# Patient Record
Sex: MALE | Race: WHITE | NOT HISPANIC OR LATINO | Employment: OTHER | URBAN - NONMETROPOLITAN AREA
[De-identification: names, ages, dates, MRNs, and addresses within clinical notes are randomized per-mention and may not be internally consistent; named-entity substitution may affect disease eponyms.]

---

## 2017-07-31 ENCOUNTER — OFFICE VISIT (OUTPATIENT)
Dept: UROLOGY | Facility: CLINIC | Age: 67
End: 2017-07-31

## 2017-07-31 VITALS — WEIGHT: 279 LBS | BODY MASS INDEX: 33.97 KG/M2 | TEMPERATURE: 98.5 F | HEIGHT: 76 IN

## 2017-07-31 DIAGNOSIS — R97.20 ELEVATED PROSTATE SPECIFIC ANTIGEN (PSA): Primary | ICD-10-CM

## 2017-07-31 LAB
BILIRUB BLD-MCNC: NEGATIVE MG/DL
CLARITY, POC: CLEAR
COLOR UR: YELLOW
GLUCOSE UR STRIP-MCNC: NEGATIVE MG/DL
KETONES UR QL: NEGATIVE
LEUKOCYTE EST, POC: NEGATIVE
NITRITE UR-MCNC: NEGATIVE MG/ML
PH UR: 5.5 [PH] (ref 5–8)
PROT UR STRIP-MCNC: NEGATIVE MG/DL
RBC # UR STRIP: NEGATIVE /UL
SP GR UR: 1.02 (ref 1–1.03)
UROBILINOGEN UR QL: NORMAL

## 2017-07-31 PROCEDURE — 99204 OFFICE O/P NEW MOD 45 MIN: CPT | Performed by: UROLOGY

## 2017-07-31 PROCEDURE — 81003 URINALYSIS AUTO W/O SCOPE: CPT | Performed by: UROLOGY

## 2017-07-31 RX ORDER — ALLOPURINOL 300 MG/1
300 TABLET ORAL DAILY
COMMUNITY
End: 2020-02-28 | Stop reason: SDUPTHER

## 2017-07-31 RX ORDER — ENALAPRIL MALEATE 1 MG/ML
1 SOLUTION ORAL DAILY
COMMUNITY
End: 2020-03-11 | Stop reason: SDUPTHER

## 2017-07-31 RX ORDER — HYDROCHLOROTHIAZIDE 25 MG/1
25 TABLET ORAL 2 TIMES DAILY
COMMUNITY
End: 2020-07-09

## 2017-07-31 RX ORDER — PROPRANOLOL HYDROCHLORIDE 20 MG/1
20 TABLET ORAL 2 TIMES DAILY
COMMUNITY
End: 2020-08-20 | Stop reason: SDUPTHER

## 2017-07-31 RX ORDER — COLCHICINE 0.6 MG/1
TABLET ORAL
COMMUNITY
End: 2020-01-31

## 2017-07-31 NOTE — PROGRESS NOTES
Mr. Monzon is 67 y.o. male    CHIEF COMPLAINT: I am here to follow up on my elevated psa. My PSA is 5.220  HPI  Elevated PSA  The patient presents with a presumed abnormality of the prostate gland, that is an elevated PSA.  This has been found in the context of PSA screening. Severity is best defined by the PSA level of 5.2 ng/ml. This test was done approximately 2weeks ago. Associated voiding symptom assessment reveals No evidence of voiding symptoms. He has never undergone prostate biopsy..       Previous PSA values include :   No results found for: PSA      No results found for: PSA      The following portions of the patient's history were reviewed and updated as appropriate: allergies, current medications, past family history, past medical history, past social history, past surgical history and problem list.    Review of Systems   Constitutional: Negative for appetite change and fever.   HENT: Negative for hearing loss and sore throat.    Eyes: Negative for pain and redness.   Respiratory: Negative for cough and shortness of breath.    Cardiovascular: Negative for chest pain and leg swelling.   Gastrointestinal: Negative for anal bleeding, nausea and vomiting.   Endocrine: Negative for cold intolerance and heat intolerance.   Genitourinary: Negative for dysuria, flank pain and hematuria.   Musculoskeletal: Negative for joint swelling and myalgias.   Skin: Negative for color change and rash.   Allergic/Immunologic: Negative for immunocompromised state.   Neurological: Negative for dizziness and speech difficulty.   Hematological: Negative for adenopathy. Does not bruise/bleed easily.   Psychiatric/Behavioral: Negative for dysphoric mood and suicidal ideas.         Current Outpatient Prescriptions:   •  allopurinol (ZYLOPRIM) 100 MG tablet, Take  by mouth., Disp: , Rfl:   •  colchicine 0.6 MG tablet, Take  by mouth., Disp: , Rfl:   •  Enalapril Maleate 1 MG/ML solution, Take  by mouth., Disp: , Rfl:   •   "hydrochlorothiazide (HYDRODIURIL) 25 MG tablet, Take  by mouth., Disp: , Rfl:   •  propranolol (INDERAL) 20 MG tablet, Take  by mouth., Disp: , Rfl:     Past Medical History:   Diagnosis Date   • Hypertension        Past Surgical History:   Procedure Laterality Date   • BACK SURGERY     • TOTAL HIP ARTHROPLASTY         Social History     Social History   • Marital status:      Spouse name: N/A   • Number of children: N/A   • Years of education: N/A     Social History Main Topics   • Smoking status: Never Smoker   • Smokeless tobacco: None   • Alcohol use None   • Drug use: None   • Sexual activity: Not Asked     Other Topics Concern   • None     Social History Narrative   • None       Family History   Problem Relation Age of Onset   • No Known Problems Father    • No Known Problems Mother        Temp 98.5 °F (36.9 °C)  Ht 76\" (193 cm)  Wt 279 lb (127 kg)  BMI 33.96 kg/m2    Physical Exam  Constitutional: He is oriented to person, place, and time. He appears well-developed and well-nourished. No distress.   HENT:   Nose: Nose normal.   Neck: Normal range of motion. Neck supple. No tracheal deviation present. No thyromegaly present.   Cardiovascular: Normal rate, regular rhythm and intact distal pulses.    No significant edema or tenderness    Pulmonary/Chest: Breath sounds normal. No accessory muscle usage. No respiratory distress.   Abdominal: Soft. Bowel sounds are normal. He exhibits no distension, no ascites and no mass. There is no hepatosplenomegaly. There is no tenderness. There is no rebound, no guarding and no CVA tenderness. No hernia.   Stool specimen is not indicated for my portion of the exam   Genitourinary: Testes normal and penis normal. Rectal exam shows no mass, no tenderness, anal tone normal and guaiac negative stool. Tender: no nodules. Right testis shows no mass, no swelling and no tenderness. Left testis shows no mass, no swelling and no tenderness. No penile tenderness (no lesion or " deformities).   Genitourinary Comments:  The urethral meatus normal in position without evidence of stricture. Epididymis without mass or tenderness. Vas Deferens is palpably normal.Anus and perineum without mass or tenderness. The seminal vesicle are without mass or enlargement. The prostate is approximately 30 ml. It is Symmetric, with a Soft consistency. There are no nodules present. . The seminal vesicles are Palpably normal in size and without mass.     Lymphadenopathy:     He has no cervical adenopathy. No inguinal adenopathy noted on the right or left side.        Right: No inguinal adenopathy present.        Left: No inguinal adenopathy present.   Neurological: He is alert and oriented to person, place, and time.   Skin: Skin is warm and dry. No rash noted. He is not diaphoretic. No pallor.   Psychiatric: He has a normal mood and affect. His behavior is normal.   Vitals reviewed.        Results for orders placed or performed in visit on 07/31/17   POC Urinalysis Dipstick, Automated   Result Value Ref Range    Color Yellow Yellow, Straw, Dark Yellow, Glendy    Clarity, UA Clear Clear    Glucose, UA Negative Negative, 1000 mg/dL (3+) mg/dL    Bilirubin Negative Negative    Ketones, UA Negative Negative    Specific Gravity  1.025 1.005 - 1.030    Blood, UA Negative Negative    pH, Urine 5.5 5.0 - 8.0    Protein, POC Negative Negative mg/dL    Urobilinogen, UA Normal Normal    Leukocytes Negative Negative    Nitrite, UA Negative Negative       Imaging Results (last 7 days)     ** No results found for the last 168 hours. **          Assessment and Plan  Diagnoses and all orders for this visit:    Elevated prostate specific antigen (PSA)  -     POC Urinalysis Dipstick, Automated  -     PSA, Total & Free; Future    This represents an undiagnosed problem with uncertain prognosis.  I discussed elevated PSA with the patient today.  We discussed that PSA is a protein measured in the bloodstream that comes exclusively  from the prostate gland.  I mentioned to him that all men with a prostate gland will have a certain PSA level.  We discussed this number can be compared to all men, or men of a certain age.  We can also follow trend of PSA which is called the PSA velocity.  We discussed the prostate cancer is a possible cause of PSA elevation, but benign etiologies such as infection, enlargement, aging, and inflammation should also be considered.  There is also convincing evidence that some patients will have a PSA that waxes and wanes completely unrelated to symptomatic disease of the prostate for cancer.  We discussed that some patients with a normal PSA may also have prostate cancer.  The necessity of digital rectal exam is also discussed.  Finally we discussed the role of free to total PSA ratio.  It is explained that this test is done in hopes of avoiding unnecessary biopsies, but that a few patients with prostate cancer will have false-negative results when measuring there free PSA.  We have elected to do a PSA with free to total ratio.  We did discuss the natural course of prostate cancer in most patients.  It is explained that waiting to see what the results of this test*are very unlikely to affect the clinical course of the disease.  However, there are exceptions in the biology of each cancer.  The risks and possible benefits of transrectal ultrasound with biopsy of the prostate gland is also discussed.  I did explain that biopsy is the standard of care for diagnosing prostate cancer. The risks, alternatives, and benefits of this treatment recommendation are discussed.  I did answer all questions of the patient.    Siddhartha Epps MD  07/31/17  4:07 PM      Cc: Bam Heath MD

## 2017-10-29 ENCOUNTER — RESULTS ENCOUNTER (OUTPATIENT)
Dept: UROLOGY | Facility: CLINIC | Age: 67
End: 2017-10-29

## 2017-10-29 DIAGNOSIS — R97.20 ELEVATED PROSTATE SPECIFIC ANTIGEN (PSA): ICD-10-CM

## 2017-11-03 LAB
PSA FREE MFR SERPL: 10.2 %
PSA FREE SERPL-MCNC: 0.54 NG/ML
PSA SERPL-MCNC: 5.3 NG/ML (ref 0–4)

## 2017-11-09 ENCOUNTER — OFFICE VISIT (OUTPATIENT)
Dept: UROLOGY | Facility: CLINIC | Age: 67
End: 2017-11-09

## 2017-11-09 VITALS — WEIGHT: 284 LBS | BODY MASS INDEX: 35.31 KG/M2 | HEIGHT: 75 IN | TEMPERATURE: 97 F

## 2017-11-09 DIAGNOSIS — R97.20 ELEVATED PROSTATE SPECIFIC ANTIGEN (PSA): Primary | ICD-10-CM

## 2017-11-09 LAB
BILIRUB BLD-MCNC: NEGATIVE MG/DL
CLARITY, POC: CLEAR
COLOR UR: YELLOW
GLUCOSE UR STRIP-MCNC: NEGATIVE MG/DL
KETONES UR QL: NEGATIVE
LEUKOCYTE EST, POC: NEGATIVE
NITRITE UR-MCNC: NEGATIVE MG/ML
PH UR: 6.5 [PH] (ref 5–8)
PROT UR STRIP-MCNC: ABNORMAL MG/DL
RBC # UR STRIP: NEGATIVE /UL
SP GR UR: 1.02 (ref 1–1.03)
UROBILINOGEN UR QL: NORMAL

## 2017-11-09 PROCEDURE — 81003 URINALYSIS AUTO W/O SCOPE: CPT | Performed by: UROLOGY

## 2017-11-09 PROCEDURE — 99213 OFFICE O/P EST LOW 20 MIN: CPT | Performed by: UROLOGY

## 2017-11-09 RX ORDER — CEPHALEXIN 500 MG/1
500 CAPSULE ORAL 2 TIMES DAILY
Qty: 2 CAPSULE | Refills: 0 | Status: SHIPPED | OUTPATIENT
Start: 2017-11-09 | End: 2017-11-10

## 2017-11-09 NOTE — PROGRESS NOTES
Mr. Monzon is 67 y.o. male    CHIEF COMPLAINT: I am here to follow up on my elevated psa    HPI  Patient with a presumed prostatic problem.  He has elevated PSA.  This was first identified about 4 months ago.  He has some mild obstructive voiding symptoms and occasional frequency.  Watchful waiting with repeat PSA was the initial management strategy which has not improved the PSA.  Severity is now PSA of 5.3 with a 10% free to total ratio.    Lab Results   Component Value Date    PSA 5.3 (H) 10% free to total ratio 11/02/2017         The following portions of the patient's history were reviewed and updated as appropriate: allergies, current medications, past family history, past medical history, past social history, past surgical history and problem list.    Review of Systems   Constitutional: Negative for chills and fever.   Gastrointestinal: Negative for abdominal pain, anal bleeding and blood in stool.   Genitourinary: Negative for flank pain and hematuria.         Current Outpatient Prescriptions:   •  allopurinol (ZYLOPRIM) 100 MG tablet, Take  by mouth., Disp: , Rfl:   •  cephalexin (KEFLEX) 500 MG capsule, Take 1 capsule by mouth 2 (Two) Times a Day for 1 day. Start on morning of biopsy, Disp: 2 capsule, Rfl: 0  •  colchicine 0.6 MG tablet, Take  by mouth., Disp: , Rfl:   •  Enalapril Maleate 1 MG/ML solution, Take  by mouth., Disp: , Rfl:   •  hydrochlorothiazide (HYDRODIURIL) 25 MG tablet, Take  by mouth., Disp: , Rfl:   •  propranolol (INDERAL) 20 MG tablet, Take  by mouth., Disp: , Rfl:     Past Medical History:   Diagnosis Date   • Hypertension        Past Surgical History:   Procedure Laterality Date   • BACK SURGERY     • TOTAL HIP ARTHROPLASTY         Social History     Social History   • Marital status:      Spouse name: N/A   • Number of children: N/A   • Years of education: N/A     Social History Main Topics   • Smoking status: Never Smoker   • Smokeless tobacco: None   • Alcohol use None  "  • Drug use: None   • Sexual activity: Not Asked     Other Topics Concern   • None     Social History Narrative       Family History   Problem Relation Age of Onset   • No Known Problems Father    • No Known Problems Mother        Temp 97 °F (36.1 °C)  Ht 75\" (190.5 cm)  Wt 284 lb (129 kg)  BMI 35.5 kg/m2    Physical Exam  Alert and oriented ×3  Not agitated or distressed  No obvious deformities  No respiratory distress  Skin without pallor or diaphoresis      Results for orders placed or performed in visit on 11/09/17   POC Urinalysis Dipstick, Automated   Result Value Ref Range    Color Yellow Yellow, Straw, Dark Yellow, Glendy    Clarity, UA Clear Clear    Glucose, UA Negative Negative, 1000 mg/dL (3+) mg/dL    Bilirubin Negative Negative    Ketones, UA Negative Negative    Specific Gravity  1.020 1.005 - 1.030    Blood, UA Negative Negative    pH, Urine 6.5 5.0 - 8.0    Protein, POC Trace (A) Negative mg/dL    Urobilinogen, UA Normal Normal    Leukocytes Negative Negative    Nitrite, UA Negative Negative       Imaging Results (last 7 days)     ** No results found for the last 168 hours. **          Assessment and Plan  Diagnoses and all orders for this visit:    Elevated prostate specific antigen (PSA)  -     POC Urinalysis Dipstick, Automated  -     cephalexin (KEFLEX) 500 MG capsule; Take 1 capsule by mouth 2 (Two) Times a Day for 1 day. Start on morning of biopsy  -     Biopsy Prostate    I discussed elevated PSA with him today. We discussed that PSA is a protein measured in the bloodstream that comes exclusively from the prostate gland.  I mentioned to him that all men with a prostate gland will have a certain PSA level. We discussed that this number can be compared to all men and age-specific PSA as well as PSA velocity. We discussed that Prostate Cancer is a possible cause of PSA elevaton, but benign etiologies such as infection, enlargement, aging, and inflammation should also be considered. We " discussed that some patients with a normal PSA may also have prostate cancer. The necessity of digital rectal examination is also discussed. The role of free to total PSA Ratio is explained.  We also discussed the relatively recent recommendations of the national prevented task force.  It is explained that the PSA has been downgraded as a standard screening tool.  Essentially this downgrading recommends the study not be used or prostate cancer screening done due to the high risk of a diagnosis of prostate cancer that is life-threatening which, if treated, can result in sexual or severe urinary side effects.  This is compared with comments by both the American U[employer]ic Association and the American Association Clinical Urologists. Discussions among the urology community has led most of us to feel strongly that a patient has a right to know if he has prostate cancer and that all options including a conservative approach to the management of prostate cancer should be discussed between A patient and a physician familiar with the treatment options for prostate cancer.  Additional emphasis is made regarding the possibility of the diagnosis of the nonlife threatening prostate cancer which could affect the patient psychologically or even result for treatment of disease were the risks of that treatment exceeds the risk of death.  The risks (infection, bleeding, pain, retention, sepsis) and possible benefits of transrectal ultrasound with biopsy of the prostate gland is also discussed. It is explained that some patients require a repeat biopsy based on diagnosis of prostate intraepithelial neoplasia or even a continued rising PSA after an initial biopsy. He voiced no additional questions. He has elected to proceed with TRUS and biopsy of the prostate       Siddhartha Epps MD  11/09/17  9:04 AM      Cc: Bam Heath MD

## 2017-12-01 DIAGNOSIS — R97.20 ELEVATED PROSTATE SPECIFIC ANTIGEN (PSA): Primary | ICD-10-CM

## 2017-12-01 RX ORDER — CEPHALEXIN 500 MG/1
500 CAPSULE ORAL 2 TIMES DAILY
Qty: 2 CAPSULE | Refills: 0 | Status: SHIPPED | OUTPATIENT
Start: 2017-12-01 | End: 2017-12-02

## 2017-12-04 ENCOUNTER — PROCEDURE VISIT (OUTPATIENT)
Dept: UROLOGY | Facility: CLINIC | Age: 67
End: 2017-12-04

## 2017-12-04 DIAGNOSIS — R97.20 ELEVATED PROSTATE SPECIFIC ANTIGEN (PSA): Primary | ICD-10-CM

## 2017-12-04 LAB
BILIRUB BLD-MCNC: NEGATIVE MG/DL
CLARITY, POC: CLEAR
COLOR UR: YELLOW
GLUCOSE UR STRIP-MCNC: NEGATIVE MG/DL
KETONES UR QL: NEGATIVE
LEUKOCYTE EST, POC: NEGATIVE
NITRITE UR-MCNC: NEGATIVE MG/ML
PH UR: 5.5 [PH] (ref 5–8)
PROT UR STRIP-MCNC: ABNORMAL MG/DL
RBC # UR STRIP: NEGATIVE /UL
SP GR UR: 1.02 (ref 1–1.03)
UROBILINOGEN UR QL: NORMAL

## 2017-12-04 PROCEDURE — 88342 IMHCHEM/IMCYTCHM 1ST ANTB: CPT | Performed by: UROLOGY

## 2017-12-04 PROCEDURE — 76872 US TRANSRECTAL: CPT | Performed by: UROLOGY

## 2017-12-04 PROCEDURE — 55700 PR PROSTATE NEEDLE BIOPSY ANY APPROACH: CPT | Performed by: UROLOGY

## 2017-12-04 PROCEDURE — G0416 PROSTATE BIOPSY, ANY MTHD: HCPCS | Performed by: UROLOGY

## 2017-12-04 PROCEDURE — 81003 URINALYSIS AUTO W/O SCOPE: CPT | Performed by: UROLOGY

## 2017-12-04 PROCEDURE — 76942 ECHO GUIDE FOR BIOPSY: CPT | Performed by: UROLOGY

## 2017-12-04 NOTE — PATIENT INSTRUCTIONS
What can I expect after a prostate biopsy?    After the biopsy, it is normal to experience the following sensation or symptoms:    Burning with urination-it is normal to feel burning with urination for the first 24 hours after the biopsy.  It may continue for up to 3 days.    Frequent urination-this will gradually improve over the first 24-36 hours.    Blood in the urine-is normal to have slightly red tinged urine or urine that resembles a navneet or red wine color.  This may last for 12 hours and may occur intermittently up to a couple weeks.    Blood in stool-you may notice red stains on the toilet tissue or see some bloody streaks in your stool.  This may last up to 5 days.    Blood in the semen-this may persist for up to 6 weeks after your biopsy.  It often starts bright red and then gradually gives way to more of a purplish discoloration.  Eventually it will be rust colored and then go away.    How should I take care of myself after the biopsy?    Drink plenty of fluids to prevent blood clots and infection and the bladder    Avoid strenuous exercise such as jogging, heavy lifting, golfing, and bike riding for 5 days.  He should also avoid riding motorcycles, lawn and tractor equipment, and ATVs during this period.    Be sure to take your final antibiotic tablet, if a prescription was given, the evening following the biopsy.    Do not drink alcoholic beverages until after completing your antibiotics.    Do not take aspirin and anti-inflammatory products such as Celebrex, ibuprofen or naproxen for 5 days following the biopsy unless specifically instructed to do so by the doctor.    Avoid sexual activity for 7 days.    If you are on warfarin, clopidogrel (Plavix), Elliquis, Effient, Trental, Pletal, or other drug to reduce clotting be sure you have discussed when to resume the medication. As a general rule, we like to keep you off this drug approximately 3-4 days following the biopsy.     When should I call my  doctor?    Call [facility] at [phone] if you have any of the following signs and symptoms:    Persistent urinary frequency or burning.    Fever greater than 101°.    Urine that is bright red or has clots large enough to make it difficult to urinate.    Rectal bleeding with clots or pure bloody stools.    Persistent bleeding that lasts longer than 1 week.

## 2017-12-04 NOTE — PROGRESS NOTES
CC: I am here for my prostate biopsy    TRUS PROSTATE WITH BIOPSY PROCEDURE NOTE  Indications:  Elevated PSA    Pre-operative prep:  fleets enema and oral antibiotic      Procedure:    After proper identification of patient and procedure, patient was placed in the left later decubitus position.  2% lidocaine jelly was instilled per rectum  for topical anesthesia.  The ultrasound probe was gently inserted per rectum. Prostate was scanned from the base of the bladder to the apex.  20 cc of 1% lidocaine plain was then used to perform a prostate nerve block injecting the junction of the seminal vesicle and bladder laterally.    PSA 5.3 (10% f/t ratio)    Prostate length: 4.4 cm    Prostate width: 6.2 cm    Prostate height: 3.1 cm    Prostate volume: 44.9 cc    PSA density: 0.12    Abnormal findings:  No lesions noted, Normal seminal vesicles, Ejaculatory ducts not visible.  No significant dilation and Transition zone enlargement    Median lobe:  no    A total of 12 biopsies were taken from the base, apex, and mid portion of the gland on both the right and the left sides.     Patient tolerated the procedure well    Complications: none    Estimated blood loss: minimal    Mr. Monzon was given instructions for follow up.  He will notify the office if he has excessive hematuria, hematochezia, fevers, perineal, or abdominal pain.    Follow up:   He will follow up in 1 week  for pathology results.

## 2017-12-06 LAB
CYTO UR: NORMAL
LAB AP CASE REPORT: NORMAL
LAB AP INTRADEPARTMENTAL CONSULT: NORMAL
LAB AP SYNOPTIC CHECKLIST: NORMAL
Lab: NORMAL
PATH REPORT.FINAL DX SPEC: NORMAL
PATH REPORT.GROSS SPEC: NORMAL

## 2017-12-08 ENCOUNTER — TELEPHONE (OUTPATIENT)
Dept: UROLOGY | Facility: CLINIC | Age: 67
End: 2017-12-08

## 2017-12-18 ENCOUNTER — OFFICE VISIT (OUTPATIENT)
Dept: UROLOGY | Facility: CLINIC | Age: 67
End: 2017-12-18

## 2017-12-18 VITALS — HEIGHT: 75 IN | BODY MASS INDEX: 35.31 KG/M2 | WEIGHT: 284 LBS

## 2017-12-18 DIAGNOSIS — C61 PROSTATE CANCER (HCC): Primary | ICD-10-CM

## 2017-12-18 PROCEDURE — 99215 OFFICE O/P EST HI 40 MIN: CPT | Performed by: UROLOGY

## 2017-12-18 NOTE — PROGRESS NOTES
Prostate Cancer consult  Mr. Monzon is 67 y.o. male    Chief complaint: I am here about my prostate biopsy.    He is here today to discuss his newly diagnosed prostate cancer.  His biopsy was done 1 week(s) ago. This was done in the context of Elevated PSA. Severity best described as adenocarcinoma in 4/12 cores with the maximum tamara grade of 3+3. The patient did not need imaging based on risk stratification. . Symptoms include none.      Current Outpatient Prescriptions:   •  allopurinol (ZYLOPRIM) 100 MG tablet, Take  by mouth., Disp: , Rfl:   •  colchicine 0.6 MG tablet, Take  by mouth., Disp: , Rfl:   •  Enalapril Maleate 1 MG/ML solution, Take  by mouth., Disp: , Rfl:   •  hydrochlorothiazide (HYDRODIURIL) 25 MG tablet, Take  by mouth., Disp: , Rfl:   •  propranolol (INDERAL) 20 MG tablet, Take  by mouth., Disp: , Rfl:     Past Medical History:   Diagnosis Date   • Hypertension        Past Surgical History:   Procedure Laterality Date   • BACK SURGERY     • TOTAL HIP ARTHROPLASTY           Assessment and Plan  There are no diagnoses linked to this encounter.    After finding out the patient has done well from the biopsy, we went over the pathology report including the assignment and application of the Tamara score, the volume of prostate cancer as predicted by the number of cores and percent involvement in each, We went over how this is used to determine whether or not the patient needs a bone scan and or CT scan of the abdomen and pelvis in addition to other preoperative workup. We talked about the use of a Azul nomogram to predict whether or not this prostate cancer is likely to still be confined to the gland.     We categorize him as: Intermediate risk Tamara 3+3 in 4/12 cores, all between 1-10% involvement.     Active Surveillance  Active Surveillance is explained as a technique that involves following the cancer because in most patients there is a significant lag time between diagnosis and spread  beyond the prostate gland. This type of surveillance is different than watchful waiting because it provides a way to closely follow the patient using RIA, PSA and repeat biopsies to assess an increase in volume or grade of tumor in hopes of still providing curative therapy while delaying the possible side effects that go with therapy as opposed to not treating the patient until there are symptoms of metastatic disease. The former treatment appears to be beneficial for those patients that have low risk disease but a greater than ten year life expectancy. The psychological manifestations of observing a clinically confined cancer are considered and discussed. The latter treatment is only appropriate in those patients whose life expectancy is less than ten years. I emphasized the risks of repeat biopsy. We talked about the experience in  countries in this technique and that a joint study with the [place] and [place] shows promise but will not be complete with definitive recommendations for a few years. It is also emphasized that some patients did not have a cancer free survival and that some patients  in the active surveillance groups but that there was no statistical significance when compared to the group treated immediately after diagnosis.     Additional information reviewed with the patient and provided to him on active surveillance as follows:   Which patients are the best candidates for no immediate treatment or active surveillance?   Prostate cancer is the most prevalent male cancer, but the majority of men with the disease do not die of prostate cancer. Studies suggest that 30-50 percent of men over the age of 60 years diagnosed with prostate cancer today by PSA screening undergo a treatment that will not extend their life or improve its quality. This does not mean that prostate cancer does not kill men, but rather that some men who are older and/or in poor health with a slowly progressive form of the  disease, may not need immediate treatment. The key is to identify the men who for now can safely forego treatment.   Eligible men should meet the following criteria for Very Low or Low Risk Disease:   Very Low Risk Prostate Cancer   · Life expectancy less than 20 years   · Cancer not felt on digital rectal examination (stage T1c)   · PSA density (PSA divided by prostate volume) is less than 0.15   · Hinckley score is 6 or less with no Paulo pattern 4 or 5   · No more than 2 cores with cancer, or cancer involving no more than 50% of any core on at least a 12 core biopsy   Low Risk Prostate Cancer   · Life expectancy less than 10-15 years   · Cancer not felt on digital rectal examination and/or small nodule (stage T1c or T2a)   · PSA below 10ng/ml   · Paulo score is 6 or less with no Hinckley pattern 4 or 5 on at least a 12 core biopsy   What does active surveillance mean?   In the past, the term watchful waiting meant no treatment until the development of metastatic disease, at which time androgen ablation (hormonal) therapy was initiated.     Today, men who have very low to low risk prostate cancer, and who choose no immediate treatment as an alternative to immediate treatment, are closely monitored with intervention -if necessary- at a time when cure is still possible. We now refer to this as active surveillance with selective delayed curative intervention. This means that men undergo periodic evaluations including PSA tests, digital rectal examinations, and prostate biopsies. If there is evidence that the cancer is progressing, treatment is recommended with the intention of curing the disease.     We discussed what rationale is used to define lower risk prostate cancer based on biopsy results.   Dr. Tyrone Steward, an expert in prostate pathology at [facility], has shown in two separate studies that cancers less than 0.5 cc (smaller than an eraser tip) can be identified correctly about 75-80  percent of the time using PSA and information from the prostate biopsy. Dr. Steward's criteria provide a method for helping men choose between immediate treatment and active surveillance.   If a man decides on active surveillance, what recommendations are made for follow-up of the cancer?   · 12-14 core prostate biopsy periodically until age 75 years   · PSA and Digital Rectal Examination every six months     Studies from [facility] active surveillance program have shown that PSA level changes are not sufficient to alert us as to whether or not the cancer is changing. This necessitates surveillance biopsies for careful monitoring. For those men that have a biopsy done about one year after the original biopsy that shows no cancer, we recommend that the interval between biopsies be lengthened to 18-24 months. This interval may increase with time e.g. every 2-3 years. Prostate biopsies may be discontinued at age 75 years, since prostate cancer is unlikely to cause harm beyond this age if a man has not yet had a change in the cancer with careful monitoring.   How does a man who qualifies for surveillance make a choice between active surveillance and treatment?     I also explained that I do not encourage healthy men in their 50's to strongly consider active surveillance because of their longer life expectancy and time horizon for cancer progression. For older men, especially over age 65 years who meet criteria, active surveillance is one of the options that should be seriously considered.     The men best suited for surveillance would appear to be those that:   · have the ability to live with cancer without worry reducing their quality of life   · are most concerned about the potential side effects of treatments   · value near term quality of life to a greater extent than any long term consequences that could occur   Each man should carefully weigh the potential loss of quality of life with treatment (radiation or  surgery), against the possibility that the window of opportunity for cure will disappear without treatment.   What does a man have to lose with active surveillance of a PSA-detected cancer?   The major concern is that while surveillance is taking place, the tumor will progress beyond the prostate to the point where cure is no longer possible. Experience to date with active surveillance allows a number of conclusions regarding this risk.   · About 30 percent of the men who have entered surveillance have undergone treatment within 5 years   · High grade cancers that are Paulo score 7 or more are found on surveillance biopsies at a rate of about 4% per year   · Most men who undergo treatment do not have high grade cancers   · Recognizing the probability of death from prostate cancer among men who are treated for high grade cancers, it is estimated that a man at age 65 years who enters surveillance with very low risk prostate cancer has   · a 20 year risk of death from prostate cancer of approximately 5%   · a 20 year risk of dying of another cause of approximately 60%     Healthy Living Tips   If a man chooses active surveillance, what dietary changes should he make?   Most men ask what they should be doing in terms of lifestyle changes to help prevent progression of their disease. This is an area of intense interest now but it's one that is clouded by the fact that dietary supplements are a billion dollar industry in the [place] This often makes it hard for consumers to distinguish between science and marketing.     The Prostate Cancer Foundation (PCF.org <http://www.pcf.org/>) has a free guide that can be ordered from their website titled Nutrition, Exercise, and Prostate Cancer. I would advise all men with prostate cancer to read this. Also, the American Cancer Society <http://www.cancer.org/Healthy/EatHealthyGetActive/ACSGuidelinesonNutritionPhysicalActivityforCancerPrevention/yecc-fgvbrqrwrx-uae>has up to date  information on lifestyle choices and cancer prevention.   The bottom line is that a diet that is low in animal and dairy fat, high in a wide variety of fruits and vegetables and healthy grains and nuts, is thought be a diet that can slow the progression of cancer. Maintaining a healthy weight with daily exercise is also considered important.     Recent studies have shown that a man's lifestyle-especially nutrition and exercise-has a significant influence in prostate cancer prevention and treatment.   · Follow these tips to help you live a healthier life   · Lose body fat by eating fewer calories per day than you burn   · Maintain muscle mass by increasing protein intake and exercise   · Cut carbohydrate intake to cut down on excess fat and weight, which can slow tumor growth   · Exercise every day, combining cardio fitness and weight lifting   · Eat nine servings a day of colorful fruits and vegetables   · Reduce stress by focusing on living a balanced life and taking care of yourself   · Plan ahead to eat healthfully and minimize stress   · Track your behaviors and help chart your progress   · Establish a support system by maintaining healthy relationships with people who understand what you are going through     What about patients with intermediate disease risk?   Intermediate disease risk is best defined as Willard Grade 7 disease (preferably 3+4), PSA >10, or palpable disease over 2cm on one side or both.   Studies show that the risk of death on active surveillance with intermediate disease is approximately 15% compared to approximately 5% with low risk disease.   Future studies will likely reveal reasonable candidates for intermediate risk disease.     Robot Assisted Laparoscopic Prostatectomy  We went over the rationale of the use of robot assisted laparoscopic prostatectomy. I explained my experience with the procedure. I explained to him that I feel confident with this technique because I have done nearly  300 cases in six years. I explained that I had not done that many open radical prostatectomies the previous ten years of practice combined which I believe plays a role in my comfort level. I explained that physicians that do that many open retropubic prostatectomies or perineal prostatectomies likely feel that they offer more benefit to patients with their respective technique because they are more proficient, familiar and comfortable. My experience with cancer control is discussed as well as the fact that cancer free survival from this technique are based on comparing early success and that long term comparisons aren't available yet. Clinically it is my impression that this procedure offers excellent local control with comparable intermediate cure rates. I also explained the technique of open retropubic prostatectomy. We discussed how previous abdominal surgery makes the operation more challenging in addition to body habitus and previous other anatomic variations that sometimes cause the procedure to last longer than expected or require conversion to the open procedure. A shorter hospital stay, more rapid progression during convalescence, and considerable decrease in blood loss due to pneumoperitoneum are explained. Complications of pneumoperitoneum and trochar placement are explained as a unique problem with robot prostatectomy. Preservation of continence and erections are discussed and compared with open surgery, radiation and other modalities of treatment. The role of early penile rehabilitation for more rapid resumption of erectile treatment is also discussed. Bladder neck contracture, Vesicourethral anastomotic leak, ureteral obstruction, and rectal injury are also discussed.     Radiation therapy options  We also talked about the types of radiation therapy are available. I explained to him the difference in traditional external beam radiotherapy versus three-dimensional conformal IMR T. I explained that the  latter seems to reduce the risks of damage to surrounding tissue with no decrease in the benefit of overall cancer treatment in studies. I did explain that typically this requires the placement of prostate side a shearing markers which will require an additional transrectal ultrasound with transrectal puncture to place these markers. He was explained that this is to accommodate the radiation oncologist in locating the prostate gland at each therapy session. I did explain that 1 disadvantages of this technique as the time consumption 8-9 consecutive weeks of treatment. We also discussed the used to prostate interstitial seed implantation. I explained to him the role of a volume study. It is also explained that radioactive seeds are implanted which increases the overall dose of radiation to the prostate while limiting the exposure to the surrounding tissues. I did also explain that our experience with this type of treatment in the limited number of these therapies that we did lead us to make a decision to no longer offer this to patient's locally. I did explain that I have a referral basis would be delighted to send this patient. We also briefly discussed proton therapy since that is not something available in our area. I explained the basics that normal tissue and a fixed distance from the target receives less scatter radiation from proton therapy than x-rays. This is this is the theoretical advantage to use of proton therapy versus IMRT.     Systemic Therapy  We talked about the role of chemotherapy and hormonal therapy in this setting. I explained that the former is used in patients that have failed primary curative therapy and have radiographic evidence of metastatic disease. It is generally preserved for patients that have failed hormonal therapy because of its high toxicity. Hormonal therapy and prostate cancer is explained. Both orchiectomy as well as systemic therapy using gonadotropin releasing hormones is  explained to the patient. The benefits of hormonal therapy is explained as palliative or adjunctive but not curative. The benefit as shown clinically by neoadjuvant hormonal therapy combined with radiotherapy in high risk patients is explained. I also went over the risks of hormonal therapy including the metabolic effects that may lead to metabolic syndrome. The latter is explained at increased risk in heart attack stroke and death. The metabolic effects on bone are explained including the increased possibility of fracture due to osteoporosis. It is explained at vitamin D and calcium supplementation is the recommendation upon initiation of the stalk treatments. Sexual side effects will also testosterone were also discussed.     Cryotherapy  Cryotherapy is explained as freezing of the prostate gland which is believed to kill prostate cancer cells. Clinical studies have shown a benefit for patients with organ confined prostate cancer. I told the patient that we don't offer this therapy, but also I really have never had a patient that has undergone cryotherapy, but I did offer to find a regional urologist for them to see that does it. I explained that it can also be used to treat patients that have failed initial radiation therapy.    Based on this discussion, the patient wants to ponder the option.     More than half of this 45 minute visit was spent on coordination of care and counseling the patient about the biology of prostate cancer, his biopsy report, risk stratification and management options .  The entire time allotted was spent as face to face time with the patient.         Siddhartha Epps MD  12/18/17  1:17 PM

## 2018-12-31 ENCOUNTER — HOSPITAL ENCOUNTER (OUTPATIENT)
Dept: ULTRASOUND IMAGING | Facility: HOSPITAL | Age: 68
Discharge: HOME | End: 2018-12-31
Attending: INTERNAL MEDICINE | Admitting: INTERNAL MEDICINE

## 2018-12-31 ENCOUNTER — TRANSCRIBE ORDERS (OUTPATIENT)
Dept: GENERAL RADIOLOGY | Facility: HOSPITAL | Age: 68
End: 2018-12-31

## 2018-12-31 DIAGNOSIS — M89.8X5 PAIN OF LEFT FEMUR: ICD-10-CM

## 2018-12-31 DIAGNOSIS — M79.605 LEG PAIN, LEFT: ICD-10-CM

## 2018-12-31 DIAGNOSIS — M79.605 LEG PAIN, LEFT: Primary | ICD-10-CM

## 2018-12-31 PROCEDURE — 93971 EXTREMITY STUDY: CPT

## 2020-01-31 RX ORDER — COLCHICINE 0.6 MG/1
0.6 TABLET ORAL 2 TIMES DAILY PRN
Qty: 20 TABLET | Refills: 5 | Status: SHIPPED | OUTPATIENT
Start: 2020-01-31

## 2020-01-31 RX ORDER — ALLOPURINOL 300 MG/1
300 TABLET ORAL DAILY
Qty: 90 TABLET | Refills: 3 | Status: SHIPPED | OUTPATIENT
Start: 2020-01-31 | End: 2020-12-08

## 2020-02-24 ENCOUNTER — TELEPHONE (OUTPATIENT)
Dept: INTERNAL MEDICINE | Facility: CLINIC | Age: 70
End: 2020-02-24

## 2020-02-24 DIAGNOSIS — Z12.5 SCREENING PSA (PROSTATE SPECIFIC ANTIGEN): Primary | ICD-10-CM

## 2020-02-24 NOTE — TELEPHONE ENCOUNTER
Pt called in requesting a order to have PSA levels checked. Pt has appt with Dr. Heath on Friday and appt in [place] on 3/4/20. Per pt, please put in order.

## 2020-02-27 LAB — PSA SERPL-MCNC: 1.55 NG/ML (ref 0–4)

## 2020-02-28 ENCOUNTER — OFFICE VISIT (OUTPATIENT)
Dept: INTERNAL MEDICINE | Facility: CLINIC | Age: 70
End: 2020-02-28

## 2020-02-28 VITALS
BODY MASS INDEX: 34.32 KG/M2 | WEIGHT: 276 LBS | HEIGHT: 75 IN | HEART RATE: 60 BPM | DIASTOLIC BLOOD PRESSURE: 72 MMHG | OXYGEN SATURATION: 99 % | SYSTOLIC BLOOD PRESSURE: 122 MMHG

## 2020-02-28 DIAGNOSIS — R73.01 IFG (IMPAIRED FASTING GLUCOSE): ICD-10-CM

## 2020-02-28 DIAGNOSIS — C61 PROSTATE CANCER (HCC): ICD-10-CM

## 2020-02-28 DIAGNOSIS — E78.00 HIGH CHOLESTEROL: ICD-10-CM

## 2020-02-28 DIAGNOSIS — I10 BENIGN ESSENTIAL HTN: Primary | ICD-10-CM

## 2020-02-28 PROCEDURE — 99214 OFFICE O/P EST MOD 30 MIN: CPT | Performed by: INTERNAL MEDICINE

## 2020-02-28 RX ORDER — FLUTICASONE PROPIONATE 50 MCG
1 SPRAY, SUSPENSION (ML) NASAL 2 TIMES DAILY PRN
COMMUNITY
Start: 2018-07-05 | End: 2020-09-16

## 2020-02-28 RX ORDER — TAMSULOSIN HYDROCHLORIDE 0.4 MG/1
1 CAPSULE ORAL DAILY PRN
COMMUNITY
End: 2020-09-16

## 2020-02-28 NOTE — PROGRESS NOTES
Subjective   Aleks Monzon is a 70 y.o. male.   Chief Complaint   Patient presents with   • Hypertension     6 month follow up       Patient is here for 6-month follow-up impaired fasting glucose hypertension prostate cancer.  He is not having any new problems he is feeling good no chest pain shortness of breath       The following portions of the patient's history were reviewed and updated as appropriate: allergies, current medications, past family history, past medical history, past social history, past surgical history and problem list.    Review of Systems   Constitutional: Negative for activity change, appetite change, fatigue, fever, unexpected weight gain and unexpected weight loss.   HENT: Negative for swollen glands, trouble swallowing and voice change.    Eyes: Negative for blurred vision and visual disturbance.   Respiratory: Negative for cough and shortness of breath.    Cardiovascular: Negative for chest pain, palpitations and leg swelling.   Gastrointestinal: Negative for abdominal pain, constipation, diarrhea, nausea, vomiting and indigestion.   Endocrine: Negative for cold intolerance, heat intolerance, polydipsia and polyphagia.   Genitourinary: Negative for dysuria and frequency.   Musculoskeletal: Positive for arthralgias, back pain and gait problem. Negative for joint swelling and neck pain.   Skin: Negative for color change, rash and skin lesions.   Neurological: Negative for dizziness, weakness, headache, memory problem and confusion.   Hematological: Does not bruise/bleed easily.   Psychiatric/Behavioral: Negative for agitation, hallucinations and suicidal ideas. The patient is not nervous/anxious.        Objective   Past Medical History:   Diagnosis Date   • Hypertension    • IFG (impaired fasting glucose)       Past Surgical History:   Procedure Laterality Date   • BACK SURGERY     • TOTAL HIP ARTHROPLASTY          Current Outpatient Medications:   •  allopurinol (ZYLOPRIM) 300 MG tablet,  Take 1 tablet by mouth Daily., Disp: 90 tablet, Rfl: 3  •  colchicine 0.6 MG tablet, Take 1 tablet by mouth 2 (Two) Times a Day As Needed for Muscle / Joint Pain., Disp: 20 tablet, Rfl: 5  •  Enalapril Maleate 1 MG/ML solution, Take 1 application by mouth Daily., Disp: , Rfl:   •  fluticasone (FLONASE) 50 MCG/ACT nasal spray, 1 spray into the nostril(s) as directed by provider 2 (Two) Times a Day As Needed., Disp: , Rfl:   •  hydrochlorothiazide (HYDRODIURIL) 25 MG tablet, Take 25 mg by mouth 2 (Two) Times a Day., Disp: , Rfl:   •  propranolol (INDERAL) 20 MG tablet, Take 20 mg by mouth 2 (Two) Times a Day., Disp: , Rfl:   •  tamsulosin (FLOMAX) 0.4 MG capsule 24 hr capsule, Take 1 capsule by mouth Daily As Needed., Disp: , Rfl:      Vitals:    02/28/20 1021   BP: 122/72   Pulse: 60   SpO2: 99%         02/28/20  1021   Weight: 125 kg (276 lb)     Patient's Body mass index is 34.5 kg/m². BMI is above normal parameters. Recommendations include: exercise counseling and nutrition counseling.      Physical Exam   Constitutional: He is oriented to person, place, and time. He appears well-developed and well-nourished.   HENT:   Head: Normocephalic and atraumatic.   Right Ear: External ear normal.   Left Ear: External ear normal.   Nose: Nose normal.   Mouth/Throat: Oropharynx is clear and moist.   Eyes: Pupils are equal, round, and reactive to light. Conjunctivae and EOM are normal.   Neck: Normal range of motion. Neck supple. No thyromegaly present.   Cardiovascular: Normal rate, regular rhythm, normal heart sounds and intact distal pulses.   Pulmonary/Chest: Effort normal and breath sounds normal.   Abdominal: Soft. Bowel sounds are normal.   Musculoskeletal:   He walks with a slight gait problem and a leaning forward gait   Lymphadenopathy:     He has no cervical adenopathy.   Neurological: He is alert and oriented to person, place, and time.   Skin: Skin is warm and dry.   Psychiatric: He has a normal mood and affect.  His behavior is normal. Thought content normal.   Nursing note and vitals reviewed.            Assessment/Plan   Diagnoses and all orders for this visit:    1. Benign essential HTN (Primary)    2. High cholesterol    3. IFG (impaired fasting glucose)    4. Prostate cancer (CMS/HCC)      This point patient's blood pressure is well controlled his cholesterol is being managed by diet his impaired fasting glucose is remaining in the pre-diabetes category his prostate cancer is followed by his [facility] oncologist who administered proton radiotherapy he is PSA is now down below 2 from over for the last time he had a checked once again his [place] oncologist is monitoring his prostate cancer he no new symptoms there

## 2020-02-29 ENCOUNTER — RESULTS ENCOUNTER (OUTPATIENT)
Dept: INTERNAL MEDICINE | Facility: CLINIC | Age: 70
End: 2020-02-29

## 2020-02-29 DIAGNOSIS — Z12.5 SCREENING PSA (PROSTATE SPECIFIC ANTIGEN): ICD-10-CM

## 2020-03-11 RX ORDER — ENALAPRIL MALEATE 20 MG/1
TABLET ORAL
Qty: 30 TABLET | Refills: 11 | Status: SHIPPED | OUTPATIENT
Start: 2020-03-11 | End: 2020-09-16

## 2020-07-09 ENCOUNTER — OFFICE VISIT (OUTPATIENT)
Dept: INTERNAL MEDICINE | Facility: CLINIC | Age: 70
End: 2020-07-09

## 2020-07-09 VITALS
WEIGHT: 284 LBS | BODY MASS INDEX: 35.31 KG/M2 | OXYGEN SATURATION: 99 % | TEMPERATURE: 97.7 F | HEART RATE: 71 BPM | HEIGHT: 75 IN | SYSTOLIC BLOOD PRESSURE: 104 MMHG | DIASTOLIC BLOOD PRESSURE: 68 MMHG

## 2020-07-09 DIAGNOSIS — I10 BENIGN ESSENTIAL HTN: Primary | ICD-10-CM

## 2020-07-09 DIAGNOSIS — E78.00 HIGH CHOLESTEROL: ICD-10-CM

## 2020-07-09 DIAGNOSIS — R73.01 IFG (IMPAIRED FASTING GLUCOSE): ICD-10-CM

## 2020-07-09 DIAGNOSIS — E66.9 OBESITY (BMI 30-39.9): ICD-10-CM

## 2020-07-09 PROBLEM — R97.20 ELEVATED PSA: Status: ACTIVE | Noted: 2020-07-09

## 2020-07-09 PROBLEM — R79.89 ELEVATED SERUM CREATININE: Status: ACTIVE | Noted: 2020-07-09

## 2020-07-09 PROBLEM — M17.0 PRIMARY OSTEOARTHRITIS OF BOTH KNEES: Status: ACTIVE | Noted: 2020-07-09

## 2020-07-09 PROBLEM — M10.9 GOUT, ARTHROPATHY: Status: ACTIVE | Noted: 2020-07-09

## 2020-07-09 PROCEDURE — 99213 OFFICE O/P EST LOW 20 MIN: CPT | Performed by: INTERNAL MEDICINE

## 2020-07-09 RX ORDER — HYDROCHLOROTHIAZIDE 25 MG/1
25 TABLET ORAL DAILY
Qty: 90 TABLET | Refills: 3
Start: 2020-07-09 | End: 2020-07-15

## 2020-07-10 DIAGNOSIS — N18.30 CKD (CHRONIC KIDNEY DISEASE) STAGE 3, GFR 30-59 ML/MIN (HCC): Primary | ICD-10-CM

## 2020-07-10 LAB
BUN SERPL-MCNC: 30 MG/DL (ref 8–23)
BUN/CREAT SERPL: 17.3 (ref 7–25)
CALCIUM SERPL-MCNC: 8 MG/DL (ref 8.6–10.5)
CHLORIDE SERPL-SCNC: 107 MMOL/L (ref 98–107)
CO2 SERPL-SCNC: 26.5 MMOL/L (ref 22–29)
CREAT SERPL-MCNC: 1.73 MG/DL (ref 0.76–1.27)
GLUCOSE SERPL-MCNC: 86 MG/DL (ref 65–99)
POTASSIUM SERPL-SCNC: 3.8 MMOL/L (ref 3.5–5.2)
SODIUM SERPL-SCNC: 141 MMOL/L (ref 136–145)

## 2020-07-15 ENCOUNTER — TELEPHONE (OUTPATIENT)
Dept: INTERNAL MEDICINE | Facility: CLINIC | Age: 70
End: 2020-07-15

## 2020-07-15 DIAGNOSIS — R60.9 EDEMA, UNSPECIFIED TYPE: Primary | ICD-10-CM

## 2020-07-15 RX ORDER — TORSEMIDE 10 MG/1
10 TABLET ORAL DAILY
Qty: 30 TABLET | Refills: 5 | Status: SHIPPED | OUTPATIENT
Start: 2020-07-15 | End: 2020-08-24 | Stop reason: SDUPTHER

## 2020-07-15 NOTE — TELEPHONE ENCOUNTER
Called pt to ask about his edema and he says it is essentially unchanged, maybe a little worse than before.  Dr. Heath reviewed BP readings and last office note and will stop HCTZ and change to Torsemide 10mg qd.  Needs to return in 2 weeks for BP check and BMP.  He voiced understanding.

## 2020-07-15 NOTE — TELEPHONE ENCOUNTER
PTS WIFE CALLED IN TO GIVE BLOOD PRESSURE RESULTS FOR PT  7/11/20 117/82  7/12/20 114/83  7/13/20  112/80  7/14/20 114/84  7/15/20 115/86

## 2020-07-17 DIAGNOSIS — N18.30 CKD (CHRONIC KIDNEY DISEASE) STAGE 3, GFR 30-59 ML/MIN (HCC): Primary | ICD-10-CM

## 2020-07-22 NOTE — PROGRESS NOTES
Mr. Monzon is 70 y.o. male    CHIEF COMPLAINT: Follow up for Hydronephrosis. Imaging done at [facility].    HPI  Aleks Monzon is a 71 y/o white male that I am asked to see for hydronephrosis of the left kidney.  This was identified about 2 weeks ago on a renal ultrasound.  This was done in the context evaluation for deterioration of renal function.  He denies any flank pain or hematuria.  The onset of this is unknown.  The patient was diagnosed with adenocarcinoma the prostate by me about 3 years ago.  He has undergone proton therapy by Dr. Hunter in [facility].    Lab Results   Component Value Date    PSA 1.550 02/26/2020    PSA 5.3 (H) 11/02/2017       The following portions of the patient's history were reviewed and updated as appropriate: allergies, current medications, past family history, past medical history, past social history, past surgical history and problem list.      Review of Systems   Constitutional: Negative for chills and fever.   Gastrointestinal: Negative for abdominal pain, anal bleeding and blood in stool.   Genitourinary: Negative for dysuria, frequency, hematuria and urgency.         Current Outpatient Medications:   •  allopurinol (ZYLOPRIM) 300 MG tablet, Take 1 tablet by mouth Daily., Disp: 90 tablet, Rfl: 3  •  colchicine 0.6 MG tablet, Take 1 tablet by mouth 2 (Two) Times a Day As Needed for Muscle / Joint Pain., Disp: 20 tablet, Rfl: 5  •  enalapril (VASOTEC) 20 MG tablet, TAKE 1 TABLET BY MOUTH ONCE DAILY., Disp: 30 tablet, Rfl: 11  •  fluticasone (FLONASE) 50 MCG/ACT nasal spray, 1 spray into the nostril(s) as directed by provider 2 (Two) Times a Day As Needed., Disp: , Rfl:   •  propranolol (INDERAL) 20 MG tablet, Take 20 mg by mouth 2 (Two) Times a Day., Disp: , Rfl:   •  tamsulosin (FLOMAX) 0.4 MG capsule 24 hr capsule, Take 1 capsule by mouth Daily As Needed., Disp: , Rfl:   •  torsemide (DEMADEX) 10 MG tablet, Take 1 tablet by mouth Daily., Disp: 30 tablet, Rfl: 5    Past  "Medical History:   Diagnosis Date   • Hypertension    • IFG (impaired fasting glucose)        Past Surgical History:   Procedure Laterality Date   • BACK SURGERY     • TOTAL HIP ARTHROPLASTY         Social History     Socioeconomic History   • Marital status:      Spouse name: Not on file   • Number of children: Not on file   • Years of education: Not on file   • Highest education level: Not on file   Tobacco Use   • Smoking status: Never Smoker   • Smokeless tobacco: Never Used   Substance and Sexual Activity   • Alcohol use: Never     Frequency: Never   • Drug use: Never   • Sexual activity: Defer       Family History   Problem Relation Age of Onset   • Heart disease Father    • Diabetes Maternal Grandmother          Temp 97.2 °F (36.2 °C)   Ht 190.5 cm (75\")   Wt 128 kg (282 lb)   BMI 35.25 kg/m²       Physical Exam  Constitutional:  They  appear well-developed and well-nourished. There are no obvious deformities. No distress. The vital signs are reviewed  Pulmonary/Chest: Effort normal.   GI: Soft. The patient exhibits no distension and no mass. There is no tenderness. There is no rebound and no guarding. No hernia.   Neurological: Patient is alert and oriented to person, place, and time.   Skin: Skin is warm and dry. Not diaphoretic.   Psychiatric:  normal mood and affect. Not agitated.         Data  Results for orders placed or performed in visit on 07/09/20   Basic Metabolic Panel   Result Value Ref Range    Glucose 86 65 - 99 mg/dL    BUN 30 (H) 8 - 23 mg/dL    Creatinine 1.73 (H) 0.76 - 1.27 mg/dL    eGFR Non African Am 39 (L) >60 mL/min/1.73    eGFR  Am 48 (L) >60 mL/min/1.73    BUN/Creatinine Ratio 17.3 7.0 - 25.0    Sodium 141 136 - 145 mmol/L    Potassium 3.8 3.5 - 5.2 mmol/L    Chloride 107 98 - 107 mmol/L    Total CO2 26.5 22.0 - 29.0 mmol/L    Calcium 8.0 (L) 8.6 - 10.5 mg/dL         Imaging Results (Last 7 Days)     ** No results found for the last 168 hours. **        International " Prostate Symptom Score  The following is posted based on patient questionnaire answers:  0 - not at all    1-7 mild symptoms  1- Less than one time in five  8-19 moderate symptoms  2 -Less than half the time  20-35 severe symptoms  3 - About half the time  4 - More than half the time  5 - Almost always     For following sections:  Incomplete Emptying: - How often have you had the sensation  of not emptying your bladder completely after you finished urinating?  0  Frequency: -How often have you had to urinate again less than   two hours after you finished urinating?      1  Intermittency: -How often have you found you stopped and started again  Several times when you urinate?       1  Urgency: -How often do you find it difficult to postpone urination?             1  Weak stream: - How often have you had a weak urinary stream?             0  Straining: - How often have you had to push or strain to begin  Urination?          0  Sleeping: -How many times did you most typically get up to urinate   From the time you went to bed at night until the time you got up in the   0  Morning          Total `  3    Quality of Life  How would you feel if you had to live with your urinary condition the way   0  It is now, no better, no worse for the rest of your life?    Where: 0=delighted; 1= pleased, 2= mostly satisfied, 3= mixed, 4 = mostly  Dissatisfied, 5= Unhappy, 6 = terrible    Independent renal ultrasound review  The renal ultrasound is available for me to review.  Treatment recommendations require an independent review.  This film has been reviewed by the radiologist to determine any non urologic abnormalities that are presents.  I inspected the kidneys for size, symmetry, contour, parenchymal thickness, perinephric reaction, presence of calcifications, and intrarenal dilation of the collecting system.  This US shows left hydronephrosis.  No stone or mass is identified.  There is definite cortical thinning in the left kidney  is about 2-1/2 cm smaller than the right kidney.      Assessment and Plan  Diagnoses and all orders for this visit:    Hydronephrosis of left kidney  -     Cancel: POC Urinalysis Dipstick, Multipro  -     CT Abdomen Pelvis Without Contrast; Future    Left renal atrophy    Prostate cancer (CMS/HCC)      -Patient has hydronephrosis and some atrophy of the left kidney.  This is a new finding.  He has had deterioration of his renal function with creatinine at 1.73.  He needs a CT scan done of the abdomen pelvis without contrast using his stone protocol.This represents an undiagnosed new problem with uncertain prognosis.  -Patient underwent proton therapy in [place] by Dr. Hunter.  He has had a good result with parent PSA of around 1.  He is continuing to follow-up with him.    (Please note that portions of this note were completed with a voice recognition program.)  Siddhartha Epps MD  07/24/20  07:46

## 2020-07-23 ENCOUNTER — OFFICE VISIT (OUTPATIENT)
Dept: UROLOGY | Facility: CLINIC | Age: 70
End: 2020-07-23

## 2020-07-23 VITALS — TEMPERATURE: 97.2 F | BODY MASS INDEX: 35.06 KG/M2 | HEIGHT: 75 IN | WEIGHT: 282 LBS

## 2020-07-23 DIAGNOSIS — N26.1 LEFT RENAL ATROPHY: ICD-10-CM

## 2020-07-23 DIAGNOSIS — N13.30 HYDRONEPHROSIS OF LEFT KIDNEY: Primary | ICD-10-CM

## 2020-07-23 DIAGNOSIS — C61 PROSTATE CANCER (HCC): ICD-10-CM

## 2020-07-23 PROCEDURE — 99214 OFFICE O/P EST MOD 30 MIN: CPT | Performed by: UROLOGY

## 2020-07-29 ENCOUNTER — RESULTS ENCOUNTER (OUTPATIENT)
Dept: INTERNAL MEDICINE | Facility: CLINIC | Age: 70
End: 2020-07-29

## 2020-07-29 DIAGNOSIS — R60.9 EDEMA, UNSPECIFIED TYPE: ICD-10-CM

## 2020-08-05 NOTE — PROGRESS NOTES
"  Mr. Monzon is 70 y.o. male    CHIEF COMPLAINT:\"Had my CT done on my kidney\"  HPI  Patient has left hydronephrosis.  It appears to be down to the level of the proximal ureter.  No flank pain or hematuria.  This was identified on evaluation by renal ultrasound which was done in the context of deteriorating renal function.  The patient was diagnosed with adenocarcinoma the prostate by me about 3 years ago.  Underwent proton therapy by Dr. Hunter in [facility].           Lab Results   Component Value Date     PSA 1.550 02/26/2020     PSA 5.3 (H) 11/02/2017       The following portions of the patient's history were reviewed and updated as appropriate: allergies, current medications, past family history, past medical history, past social history, past surgical history and problem list.      Review of Systems   Constitutional: Negative for chills and fever.   Gastrointestinal: Negative for abdominal pain, anal bleeding and blood in stool.   Genitourinary: Positive for urgency. Negative for dysuria, frequency and hematuria.         Current Outpatient Medications:   •  allopurinol (ZYLOPRIM) 300 MG tablet, Take 1 tablet by mouth Daily., Disp: 90 tablet, Rfl: 3  •  colchicine 0.6 MG tablet, Take 1 tablet by mouth 2 (Two) Times a Day As Needed for Muscle / Joint Pain., Disp: 20 tablet, Rfl: 5  •  enalapril (VASOTEC) 20 MG tablet, TAKE 1 TABLET BY MOUTH ONCE DAILY., Disp: 30 tablet, Rfl: 11  •  fluticasone (FLONASE) 50 MCG/ACT nasal spray, 1 spray into the nostril(s) as directed by provider 2 (Two) Times a Day As Needed., Disp: , Rfl:   •  propranolol (INDERAL) 20 MG tablet, Take 20 mg by mouth 2 (Two) Times a Day., Disp: , Rfl:   •  tamsulosin (FLOMAX) 0.4 MG capsule 24 hr capsule, Take 1 capsule by mouth Daily As Needed., Disp: , Rfl:   •  torsemide (DEMADEX) 10 MG tablet, Take 1 tablet by mouth Daily., Disp: 30 tablet, Rfl: 5    Past Medical History:   Diagnosis Date   • Hypertension    • IFG (impaired fasting " "glucose)        Past Surgical History:   Procedure Laterality Date   • BACK SURGERY     • TOTAL HIP ARTHROPLASTY         Social History     Socioeconomic History   • Marital status:      Spouse name: Not on file   • Number of children: Not on file   • Years of education: Not on file   • Highest education level: Not on file   Tobacco Use   • Smoking status: Never Smoker   • Smokeless tobacco: Never Used   Substance and Sexual Activity   • Alcohol use: Never     Frequency: Never   • Drug use: Never   • Sexual activity: Defer       Family History   Problem Relation Age of Onset   • Heart disease Father    • Diabetes Maternal Grandmother          Temp 97.2 °F (36.2 °C)   Ht 190.5 cm (75\")   Wt 128 kg (282 lb)   BMI 35.25 kg/m²       Physical Exam  Constitutional:  They  appear well-developed and well-nourished. There are no obvious deformities. No distress. The vital signs are reviewed  Pulmonary/Chest: Effort normal.   GI: Soft. The patient exhibits no distension and no mass. There is no tenderness. There is no rebound and no guarding. No hernia.   Neurological: Patient is alert and oriented to person, place, and time.   Skin: Skin is warm and dry. Not diaphoretic.   Psychiatric:  normal mood and affect. Not agitated.       Data  Results for orders placed or performed in visit on 08/10/20   POC Urinalysis Dipstick, Multipro   Result Value Ref Range    Color Yellow Yellow, Straw, Dark Yellow, Glendy    Clarity, UA Clear Clear    Glucose, UA Negative Negative, 1000 mg/dL (3+) mg/dL    Bilirubin Negative Negative    Ketones, UA Negative Negative    Specific Gravity  1.015 1.005 - 1.030    Blood, UA Negative Negative    pH, Urine 5.5 5.0 - 8.0    Protein, POC Negative Negative mg/dL    Urobilinogen, UA Normal Normal    Nitrite, UA Negative Negative    Leukocytes Negative Negative         Imaging Results (Last 7 Days)     ** No results found for the last 168 hours. **        CT ABDOMEN PELVIS WO CONTRAST- 8/6/2020 " 10:21 AM CDT     HISTORY: Left hydronephrosis on ultrasound with cortical thinning,  history of prostate cancer treated by proton therapy, and worsening  renal function; N13.30-Unspecified hydronephrosis      COMPARISON: None     DOSE LENGTH PRODUCT: 981 mGy cm. Automated exposure control was also  utilized to decrease patient radiation dose.     TECHNIQUE: Axial images and pelvis are obtained without IV contrast.     FINDINGS:  The nonenhanced liver, spleen, pancreas are unremarkable. The  gallbladder is contracted. 1.3 cm hypodense nonspecific right adrenal  nodule Hounsfield units measuring 22. The left adrenal gland is obscured  by the infiltrate 2 ill-defined opacities seen throughout the  retroperitoneum these begin at the T12-L1 level and extension to the  L3/L4 level. There is moderate left-sided hydronephrosis with thinning  of the left renal cortex. No obstructing urinary tract stones are  identified. Normal appearance to the nonenhanced right kidney and right  renal collecting system. Streak artifact in the pelvis from the  bilateral hip prosthesis. Multiple calcified phleboliths in the pelvis.     No free intraperitoneal air.     Redundant sigmoid colon. Normal appendix. No bowel obstruction.     Mild vascular calcifications.     There is infiltration of the posterior mediastinal fat within the lower  chest. Mild elevation left hemidiaphragm with left lower lobe  atelectasis and or scarring.     Postoperative changes of the lumbar spine with bilateral hip prosthesis.     IMPRESSION:  1. Ill-defined infiltrative process within the left retroperitoneum  beginning at the T12 level extending through L3/L4. This results in  moderate left-sided hydronephrosis with thinning of the left renal  cortex. There is stranding of the fat adjacent to the proximal left  ureter. A follow-up CT urography protocol might be considered for  further assessment. Difficult to discern if this represents  an  infectious/inflammatory or neoplastic process.  2. Nonspecific 1.3 cm right adrenal nodule.  This report was finalized on 08/06/2020 11:09 by Dr. Siri Whelan MD.    These images were made available to me to review independently.  I also reviewed the radiologist's report described above with regard to the urologic findings.   /Siddhartha Epps MD        Assessment and Plan  Diagnoses and all orders for this visit:    Hydronephrosis of left kidney  -     POC Urinalysis Dipstick, Multipro  -     CT Abdomen Pelvis With & Without Contrast; Future    Prostate cancer (CMS/HCC)    Lymphadenopathy, retroperitoneal  Comments:  New finding that requires further evaluation  Orders:  -     CT Abdomen Pelvis With & Without Contrast; Future    -Concerning finding on today's studies.  I think he has retroperitoneal adenopathy that may be causing extrinsic obstruction of the left kidney at the proximal aspect of the ureter.  I am also unable to assess the urinary tract for filling defect because this could also be concerning for urothelial mass within the renal pelvis or proximal ureter.  Clearly he needs a contrasted study.  His calculated EGFR has been 39 and 44 on the last 2 checks.  I did explain the possibility of contrast nephropathy I feel we could limit the risk of this by hydrating him both before and after the contrast.  I do think that the risks is less than the benefit of knowing the etiology of the findings as well as the potential obstruction of the left kidney.  I went over this with the family.    (Please note that portions of this note were completed with a voice recognition program.)  Siddhartha Epps MD  08/11/20  13:09

## 2020-08-06 ENCOUNTER — HOSPITAL ENCOUNTER (OUTPATIENT)
Dept: CT IMAGING | Facility: HOSPITAL | Age: 70
Discharge: HOME | End: 2020-08-06
Admitting: UROLOGY

## 2020-08-06 DIAGNOSIS — N13.30 HYDRONEPHROSIS OF LEFT KIDNEY: ICD-10-CM

## 2020-08-06 PROCEDURE — 74176 CT ABD & PELVIS W/O CONTRAST: CPT

## 2020-08-10 ENCOUNTER — OFFICE VISIT (OUTPATIENT)
Dept: UROLOGY | Facility: CLINIC | Age: 70
End: 2020-08-10

## 2020-08-10 VITALS — TEMPERATURE: 97.2 F | BODY MASS INDEX: 35.06 KG/M2 | WEIGHT: 282 LBS | HEIGHT: 75 IN

## 2020-08-10 DIAGNOSIS — C61 PROSTATE CANCER (HCC): ICD-10-CM

## 2020-08-10 DIAGNOSIS — R59.0 LYMPHADENOPATHY, RETROPERITONEAL: ICD-10-CM

## 2020-08-10 DIAGNOSIS — N13.30 HYDRONEPHROSIS OF LEFT KIDNEY: Primary | ICD-10-CM

## 2020-08-10 LAB
BILIRUB BLD-MCNC: NEGATIVE MG/DL
CLARITY, POC: CLEAR
COLOR UR: YELLOW
GLUCOSE UR STRIP-MCNC: NEGATIVE MG/DL
KETONES UR QL: NEGATIVE
LEUKOCYTE EST, POC: NEGATIVE
NITRITE UR-MCNC: NEGATIVE MG/ML
PH UR: 5.5 [PH] (ref 5–8)
PROT UR STRIP-MCNC: NEGATIVE MG/DL
RBC # UR STRIP: NEGATIVE /UL
SP GR UR: 1.01 (ref 1–1.03)
UROBILINOGEN UR QL: NORMAL

## 2020-08-10 PROCEDURE — 81003 URINALYSIS AUTO W/O SCOPE: CPT | Performed by: UROLOGY

## 2020-08-10 PROCEDURE — 99214 OFFICE O/P EST MOD 30 MIN: CPT | Performed by: UROLOGY

## 2020-08-11 DIAGNOSIS — N13.30 HYDRONEPHROSIS OF LEFT KIDNEY: Primary | ICD-10-CM

## 2020-08-11 RX ORDER — SODIUM CHLORIDE 9 MG/ML
500 INJECTION, SOLUTION INTRAVENOUS 2 TIMES DAILY
Status: DISCONTINUED | OUTPATIENT
Start: 2020-08-11 | End: 2020-09-16

## 2020-08-14 RX ORDER — PROPRANOLOL HYDROCHLORIDE 80 MG/1
TABLET ORAL
Qty: 180 TABLET | Refills: 0 | OUTPATIENT
Start: 2020-08-14

## 2020-08-14 RX ORDER — HYDROCHLOROTHIAZIDE 25 MG/1
TABLET ORAL
Qty: 180 TABLET | Refills: 0 | OUTPATIENT
Start: 2020-08-14

## 2020-08-14 NOTE — TELEPHONE ENCOUNTER
HCTZ was stopped by Dr. Heath in July.  Do not approve this.  In regards to swelling, any shortness of breath or chest pain? His blood pressure has also been running low.  I am not comfortable increasing his dose.  He will need to discuss with Dr. Heath.  Keep legs elevated as much as possible.

## 2020-08-14 NOTE — TELEPHONE ENCOUNTER
Will you look at his medicine for me. Patient is on Demadex 10 mg. Requesting a refill on HCTZ.    Patient states he was started on Toresemide 10 mg daily. Complains of increased swelling to ankles L> R. Wondering if he should increase medication. Reports having work up to check on kidney problems.

## 2020-08-17 NOTE — TELEPHONE ENCOUNTER
Patient is requesting a call back in regards to following up on the medications he was discussing with nurse on Friday. Patient can be reached at [phone] (F)

## 2020-08-17 NOTE — TELEPHONE ENCOUNTER
I have tried to contact patient at his home twice and it is office.  When he calls back let me talk with him

## 2020-08-17 NOTE — TELEPHONE ENCOUNTER
Dr. Heath has left multiple messages at patients cell and work, asking him to call back, and we have not received a call back yet.  Dr. Heath spoke with his son, Ahmet, this morning and asked him to have pt call the office.

## 2020-08-19 ENCOUNTER — HOSPITAL ENCOUNTER (OUTPATIENT)
Dept: CT IMAGING | Facility: HOSPITAL | Age: 70
Discharge: HOME | End: 2020-08-19
Admitting: UROLOGY

## 2020-08-19 DIAGNOSIS — N13.30 HYDRONEPHROSIS OF LEFT KIDNEY: ICD-10-CM

## 2020-08-19 DIAGNOSIS — R59.0 LYMPHADENOPATHY, RETROPERITONEAL: ICD-10-CM

## 2020-08-19 PROCEDURE — 82565 ASSAY OF CREATININE: CPT

## 2020-08-19 PROCEDURE — 25010000002 IOPAMIDOL 61 % SOLUTION: Performed by: UROLOGY

## 2020-08-19 PROCEDURE — 74178 CT ABD&PLV WO CNTR FLWD CNTR: CPT

## 2020-08-19 RX ADMIN — IOPAMIDOL 50 ML: 612 INJECTION, SOLUTION INTRAVENOUS at 08:28

## 2020-08-19 RX ADMIN — IOPAMIDOL 75 ML: 612 INJECTION, SOLUTION INTRAVENOUS at 08:25

## 2020-08-20 LAB — CREAT BLDA-MCNC: 1.8 MG/DL (ref 0.6–1.3)

## 2020-08-20 RX ORDER — PROPRANOLOL HYDROCHLORIDE 20 MG/1
80 TABLET ORAL 2 TIMES DAILY
Qty: 180 TABLET | Refills: 3 | Status: SHIPPED | OUTPATIENT
Start: 2020-08-20 | End: 2021-01-07 | Stop reason: SDUPTHER

## 2020-08-20 NOTE — TELEPHONE ENCOUNTER
Dr. Heath talked with pt by phone today, explaining that edema is r/t CT findings of tumor.  Discussion regarding findings and next steps discussed.

## 2020-08-20 NOTE — TELEPHONE ENCOUNTER
Inderal RF sent in today (see prior request).  In [employer], he was on 80mg bid, but listed incorrectly in Epic.

## 2020-08-24 ENCOUNTER — HOSPITAL ENCOUNTER (OUTPATIENT)
Dept: CT IMAGING | Facility: HOSPITAL | Age: 70
Discharge: HOME | End: 2020-08-24

## 2020-08-24 ENCOUNTER — OFFICE VISIT (OUTPATIENT)
Dept: INTERNAL MEDICINE | Facility: CLINIC | Age: 70
End: 2020-08-24

## 2020-08-24 ENCOUNTER — HOSPITAL ENCOUNTER (OUTPATIENT)
Dept: GENERAL RADIOLOGY | Facility: HOSPITAL | Age: 70
Discharge: HOME | End: 2020-08-24
Admitting: INTERNAL MEDICINE

## 2020-08-24 VITALS
DIASTOLIC BLOOD PRESSURE: 84 MMHG | HEIGHT: 75 IN | SYSTOLIC BLOOD PRESSURE: 116 MMHG | RESPIRATION RATE: 18 BRPM | TEMPERATURE: 97.7 F | HEART RATE: 96 BPM | BODY MASS INDEX: 35.93 KG/M2 | OXYGEN SATURATION: 96 % | WEIGHT: 289 LBS

## 2020-08-24 DIAGNOSIS — R60.9 EDEMA, UNSPECIFIED TYPE: ICD-10-CM

## 2020-08-24 DIAGNOSIS — R19.00 RETROPERITONEAL MASS: ICD-10-CM

## 2020-08-24 DIAGNOSIS — R73.01 IFG (IMPAIRED FASTING GLUCOSE): Primary | ICD-10-CM

## 2020-08-24 LAB — HBA1C MFR BLD: 5.3 %

## 2020-08-24 PROCEDURE — 83036 HEMOGLOBIN GLYCOSYLATED A1C: CPT | Performed by: INTERNAL MEDICINE

## 2020-08-24 PROCEDURE — 70490 CT SOFT TISSUE NECK W/O DYE: CPT

## 2020-08-24 PROCEDURE — 71250 CT THORAX DX C-: CPT

## 2020-08-24 PROCEDURE — 71046 X-RAY EXAM CHEST 2 VIEWS: CPT

## 2020-08-24 PROCEDURE — 99214 OFFICE O/P EST MOD 30 MIN: CPT | Performed by: INTERNAL MEDICINE

## 2020-08-24 RX ORDER — TORSEMIDE 10 MG/1
TABLET ORAL
Qty: 30 TABLET | Refills: 5 | Status: SHIPPED | OUTPATIENT
Start: 2020-08-24 | End: 2020-09-08

## 2020-08-24 NOTE — PROGRESS NOTES
Subjective   Aleks Monzon is a 70 y.o. male.   Chief Complaint   Patient presents with   • Hypertension     6 MONTH FOLLOW-UP   • IMPAIRED FASTING GLUCOSE     5.3       Patient is here for follow-up of his impaired fasting glucose his bilateral leg edema and a recent CT showing retroperitoneal mass.  I spent time going over the radiographic findings the potential diagnosis we also talked about his edema and the use of the torsemide.       The following portions of the patient's history were reviewed and updated as appropriate: allergies, current medications, past family history, past medical history, past social history, past surgical history and problem list.    Review of Systems   Constitutional: Negative for activity change, appetite change, fatigue, fever, unexpected weight gain and unexpected weight loss.   HENT: Negative for swollen glands, trouble swallowing and voice change.    Eyes: Negative for blurred vision and visual disturbance.   Respiratory: Negative for cough and shortness of breath.    Cardiovascular: Positive for leg swelling. Negative for chest pain and palpitations.   Gastrointestinal: Negative for abdominal pain, constipation, diarrhea, nausea, vomiting and indigestion.   Endocrine: Negative for cold intolerance, heat intolerance, polydipsia and polyphagia.   Genitourinary: Negative for dysuria and frequency.   Musculoskeletal: Negative for arthralgias, back pain, joint swelling and neck pain.   Skin: Negative for color change, rash and skin lesions.   Neurological: Negative for dizziness, weakness, headache, memory problem and confusion.   Hematological: Does not bruise/bleed easily.   Psychiatric/Behavioral: Negative for agitation, hallucinations and suicidal ideas. The patient is not nervous/anxious.        Objective   Past Medical History:   Diagnosis Date   • Hypertension    • IFG (impaired fasting glucose)       Past Surgical History:   Procedure Laterality Date   • BACK SURGERY     •  TOTAL HIP ARTHROPLASTY          Current Outpatient Medications:   •  allopurinol (ZYLOPRIM) 300 MG tablet, Take 1 tablet by mouth Daily., Disp: 90 tablet, Rfl: 3  •  colchicine 0.6 MG tablet, Take 1 tablet by mouth 2 (Two) Times a Day As Needed for Muscle / Joint Pain., Disp: 20 tablet, Rfl: 5  •  enalapril (VASOTEC) 20 MG tablet, TAKE 1 TABLET BY MOUTH ONCE DAILY., Disp: 30 tablet, Rfl: 11  •  fluticasone (FLONASE) 50 MCG/ACT nasal spray, 1 spray into the nostril(s) as directed by provider 2 (Two) Times a Day As Needed., Disp: , Rfl:   •  propranolol (INDERAL) 20 MG tablet, Take 4 tablets by mouth 2 (Two) Times a Day., Disp: 180 tablet, Rfl: 3  •  tamsulosin (FLOMAX) 0.4 MG capsule 24 hr capsule, Take 1 capsule by mouth Daily As Needed., Disp: , Rfl:   •  torsemide (DEMADEX) 10 MG tablet, 1/2 twice daily, Disp: 30 tablet, Rfl: 5    Current Facility-Administered Medications:   •  sodium chloride 0.9 % infusion, 500 mL/hr, Intravenous, BID, Siddhartha Epps MD     Vitals:    08/24/20 1453   BP: 116/84   Pulse: 96   Resp: 18   Temp: 97.7 °F (36.5 °C)   SpO2: 96%         08/24/20  1453   Weight: 131 kg (289 lb)     Patient's Body mass index is 36.12 kg/m². BMI is .      Physical Exam   Constitutional: He is oriented to person, place, and time. He appears well-developed and well-nourished.   HENT:   Head: Normocephalic and atraumatic.   Right Ear: External ear normal.   Left Ear: External ear normal.   Nose: Nose normal.   Mouth/Throat: Oropharynx is clear and moist.   Eyes: Pupils are equal, round, and reactive to light. Conjunctivae and EOM are normal.   Neck: Normal range of motion. Neck supple. No thyromegaly present.   Cardiovascular: Normal rate, regular rhythm, normal heart sounds and intact distal pulses.   Pulmonary/Chest: Effort normal and breath sounds normal.   Right axilla has a spongy lymph node measuring about 2 cm of questionable significance   Abdominal: Soft. Bowel sounds are normal.    Lymphadenopathy:     He has no cervical adenopathy.   Neurological: He is alert and oriented to person, place, and time.   Skin: Skin is warm and dry.   Psychiatric: He has a normal mood and affect. His behavior is normal. Thought content normal.   Nursing note and vitals reviewed.            Assessment/Plan   Diagnoses and all orders for this visit:    1. IFG (impaired fasting glucose) (Primary)  -     POC Glycosylated Hemoglobin (Hb A1C)  -     Cancel: Basic Metabolic Panel    2. Edema, unspecified type  -     torsemide (DEMADEX) 10 MG tablet; 1/2 twice daily  Dispense: 30 tablet; Refill: 5  -     XR Chest PA & Lateral; Future  -     CT Chest Without Contrast; Future  -     CT Soft Tissue Neck Without Contrast; Future  -     Comprehensive Metabolic Panel  -     Uric acid  -     Lactate Dehydrogenase    3. Retroperitoneal mass  -     XR Chest PA & Lateral; Future  -     CT Chest Without Contrast; Future  -     CT Soft Tissue Neck Without Contrast; Future  -     Comprehensive Metabolic Panel  -     Uric acid  -     Lactate Dehydrogenase  -     Ambulatory Referral to Oncology      Spent time reviewing patient's x-rays lab work and current work-up.  I called Dr. Toño Mendez who is very kind to discussed the patient's case we agreed on doing a CT uncontrasted of his neck and chest today as well as a chest x-ray I went ahead and added lab work CMP LDH uric acid levels.  I am making him an appointment to see Dr. Mendez from oncology next week hope will have some of the studies back at that point.

## 2020-08-25 LAB
ALBUMIN SERPL-MCNC: 2.9 G/DL (ref 3.5–5.2)
ALBUMIN/GLOB SERPL: 2.4 G/DL
ALP SERPL-CCNC: 61 U/L (ref 39–117)
ALT SERPL-CCNC: 15 U/L (ref 1–41)
AST SERPL-CCNC: 20 U/L (ref 1–40)
BILIRUB SERPL-MCNC: 0.4 MG/DL (ref 0–1.2)
BUN SERPL-MCNC: 23 MG/DL (ref 8–23)
BUN/CREAT SERPL: 14.6 (ref 7–25)
CALCIUM SERPL-MCNC: 7.7 MG/DL (ref 8.6–10.5)
CHLORIDE SERPL-SCNC: 106 MMOL/L (ref 98–107)
CO2 SERPL-SCNC: 28 MMOL/L (ref 22–29)
CREAT SERPL-MCNC: 1.58 MG/DL (ref 0.76–1.27)
GLOBULIN SER CALC-MCNC: 1.2 GM/DL
GLUCOSE SERPL-MCNC: 105 MG/DL (ref 65–99)
LDH SERPL-CCNC: 179 U/L (ref 135–225)
POTASSIUM SERPL-SCNC: 3.3 MMOL/L (ref 3.5–5.2)
PROT SERPL-MCNC: 4.1 G/DL (ref 6–8.5)
SODIUM SERPL-SCNC: 142 MMOL/L (ref 136–145)
URATE SERPL-MCNC: 6 MG/DL (ref 3.4–7)

## 2020-08-27 ENCOUNTER — TELEPHONE (OUTPATIENT)
Dept: INTERNAL MEDICINE | Facility: CLINIC | Age: 70
End: 2020-08-27

## 2020-08-27 LAB
Lab: NORMAL
PSA SERPL-MCNC: 0.91 NG/ML (ref 0–4)
WRITTEN AUTHORIZATION: NORMAL

## 2020-08-28 ENCOUNTER — TELEPHONE (OUTPATIENT)
Dept: INTERNAL MEDICINE | Facility: CLINIC | Age: 70
End: 2020-08-28

## 2020-08-28 ENCOUNTER — TRANSCRIBE ORDERS (OUTPATIENT)
Dept: ADMINISTRATIVE | Facility: HOSPITAL | Age: 70
End: 2020-08-28

## 2020-08-28 DIAGNOSIS — Z01.818 PRE-OP TESTING: Primary | ICD-10-CM

## 2020-08-28 NOTE — TELEPHONE ENCOUNTER
FYI:     Last three ofc notes, most recent labs, and PSA History faxed to Ally at [facility].       Fax  Number: [phone].    Contact number: [phone]

## 2020-08-29 ENCOUNTER — LAB (OUTPATIENT)
Dept: LAB | Facility: HOSPITAL | Age: 70
End: 2020-08-29

## 2020-08-29 PROCEDURE — U0003 INFECTIOUS AGENT DETECTION BY NUCLEIC ACID (DNA OR RNA); SEVERE ACUTE RESPIRATORY SYNDROME CORONAVIRUS 2 (SARS-COV-2) (CORONAVIRUS DISEASE [COVID-19]), AMPLIFIED PROBE TECHNIQUE, MAKING USE OF HIGH THROUGHPUT TECHNOLOGIES AS DESCRIBED BY CMS-2020-01-R: HCPCS | Performed by: INTERNAL MEDICINE

## 2020-08-29 PROCEDURE — C9803 HOPD COVID-19 SPEC COLLECT: HCPCS | Performed by: INTERNAL MEDICINE

## 2020-08-30 LAB
COVID LABCORP PRIORITY: NORMAL
SARS-COV-2 RNA RESP QL NAA+PROBE: NOT DETECTED

## 2020-09-01 ENCOUNTER — HOSPITAL ENCOUNTER (OUTPATIENT)
Dept: CT IMAGING | Facility: HOSPITAL | Age: 70
Discharge: HOME | End: 2020-09-01

## 2020-09-02 ENCOUNTER — OFFICE VISIT (OUTPATIENT)
Dept: HEMATOLOGY | Age: 70
End: 2020-09-02
Payer: MEDICARE

## 2020-09-02 ENCOUNTER — HOSPITAL ENCOUNTER (OUTPATIENT)
Dept: INFUSION THERAPY | Age: 70
Discharge: HOME | End: 2020-09-02
Payer: MEDICARE

## 2020-09-02 VITALS
HEIGHT: 75 IN | TEMPERATURE: 96.9 F | OXYGEN SATURATION: 96 % | DIASTOLIC BLOOD PRESSURE: 82 MMHG | BODY MASS INDEX: 35.99 KG/M2 | HEART RATE: 80 BPM | WEIGHT: 289.5 LBS | SYSTOLIC BLOOD PRESSURE: 122 MMHG

## 2020-09-02 DIAGNOSIS — R53.83 FATIGUE, UNSPECIFIED TYPE: ICD-10-CM

## 2020-09-02 DIAGNOSIS — R19.00 RETROPERITONEAL MASS: ICD-10-CM

## 2020-09-02 LAB
ALBUMIN SERPL-MCNC: 2.1 G/DL (ref 3.5–5.2)
ALP BLD-CCNC: 65 U/L (ref 40–130)
ALT SERPL-CCNC: 17 U/L (ref 21–72)
ANION GAP SERPL CALCULATED.3IONS-SCNC: 11 MMOL/L (ref 7–19)
AST SERPL-CCNC: 21 U/L (ref 17–59)
BASOPHILS ABSOLUTE: 0.02 K/UL (ref 0.01–0.08)
BASOPHILS RELATIVE PERCENT: 0.4 % (ref 0.1–1.2)
BILIRUB SERPL-MCNC: 0.4 MG/DL (ref 0.2–1.3)
BUN BLDV-MCNC: 27 MG/DL (ref 9–20)
CALCIUM SERPL-MCNC: 7.5 MG/DL (ref 8.4–10.2)
CHLORIDE BLD-SCNC: 102 MMOL/L (ref 98–111)
CO2: 27 MMOL/L (ref 22–29)
CREAT SERPL-MCNC: 1.8 MG/DL (ref 0.6–1.2)
EOSINOPHILS ABSOLUTE: 0.17 K/UL (ref 0.04–0.54)
EOSINOPHILS RELATIVE PERCENT: 3 % (ref 0.7–7)
FOLATE: 5.9 NG/ML (ref 2.7–20)
GFR NON-AFRICAN AMERICAN: 38
GLOBULIN: 1.9 G/DL
GLUCOSE BLD-MCNC: 108 MG/DL (ref 74–106)
HCT VFR BLD CALC: 41.5 % (ref 40.1–51)
HEMOGLOBIN: 13.6 G/DL (ref 13.7–17.5)
LYMPHOCYTES ABSOLUTE: 1 K/UL (ref 1.18–3.74)
LYMPHOCYTES RELATIVE PERCENT: 17.7 % (ref 19.3–53.1)
MCH RBC QN AUTO: 29.4 PG (ref 25.7–32.2)
MCHC RBC AUTO-ENTMCNC: 32.8 G/DL (ref 32.3–36.5)
MCV RBC AUTO: 89.6 FL (ref 79–92.2)
MONOCYTES ABSOLUTE: 0.48 K/UL (ref 0.24–0.82)
MONOCYTES RELATIVE PERCENT: 8.5 % (ref 4.7–12.5)
NEUTROPHILS ABSOLUTE: 3.98 K/UL (ref 1.56–6.13)
NEUTROPHILS RELATIVE PERCENT: 70.4 % (ref 34–71.1)
PDW BLD-RTO: 15.3 % (ref 11.6–14.4)
PLATELET # BLD: 231 K/UL (ref 163–337)
PMV BLD AUTO: 9.4 FL (ref 7.4–10.4)
POTASSIUM SERPL-SCNC: 3.6 MMOL/L (ref 3.5–5.1)
RBC # BLD: 4.63 M/UL (ref 4.63–6.08)
SODIUM BLD-SCNC: 140 MMOL/L (ref 137–145)
TOTAL PROTEIN: 4 G/DL (ref 6.3–8.2)
VITAMIN B-12: 264 PG/ML (ref 239–931)
WBC # BLD: 5.65 K/UL (ref 4.23–9.07)

## 2020-09-02 PROCEDURE — 1123F ACP DISCUSS/DSCN MKR DOCD: CPT | Performed by: INTERNAL MEDICINE

## 2020-09-02 PROCEDURE — 1036F TOBACCO NON-USER: CPT | Performed by: INTERNAL MEDICINE

## 2020-09-02 PROCEDURE — 80053 COMPREHEN METABOLIC PANEL: CPT

## 2020-09-02 PROCEDURE — 82607 VITAMIN B-12: CPT

## 2020-09-02 PROCEDURE — G8417 CALC BMI ABV UP PARAM F/U: HCPCS | Performed by: INTERNAL MEDICINE

## 2020-09-02 PROCEDURE — 3017F COLORECTAL CA SCREEN DOC REV: CPT | Performed by: INTERNAL MEDICINE

## 2020-09-02 PROCEDURE — 99202 OFFICE O/P NEW SF 15 MIN: CPT

## 2020-09-02 PROCEDURE — 85025 COMPLETE CBC W/AUTO DIFF WBC: CPT

## 2020-09-02 PROCEDURE — G8428 CUR MEDS NOT DOCUMENT: HCPCS | Performed by: INTERNAL MEDICINE

## 2020-09-02 PROCEDURE — 99205 OFFICE O/P NEW HI 60 MIN: CPT | Performed by: INTERNAL MEDICINE

## 2020-09-02 PROCEDURE — 82746 ASSAY OF FOLIC ACID SERUM: CPT

## 2020-09-02 PROCEDURE — 4040F PNEUMOC VAC/ADMIN/RCVD: CPT | Performed by: INTERNAL MEDICINE

## 2020-09-02 NOTE — PROGRESS NOTES
Patient:  Christopher Wells  YOB: 1950  Date of Service: 9/2/2020  MRN: [id number]   Primary Care Physician: Christine Worthy MD  Advance Directive:  No   Referring Provider: Maria Isabel Dean MD    Chief Complaint   Patient presents with   Lenetta Ni Mass     Retroperitoneal Mass       Patient Seen, Chart, Consults notes, Labs, Radiology studies reviewed. Subjective:   Christopher Wells is a very pleasant 78 y.o.  gentleman referred by Dr. Melina Christie for a large retroperitoneal mass. TARGET LYMPH NODE SITES:  · Retroperitoneal mass extending from approximately T12-L3 and encasing the mesenteric and renal arteries. The mass causes left obstructive uropathy and extends into the left renal pelvis and posteriorly around the left kidney  · 1.6 x 1.2 cm left axillary lymph node  · 0.9 cm rounded soft tissue nodule in the superficial lobe of the right parotid gland. · Nonspecific 1.3 cm right adrenal nodule    TUMOR HISTORY:   Che Samayoa was seen in initial oncology consultation on 9/2/2020 referred by Dr. Melina Christie for large retroperitoneal mass. He is accompanied by his wife Massiel Leon ([phone]). Che Samayoa has a history of prostate cancer presenting with an elevated PSA of 5.3 on 11/2/2017. 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 Stalin Gabriel. Proton therapy was delivered by Dr. Mata Ramirez with a total dose of 70 GyE in 20 fractions from 7/1/2019 through 8/13/2019. Che Samayoa presented to Dr. Melina Christie with newly developed lower extremity edema in July 2020. Laboratory parameters were abnormal.  A creatinine was 1.73 on 7/9/2020. Renal ultrasound on 7/16/2020 at [facility] radiology documented the following:   · Mild to moderate left hydronephrosis and a thin left renal cortex. Che Samayoa was referred to Dr. Stalin Gabriel for urology management anticipating a possible stent.   Dr. Nohelia Bates ordered a CT scan of the abdomen and pelvis as an initial imaging evaluation. Ct abdomen and pelvis without contrast on 8/6/2020 at [facility] documented:  ·  Ill-defined infiltrative process within the left retroperitoneum beginning at the T12 level extending through L3/L4. This results in moderate left-sided hydronephrosis with thinning of the left renal cortex. There is stranding of the fat adjacent to the proximal left ureter. A follow-up CT urography protocol might be considered for further assessment. Difficult to discern if this represents an infectious/inflammatory or neoplastic process. · Nonspecific 1.3 cm right adrenal nodule. CT abdomen and pelvis with and without contrast on 8/19/2020 was compared to 8/16/2020 and documented:  · Retroperitoneal mass is present. This extends from approximately T12-L3 this encases the mesenteric and renal arteries. This causes left obstructive uropathy and extends into the left renal pelvis and posteriorly around the left kidney. Most likely this is a neoplasm. Lymphoma should be excluded    CT neck without contrast on 8/24/2020 at [facility] documented:  · 0.9 cm rounded soft tissue nodule in the superficial lobe of the right parotid gland. CT appearance is nonspecific. Differential would include intraparotid lymph node or primary parotid neoplasia. · Otherwise, no soft tissue mass or adenopathy identified in the neck. · Advanced cervical spine degenerative change    CT chest without contrast on 8/24/2020 at [facility] documented:  · 1.6 x 1.2 cm left axillary lymph node, abnormal appearing. Consider ultrasound for further characterization. · Known retroperitoneal mass. · Otherwise, no acute intrathoracic findings    Dr. Willi Rosario arranged for a CT-guided biopsy of the retroperitoneal mass at [facility] on 9/1/2020. The biopsy was canceled by radiology due to proximity to blood vessels. Crow Luong is seen in initial medical oncology consultation on 9/2/2020.   I am able to palpate a left axillary lymph node approximately 1.5- 2 cm. Otherwise obvious palpable pathological peripheral lymphadenopathy is not appreciated. CBC today (9/2/2020) reveals a WBC of 5.65. Hgb is 13.6 with an MCV of 89.6 and platelet count of 933,281. ASSESSMENT RECOMMENDATION AND PLAN:  · I placed a call and spoke to Dr. Aleja Childs who reviewed the scans and will consider an EGD/EUS and biopsy tomorrow, 9/3/2020 to obtain tissue for definitive diagnosis. · If a diagnosis of lymphoma is made, he will then require a bone marrow biopsy and aspirate and PET scan  · 2D echo to evaluate cardiac status anticipating systemic chemotherapy with an anthracycline  · Serology, please see orders below  · Follow-up in 1 week           Allergies:  Patient has no known allergies. Medicines:  Current Outpatient Medications   Medication Sig Dispense Refill    colchicine (COLCRYS) 0.6 MG tablet Take 0.6 mg by mouth daily      hydrochlorothiazide (HYDRODIURIL) 25 MG tablet Take 25 mg by mouth daily      propranolol (INDERAL) 20 MG tablet Take 80 mg by mouth 2 times daily       ENALAPRIL MALEATE Take 20 mg by mouth daily       allopurinol (ZYLOPRIM) 100 MG tablet Take 100 mg by mouth daily. No current facility-administered medications for this visit.         Past Medical History:  Past Medical History:   Diagnosis Date    Allergic rhinitis     Colonic polyp     DDD (degenerative disc disease)     Hyperlipidemia     Hypertension        Past Surgical History:  Past Surgical History:   Procedure Laterality Date    BACK SURGERY      COLONOSCOPY  11/3/2004        JOINT REPLACEMENT      Bilateral total hips       Family History  Family History   Problem Relation Age of Onset    Stroke Mother     Pacemaker Father        Social History  Social History     Socioeconomic History    Marital status:      Spouse name: Not on file    Number of children: Not on file    Years of education: Not on file   Sriram Filter Highest education level: Not on file   Occupational History    Not on file   Social Needs    Financial resource strain: Not on file    Food insecurity     Worry: Not on file     Inability: Not on file    Transportation needs     Medical: Not on file     Non-medical: Not on file   Tobacco Use    Smoking status: Never Smoker    Smokeless tobacco: Never Used   Substance and Sexual Activity    Alcohol use: No    Drug use: No    Sexual activity: Not on file   Lifestyle    Physical activity     Days per week: Not on file     Minutes per session: Not on file    Stress: Not on file   Relationships    Social connections     Talks on phone: Not on file     Gets together: Not on file     Attends Bahai service: Not on file     Active member of club or organization: Not on file     Attends meetings of clubs or organizations: Not on file     Relationship status: Not on file    Intimate partner violence     Fear of current or ex partner: Not on file     Emotionally abused: Not on file     Physically abused: Not on file     Forced sexual activity: Not on file   Other Topics Concern    Not on file   Social History Narrative    Not on file         Review of Systems:  Constitutional: Negative for chills, fatigue, fever or significant weight loss. HENT: Negative for congestion, hearing loss, nosebleeds or sore throat. Eyes: Negative for photophobia, pain, discharge, redness and visual disturbance. Respiratory: Negative for cough, shortness of breath, or wheezing. Cardiovascular: Negative for chest pain, palpitations or leg swelling. Gastrointestinal: Negative for abdominal pain, blood in stool, constipation, diarrhea, nausea or vomiting. Genitourinary: Negative for dysuria, flank pain, frequency, hematuria or urgency. Musculoskeletal: Negative for back pain, joint swelling, myalgias or neck pain. Skin: Negative for rash or petechiae.    Neurological: Negative for tremors, seizures, syncope, weakness or headaches. Hematological: No active bruising or bleeding. Psychiatric/Behavioral: Negative for hallucinations. Objective:  Blood pressure 122/82, pulse 80, temperature 96.9 °F (36.1 °C), height 6' 3\" (1.905 m), weight 289 lb 8 oz (131.3 kg), SpO2 96 %. Physical Exam   Constitutional: Oriented to person, place, and time. No acute distress. Head: Normocephalic and atraumatic. Nose: Nose normal.   Mouth/Throat: Oropharynx is clear and moist. No oropharyngeal exudate. Eyes: Pupils are equal and round. Conjunctivae and EOM are normal. No scleral icterus. Neck: Normal range of motion. Neck supple. No JVD. No appreciable thyromegaly. Cardiovascular: Normal rate, regular rhythm, normal heart sounds and intact distal pulses. Exam reveals no gallop, murmurs or friction rub. Pulmonary/Chest: Effort normal and breath sounds normal. No respiratory distress. No wheezes. Abdominal: Soft. Bowel sounds are normal. No organomegally or masses. No tenderness. There is no rebound and no guarding. Musculoskeletal: Normal range of motion. No edema or tenderness. Lymphadenopathy: No cervical, axillary or inguinal lymphadenopathy. Neurological: Alert and oriented to person, place, and time. Cranial nerves are intact. Neurological exam is nonfocal  Skin: Skin is warm and dry. No rash noted. No erythema. No pallor. Psychiatric: Judgment normal.       Labs:  BMP:   Recent Labs     09/02/20  1442      K 3.6      CO2 27   BUN 27*   CREATININE 1.8*     CBC:   Recent Labs     09/02/20  1240   WBC 5.65   HGB 13.6*   HCT 41.5   MCV 89.6        PT/INR: No results for input(s): PROTIME, INR in the last 72 hours. APTT: No results for input(s): APTT in the last 72 hours. Magnesium:No results for input(s): MG in the last 72 hours. Phosphorus:No results for input(s): PHOS in the last 72 hours.   Hepatic:   Recent Labs     09/02/20  1442   ALKPHOS 65   ALT 17*   AST 21   PROT 4.0*   BILITOT 0.4 an EGD/EUS and biopsy tomorrow, 9/3/2020 to obtain tissue for definitive diagnosis. · If a diagnosis of lymphoma is made, he will then require a bone marrow biopsy and aspirate and PET scan  · 2D echo to evaluate cardiac status anticipating systemic chemotherapy with an anthracycline  · Serology, please see orders below  · Follow-up in 1 week              Alaina Dominguez was seen today for mass. Diagnoses and all orders for this visit:    Retroperitoneal mass  -     Miscellaneous Sendout 1; Future  -     Immunoglobulins, Quantitative; Future  -     Everman/Lambda Free Lt Chains, Serum Quant; Future  -     Dora Rivera MD, Gastroenterology, [place]    Examination prior to chemotherapy  -     Echo 2d w doppler w contrast limited; Future    Fatigue, unspecified type  -     Comprehensive Metabolic Panel; Future  -     Vitamin B12; Future  -     Folate; Future        Return in about 1 week (around 9/9/2020) for follow-up w/ Dr. Meryle Sat. Orders Placed This Encounter   Procedures    Comprehensive Metabolic Panel    Miscellaneous Sendout 1    Immunoglobulins, Quantitative    Loraine Gray, Serum Quant    Vitamin B12    Folate    Naty Subramanian MD, Gastroenterology, [place]    Echo 2d w doppler w contrast limited       No orders of the defined types were placed in this encounter. Jennifer May am scribing for Jono Richard MD. Electronically signed by [facility] on 7/4/2527 at 0:84 PM     I, Dr. Devan Hopper, personally performed the services described in this documentation as scribed by [facility] in my presence, and it is both accurate and complete. Thank you for the consult, we appreciate the opportunity to provide care to your patients. Feel free to contact me if I can be of any further assistance.

## 2020-09-03 ENCOUNTER — HOSPITAL ENCOUNTER (OUTPATIENT)
Age: 70
Setting detail: OUTPATIENT SURGERY
Discharge: HOME | End: 2020-09-03
Attending: INTERNAL MEDICINE | Admitting: INTERNAL MEDICINE
Payer: MEDICARE

## 2020-09-03 ENCOUNTER — ANESTHESIA (OUTPATIENT)
Dept: ENDOSCOPY | Age: 70
End: 2020-09-03
Payer: MEDICARE

## 2020-09-03 ENCOUNTER — ANESTHESIA EVENT (OUTPATIENT)
Dept: ENDOSCOPY | Age: 70
End: 2020-09-03
Payer: MEDICARE

## 2020-09-03 VITALS
HEART RATE: 70 BPM | HEIGHT: 75 IN | RESPIRATION RATE: 18 BRPM | OXYGEN SATURATION: 98 % | TEMPERATURE: 98.3 F | BODY MASS INDEX: 35.93 KG/M2 | SYSTOLIC BLOOD PRESSURE: 122 MMHG | DIASTOLIC BLOOD PRESSURE: 78 MMHG | WEIGHT: 289 LBS

## 2020-09-03 VITALS — DIASTOLIC BLOOD PRESSURE: 70 MMHG | SYSTOLIC BLOOD PRESSURE: 111 MMHG | OXYGEN SATURATION: 96 %

## 2020-09-03 PROCEDURE — 88305 TISSUE EXAM BY PATHOLOGIST: CPT

## 2020-09-03 PROCEDURE — 7100000010 HC PHASE II RECOVERY - FIRST 15 MIN: Performed by: INTERNAL MEDICINE

## 2020-09-03 PROCEDURE — 2720000010 HC SURG SUPPLY STERILE: Performed by: INTERNAL MEDICINE

## 2020-09-03 PROCEDURE — 3700000000 HC ANESTHESIA ATTENDED CARE: Performed by: INTERNAL MEDICINE

## 2020-09-03 PROCEDURE — 88173 CYTOPATH EVAL FNA REPORT: CPT

## 2020-09-03 PROCEDURE — 3700000001 HC ADD 15 MINUTES (ANESTHESIA): Performed by: INTERNAL MEDICINE

## 2020-09-03 PROCEDURE — 88177 CYTP FNA EVAL EA ADDL: CPT

## 2020-09-03 PROCEDURE — 6360000002 HC RX W HCPCS: Performed by: NURSE ANESTHETIST, CERTIFIED REGISTERED

## 2020-09-03 PROCEDURE — 88172 CYTP DX EVAL FNA 1ST EA SITE: CPT

## 2020-09-03 PROCEDURE — 43242 EGD US FINE NEEDLE BX/ASPIR: CPT | Performed by: INTERNAL MEDICINE

## 2020-09-03 PROCEDURE — 3609018500 HC EGD US SCOPE W/ADJACENT STRUCTURES: Performed by: INTERNAL MEDICINE

## 2020-09-03 PROCEDURE — 2580000003 HC RX 258: Performed by: INTERNAL MEDICINE

## 2020-09-03 PROCEDURE — 2709999900 HC NON-CHARGEABLE SUPPLY: Performed by: INTERNAL MEDICINE

## 2020-09-03 RX ORDER — ONDANSETRON 2 MG/ML
4 INJECTION INTRAMUSCULAR; INTRAVENOUS
Status: DISCONTINUED | OUTPATIENT
Start: 2020-09-03 | End: 2020-09-03 | Stop reason: HOSPADM

## 2020-09-03 RX ORDER — PROMETHAZINE HYDROCHLORIDE 25 MG/ML
6.25 INJECTION, SOLUTION INTRAMUSCULAR; INTRAVENOUS
Status: DISCONTINUED | OUTPATIENT
Start: 2020-09-03 | End: 2020-09-03 | Stop reason: HOSPADM

## 2020-09-03 RX ORDER — FENTANYL CITRATE 50 UG/ML
INJECTION, SOLUTION INTRAMUSCULAR; INTRAVENOUS PRN
Status: DISCONTINUED | OUTPATIENT
Start: 2020-09-03 | End: 2020-09-03 | Stop reason: SDUPTHER

## 2020-09-03 RX ORDER — TORSEMIDE 10 MG/1
15 TABLET ORAL DAILY
COMMUNITY
End: 2021-11-30

## 2020-09-03 RX ORDER — DIPHENHYDRAMINE HYDROCHLORIDE 50 MG/ML
12.5 INJECTION INTRAMUSCULAR; INTRAVENOUS
Status: DISCONTINUED | OUTPATIENT
Start: 2020-09-03 | End: 2020-09-03 | Stop reason: HOSPADM

## 2020-09-03 RX ORDER — PROPOFOL 10 MG/ML
INJECTION, EMULSION INTRAVENOUS CONTINUOUS PRN
Status: DISCONTINUED | OUTPATIENT
Start: 2020-09-03 | End: 2020-09-03 | Stop reason: SDUPTHER

## 2020-09-03 RX ORDER — SODIUM CHLORIDE, SODIUM LACTATE, POTASSIUM CHLORIDE, CALCIUM CHLORIDE 600; 310; 30; 20 MG/100ML; MG/100ML; MG/100ML; MG/100ML
INJECTION, SOLUTION INTRAVENOUS CONTINUOUS
Status: DISCONTINUED | OUTPATIENT
Start: 2020-09-03 | End: 2020-09-03 | Stop reason: HOSPADM

## 2020-09-03 RX ADMIN — FENTANYL CITRATE 25 MCG: 50 INJECTION INTRAMUSCULAR; INTRAVENOUS at 15:00

## 2020-09-03 RX ADMIN — PROPOFOL 120 MCG/KG/MIN: 10 INJECTION, EMULSION INTRAVENOUS at 14:43

## 2020-09-03 RX ADMIN — FENTANYL CITRATE 50 MCG: 50 INJECTION INTRAMUSCULAR; INTRAVENOUS at 14:42

## 2020-09-03 RX ADMIN — FENTANYL CITRATE 25 MCG: 50 INJECTION INTRAMUSCULAR; INTRAVENOUS at 14:50

## 2020-09-03 RX ADMIN — SODIUM CHLORIDE, POTASSIUM CHLORIDE, SODIUM LACTATE AND CALCIUM CHLORIDE: 600; 310; 30; 20 INJECTION, SOLUTION INTRAVENOUS at 14:10

## 2020-09-03 ASSESSMENT — PAIN SCALES - GENERAL
PAINLEVEL_OUTOF10: 0

## 2020-09-03 ASSESSMENT — PAIN - FUNCTIONAL ASSESSMENT: PAIN_FUNCTIONAL_ASSESSMENT: 0-10

## 2020-09-03 NOTE — ANESTHESIA PRE PROCEDURE
Department of Anesthesiology  Preprocedure Note       Name:  Jeana Espino   Age:  78 y.o.  :  1950                                          MRN:  [id number]         Date:  9/3/2020      Surgeon: Keanu Overton):  Jacinto Harris MD    Procedure: Procedure(s):  EGD ESOPHAGOGASTRODUODENOSCOPY ULTRASOUND    Medications prior to admission:   Prior to Admission medications    Medication Sig Start Date End Date Taking? Authorizing Provider   torsemide (DEMADEX) 10 MG tablet Take 15 mg by mouth daily   Yes Historical Provider, MD   colchicine (COLCRYS) 0.6 MG tablet Take 0.6 mg by mouth daily   Yes Historical Provider, MD   propranolol (INDERAL) 20 MG tablet Take 80 mg by mouth 2 times daily    Yes Historical Provider, MD   ENALAPRIL MALEATE Take 20 mg by mouth daily    Yes Historical Provider, MD   allopurinol (ZYLOPRIM) 100 MG tablet Take 100 mg by mouth daily. Yes Historical Provider, MD       Current medications:    Current Facility-Administered Medications   Medication Dose Route Frequency Provider Last Rate Last Dose    lactated ringers infusion   Intravenous Continuous Jacinto Harris  mL/hr at 20 1410         Allergies:  No Known Allergies    Problem List:    Patient Active Problem List   Diagnosis Code    Hypertension I10    Hyperlipidemia E78.5    Retroperitoneal mass R19.00       Past Medical History:        Diagnosis Date    Allergic rhinitis     Colonic polyp     DDD (degenerative disc disease)     Hyperlipidemia     Hypertension        Past Surgical History:        Procedure Laterality Date    BACK SURGERY      COLONOSCOPY  11/3/2004        JOINT REPLACEMENT      Bilateral total hips       Social History:    Social History     Tobacco Use    Smoking status: Never Smoker    Smokeless tobacco: Never Used   Substance Use Topics    Alcohol use:  No                                Counseling given: Not Answered      Vital Signs (Current):   Vitals:    20 1338   BP: 125/84 Airway: Mallampati: II        Dental:          Pulmonary:                              Cardiovascular:    (+) hypertension:, hyperlipidemia      ECG reviewed               Beta Blocker:  Dose within 24 Hrs         Neuro/Psych:               GI/Hepatic/Renal:   (+) renal disease: ARF,          ROS comment: Retroperitoneal mass. Endo/Other:    (+) blood dyscrasia: anemia:., electrolyte abnormalities, malignancy/cancer (large retroperitoneal mass). Abdominal:           Vascular:                                        Anesthesia Plan      MAC and TIVA     ASA 2       Induction: intravenous. Anesthetic plan and risks discussed with patient.                       Stephan Mahmood DO   9/3/2020

## 2020-09-03 NOTE — H&P
Patient Name: Jaz Dudley  : 1950  MRN: [id number]  DATE: 20    Allergies: No Known Allergies     ENDOSCOPY  History and Physical    Procedure:    [] Diagnostic Colonoscopy       [] Screening Colonoscopy  [x] EGD      [] ERCP      [x] EUS       [] Other    [x] Previous office notes/History and Physical reviewed from the patients chart. Please see EMR for further details of HPI. I have examined the patient's status immediately prior to the procedure and:      Indications/HPI:    [x]Abdominal Pain   []Cancer- GI/Lung     []Fhx of colon CA/polyps  []History of Polyps  []Barretts            []Melena  [x]Abnormal Imaging              []Dysphagia              []Persistent Pneumonia   []Anemia                            []Food Impaction        []History of Polyps  [] GI Bleed             []Pulmonary nodule/Mass   []Change in bowel habits []Heartburn/Reflux  []Rectal Bleed (BRBPR)  []Chest Pain - Non Cardiac []Heme (+) Stool []Ulcers  []Constipation  []Hemoptysis  []Varices  []Diarrhea  []Hypoxemia    []Nausea/Vomiting   []Screening   []Crohns/Colitis  []Other:     Anesthesia:   [x] MAC [] Moderate Sedation   [] General   [] None     ROS: 12 pt Review of Symptoms was negative unless mentioned above    Medications:   Prior to Admission medications    Medication Sig Start Date End Date Taking? Authorizing Provider   torsemide (DEMADEX) 10 MG tablet Take 15 mg by mouth daily   Yes Historical Provider, MD   colchicine (COLCRYS) 0.6 MG tablet Take 0.6 mg by mouth daily   Yes Historical Provider, MD   propranolol (INDERAL) 20 MG tablet Take 80 mg by mouth 2 times daily    Yes Historical Provider, MD   ENALAPRIL MALEATE Take 20 mg by mouth daily    Yes Historical Provider, MD   allopurinol (ZYLOPRIM) 100 MG tablet Take 100 mg by mouth daily.      Yes Historical Provider, MD       Past Medical History:  Past Medical History:   Diagnosis Date    Allergic rhinitis     Colonic polyp     DDD (degenerative disc disease)     Hyperlipidemia     Hypertension        Past Surgical History:  Past Surgical History:   Procedure Laterality Date    BACK SURGERY      COLONOSCOPY  11/3/2004        JOINT REPLACEMENT      Bilateral total hips       Social History:  Social History     Tobacco Use    Smoking status: Never Smoker    Smokeless tobacco: Never Used   Substance Use Topics    Alcohol use: No    Drug use: No       Vital Signs:   Vitals:    09/03/20 1338   BP: 125/84   Pulse: 72   Resp: 18   Temp: 98.3 °F (36.8 °C)   SpO2: 97%        Physical Exam:  Cardiac:  [x]WNL  []Comments:  Pulmonary:  [x]WNL   []Comments:  Neuro/Mental Status:  [x]WNL  []Comments:  Abdominal:  [x]WNL    []Comments:  Other:   []WNL  []Comments:    Informed Consent:  The risks and benefits of the procedure have been discussed with either the patient or if they cannot consent, their representative. Assessment:  Patient examined and appropriate for planned sedation and procedure. Plan:  Proceed with planned sedation and procedure as above.          Elicia Alexander MD

## 2020-09-08 ENCOUNTER — HOSPITAL ENCOUNTER (OUTPATIENT)
Dept: NON INVASIVE DIAGNOSTICS | Age: 70
Discharge: HOME | End: 2020-09-08
Payer: MEDICARE

## 2020-09-08 ENCOUNTER — OFFICE VISIT (OUTPATIENT)
Dept: INTERNAL MEDICINE | Facility: CLINIC | Age: 70
End: 2020-09-08

## 2020-09-08 ENCOUNTER — TELEPHONE (OUTPATIENT)
Dept: INTERNAL MEDICINE | Facility: CLINIC | Age: 70
End: 2020-09-08

## 2020-09-08 DIAGNOSIS — R19.09 ABDOMINAL MASS OF OTHER SITE: ICD-10-CM

## 2020-09-08 DIAGNOSIS — R60.9 EDEMA, UNSPECIFIED TYPE: ICD-10-CM

## 2020-09-08 DIAGNOSIS — E78.00 HIGH CHOLESTEROL: ICD-10-CM

## 2020-09-08 DIAGNOSIS — I10 BENIGN ESSENTIAL HTN: Primary | ICD-10-CM

## 2020-09-08 PROBLEM — R93.5 ABNORMAL CT OF THE ABDOMEN: Status: ACTIVE | Noted: 2020-09-08

## 2020-09-08 LAB
LV EF: 55 %
LVEF MODALITY: NORMAL

## 2020-09-08 PROCEDURE — C8929 TTE W OR WO FOL WCON,DOPPLER: HCPCS

## 2020-09-08 PROCEDURE — 99214 OFFICE O/P EST MOD 30 MIN: CPT | Performed by: INTERNAL MEDICINE

## 2020-09-08 PROCEDURE — 6360000004 HC RX CONTRAST MEDICATION: Performed by: NURSE PRACTITIONER

## 2020-09-08 RX ORDER — TORSEMIDE 20 MG/1
TABLET ORAL
Qty: 30 TABLET | Refills: 1 | Status: SHIPPED | OUTPATIENT
Start: 2020-09-08 | End: 2021-01-18

## 2020-09-08 RX ORDER — SODIUM CHLORIDE 0.9 % (FLUSH) 0.9 %
10 SYRINGE (ML) INJECTION PRN
Status: DISCONTINUED | OUTPATIENT
Start: 2020-09-08 | End: 2020-09-09 | Stop reason: HOSPADM

## 2020-09-08 RX ORDER — CEPHALEXIN 500 MG/1
500 CAPSULE ORAL 3 TIMES DAILY
Qty: 21 CAPSULE | Refills: 0 | Status: SHIPPED | OUTPATIENT
Start: 2020-09-08 | End: 2020-11-02

## 2020-09-08 RX ADMIN — PERFLUTREN 1.65 MG: 6.52 INJECTION, SUSPENSION INTRAVENOUS at 11:45

## 2020-09-08 NOTE — PROGRESS NOTES
Subjective   Aleks Monzon is a 70 y.o. male.   No chief complaint on file.      Patient presents with swelling both legs he is actually had beginning to have some oozing.  He has a known retroperitoneal mass which is placing some pressure on his ureters causing hydronephrosis.  We have previously given him a diuretic to try and help him with his leg edema it is a bit of minimal help.  Coincidently patient tells me that he had someone show up at work who was sick and left he was only exposed for short period time but that person later tested positive for COVID is been a week ago patient has not had symptoms since that time.       The following portions of the patient's history were reviewed and updated as appropriate: allergies, current medications, past family history, past medical history, past social history, past surgical history and problem list.    Review of Systems   Constitutional: Negative for activity change, appetite change, fatigue, fever, unexpected weight gain and unexpected weight loss.   HENT: Negative for swollen glands, trouble swallowing and voice change.    Eyes: Negative for blurred vision and visual disturbance.   Respiratory: Negative for cough and shortness of breath.    Cardiovascular: Negative for chest pain, palpitations and leg swelling.   Gastrointestinal: Negative for abdominal pain, constipation, diarrhea, nausea, vomiting and indigestion.   Endocrine: Negative for cold intolerance, heat intolerance, polydipsia and polyphagia.   Genitourinary: Negative for dysuria and frequency.   Musculoskeletal: Negative for arthralgias, back pain, joint swelling and neck pain.   Skin: Negative for color change, rash and skin lesions.   Neurological: Negative for dizziness, weakness, headache, memory problem and confusion.   Hematological: Does not bruise/bleed easily.   Psychiatric/Behavioral: Negative for agitation, hallucinations and suicidal ideas. The patient is not nervous/anxious.         Objective   Past Medical History:   Diagnosis Date   • IFG (impaired fasting glucose)       Past Surgical History:   Procedure Laterality Date   • BACK SURGERY     • TOTAL HIP ARTHROPLASTY          Current Outpatient Medications:   •  allopurinol (ZYLOPRIM) 300 MG tablet, Take 1 tablet by mouth Daily., Disp: 90 tablet, Rfl: 3  •  cephalexin (KEFLEX) 500 MG capsule, Take 1 capsule by mouth 3 (Three) Times a Day., Disp: 21 capsule, Rfl: 0  •  colchicine 0.6 MG tablet, Take 1 tablet by mouth 2 (Two) Times a Day As Needed for Muscle / Joint Pain., Disp: 20 tablet, Rfl: 5  •  enalapril (VASOTEC) 20 MG tablet, TAKE 1 TABLET BY MOUTH ONCE DAILY., Disp: 30 tablet, Rfl: 11  •  fluticasone (FLONASE) 50 MCG/ACT nasal spray, 1 spray into the nostril(s) as directed by provider 2 (Two) Times a Day As Needed., Disp: , Rfl:   •  propranolol (INDERAL) 20 MG tablet, Take 4 tablets by mouth 2 (Two) Times a Day., Disp: 180 tablet, Rfl: 3  •  tamsulosin (FLOMAX) 0.4 MG capsule 24 hr capsule, Take 1 capsule by mouth Daily As Needed., Disp: , Rfl:   •  torsemide (DEMADEX) 20 MG tablet, 1   daily, Disp: 30 tablet, Rfl: 1    Current Facility-Administered Medications:   •  sodium chloride 0.9 % infusion, 500 mL/hr, Intravenous, BID, Siddhartha Epps MD     There were no vitals filed for this visit.  There were no vitals filed for this visit.  Patient's There is no height or weight on file to calculate BMI. BMI is .      Physical Exam   Constitutional: He is oriented to person, place, and time. He appears well-developed and well-nourished.   HENT:   Head: Normocephalic and atraumatic.   Right Ear: External ear normal.   Left Ear: External ear normal.   Nose: Nose normal.   Mouth/Throat: Oropharynx is clear and moist.   Eyes: Pupils are equal, round, and reactive to light. Conjunctivae and EOM are normal.   Neck: Normal range of motion. Neck supple. No thyromegaly present.   Cardiovascular: Normal rate, regular rhythm, normal heart  sounds and intact distal pulses.   Pulmonary/Chest: Effort normal and breath sounds normal.   Abdominal: Soft. Bowel sounds are normal.   Musculoskeletal: He exhibits edema ( Both lower extremities are 2+ edematous he is getting some breakdown of the tissues of his lower extremities some of this may be lymphedema.).   Lymphadenopathy:     He has no cervical adenopathy.   I was able to palpate a lymph node beneath his left axilla today which was not palpable his last visit   Neurological: He is alert and oriented to person, place, and time.   Skin: Skin is warm and dry.   Psychiatric: He has a normal mood and affect. His behavior is normal. Thought content normal.   Nursing note and vitals reviewed.            Assessment/Plan   Diagnoses and all orders for this visit:    1. Benign essential HTN (Primary)    2. Edema, unspecified type  -     torsemide (DEMADEX) 20 MG tablet; 1   daily  Dispense: 30 tablet; Refill: 1    3. High cholesterol    4. Abdominal mass of other site    Other orders  -     cephalexin (KEFLEX) 500 MG capsule; Take 1 capsule by mouth 3 (Three) Times a Day.  Dispense: 21 capsule; Refill: 0      Patient has a retroperitoneal mass which is causing bilateral leg edema.  They have attempted twice to obtain tissue for diagnosis he has a presumed lymphoma currently being worked up by Dr. Toño Mendez.  He does have a new lymph node beneath his left axilla which was not palpable on prior evaluation.  I am going to try patient on a round of antibiotics and increase his Demadex to 20 mg daily will see him back in short-term in about 2 weeks to make sure that he does not get dehydrated or going to renal failure.  Hopefully Dr. Mendez can get him biopsied and diagnosed in the interim.

## 2020-09-08 NOTE — TELEPHONE ENCOUNTER
Pt called in requesting to speak with nurse. Pt stated he has some leakage coming from his feet.Pt stated Dr. Heath and Dr. Mendez is aware of what's going on. Pt is wanting to know what he can do to help the area.    Please call [phone].

## 2020-09-09 ENCOUNTER — VIRTUAL VISIT (OUTPATIENT)
Dept: HEMATOLOGY | Age: 70
End: 2020-09-09
Payer: MEDICARE

## 2020-09-09 PROCEDURE — 1036F TOBACCO NON-USER: CPT | Performed by: INTERNAL MEDICINE

## 2020-09-09 PROCEDURE — 1123F ACP DISCUSS/DSCN MKR DOCD: CPT | Performed by: INTERNAL MEDICINE

## 2020-09-09 PROCEDURE — G8417 CALC BMI ABV UP PARAM F/U: HCPCS | Performed by: INTERNAL MEDICINE

## 2020-09-09 PROCEDURE — 4040F PNEUMOC VAC/ADMIN/RCVD: CPT | Performed by: INTERNAL MEDICINE

## 2020-09-09 PROCEDURE — G8427 DOCREV CUR MEDS BY ELIG CLIN: HCPCS | Performed by: INTERNAL MEDICINE

## 2020-09-09 PROCEDURE — 3017F COLORECTAL CA SCREEN DOC REV: CPT | Performed by: INTERNAL MEDICINE

## 2020-09-09 PROCEDURE — 99214 OFFICE O/P EST MOD 30 MIN: CPT | Performed by: INTERNAL MEDICINE

## 2020-09-09 ASSESSMENT — ENCOUNTER SYMPTOMS
STRIDOR: 0
SINUS PAIN: 0
ABDOMINAL PAIN: 0
CHEST TIGHTNESS: 0
DIARRHEA: 0
EYE DISCHARGE: 0
VOMITING: 0
BACK PAIN: 0
SHORTNESS OF BREATH: 0
FACIAL SWELLING: 0
COLOR CHANGE: 0
RECTAL PAIN: 0
NAUSEA: 0
VOICE CHANGE: 0
TROUBLE SWALLOWING: 0
WHEEZING: 0
CONSTIPATION: 0
EYE ITCHING: 0
EYE PAIN: 0

## 2020-09-09 NOTE — PROGRESS NOTES
2020    TELEHEALTH EVALUATION -- Audio/Visual (During IYZRY-43 public health emergency)    HPI:    Rk Varma (:  1950) has requested an audio/video evaluation for the following concern(s):    Rk Varma is a very pleasant 78 y.o.  gentleman managed with primary and secondary diagnoses as outlined:  · Retroperitoneal mass extending from approximately T12-L3 and encasing the mesenteric and renal arteries, left axillary lymphadenopathy and right parotid gland small nodule suspicious for lymphoma    Arrangements were made during his initial consultation on 2020 for EGD/EUS and biopsy of the retroperitoneal mass. EGD with EUS and FNA performed on 9/3/2020 by Dr. Leti Alfaro was nondiagnostic. The site of the primary mass was too low to be accessed from the stomach despite the description suggesting involvement in the celiac plexus area. Mr. Linda Velazquez is evaluated using a virtual synchronous two way real time Audio substitute for in-person clinic visit using the telephone system platform during the [address] [facility] emergency pandemic crisis participating from home and I from the [facility] clinic. His wife was present with him during this interview. The patient was offered telemedicine as an option for care delivery and consented to this option rather than coming to the clinic due to the fact that he was in contact with someone with COVID-19 and responsibly requested medication. TARGET LYMPH NODE SITES:  · Retroperitoneal mass extending from approximately T12-L3 and encasing the mesenteric and renal arteries. The mass causes left obstructive uropathy and extends into the left renal pelvis and posteriorly around the left kidney  · 1.6 x 1.2 cm left axillary lymph node  · 0.9 cm rounded soft tissue nodule in the superficial lobe of the right parotid gland.   · Nonspecific 1.3 cm right adrenal nodule     TUMOR HISTORY:   Tony Headley was seen in initial oncology consultation on 9/2/2020 referred by Dr. Ketty Polo for large retroperitoneal mass. He is accompanied by his wife [facility] ([phone]).    Sri Lay has a history of prostate cancer presenting with an elevated PSA of 5.3 on 11/2/2017. 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 Rosalia Sahu. Proton therapy was delivered by Dr. Sly Conte with a total dose of 70 GyE in 20 fractions from 7/1/2019 through 8/13/2019.     Sri Lay presented to Dr. Ketty Polo with newly developed lower extremity edema in July 2020. Laboratory parameters were abnormal.  A creatinine was 1.73 on 7/9/2020.     Renal ultrasound on 7/16/2020 at [facility] documented the following:   · Mild to moderate left hydronephrosis and a thin left renal cortex.     Sri Lay was referred to Dr. Rosalia Sahu for urology management anticipating a possible stent. Dr. Helen Wang ordered a CT scan of the abdomen and pelvis as an initial imaging evaluation.     Ct abdomen and pelvis without contrast on 8/6/2020 at [facility] documented:  ·  Ill-defined infiltrative process within the left retroperitoneum beginning at the T12 level extending through L3/L4. This results in moderate left-sided hydronephrosis with thinning of the left renal cortex. There is stranding of the fat adjacent to the proximal left ureter. A follow-up CT urography protocol might be considered for further assessment. Difficult to discern if this represents an infectious/inflammatory or neoplastic process. · Nonspecific 1.3 cm right adrenal nodule.     CT abdomen and pelvis with and without contrast on 8/19/2020 was compared to 8/16/2020 and documented:  · Retroperitoneal mass is present. This extends from approximately T12-L3 this encases the mesenteric and renal arteries. This causes left obstructive uropathy and extends into the left renal pelvis and posteriorly around the left kidney. Most likely this is a neoplasm. Lymphoma should be excluded     CT neck without contrast on 8/24/2020 at [facility] documented:  · 0.9 cm rounded soft tissue nodule in the superficial lobe of the right parotid gland. CT appearance is nonspecific. Differential would include intraparotid lymph node or primary parotid neoplasia. · Otherwise, no soft tissue mass or adenopathy identified in the neck. · Advanced cervical spine degenerative change     CT chest without contrast on 8/24/2020 at [facility] documented:  · 1.6 x 1.2 cm left axillary lymph node, abnormal appearing. Consider ultrasound for further characterization. · Known retroperitoneal mass. · Otherwise, no acute intrathoracic findings     Dr. Dangelo Abad arranged for a CT-guided biopsy of the retroperitoneal mass at [facility] on 9/1/2020. The biopsy was canceled by radiology due to proximity to blood vessels.     Kayleen Wright is seen in initial medical oncology consultation on 9/2/2020. I am able to palpate a left axillary lymph node approximately 1.5- 2 cm. Otherwise obvious palpable pathological peripheral lymphadenopathy is not appreciated.     CBC 9/2/2020 revealed a WBC of 5.65. Hgb is 13.6 with an MCV of 89.6 and platelet count of 516,482.      EGD with EUS and FNA was performed on 9/3/2020 by Dr. Isadora Villegas. Cytology revealed:  · Hyalinized fibrosis with scattered small round lymphocytes and fragments of benign hepatic parenchyma   · Negative for cytologic evidence of malignancy     The case was discussed with Dr. Isadora Villegas. He was unable to directly and adequately visualize the mass to obtain a positive tissue for diagnosis. All of this is discussed with . Jose Cheek and his wife with all other questions answered to their understanding satisfaction.     ASSESSMENT RECOMMENDATION AND PLAN:  · Ultrasound-guided biopsy of the left axillary lymph node has been scheduled for 9/15/2020 at 10:30 AM at [address] [place] [address] radiology department  · If a diagnosis of lymphoma is made, he will then require a bone marrow biopsy and aspirate and PET scan  · 2D echo to evaluate cardiac status anticipating systemic chemotherapy with an anthracycline  · Follow-up 9/17/2020          Review of Systems   Constitutional: Negative for appetite change, fatigue, fever and unexpected weight change. HENT: Negative for ear pain, facial swelling, hearing loss, sinus pain, trouble swallowing and voice change. Eyes: Negative for pain, discharge, itching and visual disturbance. Respiratory: Negative for chest tightness, shortness of breath, wheezing and stridor. Cardiovascular: Negative for chest pain, palpitations and leg swelling. Gastrointestinal: Negative for abdominal pain, constipation, diarrhea, nausea, rectal pain and vomiting. Endocrine: Negative for polydipsia, polyphagia and polyuria. Genitourinary: Negative for dysuria, flank pain and hematuria. Musculoskeletal: Negative for back pain, gait problem, neck pain and neck stiffness. Skin: Negative for color change, pallor, rash and wound. Allergic/Immunologic: Negative for immunocompromised state. Neurological: Negative for dizziness, speech difficulty, weakness, light-headedness and headaches. Hematological: Does not bruise/bleed easily. Psychiatric/Behavioral: Negative for confusion. The patient is not nervous/anxious. Prior to Visit Medications    Medication Sig Taking? Authorizing Provider   torsemide (DEMADEX) 10 MG tablet Take 15 mg by mouth daily Yes Historical Provider, MD   colchicine (COLCRYS) 0.6 MG tablet Take 0.6 mg by mouth daily Yes Historical Provider, MD   propranolol (INDERAL) 20 MG tablet Take 80 mg by mouth 2 times daily  Yes Historical Provider, MD   ENALAPRIL MALEATE Take 20 mg by mouth daily  Yes Historical Provider, MD   allopurinol (ZYLOPRIM) 100 MG tablet Take 100 mg by mouth daily.    Yes Historical Provider, MD       Social History     Tobacco Use    Smoking status: Never Smoker    Smokeless tobacco: Never Used   Substance Use Topics    Alcohol use: No    Drug use: No       ASSESSMENT/PLAN:  Jeana Espino is a very pleasant 78 y.o.  gentleman managed with primary and secondary diagnoses as outlined:  · Retroperitoneal mass extending from approximately T12-L3 and encasing the mesenteric and renal arteries, left axillary lymphadenopathy and right parotid gland small nodule suspicious for lymphoma    Arrangements were made during his initial consultation on 9/2/2020 for EGD/EUS and biopsy of the retroperitoneal mass. EGD with EUS and FNA performed on 9/3/2020 by Dr. Maria C Grant was nondiagnostic. The site of the primary mass was too low to be accessed from the stomach despite the description suggesting involvement in the celiac plexus area. Mr. Lisandra Woo is evaluated using a virtual synchronous two way real time Audio substitute for in-person clinic visit using the telephone system platform during the [address] [facility] emergency pandemic crisis participating from home and I from the [facility] [place] clinic. His wife was present with him during this interview. The patient was offered telemedicine as an option for care delivery and consented to this option rather than coming to the clinic due to the fact that he was in contact with someone with COVID-19 and responsibly requested medication. TARGET LYMPH NODE SITES:  · Retroperitoneal mass extending from approximately T12-L3 and encasing the mesenteric and renal arteries. The mass causes left obstructive uropathy and extends into the left renal pelvis and posteriorly around the left kidney  · 1.6 x 1.2 cm left axillary lymph node  · 0.9 cm rounded soft tissue nodule in the superficial lobe of the right parotid gland. · Nonspecific 1.3 cm right adrenal nodule     Boogie Centeno has a history of prostate cancer presenting with an elevated PSA of 5.3 on 11/2/2017.   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 Didier Clemons. Proton therapy was delivered by Dr. Wolfgang Puri with a total dose of 70 GyE in 20 fractions from 7/1/2019 through 8/13/2019.     Paulette Tavarez presented to Dr. Alethea May with newly developed lower extremity edema in July 2020. Laboratory parameters were abnormal.  A creatinine was 1.73 on 7/9/2020.     Renal ultrasound on 7/16/2020 at [facility] radiology documented the following:   · Mild to moderate left hydronephrosis and a thin left renal cortex.     Paulette Tavarez was referred to Dr. Didier Clemons for urology management anticipating a possible stent. Dr. iNng Saucedo ordered a CT scan of the abdomen and pelvis as an initial imaging evaluation.     Ct abdomen and pelvis without contrast on 8/6/2020 at [facility] documented:  ·  Ill-defined infiltrative process within the left retroperitoneum beginning at the T12 level extending through L3/L4. This results in moderate left-sided hydronephrosis with thinning of the left renal cortex. There is stranding of the fat adjacent to the proximal left ureter. A follow-up CT urography protocol might be considered for further assessment. Difficult to discern if this represents an infectious/inflammatory or neoplastic process. · Nonspecific 1.3 cm right adrenal nodule.     CT abdomen and pelvis with and without contrast on 8/19/2020 was compared to 8/16/2020 and documented:  · Retroperitoneal mass is present. This extends from approximately T12-L3 this encases the mesenteric and renal arteries. This causes left obstructive uropathy and extends into the left renal pelvis and posteriorly around the left kidney. Most likely this is a neoplasm. Lymphoma should be excluded     CT neck without contrast on 8/24/2020 at [facility] documented:  · 0.9 cm rounded soft tissue nodule in the superficial lobe of the right parotid gland. CT appearance is nonspecific.  Differential would include intraparotid lymph node or primary parotid neoplasia. · Otherwise, no soft tissue mass or adenopathy identified in the neck. · Advanced cervical spine degenerative change     CT chest without contrast on 8/24/2020 at [facility] documented:  · 1.6 x 1.2 cm left axillary lymph node, abnormal appearing. Consider ultrasound for further characterization. · Known retroperitoneal mass. · Otherwise, no acute intrathoracic findings     Dr. Bailey Guzman arranged for a CT-guided biopsy of the retroperitoneal mass at [facility] on 9/1/2020. The biopsy was canceled by radiology due to proximity to blood vessels.     Lexi Orona is seen in initial medical oncology consultation on 9/2/2020. I am able to palpate a left axillary lymph node measuring approximately 1.5- 2 cm, and mobile. Otherwise obvious palpable pathological peripheral lymphadenopathy is not appreciated.     CBC 9/2/2020 revealed a WBC of 5.65. Hgb is 13.6 with an MCV of 89.6 and platelet count of 116,325. EGD with EUS and FNA was performed on 9/3/2020 by Dr. Damion Calvillo. Cytology revealed:  · Hyalinized fibrosis with scattered small round lymphocytes and fragments of benign hepatic parenchyma   · Negative for cytologic evidence of malignancy     The case was discussed with Dr. Damion Calvillo. He was unable to directly and adequately visualize the mass to obtain a positive tissue for diagnosis. All of this is discussed with . Ramesh Magallon and his wife with all other questions answered to their understanding satisfaction. ASSESSMENT RECOMMENDATION AND PLAN:  · Ultrasound-guided biopsy of the left axillary lymph node has been scheduled for 9/15/2020 at 10:30 AM at [address] [place] [address] radiology department  · If a diagnosis of Miguel Salt is made, he will then require a bone marrow biopsy and aspirate and PET scan  · 2D echo to evaluate cardiac status anticipating systemic chemotherapy with an anthracycline  · Follow-up 9/17/2020      1. Axillary mass, left    - US BIOPSY LYMPH NODE; Future      No follow-ups on file.     Jaz Dudley is a 78 y.o. male being evaluated by a Virtual Visit (video visit) encounter to address concerns as mentioned above. A caregiver was present when appropriate. Due to this being a TeleHealth encounter (During [place] [address] public health emergency), evaluation of the following organ systems was limited: Vitals/Constitutional/EENT/Resp/CV/GI//MS/Neuro/Skin/Heme-Lymph-Imm. Pursuant to the emergency declaration under the [address] [place] [address], [address] [place] [address] authority and the [employer] Act, this Virtual Visit was conducted with patient's (and/or legal guardian's) consent, to reduce the patient's risk of exposure to COVID-19 and provide necessary medical care. The patient (and/or legal guardian) has also been advised to contact this office for worsening conditions or problems, and seek emergency medical treatment and/or call 911 if deemed necessary. Patient identification was verified at the start of the visit: Yes    Total time spent on this encounter: Not billed by time    Services were provided through a video synchronous discussion virtually to substitute for in-person clinic visit. Patient and provider were located at their individual homes. --Dmitry Patel LPN on 8/8/2615 at 7:57 PM    An electronic signature was used to authenticate this note. Ambrosio Guillen am scribing for Meme Medrano MD. Electronically signed by Giuseppe Pradhan LPN on 3/0/0018 at 4:94 PM       Prakash Simmonds was seen today for telephone appointment visit.     Diagnoses and all orders for this visit:    Axillary mass, left  -     US BIOPSY LYMPH NODE; Future        Orders Placed This Encounter   Procedures    US BIOPSY LYMPH NODE     Standing Status:   Future     Standing Expiration Date:   9/9/2021     Order Specific Question:   Reason for exam:     Answer:   palpable left axillary mass     Order Specific Question:   Laterality     Answer:   Left No orders of the defined types were placed in this encounter. No follow-ups on file.

## 2020-09-15 ENCOUNTER — HOSPITAL ENCOUNTER (OUTPATIENT)
Dept: GENERAL RADIOLOGY | Age: 70
Discharge: HOME | End: 2020-09-15
Payer: MEDICARE

## 2020-09-15 ENCOUNTER — HOSPITAL ENCOUNTER (OUTPATIENT)
Dept: ULTRASOUND IMAGING | Age: 70
Discharge: HOME | End: 2020-09-15
Payer: MEDICARE

## 2020-09-15 PROCEDURE — 76882 US LMTD JT/FCL EVL NVASC XTR: CPT

## 2020-09-15 NOTE — PROGRESS NOTES
Patient:  Zachary Contreras  YOB: 1950  Date of Service: 9/16/2020  MRN: [id number]    Primary Care Physician: Jody Sharp MD    Chief Complaint   Patient presents with    Follow-up     Retroperitoneal mass        Patient Seen, Chart, Consults notes, Labs, Radiology studies reviewed. Subjective:  Lilliana Neighbors a very pleasant 70 y.o.  gentleman managed with primary and secondary diagnoses as outlined:  · Retroperitoneal mass extending from approximately T12-L3 and encasing the mesenteric and renal arteries, left axillary lymphadenopathy and right parotid gland small nodule suspicious for lymphoma     Arrangements were made during his initial consultation on 9/2/2020 for EGD/EUS and biopsy of the retroperitoneal mass. EGD with EUS and FNA performed on 9/3/2020 by Dr. Sloan Regalado was nondiagnostic. The site of the primary mass was too low to be accessed from the stomach despite the description suggesting involvement in the celiac plexus area. Referral was then made for an ultrasound-guided biopsy of the left axillary lymph node. This was attempted on 9/15/2020 by the radiologist, Dr. Ernesto Prieto after they were unable to visualize the lymph node and therefore this procedure was not performed either. Leatha Blank comes today accompanied by his wife for further decision-making anticipating a definitive tissue diagnosis to proceed with therapy.        TARGET LYMPH NODE SITES:  · Retroperitoneal mass extending from approximately T12-L3 and encasing the mesenteric and renal arteries.  The mass causes left obstructive uropathy and extends into the left renal pelvis and posteriorly around the left kidney  · 1.6 x 1.2 cm left axillary lymph node  · 0.9 cm rounded soft tissue nodule in the superficial lobe of the right parotid gland.   · Nonspecific 1.3 cm right adrenal nodule     TUMOR HISTORY:   Leatha Blank was seen in initial oncology consultation on 9/2/2020 referred by Dr. Thiago Mejia for large retroperitoneal mass.  He is accompanied by his wife Shonda Aguilera ([phone]).    Mark Noonan has a history of prostate cancer presenting with an elevated PSA of 5.3 on 11/2/2017. A diagnosis of stage I (T1 cN0 M0) low risk adenocarcinoma of the prostate (Isaac score of 6 (3+3) was made on transrectal ultrasound biopsy on 12/4/2017 by Dr. Dharmesh Crawley. Proton therapy was delivered by Dr. He Glynn with a total dose of 70 GyE in 20 fractions from 7/1/2019 through 8/13/2019.     Mark Noonan presented to Dr. Michelle Humphrey with newly developed lower extremity edema in July 2020. Laboratory parameters were abnormal.  A creatinine was 1.73 on 7/9/2020.     Renal ultrasound on 7/16/2020 at [facility] radiology documented the following:   · Mild to moderate left hydronephrosis and a thin left renal cortex.     Mark Noonan was referred to Dr. Dharmesh Crawley for urology management anticipating a possible stent. Dr. Ravin Vinson ordered a CT scan of the abdomen and pelvis as an initial imaging evaluation.     Ct abdomen and pelvis without contrast on 8/6/2020 [facility] documented:  ·  Ill-defined infiltrative process within the left retroperitoneum beginning at the T12 level extending through L3/L4. This results in moderate left-sided hydronephrosis with thinning of the left renal cortex. There is stranding of the fat adjacent to the proximal left ureter. A follow-up CT urography protocol might be considered for further assessment. Difficult to discern if this represents an infectious/inflammatory or neoplastic process. · Nonspecific 1.3 cm right adrenal nodule.     CT abdomen and pelvis with and without contrast on 8/19/2020 was compared to 8/16/2020 and documented:  · Retroperitoneal mass is present. This extends from approximately T12-L3 this encases the mesenteric and renal arteries. This causes left obstructive uropathy and extends into the left renal pelvis and posteriorly around the left kidney. Most likely this is a neoplasm.  Lymphoma should be excluded     CT neck without contrast on 8/24/2020 at [facility] documented:  · 0.9 cm rounded soft tissue nodule in the superficial lobe of the right parotid gland. CT appearance is nonspecific. Differential would include intraparotid lymph node or primary parotid neoplasia. · Otherwise, no soft tissue mass or adenopathy identified in the neck. · Advanced cervical spine degenerative change     CT chest without contrast on 8/24/2020 at [facility] documented:  · 1.6 x 1.2 cm left axillary lymph node, abnormal appearing. Consider ultrasound for further characterization. · Known retroperitoneal mass. · Otherwise, no acute intrathoracic findings     Dr. Lamar Flores arranged for a CT-guided biopsy of the retroperitoneal mass at [facility] on 9/1/2020. The biopsy was canceled by radiology due to proximity to blood vessels.     Javier Burnham is seen in initial medical oncology consultation on 9/2/2020.  I am able to palpate a left axillary lymph node approximately 1.5- 2 cm.  Otherwise obvious palpable pathological peripheral lymphadenopathy is not appreciated.     CBC 9/2/2020 revealed a WBC of 5.65. Hgb is 13.6 with an MCV of 89.6 and platelet count of 175,695.      EGD with EUS and FNA was performed on 9/3/2020 by Dr. Viviane Goel. Cytology revealed:  · Hyalinized fibrosis with scattered small round lymphocytes and fragments of benign hepatic parenchyma   · Negative for cytologic evidence of malignancy     The case was discussed with Dr. Viviane Goel. He was unable to directly and adequately visualize the mass to obtain a positive tissue for diagnosis.     2D ECHO performed on 9/8/2020 showed an EF of 55%. Referral was then made for an ultrasound-guided biopsy of the left axillary lymph node. This was attempted on 9/15/2020 by the radiologist, Dr. Freddie Hanna after they were unable to visualize the lymph node and therefore this procedure was not performed either.       Allergies:  Patient has no known allergies.     Medicines:  Current Outpatient Medications   Medication Sig Dispense Refill    torsemide (DEMADEX) 10 MG tablet Take 15 mg by mouth daily      colchicine (COLCRYS) 0.6 MG tablet Take 0.6 mg by mouth daily      propranolol (INDERAL) 20 MG tablet Take 80 mg by mouth 2 times daily       ENALAPRIL MALEATE Take 20 mg by mouth daily       allopurinol (ZYLOPRIM) 100 MG tablet Take 100 mg by mouth daily. No current facility-administered medications for this visit. Past Medical History:      Diagnosis Date    Allergic rhinitis     Colonic polyp     DDD (degenerative disc disease)     Hyperlipidemia     Hypertension         Past Surgical History:      Procedure Laterality Date    BACK SURGERY      COLONOSCOPY  11/3/2004        TOTAL HIP ARTHROPLASTY Bilateral     UPPER GASTROINTESTINAL ENDOSCOPY N/A 9/3/2020    Dr RAMSES Olivarez-w/EUS and fna-Benign        Family History:      Problem Relation Age of Onset    Stroke Mother     Deep Vein Thrombosis Mother     Pacemaker Father     Heart Disease Father         Social History  Social History     Tobacco Use    Smoking status: Never Smoker    Smokeless tobacco: Never Used   Substance Use Topics    Alcohol use: No    Drug use: No          Review of Systems:  Constitutional: Negative for chills, fatigue, fever or significant weight loss. HENT: Negative for congestion, hearing loss, nosebleeds or sore throat. Eyes: Negative for photophobia, pain, discharge, redness and visual disturbance. Respiratory: Negative for cough, shortness of breath, or wheezing. Cardiovascular: Negative for chest pain, palpitations or leg swelling. Gastrointestinal: Negative for abdominal pain, blood in stool, constipation, diarrhea, nausea or vomiting. Genitourinary: Negative for dysuria, flank pain, frequency, hematuria or urgency. Musculoskeletal: Negative for back pain, joint swelling, myalgias or neck pain. Skin: Negative for rash or petechiae.    Neurological: Negative for tremors, seizures, syncope, weakness or headaches. Hematological: No active bruising or bleeding. Psychiatric/Behavioral: Negative for hallucinations. Objective:  Vital Signs: Blood pressure 124/82, pulse 73, temperature 96.6 °F (35.9 °C), temperature source Temporal, height 6' 3\" (1.905 m), weight 270 lb (122.5 kg), SpO2 97 %. Physical Exam   Constitutional: Oriented to person, place, and time. No acute distress. Head: Normocephalic and atraumatic. Nose: Nose normal.   Mouth/Throat: Oropharynx is clear and moist. No oropharyngeal exudate. Eyes: Pupils are equal and round. Conjunctivae and EOM are normal. No scleral icterus. Neck: Normal range of motion. Neck supple. No JVD. No appreciable thyromegaly. Cardiovascular: Normal rate, regular rhythm, normal heart sounds and intact distal pulses. Exam reveals no gallop, murmurs or friction rub. Pulmonary/Chest: Effort normal and breath sounds normal. No respiratory distress. No wheezes. Abdominal: Soft. Bowel sounds are normal. No organomegally or masses. No tenderness. There is no rebound and no guarding. Musculoskeletal: Normal range of motion. No edema or tenderness. Lymphadenopathy: No cervical, axillary or inguinal lymphadenopathy. Neurological: Alert and oriented to person, place, and time. Cranial nerves are intact. Neurological exam is nonfocal  Skin: Skin is warm and dry. No rash noted. No erythema. No pallor. Psychiatric: Judgment normal.          Labs:  BMP: No results for input(s): NA, K, CL, CO2, PHOS, BUN, CREATININE, CALCIUM in the last 72 hours. CBC:   Recent Labs     09/16/20  0815   WBC 7.32   HGB 13.7   HCT 42.1   MCV 90.1        PT/INR: No results for input(s): PROTIME, INR in the last 72 hours. APTT: No results for input(s): APTT in the last 72 hours. Magnesium:No results for input(s): MG in the last 72 hours. Phosphorus:No results for input(s): PHOS in the last 72 hours.   Hepatic: No results for input(s): ALKPHOS, ALT, AST, PROT, BILITOT, BILIDIR, LABALBU in the last 72 hours. Cultures:   No results for input(s): CULTURE in the last 72 hours. Radiology reports as per the Radiologist  Radiology: No results found. ASSESSMENT AND PLAN:  Delmis Aguirre a very pleasant 70 y.o.  gentleman managed with primary and secondary diagnoses as outlined:  · Retroperitoneal mass extending from approximately T12-L3 and encasing the mesenteric and renal arteries, left axillary lymphadenopathy and right parotid gland small nodule suspicious for lymphoma     Arrangements were made during his initial consultation on 9/2/2020 for EGD/EUS and biopsy of the retroperitoneal mass. EGD with EUS and FNA performed on 9/3/2020 by Dr. Maira Cruz was nondiagnostic. The site of the primary mass was too low to be accessed from the stomach despite the description suggesting involvement in the celiac plexus area. Referral was then made for an ultrasound-guided biopsy of the left axillary lymph node. This was attempted on 9/15/2020 by the radiologist, Dr. Vane Alonzo after they were unable to visualize the lymph node and therefore this procedure was not performed either. Kimber Steele comes today accompanied by his wife for further decision-making anticipating a definitive tissue diagnosis to proceed with therapy.     CBC 9/2/2020 revealed a WBC of 5.65. Hgb is 13.6 with an MCV of 89.6 and platelet count of 897,993. CBC today (9/16/2020) reveals a WBC of 7.32. Hgb is 13.7 with an MCV of 90.1 and platelet count of 814,550. I placed a phone call to the surgeon, Dr. Rut Hamilton to expedite tissue diagnosis. She returned my call and we discussed Mr. Wyatt Handley in detail. Dr. Alcides ePtit graciously agreed to see Mr. Wyatt Handley this afternoon at 1 PM anticipating getting him on the OR schedule for tomorrow 9/17/2020 ordered 9/18/2020.     I will get him back into see me next week to try to proceed with completion of work-up and proceeding with a treatment plan. [place] Apgar am scribing for Lilly Gerard MD. Electronically signed by Jose M Perez LPN on  at 1:63 AM       Leatha Blank was seen today for follow-up. Diagnoses and all orders for this visit:    Axillary mass, left    Retroperitoneal mass    Fatigue, unspecified type        No orders of the defined types were placed in this encounter. No orders of the defined types were placed in this encounter. Return in about 1 week (around 2020) for follow-up w/ Dr. Reyna Chand. I, Dr. Sharifa Brito, personally performed the services described in this documentation as scribed by Jose M Perez LPN in my presence, and it is both accurate and complete.

## 2020-09-16 ENCOUNTER — HOSPITAL ENCOUNTER (OUTPATIENT)
Dept: INFUSION THERAPY | Age: 70
Discharge: HOME | End: 2020-09-16
Payer: MEDICARE

## 2020-09-16 ENCOUNTER — TRANSCRIBE ORDERS (OUTPATIENT)
Dept: ADMINISTRATIVE | Facility: HOSPITAL | Age: 70
End: 2020-09-16

## 2020-09-16 ENCOUNTER — OFFICE VISIT (OUTPATIENT)
Dept: HEMATOLOGY | Age: 70
End: 2020-09-16
Payer: MEDICARE

## 2020-09-16 ENCOUNTER — APPOINTMENT (OUTPATIENT)
Dept: PREADMISSION TESTING | Facility: HOSPITAL | Age: 70
End: 2020-09-16

## 2020-09-16 VITALS
HEIGHT: 75 IN | BODY MASS INDEX: 33.57 KG/M2 | DIASTOLIC BLOOD PRESSURE: 82 MMHG | WEIGHT: 270 LBS | HEART RATE: 73 BPM | SYSTOLIC BLOOD PRESSURE: 124 MMHG | TEMPERATURE: 96.6 F | OXYGEN SATURATION: 97 %

## 2020-09-16 VITALS
SYSTOLIC BLOOD PRESSURE: 131 MMHG | DIASTOLIC BLOOD PRESSURE: 75 MMHG | OXYGEN SATURATION: 96 % | WEIGHT: 271.17 LBS | BODY MASS INDEX: 35.94 KG/M2 | HEIGHT: 73 IN | RESPIRATION RATE: 18 BRPM | HEART RATE: 84 BPM

## 2020-09-16 DIAGNOSIS — Z11.59 SCREENING FOR VIRAL DISEASE: Primary | ICD-10-CM

## 2020-09-16 DIAGNOSIS — R19.00 RETROPERITONEAL MASS: ICD-10-CM

## 2020-09-16 PROBLEM — R53.83 FATIGUE: Status: ACTIVE | Noted: 2020-09-16

## 2020-09-16 PROBLEM — R22.32 AXILLARY MASS, LEFT: Status: ACTIVE | Noted: 2020-09-16

## 2020-09-16 LAB
ALBUMIN SERPL-MCNC: 2.5 G/DL (ref 3.5–5.2)
ALBUMIN/GLOB SERPL: 1.1 G/DL
ALP SERPL-CCNC: 92 U/L (ref 39–117)
ALT SERPL W P-5'-P-CCNC: 19 U/L (ref 1–41)
ANION GAP SERPL CALCULATED.3IONS-SCNC: 7 MMOL/L (ref 5–15)
AST SERPL-CCNC: 23 U/L (ref 1–40)
BASOPHILS # BLD AUTO: 0.02 10*3/MM3 (ref 0–0.2)
BASOPHILS ABSOLUTE: 0.02 K/UL (ref 0.01–0.08)
BASOPHILS NFR BLD AUTO: 0.3 % (ref 0–1.5)
BASOPHILS RELATIVE PERCENT: 0.3 % (ref 0.1–1.2)
BILIRUB SERPL-MCNC: 0.2 MG/DL (ref 0–1.2)
BUN SERPL-MCNC: 31 MG/DL (ref 8–23)
BUN/CREAT SERPL: 17.1 (ref 7–25)
CALCIUM SPEC-SCNC: 8.2 MG/DL (ref 8.6–10.5)
CHLORIDE SERPL-SCNC: 109 MMOL/L (ref 98–107)
CO2 SERPL-SCNC: 30 MMOL/L (ref 22–29)
CREAT SERPL-MCNC: 1.81 MG/DL (ref 0.76–1.27)
DEPRECATED RDW RBC AUTO: 45.1 FL (ref 37–54)
EOSINOPHIL # BLD AUTO: 0.07 10*3/MM3 (ref 0–0.4)
EOSINOPHIL NFR BLD AUTO: 1.2 % (ref 0.3–6.2)
EOSINOPHILS ABSOLUTE: 0.09 K/UL (ref 0.04–0.54)
EOSINOPHILS RELATIVE PERCENT: 1.2 % (ref 0.7–7)
ERYTHROCYTE [DISTWIDTH] IN BLOOD BY AUTOMATED COUNT: 15.2 % (ref 12.3–15.4)
GFR SERPL CREATININE-BSD FRML MDRD: 37 ML/MIN/1.73
GLOBULIN UR ELPH-MCNC: 2.2 GM/DL
GLUCOSE SERPL-MCNC: 115 MG/DL (ref 65–99)
HCT VFR BLD AUTO: 38.5 % (ref 37.5–51)
HCT VFR BLD CALC: 42.1 % (ref 40.1–51)
HEMOGLOBIN: 13.7 G/DL (ref 13.7–17.5)
HGB BLD-MCNC: 13.1 G/DL (ref 13–17.7)
IMM GRANULOCYTES # BLD AUTO: 0.04 10*3/MM3 (ref 0–0.05)
IMM GRANULOCYTES NFR BLD AUTO: 0.7 % (ref 0–0.5)
LYMPHOCYTES # BLD AUTO: 1.28 10*3/MM3 (ref 0.7–3.1)
LYMPHOCYTES ABSOLUTE: 1.6 K/UL (ref 1.18–3.74)
LYMPHOCYTES NFR BLD AUTO: 22.3 % (ref 19.6–45.3)
LYMPHOCYTES RELATIVE PERCENT: 21.9 % (ref 19.3–53.1)
MCH RBC QN AUTO: 28.3 PG (ref 26.6–33)
MCH RBC QN AUTO: 29.3 PG (ref 25.7–32.2)
MCHC RBC AUTO-ENTMCNC: 32.5 G/DL (ref 32.3–36.5)
MCHC RBC AUTO-ENTMCNC: 34 G/DL (ref 31.5–35.7)
MCV RBC AUTO: 83.2 FL (ref 79–97)
MCV RBC AUTO: 90.1 FL (ref 79–92.2)
MONOCYTES # BLD AUTO: 0.56 10*3/MM3 (ref 0.1–0.9)
MONOCYTES ABSOLUTE: 0.9 K/UL (ref 0.24–0.82)
MONOCYTES NFR BLD AUTO: 9.7 % (ref 5–12)
MONOCYTES RELATIVE PERCENT: 12.3 % (ref 4.7–12.5)
NEUTROPHILS ABSOLUTE: 4.71 K/UL (ref 1.56–6.13)
NEUTROPHILS NFR BLD AUTO: 3.78 10*3/MM3 (ref 1.7–7)
NEUTROPHILS NFR BLD AUTO: 65.8 % (ref 42.7–76)
NEUTROPHILS RELATIVE PERCENT: 64.3 % (ref 34–71.1)
NRBC BLD AUTO-RTO: 0 /100 WBC (ref 0–0.2)
PDW BLD-RTO: 14.9 % (ref 11.6–14.4)
PLATELET # BLD AUTO: 283 10*3/MM3 (ref 140–450)
PLATELET # BLD: 227 K/UL (ref 163–337)
PMV BLD AUTO: 9.2 FL (ref 6–12)
PMV BLD AUTO: 9.3 FL (ref 7.4–10.4)
POTASSIUM SERPL-SCNC: 3.5 MMOL/L (ref 3.5–5.2)
PROT SERPL-MCNC: 4.7 G/DL (ref 6–8.5)
RBC # BLD AUTO: 4.63 10*6/MM3 (ref 4.14–5.8)
RBC # BLD: 4.67 M/UL (ref 4.63–6.08)
SODIUM SERPL-SCNC: 146 MMOL/L (ref 136–145)
WBC # BLD AUTO: 5.75 10*3/MM3 (ref 3.4–10.8)
WBC # BLD: 7.32 K/UL (ref 4.23–9.07)

## 2020-09-16 PROCEDURE — 80053 COMPREHEN METABOLIC PANEL: CPT | Performed by: SPECIALIST

## 2020-09-16 PROCEDURE — 85025 COMPLETE CBC W/AUTO DIFF WBC: CPT

## 2020-09-16 PROCEDURE — G8417 CALC BMI ABV UP PARAM F/U: HCPCS | Performed by: INTERNAL MEDICINE

## 2020-09-16 PROCEDURE — 99211 OFF/OP EST MAY X REQ PHY/QHP: CPT

## 2020-09-16 PROCEDURE — 93005 ELECTROCARDIOGRAM TRACING: CPT

## 2020-09-16 PROCEDURE — 36415 COLL VENOUS BLD VENIPUNCTURE: CPT

## 2020-09-16 PROCEDURE — 3017F COLORECTAL CA SCREEN DOC REV: CPT | Performed by: INTERNAL MEDICINE

## 2020-09-16 PROCEDURE — 4040F PNEUMOC VAC/ADMIN/RCVD: CPT | Performed by: INTERNAL MEDICINE

## 2020-09-16 PROCEDURE — C9803 HOPD COVID-19 SPEC COLLECT: HCPCS | Performed by: SPECIALIST

## 2020-09-16 PROCEDURE — 93010 ELECTROCARDIOGRAM REPORT: CPT | Performed by: INTERNAL MEDICINE

## 2020-09-16 PROCEDURE — 99214 OFFICE O/P EST MOD 30 MIN: CPT | Performed by: INTERNAL MEDICINE

## 2020-09-16 PROCEDURE — G8427 DOCREV CUR MEDS BY ELIG CLIN: HCPCS | Performed by: INTERNAL MEDICINE

## 2020-09-16 PROCEDURE — 1036F TOBACCO NON-USER: CPT | Performed by: INTERNAL MEDICINE

## 2020-09-16 PROCEDURE — 87635 SARS-COV-2 COVID-19 AMP PRB: CPT | Performed by: SPECIALIST

## 2020-09-16 PROCEDURE — 1123F ACP DISCUSS/DSCN MKR DOCD: CPT | Performed by: INTERNAL MEDICINE

## 2020-09-16 PROCEDURE — 85025 COMPLETE CBC W/AUTO DIFF WBC: CPT | Performed by: SPECIALIST

## 2020-09-16 NOTE — DISCHARGE INSTRUCTIONS
DAY OF SURGERY INSTRUCTIONS        YOUR SURGEON: ***April DAILY    PROCEDURE: ***AXILLIARY LYMPH NODE BIOPSY    DATE OF SURGERY: ***SEPTEMBER17,2020    ARRIVAL TIME: AS DIRECTED BY OFFICE    YOU MAY TAKE THE FOLLOWING MEDICATION(S) THE MORNING OF SURGERY WITH A SIP OF WATER: ***PROPRANOLOL      ALL OTHER HOME MEDICATION CHECK WITH YOUR PHYSICIAN      DO NOT TAKE ANY ERECTILE DYSFUNCTION MEDICATIONS (EX: CIALIS, VIAGRA) 24 HOURS PRIOR TO SURGERY                      MANAGING PAIN AFTER SURGERY    We know you are probably wondering what your pain will be like after surgery.  Following surgery it is unrealistic to expect you will not have pain.   Pain is how our bodies let us know that something is wrong or cautions us to be careful.  That said, our goal is to make your pain tolerable.    Methods we may use to treat your pain include (oral or IV medications, PCAs, epidurals, nerve blocks, etc.)   While some procedures require IV pain medications for a short time after surgery, transitioning to pain medications by mouth allows for better management of pain.   Your nurse will encourage you to take oral pain medications whenever possible.  IV medications work almost immediately, but only last a short while.  Taking medications by mouth allows for a more constant level of medication in your blood stream for a longer period of time.      Once your pain is out of control it is harder to get back under control.  It is important you are aware when your next dose of pain medication is due.  If you are admitted, your nurse may write the time of your next dose on the white board in your room to help you remember.      We are interested in your pain and encourage you to inform us about aggravating factors during your visit.   Many times a simple repositioning every few hours can make a big difference.    If your physician says it is okay, do not let your pain prevent you from getting out of bed. Be sure to call your nurse for  assistance prior to getting up so you do not fall.      Before surgery, please decide your tolerable pain goal.  These faces help describe the pain ratings we use on a 0-10 scale.   Be prepared to tell us your goal and whether or not you take pain or anxiety medications at home.          BEFORE YOU COME TO THE HOSPITAL  (Pre-op instructions)  • Do not eat, drink, smoke or chew gum after midnight the night before surgery.  This also includes no mints.  • Morning of surgery take only the medicines you have been instructed with a sip of water unless otherwise instructed  by your physician.  • Do not shave, wear makeup or dark nail polish.  • Remove all jewelry including rings.  • Leave anything you consider valuable at home.  • Leave your suitcase in the car until after your surgery.  • Bring the following with you if applicable:  o Picture ID and insurance, Medicare or Medicaid cards  o Co-pay/deductible required by insurance (cash, check, credit card)  o Copy of advance directive, living will or power-of- documents if not brought to PAT  o CPAP or BIPAP mask and tubing  o Relaxation aids ( book, magazine), etc.  o Hearing aids                        ON THE DAY OF SURGERY  · On the day of surgery check in at registration located at the main entrance of the hospital.   ? You will be registered and given a beeper with instructions where to wait in the main lobby.  ? When your beeper lights up and vibrates a member of the Outpatient Surgery staff will meet you at the double doors under the stair steps and escort you to your preoperative room.   · You may have cloth compression devices placed on your legs. These help to prevent blood clots and reduce swelling in your legs.  · An IV may be inserted into one of your veins.  · In the operating room, you may be given one or more of the following:  ? A medicine to help you relax (sedative).  ? A medicine to numb the area (local anesthetic).  ? A medicine to make you  "fall asleep (general anesthetic).  ? A medicine that is injected into an area of your body to numb everything below the injection site (regional anesthetic).  · Your surgical site will be marked or identified.  · You may be given an antibiotic through your IV to help prevent infection.  Contact a health care provider if you:  · Develop a fever of more than 100.4°F (38°C) or other feelings of illness during the 48 hours before your surgery.  · Have symptoms that get worse.  Have questions or concerns about your surgery    General Anesthesia/Surgery, Adult  General anesthesia is the use of medicines to make a person \"go to sleep\" (unconscious) for a medical procedure. General anesthesia must be used for certain procedures, and is often recommended for procedures that:  · Last a long time.  · Require you to be still or in an unusual position.  · Are major and can cause blood loss.  The medicines used for general anesthesia are called general anesthetics. As well as making you unconscious for a certain amount of time, these medicines:  · Prevent pain.  · Control your blood pressure.  · Relax your muscles.  Tell a health care provider about:  · Any allergies you have.  · All medicines you are taking, including vitamins, herbs, eye drops, creams, and over-the-counter medicines.  · Any problems you or family members have had with anesthetic medicines.  · Types of anesthetics you have had in the past.  · Any blood disorders you have.  · Any surgeries you have had.  · Any medical conditions you have.  · Any recent upper respiratory, chest, or ear infections.  · Any history of:  ? Heart or lung conditions, such as heart failure, sleep apnea, asthma, or chronic obstructive pulmonary disease (COPD).  ?  service.  ? Depression or anxiety.  · Any tobacco or drug use, including marijuana or alcohol use.  · Whether you are pregnant or may be pregnant.  What are the risks?  Generally, this is a safe procedure. However, " problems may occur, including:  · Allergic reaction.  · Lung and heart problems.  · Inhaling food or liquid from the stomach into the lungs (aspiration).  · Nerve injury.  · Air in the bloodstream, which can lead to stroke.  · Extreme agitation or confusion (delirium) when you wake up from the anesthetic.  · Waking up during your procedure and being unable to move. This is rare.  These problems are more likely to develop if you are having a major surgery or if you have an advanced or serious medical condition. You can prevent some of these complications by answering all of your health care provider's questions thoroughly and by following all instructions before your procedure.  General anesthesia can cause side effects, including:  · Nausea or vomiting.  · A sore throat from the breathing tube.  · Hoarseness.  · Wheezing or coughing.  · Shaking chills.  · Tiredness.  · Body aches.  · Anxiety.  · Sleepiness or drowsiness.  · Confusion or agitation.  RISKS AND COMPLICATIONS OF SURGERY  Your health care provider will discuss possible risks and complications with you before surgery. Common risks and complications include:    · Problems due to the use of anesthetics.  · Blood loss and replacement (does not apply to minor surgical procedures).  · Temporary increase in pain due to surgery.  · Uncorrected pain or problems that the surgery was meant to correct.  · Infection.  · New damage.    What happens before the procedure?    Medicines  Ask your health care provider about:  · Changing or stopping your regular medicines. This is especially important if you are taking diabetes medicines or blood thinners.  · Taking medicines such as aspirin and ibuprofen. These medicines can thin your blood. Do not take these medicines unless your health care provider tells you to take them.  · Taking over-the-counter medicines, vitamins, herbs, and supplements. Do not take these during the week before your procedure unless your health  care provider approves them.  General instructions  · Starting 3-6 weeks before the procedure, do not use any products that contain nicotine or tobacco, such as cigarettes and e-cigarettes. If you need help quitting, ask your health care provider.  · If you brush your teeth on the morning of the procedure, make sure to spit out all of the toothpaste.  · Tell your health care provider if you become ill or develop a cold, cough, or fever.  · If instructed by your health care provider, bring your sleep apnea device with you on the day of your surgery (if applicable).  · Ask your health care provider if you will be going home the same day, the following day, or after a longer hospital stay.  ? Plan to have someone take you home from the hospital or clinic.  ? Plan to have a responsible adult care for you for at least 24 hours after you leave the hospital or clinic. This is important.  What happens during the procedure?  · You will be given anesthetics through both of the following:  ? A mask placed over your nose and mouth.  ? An IV in one of your veins.  · You may receive a medicine to help you relax (sedative).  · After you are unconscious, a breathing tube may be inserted down your throat to help you breathe. This will be removed before you wake up.  · An anesthesia specialist will stay with you throughout your procedure. He or she will:  ? Keep you comfortable and safe by continuing to give you medicines and adjusting the amount of medicine that you get.  ? Monitor your blood pressure, pulse, and oxygen levels to make sure that the anesthetics do not cause any problems.  The procedure may vary among health care providers and hospitals.  What happens after the procedure?  · Your blood pressure, temperature, heart rate, breathing rate, and blood oxygen level will be monitored until the medicines you were given have worn off.  · You will wake up in a recovery area. You may wake up slowly.  · If you feel anxious or  agitated, you may be given medicine to help you calm down.  · If you will be going home the same day, your health care provider may check to make sure you can walk, drink, and urinate.  · Your health care provider will treat any pain or side effects you have before you go home.  · Do not drive for 24 hours if you were given a sedative.  Summary  · General anesthesia is used to keep you still and prevent pain during a procedure.  · It is important to tell your healthcare provider about your medical history and any surgeries you have had, and previous experience with anesthesia.  · Follow your healthcare provider’s instructions about when to stop eating, drinking, or taking certain medicines before your procedure.  · Plan to have someone take you home from the hospital or clinic.  This information is not intended to replace advice given to you by your health care provider. Make sure you discuss any questions you have with your health care provider.  Document Released: 03/26/2009 Document Revised: 08/03/2018 Document Reviewed: 08/03/2018  [employer] Interactive Patient Education © 2019 [employer]       Fall Prevention in Hospitals, Adult  As a hospital patient, your condition and the treatments you receive can increase your risk for falls. Some additional risk factors for falls in a hospital include:  · Being in an unfamiliar environment.  · Being on bed rest.  · Your surgery.  · Taking certain medicines.  · Your tubing requirements, such as intravenous (IV) therapy or catheters.  It is important that you learn how to decrease fall risks while at the hospital. Below are important tips that can help prevent falls.  SAFETY TIPS FOR PREVENTING FALLS  Talk about your risk of falling.  · Ask your health care provider why you are at risk for falling. Is it your medicine, illness, tubing placement, or something else?  · Make a plan with your health care provider to keep you safe from falls.  · Ask your health care provider  or pharmacist about side effects of your medicines. Some medicines can make you dizzy or affect your coordination.  Ask for help.  · Ask for help before getting out of bed. You may need to press your call button.  · Ask for assistance in getting safely to the toilet.  · Ask for a walker or cane to be put at your bedside. Ask that most of the side rails on your bed be placed up before your health care provider leaves the room.  · Ask family or friends to sit with you.  · Ask for things that are out of your reach, such as your glasses, hearing aids, telephone, bedside table, or call button.  Follow these tips to avoid falling:  · Stay lying or seated, rather than standing, while waiting for help.  · Wear rubber-soled slippers or shoes whenever you walk in the hospital.  · Avoid quick, sudden movements.  ¨ Change positions slowly.  ¨ Sit on the side of your bed before standing.  ¨ Stand up slowly and wait before you start to walk.  · Let your health care provider know if there is a spill on the floor.  · Pay careful attention to the medical equipment, electrical cords, and tubes around you.  · When you need help, use your call button by your bed or in the bathroom. Wait for one of your health care providers to help you.  · If you feel dizzy or unsure of your footing, return to bed and wait for assistance.  · Avoid being distracted by the TV, telephone, or another person in your room.  · Do not lean or support yourself on rolling objects, such as IV poles or bedside tables.     This information is not intended to replace advice given to you by your health care provider. Make sure you discuss any questions you have with your health care provider.     Document Released: 12/15/2001 Document Revised: 01/08/2016 Document Reviewed: 08/25/2013  [employer] Interactive Patient Education ©2016 [employer]       [facility]  CHG 4% Patient Instruction Sheet    Chlorhexidine Before Surgery  Chlorhexidine gluconate (CHG)  is a germ-killing (antiseptic) solution that is used to clean the skin. It gets rid of the bacteria that normally live on the skin. Cleaning your skin with CHG before surgery helps lower the risk for infection after surgery.    How to use CHG solution  · You will take 2 showers, one shower the night before surgery, the second shower the morning of surgery before coming to the hospital.  · Use CHG only as told by your health care provider, and follow the instructions on the label.  · Use CHG solution while taking a shower. Follow these steps when using CHG solution (unless your health care provider gives you different instructions):  1. Start the shower.  2. Use your normal soap and shampoo to wash your face and hair.  3. Turn off the shower or move out of the shower stream.  4. Pour the CHG onto a clean washcloth. Do not use any type of brush or rough-edged sponge.  5. Starting at your neck, lather your body down to your toes. Make sure you:  6. Pay special attention to the part of your body where you will be having surgery. Scrub this area for at least 1 minute.  7. Use the full amount of CHG as directed. Usually, this is one half bottle for each shower.  8. Do not use CHG on your head or face. If the solution gets into your ears or eyes, rinse them well with water.  9. Avoid your genital area.  10. Avoid any areas of skin that have broken skin, cuts, or scrapes.  11. Scrub your back and under your arms. Make sure to wash skin folds.  12. Let the lather sit on your skin for 1-2 minutes or as long as told by your health care  provider.  13. Thoroughly rinse your entire body in the shower. Make sure that all body creases and crevices are rinsed well.  14. Dry off with a clean towel. Do not put any substances on your body afterward, such as powder, lotion, or perfume.  15. Put on clean clothes or pajamas.  16. If it is the night before your surgery, sleep in clean sheets.    What are the risks?  Risks of using CHG  include:  · A skin reaction.  · Hearing loss, if CHG gets in your ears.  · Eye injury, if CHG gets in your eyes and is not rinsed out.  · The CHG product catching fire.  Make sure that you avoid smoking and flames after applying CHG to your skin.  Do not use CHG:  · If you have a chlorhexidine allergy or have previously reacted to chlorhexidine.  · On babies younger than 2 months of age.      On the day of surgery, when you are taken to your room in Outpatient Surgery you will be given a CHG prepackaged cloth to wipe the site for your surgery.  How to use CHG prepackaged cloths  · Follow the instructions on the label.  · Use the CHG cloth on clean, dry skin. Follow these steps when using a CHG cloth (unless your health care provider gives you different instructions):  1. Using the CHG cloth, vigorously scrub the part of your body where you will be having surgery. Scrub using a back-and-forth motion for 3 minutes. The area on your body should be completely wet with CHG when you are finished scrubbing.  2. Do not rinse. Discard the cloth and let the area air-dry for 1 minute. Do not put any substances on your body afterward, such as powder, lotion, or perfume.  Contact a health care provider if:  · Your skin gets irritated after scrubbing.  · You have questions about using your solution or cloth.  Get help right away if:  · Your eyes become very red or swollen.  · Your eyes itch badly.  · Your skin itches badly and is red or swollen.  · Your hearing changes.  · You have trouble seeing.  · You have swelling or tingling in your mouth or throat.  · You have trouble breathing.  · You swallow any chlorhexidine.  Summary  · Chlorhexidine gluconate (CHG) is a germ-killing (antiseptic) solution that is used to clean the skin. Cleaning your skin with CHG before surgery helps lower the risk for infection after surgery.  · You may be given CHG to use at home. It may be in a bottle or in a prepackaged cloth to use on your skin.  Carefully follow your health care provider's instructions and the instructions on the product label.  · Do not use CHG if you have a chlorhexidine allergy.  · Contact your health care provider if your skin gets irritated after scrubbing.  This information is not intended to replace advice given to you by your health care provider. Make sure you discuss any questions you have with your health care provider.  Document Released: 09/11/2013 Document Revised: 11/15/2018 Document Reviewed: 11/15/2018  [employer] Interactive Patient Education © 2019 [employer]          PATIENT/FAMILY/RESPONSIBLE PARTY VERBALIZES UNDERSTANDING OF ABOVE EDUCATION.  COPY OF PAIN SCALE GIVEN AND REVIEWED WITH VERBALIZED UNDERSTANDING.

## 2020-09-17 ENCOUNTER — TELEPHONE (OUTPATIENT)
Dept: INTERNAL MEDICINE | Facility: CLINIC | Age: 70
End: 2020-09-17

## 2020-09-17 LAB — SARS-COV-2 N GENE RESP QL NAA+PROBE: DETECTED

## 2020-09-17 NOTE — TELEPHONE ENCOUNTER
Called pt, at Dr. Heath's request, to see how he was doing as he tested positive for Covid.  He says he is doing fine, is asymptomatic, and is isolating at home.  Advised if he were to become symptomatic and having problems, will need to go to the ER and he voiced understanding.

## 2020-09-21 ENCOUNTER — HOSPITAL ENCOUNTER (OUTPATIENT)
Dept: INFUSION THERAPY | Age: 70
End: 2020-09-21

## 2020-09-22 NOTE — OP NOTE
Referring/Primary Care Provider: Ranjana Jim MD,   Duyen Crowder MD    Date of Procedure: 09/03/20    Procedure:   1. EGD with Endoscopic Ultrasound and FNA    Indications:   1. Abnormal imaging of the abdomen with retroperitoneal mass noted. There is obstructive uropathy. Patient had symptoms of pain and decreased appetite. Anesthesia:  Sedation was administered by anesthesia who monitored the patient during the procedure. Procedure:   After reviewing the patient's chart, H&P, medications, obtaining informed consent, and discussing risks benefits and alternatives to the procedure the patient was placed in the left lateral decubitus position. A oblique viewing Olympus 140 Linear EUS scope was lubricated and inserted through the mouth into the oropharynx. Under indirect visualization, the upper esophagus was intubated. The scope was advanced to the level of the third portion of duodenum with limited views of the esophageal mucosa. Findings and maneuvers are listed in impression below. The patient tolerated the procedure well. There were no immediate complications. Findings:   Endoscopic Finding:   - Endoscopic evaluation of the upper GI tract was largely unremarkable. Endosonographic Findings:  - The celiac axis and associated vascular structures was identified and examined. No well defined lymph nodes were appreciated. Limited views of the left lobe of the liver revealed an enlarged left lobe of the liver. Posterior to the stomach there did appear to be a an abnormal and well defined area measuring 4.1cm x 1.5cm. This appeared to be well defined. I questions if this was actually an enlarged conglomerate of lymph nodes. FNA was pursued with a 22G FNA needle using a stylette and slow pull capillary suction technique. Doppler imaging was used to confirm a lack of vascular structures within the needle path. Rapid cytology at bedside was suspicious for hepatocytes.   Further evaluation did not show a clear connection to the left lobe of liver, but the possibility of an \"appendage\" type area to the left lobe was possible. No further FNA was pursed of this area. - Pancreas: the main pancreatic body and tail appeared normal. There was no pancreatic ductal dilation noted. - From the duodenum, the CBD was noted to be normal. Again despite the CT report, I could not appreciate a focal mass for FNA.     - Careful examination of the uncinate area of the pancreas did show a questionably more defined area of 1.3 x 1.38cm in size. It was hypoechoic with ill defined borders. The tissue around this area (in the posterior and inferior portions) of the pancreatic head were very ill defined and poor marginated. FNA was pursued x 2 passes in similar fashion to above. Bedside cytology was reported as blood only - no abnormal cells. Due to the unclear etiology of the mass and difficulty in identifying the mass in question, the procedure was aborted. Estimated Blood Loss: minimal    IMPRESSION:    1. S/p FNA as above. Very difficult to identify the mass in question on CT scan. RECOMMENDATIONS:   - Follow up as planned with Dr Grady Cooks office to further discuss evaluation/biopsy of this mass. Plan/Results d/w Dr Sara Piedra post procedure. The results were discussed with the patient and family. A copy of the images obtained were given to the patient.      Darcie Dietz MD  8:01 AM

## 2020-09-22 NOTE — BRIEF OP NOTE
Brief Postoperative Note      Patient: Jesus Mason  YOB: 1950  MRN: [id number]    Date of Procedure: 9/3/2020    Pre-Op Diagnosis: ABD MASS    Post-Op Diagnosis: Same       Procedure(s):  EGD ESOPHAGOGASTRODUODENOSCOPY ULTRASOUND with FNA    Surgeon(s):  Bharath Huerta MD    Anesthesia: Monitor Anesthesia Care    Estimated Blood Loss (mL): Minimal    Complications: None    Specimens:   * No specimens in log *    Implants:  * No implants in log *      Drains: * No LDAs found *      Electronically signed by Bharath Huerta MD on 9/22/2020 at 7:51 AM

## 2020-09-24 ENCOUNTER — TELEPHONE (OUTPATIENT)
Dept: INTERNAL MEDICINE | Facility: CLINIC | Age: 70
End: 2020-09-24

## 2020-09-24 NOTE — TELEPHONE ENCOUNTER
Jackeline with Dr. Jackson called, and they will r/s him for 10/1 with a 5:30 arrival at the hospital.  She says [employer] will be calling him to arrange for repeat Covid testing, will not need repeat pre-op lab.  He voiced understanding.

## 2020-09-24 NOTE — TELEPHONE ENCOUNTER
Called pt, at Dr. Heath's request, to see if anyone had contacted him about repeat Covid testing, rescheduling his biopsy with Dr. To, etc and he says no.  He did say that the health dept had contacted him to let him know he would be released on the 27th.  Called Dr. To's office and left a message on Dr. Yousuf Viveros's MA, , informing of his release date and the need to get him rescheduled for his biopsy, etc.  Asked her to call us back to confirm that this was being worked on, then we could relay that to the patient as well.

## 2020-09-25 ENCOUNTER — TRANSCRIBE ORDERS (OUTPATIENT)
Dept: ADMINISTRATIVE | Facility: HOSPITAL | Age: 70
End: 2020-09-25

## 2020-09-25 DIAGNOSIS — Z11.59 SCREENING FOR VIRAL DISEASE: Primary | ICD-10-CM

## 2020-09-28 ENCOUNTER — LAB (OUTPATIENT)
Dept: LAB | Facility: HOSPITAL | Age: 70
End: 2020-09-28

## 2020-09-28 PROCEDURE — U0003 INFECTIOUS AGENT DETECTION BY NUCLEIC ACID (DNA OR RNA); SEVERE ACUTE RESPIRATORY SYNDROME CORONAVIRUS 2 (SARS-COV-2) (CORONAVIRUS DISEASE [COVID-19]), AMPLIFIED PROBE TECHNIQUE, MAKING USE OF HIGH THROUGHPUT TECHNOLOGIES AS DESCRIBED BY CMS-2020-01-R: HCPCS | Performed by: SPECIALIST

## 2020-09-28 PROCEDURE — C9803 HOPD COVID-19 SPEC COLLECT: HCPCS | Performed by: SPECIALIST

## 2020-09-29 LAB
COVID LABCORP PRIORITY: NORMAL
SARS-COV-2 RNA RESP QL NAA+PROBE: NOT DETECTED

## 2020-10-01 ENCOUNTER — ANESTHESIA (OUTPATIENT)
Dept: PERIOP | Facility: HOSPITAL | Age: 70
End: 2020-10-01

## 2020-10-01 ENCOUNTER — HOSPITAL ENCOUNTER (OUTPATIENT)
Facility: HOSPITAL | Age: 70
Setting detail: HOSPITAL OUTPATIENT SURGERY
Discharge: HOME | End: 2020-10-01
Attending: SPECIALIST | Admitting: SPECIALIST

## 2020-10-01 ENCOUNTER — ANESTHESIA EVENT (OUTPATIENT)
Dept: PERIOP | Facility: HOSPITAL | Age: 70
End: 2020-10-01

## 2020-10-01 VITALS
SYSTOLIC BLOOD PRESSURE: 116 MMHG | TEMPERATURE: 97.8 F | HEART RATE: 63 BPM | DIASTOLIC BLOOD PRESSURE: 84 MMHG | OXYGEN SATURATION: 95 % | RESPIRATION RATE: 18 BRPM

## 2020-10-01 DIAGNOSIS — C85.90 LYMPHOMA (HCC): ICD-10-CM

## 2020-10-01 DIAGNOSIS — R93.5 ABNORMAL CT OF THE ABDOMEN: Primary | ICD-10-CM

## 2020-10-01 PROCEDURE — 25010000002 ONDANSETRON PER 1 MG: Performed by: ANESTHESIOLOGY

## 2020-10-01 PROCEDURE — 88313 SPECIAL STAINS GROUP 2: CPT

## 2020-10-01 PROCEDURE — 88185 FLOWCYTOMETRY/TC ADD-ON: CPT | Performed by: SPECIALIST

## 2020-10-01 PROCEDURE — 88323 CONSLTJ&REPRT MATRL PREP SLD: CPT

## 2020-10-01 PROCEDURE — 25010000002 SUCCINYLCHOLINE PER 20 MG: Performed by: NURSE ANESTHETIST, CERTIFIED REGISTERED

## 2020-10-01 PROCEDURE — 88333 PATH CONSLTJ SURG CYTO XM 1: CPT | Performed by: SPECIALIST

## 2020-10-01 PROCEDURE — 25010000002 PROPOFOL 10 MG/ML EMULSION: Performed by: NURSE ANESTHETIST, CERTIFIED REGISTERED

## 2020-10-01 PROCEDURE — 88307 TISSUE EXAM BY PATHOLOGIST: CPT | Performed by: SPECIALIST

## 2020-10-01 PROCEDURE — 25010000002 FENTANYL CITRATE (PF) 100 MCG/2ML SOLUTION: Performed by: NURSE ANESTHETIST, CERTIFIED REGISTERED

## 2020-10-01 PROCEDURE — 25010000002 ONDANSETRON PER 1 MG: Performed by: NURSE ANESTHETIST, CERTIFIED REGISTERED

## 2020-10-01 PROCEDURE — 88341 IMHCHEM/IMCYTCHM EA ADD ANTB: CPT

## 2020-10-01 PROCEDURE — 88184 FLOWCYTOMETRY/ TC 1 MARKER: CPT | Performed by: SPECIALIST

## 2020-10-01 PROCEDURE — 88342 IMHCHEM/IMCYTCHM 1ST ANTB: CPT

## 2020-10-01 RX ORDER — LIDOCAINE HYDROCHLORIDE 40 MG/ML
SOLUTION TOPICAL AS NEEDED
Status: DISCONTINUED | OUTPATIENT
Start: 2020-10-01 | End: 2020-10-01 | Stop reason: SURG

## 2020-10-01 RX ORDER — SODIUM CHLORIDE 0.9 % (FLUSH) 0.9 %
3-10 SYRINGE (ML) INJECTION AS NEEDED
Status: DISCONTINUED | OUTPATIENT
Start: 2020-10-01 | End: 2020-10-01 | Stop reason: HOSPADM

## 2020-10-01 RX ORDER — ONDANSETRON 2 MG/ML
4 INJECTION INTRAMUSCULAR; INTRAVENOUS ONCE AS NEEDED
Status: COMPLETED | OUTPATIENT
Start: 2020-10-01 | End: 2020-10-01

## 2020-10-01 RX ORDER — NALOXONE HCL 0.4 MG/ML
0.4 VIAL (ML) INJECTION AS NEEDED
Status: DISCONTINUED | OUTPATIENT
Start: 2020-10-01 | End: 2020-10-01 | Stop reason: HOSPADM

## 2020-10-01 RX ORDER — OXYCODONE HYDROCHLORIDE AND ACETAMINOPHEN 5; 325 MG/1; MG/1
1-2 TABLET ORAL EVERY 4 HOURS PRN
Qty: 20 TABLET | Refills: 0 | Status: SHIPPED | OUTPATIENT
Start: 2020-10-01 | End: 2020-11-16

## 2020-10-01 RX ORDER — SODIUM CHLORIDE 0.9 % (FLUSH) 0.9 %
3 SYRINGE (ML) INJECTION EVERY 12 HOURS SCHEDULED
Status: DISCONTINUED | OUTPATIENT
Start: 2020-10-01 | End: 2020-10-01 | Stop reason: HOSPADM

## 2020-10-01 RX ORDER — MAGNESIUM HYDROXIDE 1200 MG/15ML
LIQUID ORAL AS NEEDED
Status: DISCONTINUED | OUTPATIENT
Start: 2020-10-01 | End: 2020-10-01 | Stop reason: HOSPADM

## 2020-10-01 RX ORDER — ACETAMINOPHEN 500 MG
1000 TABLET ORAL ONCE
Status: COMPLETED | OUTPATIENT
Start: 2020-10-01 | End: 2020-10-01

## 2020-10-01 RX ORDER — FENTANYL CITRATE 50 UG/ML
25 INJECTION, SOLUTION INTRAMUSCULAR; INTRAVENOUS
Status: DISCONTINUED | OUTPATIENT
Start: 2020-10-01 | End: 2020-10-01 | Stop reason: HOSPADM

## 2020-10-01 RX ORDER — ONDANSETRON 2 MG/ML
INJECTION INTRAMUSCULAR; INTRAVENOUS AS NEEDED
Status: DISCONTINUED | OUTPATIENT
Start: 2020-10-01 | End: 2020-10-01 | Stop reason: SURG

## 2020-10-01 RX ORDER — SODIUM CHLORIDE, SODIUM LACTATE, POTASSIUM CHLORIDE, CALCIUM CHLORIDE 600; 310; 30; 20 MG/100ML; MG/100ML; MG/100ML; MG/100ML
1000 INJECTION, SOLUTION INTRAVENOUS CONTINUOUS
Status: DISCONTINUED | OUTPATIENT
Start: 2020-10-01 | End: 2020-10-01 | Stop reason: HOSPADM

## 2020-10-01 RX ORDER — ROCURONIUM BROMIDE 10 MG/ML
INJECTION, SOLUTION INTRAVENOUS AS NEEDED
Status: DISCONTINUED | OUTPATIENT
Start: 2020-10-01 | End: 2020-10-01 | Stop reason: SURG

## 2020-10-01 RX ORDER — FENTANYL CITRATE 50 UG/ML
INJECTION, SOLUTION INTRAMUSCULAR; INTRAVENOUS AS NEEDED
Status: DISCONTINUED | OUTPATIENT
Start: 2020-10-01 | End: 2020-10-01 | Stop reason: SURG

## 2020-10-01 RX ORDER — FAMOTIDINE 10 MG/ML
20 INJECTION, SOLUTION INTRAVENOUS
Status: COMPLETED | OUTPATIENT
Start: 2020-10-01 | End: 2020-10-01

## 2020-10-01 RX ORDER — LABETALOL HYDROCHLORIDE 5 MG/ML
5 INJECTION, SOLUTION INTRAVENOUS
Status: DISCONTINUED | OUTPATIENT
Start: 2020-10-01 | End: 2020-10-01 | Stop reason: HOSPADM

## 2020-10-01 RX ORDER — OXYCODONE AND ACETAMINOPHEN 7.5; 325 MG/1; MG/1
2 TABLET ORAL EVERY 4 HOURS PRN
Status: DISCONTINUED | OUTPATIENT
Start: 2020-10-01 | End: 2020-10-01 | Stop reason: HOSPADM

## 2020-10-01 RX ORDER — LIDOCAINE HYDROCHLORIDE 10 MG/ML
0.5 INJECTION, SOLUTION EPIDURAL; INFILTRATION; INTRACAUDAL; PERINEURAL ONCE AS NEEDED
Status: DISCONTINUED | OUTPATIENT
Start: 2020-10-01 | End: 2020-10-01 | Stop reason: HOSPADM

## 2020-10-01 RX ORDER — EPHEDRINE SULFATE 50 MG/ML
INJECTION, SOLUTION INTRAVENOUS AS NEEDED
Status: DISCONTINUED | OUTPATIENT
Start: 2020-10-01 | End: 2020-10-01 | Stop reason: SURG

## 2020-10-01 RX ORDER — OXYCODONE HYDROCHLORIDE AND ACETAMINOPHEN 5; 325 MG/1; MG/1
1 TABLET ORAL ONCE AS NEEDED
Status: DISCONTINUED | OUTPATIENT
Start: 2020-10-01 | End: 2020-10-01 | Stop reason: HOSPADM

## 2020-10-01 RX ORDER — DIPHENHYDRAMINE HCL 25 MG
25 TABLET ORAL EVERY 6 HOURS PRN
COMMUNITY

## 2020-10-01 RX ORDER — OXYCODONE AND ACETAMINOPHEN 10; 325 MG/1; MG/1
1 TABLET ORAL ONCE AS NEEDED
Status: DISCONTINUED | OUTPATIENT
Start: 2020-10-01 | End: 2020-10-01 | Stop reason: HOSPADM

## 2020-10-01 RX ORDER — SODIUM CHLORIDE, SODIUM LACTATE, POTASSIUM CHLORIDE, CALCIUM CHLORIDE 600; 310; 30; 20 MG/100ML; MG/100ML; MG/100ML; MG/100ML
100 INJECTION, SOLUTION INTRAVENOUS CONTINUOUS
Status: DISCONTINUED | OUTPATIENT
Start: 2020-10-01 | End: 2020-10-01 | Stop reason: HOSPADM

## 2020-10-01 RX ORDER — SUCCINYLCHOLINE CHLORIDE 20 MG/ML
INJECTION INTRAMUSCULAR; INTRAVENOUS AS NEEDED
Status: DISCONTINUED | OUTPATIENT
Start: 2020-10-01 | End: 2020-10-01 | Stop reason: SURG

## 2020-10-01 RX ORDER — PROPOFOL 10 MG/ML
VIAL (ML) INTRAVENOUS AS NEEDED
Status: DISCONTINUED | OUTPATIENT
Start: 2020-10-01 | End: 2020-10-01 | Stop reason: SURG

## 2020-10-01 RX ORDER — BUPIVACAINE HYDROCHLORIDE AND EPINEPHRINE 2.5; 5 MG/ML; UG/ML
INJECTION, SOLUTION INFILTRATION; PERINEURAL AS NEEDED
Status: DISCONTINUED | OUTPATIENT
Start: 2020-10-01 | End: 2020-10-01 | Stop reason: HOSPADM

## 2020-10-01 RX ORDER — NEOSTIGMINE METHYLSULFATE 5 MG/5 ML
SYRINGE (ML) INTRAVENOUS AS NEEDED
Status: DISCONTINUED | OUTPATIENT
Start: 2020-10-01 | End: 2020-10-01 | Stop reason: SURG

## 2020-10-01 RX ORDER — FLUMAZENIL 0.1 MG/ML
0.2 INJECTION INTRAVENOUS AS NEEDED
Status: DISCONTINUED | OUTPATIENT
Start: 2020-10-01 | End: 2020-10-01 | Stop reason: HOSPADM

## 2020-10-01 RX ORDER — SODIUM CHLORIDE 0.9 % (FLUSH) 0.9 %
3 SYRINGE (ML) INJECTION AS NEEDED
Status: DISCONTINUED | OUTPATIENT
Start: 2020-10-01 | End: 2020-10-01 | Stop reason: HOSPADM

## 2020-10-01 RX ADMIN — ACETAMINOPHEN 1000 MG: 500 TABLET, FILM COATED ORAL at 07:46

## 2020-10-01 RX ADMIN — GLYCOPYRROLATE 0.4 MG: 0.2 INJECTION, SOLUTION INTRAMUSCULAR; INTRAVENOUS at 09:03

## 2020-10-01 RX ADMIN — ROCURONIUM BROMIDE 25 MG: 10 INJECTION INTRAVENOUS at 08:43

## 2020-10-01 RX ADMIN — PROPOFOL 180 MG: 10 INJECTION, EMULSION INTRAVENOUS at 08:26

## 2020-10-01 RX ADMIN — FAMOTIDINE 20 MG: 10 INJECTION INTRAVENOUS at 07:46

## 2020-10-01 RX ADMIN — SUCCINYLCHOLINE CHLORIDE 160 MG: 20 INJECTION, SOLUTION INTRAMUSCULAR; INTRAVENOUS at 08:26

## 2020-10-01 RX ADMIN — FENTANYL CITRATE 100 MCG: 50 INJECTION, SOLUTION INTRAMUSCULAR; INTRAVENOUS at 08:26

## 2020-10-01 RX ADMIN — ONDANSETRON HYDROCHLORIDE 4 MG: 2 SOLUTION INTRAMUSCULAR; INTRAVENOUS at 09:44

## 2020-10-01 RX ADMIN — ROCURONIUM BROMIDE 5 MG: 10 INJECTION INTRAVENOUS at 08:26

## 2020-10-01 RX ADMIN — CEFAZOLIN 1 G: 1 INJECTION, POWDER, FOR SOLUTION INTRAMUSCULAR; INTRAVENOUS; PARENTERAL at 08:41

## 2020-10-01 RX ADMIN — SODIUM CHLORIDE, POTASSIUM CHLORIDE, SODIUM LACTATE AND CALCIUM CHLORIDE: 600; 310; 30; 20 INJECTION, SOLUTION INTRAVENOUS at 09:00

## 2020-10-01 RX ADMIN — CEFAZOLIN 1 G: 1 INJECTION, POWDER, FOR SOLUTION INTRAMUSCULAR; INTRAVENOUS; PARENTERAL at 08:35

## 2020-10-01 RX ADMIN — Medication 3 MG: at 09:03

## 2020-10-01 RX ADMIN — OXYCODONE HYDROCHLORIDE AND ACETAMINOPHEN 1 TABLET: 5; 325 TABLET ORAL at 10:32

## 2020-10-01 RX ADMIN — ONDANSETRON HYDROCHLORIDE 4 MG: 2 SOLUTION INTRAMUSCULAR; INTRAVENOUS at 09:01

## 2020-10-01 RX ADMIN — EPHEDRINE SULFATE 20 MG: 50 INJECTION INTRAVENOUS at 08:39

## 2020-10-01 RX ADMIN — LIDOCAINE HYDROCHLORIDE 1 EACH: 40 SOLUTION TOPICAL at 08:26

## 2020-10-01 RX ADMIN — LIDOCAINE HYDROCHLORIDE 100 MG: 20 INJECTION, SOLUTION INTRAVENOUS at 08:26

## 2020-10-01 RX ADMIN — SODIUM CHLORIDE, POTASSIUM CHLORIDE, SODIUM LACTATE AND CALCIUM CHLORIDE 1000 ML: 600; 310; 30; 20 INJECTION, SOLUTION INTRAVENOUS at 07:03

## 2020-10-01 NOTE — ANESTHESIA POSTPROCEDURE EVALUATION
Patient: Aleks Monzon    Procedure Summary     Date: 10/01/20 Room / Location:  [facility] OR 06 /  [facility] OR    Anesthesia Start: 0821 Anesthesia Stop: 0913    Procedure: LEFT AXILLARY LYMPH NODE BIOPSY (Left ) Diagnosis: (LYMPHOMA)    Surgeon: Dina To MD Provider: Angel Castellanos CRNA    Anesthesia Type: general ASA Status: 3          Anesthesia Type: general    Vitals  Vitals Value Taken Time   /84 10/01/20 0952   Temp 97.8 °F (36.6 °C) 10/01/20 0945   Pulse 61 10/01/20 0954   Resp 16 10/01/20 0945   SpO2 94 % 10/01/20 0954   Vitals shown include unvalidated device data.        Post Anesthesia Care and Evaluation    PONV Status: none  Comments: Patient d/c from PACU prior to anes eval based on Yaneth score.  Please see RN notes for details of d/c criteria.    Blood pressure 112/79, pulse 60, temperature 97.8 °F (36.6 °C), temperature source Temporal, resp. rate 18, SpO2 95 %.

## 2020-10-01 NOTE — ANESTHESIA PROCEDURE NOTES
Airway  Urgency: elective    Date/Time: 10/1/2020 8:26 AM  Airway not difficult    General Information and Staff    Patient location during procedure: OR  CRNA: Angel Castellanos CRNA    Indications and Patient Condition  Indications for airway management: airway protection    Preoxygenated: yes  Mask difficulty assessment: 1 - vent by mask    Final Airway Details  Final airway type: endotracheal airway      Successful airway: ETT  Cuffed: yes   Successful intubation technique: direct laryngoscopy  Facilitating devices/methods: intubating stylet  Endotracheal tube insertion site: oral  Blade: Spring  Blade size: 2  ETT size (mm): 7.5  Cormack-Lehane Classification: grade I - full view of glottis  Placement verified by: chest auscultation and capnometry   Cuff volume (mL): 8  Measured from: teeth  ETT/EBT  to teeth (cm): 21  Number of attempts at approach: 1  Assessment: lips, teeth, and gum same as pre-op and atraumatic intubation

## 2020-10-01 NOTE — DISCHARGE INSTRUCTIONS

## 2020-10-01 NOTE — ANESTHESIA PREPROCEDURE EVALUATION
Anesthesia Evaluation     Patient summary reviewed and Nursing notes reviewed   no history of anesthetic complications:  NPO Solid Status: > 8 hours  NPO Liquid Status: > 8 hours           Airway   Mallampati: I  TM distance: >3 FB  Neck ROM: full  No difficulty expected  Dental      Pulmonary    (-) not a smoker  Cardiovascular   Exercise tolerance: good (4-7 METS)    Patient on routine beta blocker and Beta blocker given within 24 hours of surgery    (+) hypertension, hyperlipidemia,       Neuro/Psych  (-) seizures, TIA, CVA  GI/Hepatic/Renal/Endo    (+) obesity,   renal disease CRI,     ROS Comment: Worsening renal function and lower extremity swelling, CT shows   IMPRESSION:  1. Ill-defined infiltrative process within the left retroperitoneum  beginning at the T12 level extending through L3/L4. This results in  moderate left-sided hydronephrosis with thinning of the left renal  cortex. There is stranding of the fat adjacent to the proximal left  ureter. A follow-up CT urography protocol might be considered for  further assessment. Difficult to discern if this represents an  infectious/inflammatory or neoplastic process.  2. Nonspecific 1.3 cm right adrenal nodule.  This report was finalized on 08/06/2020 11:09 by Dr. Siri Whelan MD.       Now presents for axillary node excision for diagnosis  Pt was incidentally found to be covid + on 9/16/20. He was asymptomatic throughout.     Musculoskeletal     Abdominal    Substance History      OB/GYN          Other   arthritis,    history of cancer      Other Comment: Prostate cancer in remission  Retroperitoneal hematoma as described above                Anesthesia Plan    ASA 3     general     intravenous induction     Anesthetic plan, all risks, benefits, and alternatives have been provided, discussed and informed consent has been obtained with: patient.

## 2020-10-01 NOTE — OP NOTE
Preoperative diagnosis:  Retroperitoneal mass with left axillary adenopathy  Postoperative diagnosis:  Same  Procedure:  Left axillary lymph node biopsy  Surgeon:  Dina To MD  Anesthesia:  Get loc  Ebl:  Minimal  Ivf: see anesthesia  Indications: the patient is a 70-year-old male who has been found to have a retroperitoneal mass.  He presents for left axillary lymph node biopsy for hopefull diagnosis.  The risks, benefits, complications, and possible alternatives were discussed with the patient who agreed to proceed.   Description of procedure; the patient was laid supine.  The left axilla was prepped and draped.  An incision was created.  There were multiple enlarged but not matted lymph nodes which were excised with bovie.  The wound was copiously irrigated with sterile water.  A #10 AMANDEEP was placed into the wound bed, anchored to the skin with 2-0 silk, and placed to bulb suction. The skin was closed with 3-0 and 4-0 vicyrl. The sponge, needle, and instrument counts were correct.  Complications :none  Disposition good to pacu  Findings;  Multiple nonmatted lymph nodes

## 2020-10-05 DIAGNOSIS — R19.00 ABDOMINAL MASS, UNSPECIFIED ABDOMINAL LOCATION: Primary | ICD-10-CM

## 2020-10-05 DIAGNOSIS — C85.90 LYMPHOMA, UNSPECIFIED BODY REGION, UNSPECIFIED LYMPHOMA TYPE (HCC): ICD-10-CM

## 2020-11-02 ENCOUNTER — TRANSCRIBE ORDERS (OUTPATIENT)
Dept: ADMINISTRATIVE | Facility: HOSPITAL | Age: 70
End: 2020-11-02

## 2020-11-02 ENCOUNTER — OFFICE VISIT (OUTPATIENT)
Dept: INTERNAL MEDICINE | Facility: CLINIC | Age: 70
End: 2020-11-02

## 2020-11-02 ENCOUNTER — LAB (OUTPATIENT)
Dept: LAB | Facility: HOSPITAL | Age: 70
End: 2020-11-02

## 2020-11-02 VITALS
OXYGEN SATURATION: 97 % | TEMPERATURE: 97 F | SYSTOLIC BLOOD PRESSURE: 118 MMHG | DIASTOLIC BLOOD PRESSURE: 80 MMHG | BODY MASS INDEX: 36.71 KG/M2 | HEIGHT: 73 IN | WEIGHT: 277 LBS | HEART RATE: 73 BPM

## 2020-11-02 DIAGNOSIS — R19.00 RETROPERITONEAL MASS: Primary | ICD-10-CM

## 2020-11-02 DIAGNOSIS — I10 BENIGN ESSENTIAL HTN: ICD-10-CM

## 2020-11-02 DIAGNOSIS — Z01.818 PREOPERATIVE TESTING: Primary | ICD-10-CM

## 2020-11-02 DIAGNOSIS — Z23 NEED FOR INFLUENZA VACCINATION: ICD-10-CM

## 2020-11-02 DIAGNOSIS — Z01.818 PREOPERATIVE TESTING: ICD-10-CM

## 2020-11-02 DIAGNOSIS — C61 PROSTATE CANCER (HCC): ICD-10-CM

## 2020-11-02 PROBLEM — R22.32 AXILLARY MASS, LEFT: Status: ACTIVE | Noted: 2020-09-16

## 2020-11-02 PROCEDURE — 99214 OFFICE O/P EST MOD 30 MIN: CPT | Performed by: INTERNAL MEDICINE

## 2020-11-02 PROCEDURE — C9803 HOPD COVID-19 SPEC COLLECT: HCPCS

## 2020-11-02 PROCEDURE — U0003 INFECTIOUS AGENT DETECTION BY NUCLEIC ACID (DNA OR RNA); SEVERE ACUTE RESPIRATORY SYNDROME CORONAVIRUS 2 (SARS-COV-2) (CORONAVIRUS DISEASE [COVID-19]), AMPLIFIED PROBE TECHNIQUE, MAKING USE OF HIGH THROUGHPUT TECHNOLOGIES AS DESCRIBED BY CMS-2020-01-R: HCPCS | Performed by: UROLOGY

## 2020-11-02 PROCEDURE — 90694 VACC AIIV4 NO PRSRV 0.5ML IM: CPT | Performed by: INTERNAL MEDICINE

## 2020-11-02 PROCEDURE — G0008 ADMIN INFLUENZA VIRUS VAC: HCPCS | Performed by: INTERNAL MEDICINE

## 2020-11-02 NOTE — PROGRESS NOTES
Subjective   Aleks Monzon is a 70 y.o. male.   Chief Complaint   Patient presents with   • Follow-up     abd mass and htn        This is a follow-up visit with Mr. Monzon in regard to his retroperitoneal mass which is causing his left ureter to be significantly obstructed.  He has been worked up recently at [facility] they tried to do a endoscopic ultrasound-guided biopsy and were unable to do that.  At this point he is scheduled to have a needle biopsy with one of the interventionalists at [facility].  I am only able to see certain reports in care everywhere and unfortunately it does not fill in all the gaps.  Mr. Monzon seems to be doing well he feels well is in good spirits his swelling in his legs are stable he is being very diligent about wearing his support stockings.       The following portions of the patient's history were reviewed and updated as appropriate: allergies, current medications, past family history, past medical history, past social history, past surgical history and problem list.    Review of Systems   Constitutional: Negative for activity change, appetite change, fatigue, fever, unexpected weight gain and unexpected weight loss.   HENT: Negative for swollen glands, trouble swallowing and voice change.    Eyes: Negative for blurred vision and visual disturbance.   Respiratory: Negative for cough and shortness of breath.    Cardiovascular: Negative for chest pain, palpitations and leg swelling.   Gastrointestinal: Negative for abdominal pain, constipation, diarrhea, nausea, vomiting and indigestion.   Endocrine: Negative for cold intolerance, heat intolerance, polydipsia and polyphagia.   Genitourinary: Negative for dysuria and frequency.   Musculoskeletal: Negative for arthralgias, back pain, joint swelling and neck pain.   Skin: Negative for color change, rash and skin lesions.   Neurological: Negative for dizziness, weakness, headache, memory problem and confusion.   Hematological: Does  not bruise/bleed easily.   Psychiatric/Behavioral: Negative for agitation, hallucinations and suicidal ideas. The patient is not nervous/anxious.        Objective   Past Medical History:   Diagnosis Date   • Arthritis    • Cancer (CMS/HCC)     PROSTATE   • Cellulitis    • Gout    • Hypertension    • IFG (impaired fasting glucose)       Past Surgical History:   Procedure Laterality Date   • AXILLARY LYMPH NODE BIOPSY/EXCISION Left 10/1/2020    Procedure: LEFT AXILLARY LYMPH NODE BIOPSY;  Surgeon: Dina To MD;  Location: [facility];  Service: General;  Laterality: Left;   • BACK SURGERY     • JOINT REPLACEMENT      BILATERAL HIP    • TOTAL HIP ARTHROPLASTY          Current Outpatient Medications:   •  allopurinol (ZYLOPRIM) 300 MG tablet, Take 1 tablet by mouth Daily., Disp: 90 tablet, Rfl: 3  •  colchicine 0.6 MG tablet, Take 1 tablet by mouth 2 (Two) Times a Day As Needed for Muscle / Joint Pain., Disp: 20 tablet, Rfl: 5  •  diphenhydrAMINE (BENADRYL) 25 MG tablet, Take 25 mg by mouth Every 6 (Six) Hours As Needed for Allergies., Disp: , Rfl:   •  oxyCODONE-acetaminophen (PERCOCET) 5-325 MG per tablet, Take 1-2 tablets by mouth Every 4 (Four) Hours As Needed (Pain)., Disp: 20 tablet, Rfl: 0  •  propranolol (INDERAL) 20 MG tablet, Take 4 tablets by mouth 2 (Two) Times a Day., Disp: 180 tablet, Rfl: 3  •  torsemide (DEMADEX) 20 MG tablet, 1   daily (Patient taking differently: Take 10 mg by mouth 2 (two) times a day. 1   daily), Disp: 30 tablet, Rfl: 1     Vitals:    11/02/20 1536   BP: 118/80   Pulse: 73   Temp: 97 °F (36.1 °C)   SpO2: 97%         11/02/20  1536   Weight: 126 kg (277 lb)     Patient's Body mass index is 36.52 kg/m². BMI is .      Physical Exam  Vitals signs and nursing note reviewed.   Constitutional:       General: He is not in acute distress.     Appearance: Normal appearance. He is well-developed.   HENT:      Head: Normocephalic and atraumatic.      Right Ear: External ear normal.       Left Ear: External ear normal.      Nose: Nose normal.   Eyes:      Extraocular Movements: Extraocular movements intact.      Conjunctiva/sclera: Conjunctivae normal.      Pupils: Pupils are equal, round, and reactive to light.   Neck:      Musculoskeletal: Normal range of motion and neck supple. No neck rigidity or muscular tenderness.   Cardiovascular:      Rate and Rhythm: Normal rate and regular rhythm.      Pulses: Normal pulses.      Heart sounds: Normal heart sounds.   Pulmonary:      Effort: Pulmonary effort is normal.      Breath sounds: Normal breath sounds.   Abdominal:      General: Bowel sounds are normal.      Palpations: Abdomen is soft.   Musculoskeletal: Normal range of motion.   Skin:     General: Skin is warm and dry.   Neurological:      General: No focal deficit present.      Mental Status: He is alert and oriented to person, place, and time.   Psychiatric:         Mood and Affect: Mood normal.         Behavior: Behavior normal.               Assessment/Plan   Diagnoses and all orders for this visit:    1. Retroperitoneal mass (Primary)    2. Prostate cancer (CMS/HCC)    3. Benign essential HTN    Patient is scheduled Thursday for a biopsy of his retroperitoneal mass once we have tissue we can get him treated.  There is been quite a hold up in not being able to get adequate tissue including of the unfortunate negativity of the left axillary lymph node.

## 2020-11-03 LAB
COVID LABCORP PRIORITY: NORMAL
SARS-COV-2 RNA RESP QL NAA+PROBE: NOT DETECTED

## 2020-11-16 ENCOUNTER — OFFICE VISIT (OUTPATIENT)
Dept: INTERNAL MEDICINE | Facility: CLINIC | Age: 70
End: 2020-11-16

## 2020-11-16 VITALS
DIASTOLIC BLOOD PRESSURE: 72 MMHG | HEART RATE: 96 BPM | WEIGHT: 281 LBS | TEMPERATURE: 96.9 F | SYSTOLIC BLOOD PRESSURE: 118 MMHG | HEIGHT: 73 IN | OXYGEN SATURATION: 99 % | BODY MASS INDEX: 37.24 KG/M2

## 2020-11-16 DIAGNOSIS — E66.9 OBESITY (BMI 30-39.9): ICD-10-CM

## 2020-11-16 DIAGNOSIS — C61 PROSTATE CANCER (HCC): ICD-10-CM

## 2020-11-16 DIAGNOSIS — I10 BENIGN ESSENTIAL HTN: Primary | ICD-10-CM

## 2020-11-16 DIAGNOSIS — R19.00 RETROPERITONEAL MASS: ICD-10-CM

## 2020-11-16 PROCEDURE — 99214 OFFICE O/P EST MOD 30 MIN: CPT | Performed by: INTERNAL MEDICINE

## 2020-11-16 RX ORDER — TAMSULOSIN HYDROCHLORIDE 0.4 MG/1
0.4 CAPSULE ORAL DAILY
COMMUNITY
Start: 2020-11-05 | End: 2021-08-02

## 2020-11-16 NOTE — PROGRESS NOTES
Subjective   Aleks Monzon is a 70 y.o. male.   Chief Complaint   Patient presents with   • Edema     bilateral legs x 3 months, progressing into waist, water weight, patient denies SOB        I spent considerable time reviewing patient's of [facility] records.  Recently patient had a biopsy of a retroperitoneal mass at [facility].  The tissues came back fibrosis benign hepatocytes no cancers were identified.  He did have placement of a ureteral stent by Dr. Davis at [facility].  My review of his note and recent other notes indicate that [facility] was trying to get in touch with him to possibly schedule him to see a rheumatologist there.  There remains no clear reason for the retroperitoneal mass.  Patient is primarily here today concerned about the edema that he has in both his lower extremities.  He has support hose which he said were in his sock drawer at home.       The following portions of the patient's history were reviewed and updated as appropriate: allergies, current medications, past family history, past medical history, past social history, past surgical history and problem list.    Review of Systems   Constitutional: Negative for activity change, appetite change, fatigue, fever, unexpected weight gain and unexpected weight loss.   HENT: Negative for swollen glands, trouble swallowing and voice change.    Eyes: Negative for blurred vision and visual disturbance.   Respiratory: Negative for cough and shortness of breath.    Cardiovascular: Negative for chest pain, palpitations and leg swelling.   Gastrointestinal: Negative for abdominal pain, constipation, diarrhea, nausea, vomiting and indigestion.   Endocrine: Negative for cold intolerance, heat intolerance, polydipsia and polyphagia.   Genitourinary: Negative for dysuria and frequency.   Musculoskeletal: Negative for arthralgias, back pain, joint swelling and neck pain.   Skin: Negative for color change, rash and skin lesions.   Neurological:  Negative for dizziness, weakness, headache, memory problem and confusion.   Hematological: Does not bruise/bleed easily.   Psychiatric/Behavioral: Negative for agitation, hallucinations and suicidal ideas. The patient is not nervous/anxious.        Objective   Past Medical History:   Diagnosis Date   • Arthritis    • Cancer (CMS/HCC)     PROSTATE   • Cellulitis    • Gout    • Hypertension    • IFG (impaired fasting glucose)       Past Surgical History:   Procedure Laterality Date   • AXILLARY LYMPH NODE BIOPSY/EXCISION Left 10/1/2020    Procedure: LEFT AXILLARY LYMPH NODE BIOPSY;  Surgeon: Dina To MD;  Location: [facility];  Service: General;  Laterality: Left;   • BACK SURGERY     • JOINT REPLACEMENT      BILATERAL HIP    • TOTAL HIP ARTHROPLASTY          Current Outpatient Medications:   •  allopurinol (ZYLOPRIM) 300 MG tablet, Take 1 tablet by mouth Daily., Disp: 90 tablet, Rfl: 3  •  diphenhydrAMINE (BENADRYL) 25 MG tablet, Take 25 mg by mouth Every 6 (Six) Hours As Needed for Allergies., Disp: , Rfl:   •  propranolol (INDERAL) 20 MG tablet, Take 4 tablets by mouth 2 (Two) Times a Day., Disp: 180 tablet, Rfl: 3  •  tamsulosin (FLOMAX) 0.4 MG capsule 24 hr capsule, Take 0.4 mg by mouth Daily., Disp: , Rfl:   •  torsemide (DEMADEX) 20 MG tablet, 1   daily (Patient taking differently: Take 10 mg by mouth 2 (two) times a day. 1   daily), Disp: 30 tablet, Rfl: 1  •  colchicine 0.6 MG tablet, Take 1 tablet by mouth 2 (Two) Times a Day As Needed for Muscle / Joint Pain., Disp: 20 tablet, Rfl: 5     Vitals:    11/16/20 1533   BP: 118/72   Pulse: 96   Temp: 96.9 °F (36.1 °C)   SpO2: 99%         11/16/20  1533   Weight: 127 kg (281 lb)     Patient's Body mass index is 37.07 kg/m². BMI is .      Physical Exam  Vitals signs and nursing note reviewed.   Constitutional:       General: He is not in acute distress.     Appearance: Normal appearance. He is well-developed.   HENT:      Head: Normocephalic and atraumatic.       Right Ear: External ear normal.      Left Ear: External ear normal.      Nose: Nose normal.   Eyes:      Extraocular Movements: Extraocular movements intact.      Conjunctiva/sclera: Conjunctivae normal.      Pupils: Pupils are equal, round, and reactive to light.   Neck:      Musculoskeletal: Normal range of motion and neck supple. No neck rigidity or muscular tenderness.   Cardiovascular:      Rate and Rhythm: Normal rate and regular rhythm.      Pulses: Normal pulses.      Heart sounds: Normal heart sounds.   Pulmonary:      Effort: Pulmonary effort is normal.      Breath sounds: Normal breath sounds.   Abdominal:      General: Bowel sounds are normal.      Palpations: Abdomen is soft.   Musculoskeletal: Normal range of motion.   Skin:     General: Skin is warm and dry.   Neurological:      General: No focal deficit present.      Mental Status: He is alert and oriented to person, place, and time.   Psychiatric:         Mood and Affect: Mood normal.         Behavior: Behavior normal.               Assessment/Plan   Diagnoses and all orders for this visit:    1. Benign essential HTN (Primary)  -     Basic Metabolic Panel    2. Obesity (BMI 30-39.9)    3. Prostate cancer (CMS/HCC)    4. Retroperitoneal mass    Patient has a retroperitoneal mass which is causing a significant amount of edema in both lower extremities.  The etiology of that mass has still not been identified clearly.  The biopsy would appear to be showing some hepatocytes some fibrosis tissue but no cancer was identified.  Apparently there are some additional studies which are being done and according to my reading of [facility] they may be setting him up with a rheumatologist.  I am a little concerned that he is not seeing a oncologist.  I have asked him to change his use of torsemide from 10 mg twice a day to 20 mg once a day to try and get that diuretic effect.  I am also getting a BMP on him today we will follow him up in a  couple weeks

## 2020-11-17 ENCOUNTER — TELEPHONE (OUTPATIENT)
Dept: INTERNAL MEDICINE | Facility: CLINIC | Age: 70
End: 2020-11-17

## 2020-11-17 LAB
BUN SERPL-MCNC: 29 MG/DL (ref 8–23)
BUN/CREAT SERPL: 18.2 (ref 7–25)
CALCIUM SERPL-MCNC: 8.3 MG/DL (ref 8.6–10.5)
CHLORIDE SERPL-SCNC: 105 MMOL/L (ref 98–107)
CO2 SERPL-SCNC: 28 MMOL/L (ref 22–29)
CREAT SERPL-MCNC: 1.59 MG/DL (ref 0.76–1.27)
GLUCOSE SERPL-MCNC: 101 MG/DL (ref 65–99)
POTASSIUM SERPL-SCNC: 3.8 MMOL/L (ref 3.5–5.2)
SODIUM SERPL-SCNC: 140 MMOL/L (ref 136–145)

## 2020-11-17 NOTE — TELEPHONE ENCOUNTER
HE STATED HE HAS GOT IN TOUCH WITH   [facility] AND HAS AN APPT WITH DR LUKE ON 02- FOR DEPT OF RHEUMATOLOGY   JUST WANTED TO GIVE ESTEE       GOOD CONTACT   [phone] (H)

## 2020-11-18 ENCOUNTER — TELEPHONE (OUTPATIENT)
Dept: INTERNAL MEDICINE | Facility: CLINIC | Age: 70
End: 2020-11-18

## 2020-11-18 NOTE — TELEPHONE ENCOUNTER
PATIENT HAD QUESTIONS ABOUT FLUID BUILD UP.   CAN FLUID BUILD UP DAMAGE ORGANS?  WHAT TO DO UNTIL NEXT APPOINTMENT WITH. RHEUMATOLOGY    IF SURGERY IS A OPTION TO REMOVE THE MASS ON KIDNEY?   CALL BACK:[phone] (H)

## 2020-11-19 ENCOUNTER — TELEPHONE (OUTPATIENT)
Dept: INTERNAL MEDICINE | Facility: CLINIC | Age: 70
End: 2020-11-19

## 2020-11-19 DIAGNOSIS — N13.5 RETROPERITONEAL FIBROSIS: Primary | ICD-10-CM

## 2020-11-19 LAB
LAB AP CASE REPORT: NORMAL
LAB AP CLINICAL INFORMATION: NORMAL
LAB AP DIAGNOSIS COMMENT: NORMAL
PATH REPORT.ADDENDUM SPEC: NORMAL
PATH REPORT.FINAL DX SPEC: NORMAL
PATH REPORT.GROSS SPEC: NORMAL

## 2020-11-19 NOTE — TELEPHONE ENCOUNTER
Dr. Heath spoke to Dr. Burrows by phone today, in regards to retroperitoneal fibrosis.  Patient has a rheum appt at [facility] in February but wanting him seen by someone sooner, to start medication, etc.  Dr. Burrows says his professor at [employer] was an expert on this condition, so he is very familiar with it and plans to work him in over the next 2 weeks.  Records faxed to Dr. Burrows' office today.  Pt needs a renal u/s in about 2 weeks, per Dr. Heath.  Have LM for pt to call back to inform him of this information.  Dr. Heath says not to cancel his Feb [facility] rheum appt yet.

## 2020-11-23 ENCOUNTER — TELEPHONE (OUTPATIENT)
Dept: INTERNAL MEDICINE | Facility: CLINIC | Age: 70
End: 2020-11-23

## 2020-11-25 ENCOUNTER — TELEPHONE (OUTPATIENT)
Dept: INTERNAL MEDICINE | Facility: CLINIC | Age: 70
End: 2020-11-25

## 2020-11-25 NOTE — TELEPHONE ENCOUNTER
Dr. Burrows called and talked with Dr. Heath - he is going to order a CT of A&P, so we will cancel his renal ultrasound order.

## 2020-11-30 ENCOUNTER — HOSPITAL ENCOUNTER (OUTPATIENT)
Dept: ULTRASOUND IMAGING | Facility: HOSPITAL | Age: 70
Discharge: HOME | End: 2020-11-30
Admitting: INTERNAL MEDICINE

## 2020-11-30 ENCOUNTER — TRANSCRIBE ORDERS (OUTPATIENT)
Dept: ADMINISTRATIVE | Facility: HOSPITAL | Age: 70
End: 2020-11-30

## 2020-11-30 DIAGNOSIS — N13.5 RETROPERITONEAL FIBROSIS: Primary | ICD-10-CM

## 2020-11-30 DIAGNOSIS — N13.5 RETROPERITONEAL FIBROSIS: ICD-10-CM

## 2020-11-30 PROCEDURE — 76775 US EXAM ABDO BACK WALL LIM: CPT

## 2020-12-03 ENCOUNTER — HOSPITAL ENCOUNTER (OUTPATIENT)
Dept: CT IMAGING | Facility: HOSPITAL | Age: 70
Discharge: HOME | End: 2020-12-03
Admitting: INTERNAL MEDICINE

## 2020-12-03 DIAGNOSIS — N13.5 RETROPERITONEAL FIBROSIS: ICD-10-CM

## 2020-12-03 LAB — CREAT BLDA-MCNC: 1.8 MG/DL (ref 0.6–1.3)

## 2020-12-03 PROCEDURE — 82565 ASSAY OF CREATININE: CPT

## 2020-12-03 PROCEDURE — 74170 CT ABD WO CNTRST FLWD CNTRST: CPT

## 2020-12-03 PROCEDURE — 25010000002 IOPAMIDOL 61 % SOLUTION: Performed by: INTERNAL MEDICINE

## 2020-12-03 RX ADMIN — IOPAMIDOL 100 ML: 612 INJECTION, SOLUTION INTRAVENOUS at 08:56

## 2020-12-07 RX ORDER — PROPRANOLOL HYDROCHLORIDE 80 MG/1
TABLET ORAL
Qty: 180 TABLET | Refills: 3 | Status: ON HOLD | OUTPATIENT
Start: 2020-12-07 | End: 2022-05-23

## 2020-12-08 RX ORDER — ALLOPURINOL 300 MG/1
TABLET ORAL
Qty: 90 TABLET | Refills: 3 | Status: SHIPPED | OUTPATIENT
Start: 2020-12-08 | End: 2021-12-27

## 2020-12-16 ENCOUNTER — TELEPHONE (OUTPATIENT)
Dept: INTERNAL MEDICINE | Facility: CLINIC | Age: 70
End: 2020-12-16

## 2020-12-16 NOTE — TELEPHONE ENCOUNTER
PATIENT WAS INTERESTED IN SHINGLES VACCINATION AND WANTED TO LEAVE A MESSAGE WITH PCP ABOUT GETTING ONE.    PATIENT CALL BACK: [phone]

## 2020-12-16 NOTE — TELEPHONE ENCOUNTER
With his recent medical issues, is it ok for him to get the shingles vaccine now?  If so, will instruct to get thru pharmacy.

## 2020-12-17 NOTE — TELEPHONE ENCOUNTER
Pt informed he should get it and instructed him to get thru the pharmacy, which is [facility], for billing purposes, which he understands.

## 2021-01-07 ENCOUNTER — OFFICE VISIT (OUTPATIENT)
Dept: INTERNAL MEDICINE | Facility: CLINIC | Age: 71
End: 2021-01-07

## 2021-01-07 VITALS
HEIGHT: 73 IN | WEIGHT: 287 LBS | SYSTOLIC BLOOD PRESSURE: 122 MMHG | TEMPERATURE: 97.1 F | HEART RATE: 60 BPM | OXYGEN SATURATION: 98 % | BODY MASS INDEX: 38.04 KG/M2 | DIASTOLIC BLOOD PRESSURE: 60 MMHG

## 2021-01-07 DIAGNOSIS — R79.89 ELEVATED SERUM CREATININE: ICD-10-CM

## 2021-01-07 DIAGNOSIS — R73.01 IFG (IMPAIRED FASTING GLUCOSE): Primary | ICD-10-CM

## 2021-01-07 DIAGNOSIS — C61 PROSTATE CANCER (HCC): ICD-10-CM

## 2021-01-07 DIAGNOSIS — N13.5 RETROPERITONEAL FIBROSIS: ICD-10-CM

## 2021-01-07 PROBLEM — M10.9 GOUT: Status: ACTIVE | Noted: 2021-01-06

## 2021-01-07 PROBLEM — K68.2 RETROPERITONEAL FIBROSIS: Status: ACTIVE | Noted: 2021-01-06

## 2021-01-07 LAB — HBA1C MFR BLD: 5.5 %

## 2021-01-07 PROCEDURE — 83036 HEMOGLOBIN GLYCOSYLATED A1C: CPT | Performed by: INTERNAL MEDICINE

## 2021-01-07 PROCEDURE — 99214 OFFICE O/P EST MOD 30 MIN: CPT | Performed by: INTERNAL MEDICINE

## 2021-01-07 NOTE — PROGRESS NOTES
Subjective   Aleks Monzon is a 70 y.o. male.   Chief Complaint   Patient presents with   • Retroperitoneal Mass   • IFG     A1C:5.5 %       History of Present Illness at this point patient is following up for his retroperitoneal fibrosis.  We have referred him to see Dr. Burrows our local rheumatologist who is working with a team at [facility] to get him under some form of treatment.  My understanding is that [facility] and Dr. Burrows are going to be discussing this and coming up with a formal plan tomorrow.  At this point patient is not yet on steroids though he is having quite a bit of abdominal distention leg edema.  In addition to following up his retroperitoneal fibrosis I am also seeing him for impaired fasting glucose.    The following portions of the patient's history were reviewed and updated as appropriate: allergies, current medications, past family history, past medical history, past social history, past surgical history and problem list.    Review of Systems   Constitutional: Negative for activity change, appetite change, fatigue, fever, unexpected weight gain and unexpected weight loss.   HENT: Negative for swollen glands, trouble swallowing and voice change.    Eyes: Negative for blurred vision and visual disturbance.   Respiratory: Negative for cough and shortness of breath.    Cardiovascular: Positive for leg swelling. Negative for chest pain and palpitations.   Gastrointestinal: Positive for abdominal distention. Negative for abdominal pain, constipation, diarrhea, nausea, vomiting and indigestion.   Endocrine: Negative for cold intolerance, heat intolerance, polydipsia and polyphagia.   Genitourinary: Negative for dysuria and frequency.   Musculoskeletal: Negative for arthralgias, back pain, joint swelling and neck pain.   Skin: Negative for color change, rash and skin lesions.   Neurological: Negative for dizziness, weakness, headache, memory problem and confusion.   Hematological: Does not  bruise/bleed easily.   Psychiatric/Behavioral: Negative for agitation, hallucinations and suicidal ideas. The patient is not nervous/anxious.        Objective   Past Medical History:   Diagnosis Date   • Arthritis    • Cancer (CMS/HCC)     PROSTATE   • Cellulitis    • Gout    • Hypertension    • IFG (impaired fasting glucose)       Past Surgical History:   Procedure Laterality Date   • AXILLARY LYMPH NODE BIOPSY/EXCISION Left 10/1/2020    Procedure: LEFT AXILLARY LYMPH NODE BIOPSY;  Surgeon: Dina To MD;  Location: [facility];  Service: General;  Laterality: Left;   • BACK SURGERY     • JOINT REPLACEMENT      BILATERAL HIP    • TOTAL HIP ARTHROPLASTY          Current Outpatient Medications:   •  allopurinol (ZYLOPRIM) 300 MG tablet, TAKE 1 TABLET BY MOUTH ONCE DAILY, Disp: 90 tablet, Rfl: 3  •  colchicine 0.6 MG tablet, Take 1 tablet by mouth 2 (Two) Times a Day As Needed for Muscle / Joint Pain., Disp: 20 tablet, Rfl: 5  •  diphenhydrAMINE (BENADRYL) 25 MG tablet, Take 25 mg by mouth Every 6 (Six) Hours As Needed for Allergies., Disp: , Rfl:   •  propranolol (INDERAL) 80 MG tablet, TAKE 2 TABLETS BY MOUTH AT BEDTIME, Disp: 180 tablet, Rfl: 3  •  tamsulosin (FLOMAX) 0.4 MG capsule 24 hr capsule, Take 0.4 mg by mouth Daily., Disp: , Rfl:   •  torsemide (DEMADEX) 20 MG tablet, 1   daily (Patient taking differently: Take 10 mg by mouth 2 (two) times a day. 1   daily), Disp: 30 tablet, Rfl: 1     Vitals:    01/07/21 1123   BP: 122/60   Pulse: 60   Temp: 97.1 °F (36.2 °C)   SpO2: 98%         01/07/21  1123   Weight: 130 kg (287 lb)     Patient's Body mass index is 37.87 kg/m². BMI is .      Physical Exam  Vitals signs and nursing note reviewed.   Constitutional:       General: He is not in acute distress.     Appearance: Normal appearance. He is well-developed.   HENT:      Head: Normocephalic and atraumatic.      Right Ear: External ear normal.      Left Ear: External ear normal.      Nose: Nose normal.   Eyes:       Extraocular Movements: Extraocular movements intact.      Conjunctiva/sclera: Conjunctivae normal.      Pupils: Pupils are equal, round, and reactive to light.   Neck:      Musculoskeletal: Normal range of motion and neck supple. No neck rigidity or muscular tenderness.   Cardiovascular:      Rate and Rhythm: Normal rate and regular rhythm.      Pulses: Normal pulses.      Heart sounds: Normal heart sounds.   Pulmonary:      Effort: Pulmonary effort is normal.      Breath sounds: Normal breath sounds.   Abdominal:      General: Bowel sounds are normal.      Palpations: Abdomen is soft.   Musculoskeletal: Normal range of motion.   Skin:     General: Skin is warm and dry.   Neurological:      General: No focal deficit present.      Mental Status: He is alert and oriented to person, place, and time.   Psychiatric:         Mood and Affect: Mood normal.         Behavior: Behavior normal.               Assessment/Plan   Diagnoses and all orders for this visit:    1. IFG (impaired fasting glucose) (Primary)  -     POC Glycosylated Hemoglobin (Hb A1C)  -     Basic Metabolic Panel    2. Prostate cancer (CMS/HCC)  -     PSA DIAGNOSTIC    3. Retroperitoneal fibrosis    4. Elevated serum creatinine        At this point patient has adequate impaired fasting glucose his prostate cancer is under control we are going to recheck his PSA.  His retroperitoneal fibrosis is under review but both are local rheumatologist Dr. Burrows as well as [facility] a plan of treatment is intense dissipated to begin tomorrow.  Because of a previously elevated creatinine I want to go ahead and repeat his kidney function and electrolytes today.

## 2021-01-08 LAB
BUN SERPL-MCNC: 31 MG/DL (ref 8–23)
BUN/CREAT SERPL: 20.5 (ref 7–25)
CALCIUM SERPL-MCNC: 7.9 MG/DL (ref 8.6–10.5)
CHLORIDE SERPL-SCNC: 107 MMOL/L (ref 98–107)
CO2 SERPL-SCNC: 29.7 MMOL/L (ref 22–29)
CREAT SERPL-MCNC: 1.51 MG/DL (ref 0.76–1.27)
GLUCOSE SERPL-MCNC: 99 MG/DL (ref 65–99)
POTASSIUM SERPL-SCNC: 3.9 MMOL/L (ref 3.5–5.2)
PSA SERPL-MCNC: 0.51 NG/ML (ref 0–4)
SODIUM SERPL-SCNC: 142 MMOL/L (ref 136–145)

## 2021-01-18 ENCOUNTER — TRANSCRIBE ORDERS (OUTPATIENT)
Dept: ADMINISTRATIVE | Facility: HOSPITAL | Age: 71
End: 2021-01-18

## 2021-01-18 DIAGNOSIS — R60.9 EDEMA, UNSPECIFIED TYPE: ICD-10-CM

## 2021-01-18 DIAGNOSIS — N13.5 RETROPERITONEAL FIBROSIS: Primary | ICD-10-CM

## 2021-01-18 RX ORDER — TORSEMIDE 20 MG/1
10 TABLET ORAL 2 TIMES DAILY
Qty: 60 TABLET | Refills: 11 | Status: SHIPPED | OUTPATIENT
Start: 2021-01-18 | End: 2021-03-25 | Stop reason: SDUPTHER

## 2021-01-20 ENCOUNTER — HOSPITAL ENCOUNTER (OUTPATIENT)
Dept: CT IMAGING | Facility: HOSPITAL | Age: 71
Discharge: HOME | End: 2021-01-20
Admitting: INTERNAL MEDICINE

## 2021-01-20 DIAGNOSIS — N13.5 RETROPERITONEAL FIBROSIS: ICD-10-CM

## 2021-01-20 PROCEDURE — 25010000002 IOPAMIDOL 61 % SOLUTION: Performed by: INTERNAL MEDICINE

## 2021-01-20 PROCEDURE — 71260 CT THORAX DX C+: CPT

## 2021-01-20 RX ADMIN — IOPAMIDOL 100 ML: 612 INJECTION, SOLUTION INTRAVENOUS at 12:45

## 2021-03-15 ENCOUNTER — TELEPHONE (OUTPATIENT)
Dept: INTERNAL MEDICINE | Facility: CLINIC | Age: 71
End: 2021-03-15

## 2021-03-15 ENCOUNTER — TRANSCRIBE ORDERS (OUTPATIENT)
Dept: ADMINISTRATIVE | Facility: HOSPITAL | Age: 71
End: 2021-03-15

## 2021-03-15 DIAGNOSIS — N13.5 RETROPERITONEAL FIBROSIS: Primary | ICD-10-CM

## 2021-03-16 ENCOUNTER — TELEPHONE (OUTPATIENT)
Dept: INTERNAL MEDICINE | Facility: CLINIC | Age: 71
End: 2021-03-16

## 2021-03-16 NOTE — TELEPHONE ENCOUNTER
PAULA WITH [facility] IN [place] CALLING ON BEHALF OF PATIENT STATING THAT THEY ARE NEEDING AN EKG TRACING ASAP FOR PATIENT SINCE HE IS HAVING SURGERY TOMORROW.    PAULA CALLBACK # [phone]

## 2021-03-16 NOTE — TELEPHONE ENCOUNTER
Spoke with cortney they needed the reading not just report, faxed to [phone] she will call back if not received by [phone]

## 2021-03-24 ENCOUNTER — HOSPITAL ENCOUNTER (OUTPATIENT)
Dept: CT IMAGING | Facility: HOSPITAL | Age: 71
Discharge: HOME | End: 2021-03-24
Admitting: INTERNAL MEDICINE

## 2021-03-24 DIAGNOSIS — N13.5 RETROPERITONEAL FIBROSIS: ICD-10-CM

## 2021-03-24 PROCEDURE — 74178 CT ABD&PLV WO CNTR FLWD CNTR: CPT

## 2021-03-24 PROCEDURE — 25010000002 IOPAMIDOL 61 % SOLUTION: Performed by: INTERNAL MEDICINE

## 2021-03-24 PROCEDURE — 82565 ASSAY OF CREATININE: CPT

## 2021-03-24 RX ADMIN — IOPAMIDOL 100 ML: 612 INJECTION, SOLUTION INTRAVENOUS at 09:04

## 2021-03-25 DIAGNOSIS — R60.9 EDEMA, UNSPECIFIED TYPE: ICD-10-CM

## 2021-03-25 LAB — CREAT BLDA-MCNC: 1.6 MG/DL (ref 0.6–1.3)

## 2021-03-25 RX ORDER — TORSEMIDE 20 MG/1
10 TABLET ORAL 2 TIMES DAILY
Qty: 60 TABLET | Refills: 11 | Status: SHIPPED | OUTPATIENT
Start: 2021-03-25 | End: 2021-09-30

## 2021-03-25 NOTE — TELEPHONE ENCOUNTER
Caller: Aleks Monzon    Relationship: Self    Best call back number:=[phone]  Medication needed:   Requested Prescriptions     Pending Prescriptions Disp Refills   • torsemide (DEMADEX) 20 MG tablet 60 tablet 11     Sig: Take 0.5 tablets by mouth 2 (two) times a day. 1   daily       When do you need the refill by: ASAP    What additional details did the patient provide when requesting the medication: TAKING HALF PILL NOW WANTING TO INCREASE THAT     Does the patient have less than a 3 day supply:  [x] Yes  [] No    What is the patient's preferred pharmacy: [facility], [address] - [phone] [facility] [phone] FX

## 2021-04-02 ENCOUNTER — LAB (OUTPATIENT)
Dept: INTERNAL MEDICINE | Facility: CLINIC | Age: 71
End: 2021-04-02

## 2021-04-02 DIAGNOSIS — Z79.899 ENCOUNTER FOR LONG-TERM (CURRENT) USE OF MEDICATIONS: ICD-10-CM

## 2021-04-02 DIAGNOSIS — I10 BENIGN ESSENTIAL HTN: Primary | ICD-10-CM

## 2021-04-02 DIAGNOSIS — E11.9 ENCOUNTER FOR DIABETIC FOOT EXAM (HCC): ICD-10-CM

## 2021-04-02 DIAGNOSIS — Z12.5 SCREENING PSA (PROSTATE SPECIFIC ANTIGEN): ICD-10-CM

## 2021-04-02 DIAGNOSIS — Z11.59 NEED FOR HEPATITIS C SCREENING TEST: ICD-10-CM

## 2021-04-02 DIAGNOSIS — E78.00 HIGH CHOLESTEROL: ICD-10-CM

## 2021-04-03 LAB
ALBUMIN SERPL-MCNC: 2.6 G/DL (ref 3.5–5.2)
ALBUMIN/GLOB SERPL: 1.6 G/DL
ALP SERPL-CCNC: 57 U/L (ref 39–117)
ALT SERPL-CCNC: 27 U/L (ref 1–41)
APPEARANCE UR: CLEAR
AST SERPL-CCNC: 17 U/L (ref 1–40)
BACTERIA #/AREA URNS HPF: ABNORMAL /HPF
BASOPHILS # BLD AUTO: ABNORMAL 10*3/UL
BILIRUB SERPL-MCNC: 0.5 MG/DL (ref 0–1.2)
BILIRUB UR QL STRIP: NEGATIVE
BUN SERPL-MCNC: 36 MG/DL (ref 8–23)
BUN/CREAT SERPL: 21.2 (ref 7–25)
CALCIUM SERPL-MCNC: 6.7 MG/DL (ref 8.6–10.5)
CASTS URNS MICRO: ABNORMAL
CHLORIDE SERPL-SCNC: 101 MMOL/L (ref 98–107)
CHOLEST SERPL-MCNC: 143 MG/DL (ref 0–200)
CO2 SERPL-SCNC: 32.2 MMOL/L (ref 22–29)
COLOR UR: YELLOW
CREAT SERPL-MCNC: 1.7 MG/DL (ref 0.76–1.27)
DIFFERENTIAL COMMENT: ABNORMAL
EOSINOPHIL # BLD AUTO: ABNORMAL 10*3/UL
EOSINOPHIL NFR BLD AUTO: ABNORMAL %
EPI CELLS #/AREA URNS HPF: ABNORMAL /HPF
ERYTHROCYTE [DISTWIDTH] IN BLOOD BY AUTOMATED COUNT: 18 % (ref 12.3–15.4)
GLOBULIN SER CALC-MCNC: 1.6 GM/DL
GLUCOSE SERPL-MCNC: 107 MG/DL (ref 65–99)
GLUCOSE UR QL: NEGATIVE
HBA1C MFR BLD: 5.6 % (ref 4.8–5.6)
HCT VFR BLD AUTO: 38.8 % (ref 37.5–51)
HCV AB S/CO SERPL IA: <0.1 S/CO RATIO (ref 0–0.9)
HDLC SERPL-MCNC: 52 MG/DL (ref 40–60)
HGB BLD-MCNC: 13.1 G/DL (ref 13–17.7)
HGB UR QL STRIP: ABNORMAL
KETONES UR QL STRIP: NEGATIVE
LDLC SERPL CALC-MCNC: 76 MG/DL (ref 0–100)
LEUKOCYTE ESTERASE UR QL STRIP: ABNORMAL
LYMPHOCYTES # BLD AUTO: ABNORMAL 10*3/UL
LYMPHOCYTES # BLD MANUAL: 0.8 10*3/MM3 (ref 0.7–3.1)
LYMPHOCYTES NFR BLD AUTO: ABNORMAL %
LYMPHOCYTES NFR BLD MANUAL: 7.5 % (ref 19.6–45.3)
MCH RBC QN AUTO: 29.8 PG (ref 26.6–33)
MCHC RBC AUTO-ENTMCNC: 33.8 G/DL (ref 31.5–35.7)
MCV RBC AUTO: 88.2 FL (ref 79–97)
MONOCYTES # BLD MANUAL: 0.8 10*3/MM3 (ref 0.1–0.9)
MONOCYTES NFR BLD AUTO: ABNORMAL %
MONOCYTES NFR BLD MANUAL: 7.5 % (ref 5–12)
NEUTROPHILS # BLD MANUAL: 8.8 10*3/MM3 (ref 1.7–7)
NEUTROPHILS NFR BLD AUTO: ABNORMAL %
NEUTROPHILS NFR BLD MANUAL: 82.8 % (ref 42.7–76)
NITRITE UR QL STRIP: NEGATIVE
PH UR STRIP: 6.5 [PH] (ref 5–8)
PLATELET # BLD AUTO: 181 10*3/MM3 (ref 140–450)
PLATELET BLD QL SMEAR: ABNORMAL
POTASSIUM SERPL-SCNC: 3 MMOL/L (ref 3.5–5.2)
PROT SERPL-MCNC: 4.2 G/DL (ref 6–8.5)
PROT UR QL STRIP: NEGATIVE
PSA SERPL-MCNC: 0.49 NG/ML (ref 0–4)
RBC # BLD AUTO: 4.4 10*6/MM3 (ref 4.14–5.8)
RBC #/AREA URNS HPF: ABNORMAL /HPF
RBC MORPH BLD: ABNORMAL
SODIUM SERPL-SCNC: 142 MMOL/L (ref 136–145)
SP GR UR: 1.01 (ref 1–1.03)
TRIGL SERPL-MCNC: 76 MG/DL (ref 0–150)
TSH SERPL DL<=0.005 MIU/L-ACNC: 3.94 UIU/ML (ref 0.27–4.2)
URATE SERPL-MCNC: 7.5 MG/DL (ref 3.4–7)
UROBILINOGEN UR STRIP-MCNC: ABNORMAL MG/DL
VLDLC SERPL CALC-MCNC: 15 MG/DL (ref 5–40)
WBC # BLD AUTO: 10.63 10*3/MM3 (ref 3.4–10.8)
WBC #/AREA URNS HPF: ABNORMAL /HPF

## 2021-04-08 ENCOUNTER — OFFICE VISIT (OUTPATIENT)
Dept: INTERNAL MEDICINE | Facility: CLINIC | Age: 71
End: 2021-04-08

## 2021-04-08 VITALS
OXYGEN SATURATION: 97 % | DIASTOLIC BLOOD PRESSURE: 88 MMHG | HEIGHT: 73 IN | TEMPERATURE: 97.9 F | BODY MASS INDEX: 35.78 KG/M2 | WEIGHT: 270 LBS | SYSTOLIC BLOOD PRESSURE: 112 MMHG | HEART RATE: 93 BPM

## 2021-04-08 DIAGNOSIS — C44.222 SQUAMOUS CELL CARCINOMA OF SKIN OF RIGHT EAR AND EXTERNAL AUDITORY CANAL: Primary | ICD-10-CM

## 2021-04-08 DIAGNOSIS — I10 BENIGN ESSENTIAL HTN: ICD-10-CM

## 2021-04-08 DIAGNOSIS — R73.01 IFG (IMPAIRED FASTING GLUCOSE): ICD-10-CM

## 2021-04-08 DIAGNOSIS — N13.5 RETROPERITONEAL FIBROSIS: ICD-10-CM

## 2021-04-08 PROCEDURE — G0438 PPPS, INITIAL VISIT: HCPCS | Performed by: INTERNAL MEDICINE

## 2021-04-08 RX ORDER — POTASSIUM CHLORIDE 750 MG/1
10 CAPSULE, EXTENDED RELEASE ORAL DAILY
Qty: 90 CAPSULE | Refills: 3 | Status: SHIPPED | OUTPATIENT
Start: 2021-04-08 | End: 2021-12-01

## 2021-04-08 NOTE — PROGRESS NOTES
The ABCs of the Annual Wellness Visit  Initial Medicare Wellness Visit    Chief Complaint   Patient presents with   • Medicare Wellness-Initial Visit   • Leg Swelling     swelling, red, and weeping; has been getting worse ; weakness as well  shelbi time getting up steps    • Arm Pain     bilateral arm pain; muscle, worse in left arm than right; even lifting a coffee cup        Subjective   History of Present Illness:  Aleks Monzon is a 71 y.o. male who presents for an Initial Medicare Wellness Visit.  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  His renal function has improved with each stent placement.  Currently he is having edema and breaking out on both lower extremities.    HEALTH RISK ASSESSMENT    Recent Hospitalizations:  No hospitalization(s) within the last year.    Current Medical Providers:  Patient Care Team:  Bam Heath MD as PCP - General (Internal Medicine)  Siddhartha Epps MD as Consulting Physician (Urology)    Smoking Status:  Social History     Tobacco Use   Smoking Status Never Smoker   Smokeless Tobacco Never Used       Alcohol Consumption:  Social History     Substance and Sexual Activity   Alcohol Use Never       Depression Screen:   PHQ-2/PHQ-9 Depression Screening 4/8/2021   Little interest or pleasure in doing things 0   Feeling down, depressed, or hopeless 0   Total Score 0       Fall Risk Screen:  STEADI Fall Risk Assessment has not been completed.    Health Habits and Functional and Cognitive Screening:  Functional & Cognitive Status 4/8/2021   Do you have difficulty preparing food and eating? No   Do you have difficulty bathing yourself, getting dressed or grooming yourself? No   Do you have difficulty using the toilet? No   Do you have difficulty moving around from place to place? No   Do you have trouble with steps  or getting out of a bed or a chair? No   Current Diet Well Balanced Diet   Dental Exam Up to date   Eye Exam Up to date   Exercise (times per week) 0 times per week   Current Exercise Activities Include No Regular Exercise   Do you need help using the phone?  No   Are you deaf or do you have serious difficulty hearing?  No   Do you need help with transportation? No   Do you need help shopping? No   Do you need help preparing meals?  No   Do you need help with housework?  No   Do you need help with laundry? No   Do you need help taking your medications? No   Do you need help managing money? No   Do you ever drive or ride in a car without wearing a seat belt? No   Have you felt unusual stress, anger or loneliness in the last month? No   Who do you live with? Spouse   If you need help, do you have trouble finding someone available to you? No   Have you been bothered in the last four weeks by sexual problems? No   Do you have difficulty concentrating, remembering or making decisions? No         Does the patient have evidence of cognitive impairment? No    Asprin use counseling:Does not need ASA (and currently is not on it)    Age-appropriate Screening Schedule:  Refer to the list below for future screening recommendations based on patient's age, sex and/or medical conditions. Orders for these recommended tests are listed in the plan section. The patient has been provided with a written plan.    Health Maintenance   Topic Date Due   • URINE MICROALBUMIN  Never done   • COLONOSCOPY  Never done   • DIABETIC EYE EXAM  03/15/2019   • ZOSTER VACCINE (2 of 2) 02/24/2021   • INFLUENZA VACCINE  08/01/2021   • HEMOGLOBIN A1C  10/02/2021   • DIABETIC FOOT EXAM  04/02/2022   • LIPID PANEL  04/02/2022   • TDAP/TD VACCINES (2 - Td) 07/31/2028          The following portions of the patient's history were reviewed and updated as appropriate: allergies, current medications, past family history, past medical history, past social history,  past surgical history and problem list.    Outpatient Medications Prior to Visit   Medication Sig Dispense Refill   • allopurinol (ZYLOPRIM) 300 MG tablet TAKE 1 TABLET BY MOUTH ONCE DAILY 90 tablet 3   • colchicine 0.6 MG tablet Take 1 tablet by mouth 2 (Two) Times a Day As Needed for Muscle / Joint Pain. 20 tablet 5   • diphenhydrAMINE (BENADRYL) 25 MG tablet Take 25 mg by mouth Every 6 (Six) Hours As Needed for Allergies.     • propranolol (INDERAL) 80 MG tablet TAKE 2 TABLETS BY MOUTH AT BEDTIME 180 tablet 3   • tamsulosin (FLOMAX) 0.4 MG capsule 24 hr capsule Take 0.4 mg by mouth Daily.     • torsemide (DEMADEX) 20 MG tablet Take 0.5 tablets by mouth 2 (two) times a day. 1   daily 60 tablet 11     No facility-administered medications prior to visit.       Patient Active Problem List   Diagnosis   • IFG (impaired fasting glucose)   • Benign essential HTN   • High cholesterol   • Prostate cancer (CMS/HCC)   • Elevated PSA   • Elevated serum creatinine   • Gout, arthropathy   • Obesity (BMI 30-39.9)   • Primary osteoarthritis of both knees   • Retroperitoneal mass   • Edema   • Abnormal CT of the abdomen   • Axillary mass, left   • Retroperitoneal fibrosis   • Gout   • Ureteral obstruction, left   • Hypertension       Advanced Care Planning:  ACP discussion was held with the patient during this visit. Patient does not have an advance directive, information provided.    Review of Systems   Constitutional: Negative for activity change, appetite change and chills.   HENT: Negative for congestion, ear pain and facial swelling.    Eyes: Negative for pain, discharge and itching.   Respiratory: Positive for shortness of breath. Negative for apnea and cough.    Cardiovascular: Positive for leg swelling. Negative for chest pain and palpitations.   Gastrointestinal: Positive for abdominal distention. Negative for abdominal pain and anal bleeding.   Endocrine: Negative for cold intolerance and heat intolerance.  "  Genitourinary: Negative for difficulty urinating, dysuria and flank pain.   Musculoskeletal: Negative for arthralgias, back pain and joint swelling.   Skin: Negative for color change, pallor and rash.   Allergic/Immunologic: Negative for environmental allergies and food allergies.   Neurological: Negative for dizziness and facial asymmetry.   Hematological: Negative for adenopathy. Does not bruise/bleed easily.   Psychiatric/Behavioral: Negative for agitation, behavioral problems and confusion.       Compared to one year ago, the patient feels his physical health is the same.  Compared to one year ago, the patient feels his mental health is the same.    Reviewed chart for potential of high risk medication in the elderly: yes  Reviewed chart for potential of harmful drug interactions in the elderly:yes    Objective         Vitals:    04/08/21 1058   BP: 112/88   BP Location: Left arm   Patient Position: Sitting   Cuff Size: Adult   Pulse: 93   Temp: 97.9 °F (36.6 °C)   TempSrc: Temporal   SpO2: 97%   Weight: 122 kg (270 lb)   Height: 185.4 cm (73\")   PainSc:   3   PainLoc: Arm  Comment: muscle pain       Body mass index is 35.62 kg/m².  Discussed the patient's BMI with him. The BMI is above average; BMI management plan is completed.    Physical Exam  Vitals and nursing note reviewed.   Constitutional:       General: He is not in acute distress.     Appearance: Normal appearance. He is well-developed.   HENT:      Head: Normocephalic and atraumatic.      Right Ear: External ear normal.      Left Ear: External ear normal.      Nose: Nose normal.   Eyes:      Extraocular Movements: Extraocular movements intact.      Conjunctiva/sclera: Conjunctivae normal.      Pupils: Pupils are equal, round, and reactive to light.   Cardiovascular:      Rate and Rhythm: Normal rate and regular rhythm.      Pulses: Normal pulses.      Heart sounds: Normal heart sounds.   Pulmonary:      Effort: Pulmonary effort is normal.      Breath " sounds: Normal breath sounds.   Abdominal:      General: Bowel sounds are normal.      Palpations: Abdomen is soft.   Musculoskeletal:         General: Normal range of motion.      Cervical back: Normal range of motion and neck supple. No rigidity. No muscular tenderness.      Right lower leg: Edema present.      Left lower leg: Edema present.   Skin:     General: Skin is warm and dry.      Comments: Mild breakdown both lower extremities.  Patient has been dressing these with over-the-counter dressings.  He does have a few pustules in the right shin area.  No evidence of cellulitis or overt infection   Neurological:      General: No focal deficit present.      Mental Status: He is alert and oriented to person, place, and time.   Psychiatric:         Mood and Affect: Mood normal.         Behavior: Behavior normal.         Lab Results   Component Value Date     (H) 04/02/2021    CHLPL 143 04/02/2021    TRIG 76 04/02/2021    HDL 52 04/02/2021    LDL 76 04/02/2021    VLDL 15 04/02/2021    HGBA1C 5.60 04/02/2021        Assessment/Plan   Medicare Risks and Personalized Health Plan  CMS Preventative Services Quick Reference  Advance Directive Discussion  Cardiovascular risk  Colon Cancer Screening  Immunizations Discussed/Encouraged (specific immunizations; adacel Tdap, Pneumococcal 23, Prevnar and Shingrix )  Obesity/Overweight     The above risks/problems have been discussed with the patient.  Pertinent information has been shared with the patient in the After Visit Summary.  Follow up plans and orders are seen below in the Assessment/Plan Section.    Diagnoses and all orders for this visit:    1. 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 Benign essential HTN  -     Basic Metabolic Panel; Future    3. Retroperitoneal fibrosis    4. IFG (impaired fasting glucose)    Other orders  -     potassium chloride (MICRO-K) 10 MEQ CR  capsule; Take 1 capsule by mouth Daily.  Dispense: 90 capsule; Refill: 3      Follow Up:  Return in about 6 months (around 10/8/2021).  At this point patient has multiple skin cancers ears top of his head needs to be seen by dermatology.  Also his blood pressure is under good control his retroperitoneal fibrosis being managed by Dr. Burrows locally he has been seen in consultation at [place].  I did spend time explaining retroperitoneal fibrosis and the nature of the disease to him.  On his lab work we did find hypokalemia so he is being given potassium replacement today with a follow-up potassium in 2 weeks.    An After Visit Summary and PPPS were given to the patient.

## 2021-04-13 ENCOUNTER — TELEPHONE (OUTPATIENT)
Dept: INTERNAL MEDICINE | Facility: CLINIC | Age: 71
End: 2021-04-13

## 2021-04-15 NOTE — TELEPHONE ENCOUNTER
Appt made with Dr. Stratton for 6/15 @ 8:15, which was their soonest date available.  LM for wife to call back to inform.  Will also give her contact info for Dr. Summers's office, in case they want to call and see if he has a sooner appt.

## 2021-05-10 ENCOUNTER — TELEPHONE (OUTPATIENT)
Dept: INTERNAL MEDICINE | Facility: CLINIC | Age: 71
End: 2021-05-10

## 2021-05-10 NOTE — TELEPHONE ENCOUNTER
Pt stated he missed a call. Marleni CALDWELL Called pt to remind him to do repeat labs at his convenience.

## 2021-06-09 RX ORDER — ENALAPRIL MALEATE 20 MG/1
TABLET ORAL
Qty: 30 TABLET | Refills: 0 | OUTPATIENT
Start: 2021-06-09

## 2021-06-16 ENCOUNTER — TELEPHONE (OUTPATIENT)
Dept: INTERNAL MEDICINE | Facility: CLINIC | Age: 71
End: 2021-06-16

## 2021-06-16 ENCOUNTER — OFFICE VISIT (OUTPATIENT)
Dept: INTERNAL MEDICINE | Facility: CLINIC | Age: 71
End: 2021-06-16

## 2021-06-16 VITALS
OXYGEN SATURATION: 98 % | DIASTOLIC BLOOD PRESSURE: 80 MMHG | HEIGHT: 73 IN | SYSTOLIC BLOOD PRESSURE: 120 MMHG | HEART RATE: 81 BPM | WEIGHT: 261 LBS | BODY MASS INDEX: 34.59 KG/M2 | TEMPERATURE: 97.6 F

## 2021-06-16 DIAGNOSIS — C61 PROSTATE CANCER (HCC): ICD-10-CM

## 2021-06-16 DIAGNOSIS — N13.5 RETROPERITONEAL FIBROSIS: ICD-10-CM

## 2021-06-16 DIAGNOSIS — C67.9 MALIGNANT NEOPLASM OF URINARY BLADDER, UNSPECIFIED SITE (HCC): ICD-10-CM

## 2021-06-16 DIAGNOSIS — R39.9 UTI SYMPTOMS: Primary | ICD-10-CM

## 2021-06-16 PROBLEM — C67.0 MALIGNANT NEOPLASM OF TRIGONE OF URINARY BLADDER (HCC): Status: ACTIVE | Noted: 2021-04-16

## 2021-06-16 LAB
BILIRUB BLD-MCNC: NEGATIVE MG/DL
CLARITY, POC: CLEAR
COLOR UR: YELLOW
GLUCOSE UR STRIP-MCNC: NEGATIVE MG/DL
KETONES UR QL: NEGATIVE
LEUKOCYTE EST, POC: ABNORMAL
NITRITE UR-MCNC: POSITIVE MG/ML
PH UR: 6 [PH] (ref 5–8)
PROT UR STRIP-MCNC: NEGATIVE MG/DL
RBC # UR STRIP: NEGATIVE /UL
SP GR UR: 1.02 (ref 1–1.03)
UROBILINOGEN UR QL: NORMAL

## 2021-06-16 PROCEDURE — 99214 OFFICE O/P EST MOD 30 MIN: CPT | Performed by: INTERNAL MEDICINE

## 2021-06-16 PROCEDURE — 81003 URINALYSIS AUTO W/O SCOPE: CPT | Performed by: INTERNAL MEDICINE

## 2021-06-16 RX ORDER — SULFAMETHOXAZOLE AND TRIMETHOPRIM 800; 160 MG/1; MG/1
1 TABLET ORAL 2 TIMES DAILY
Qty: 6 TABLET | Refills: 0 | Status: SHIPPED | OUTPATIENT
Start: 2021-06-16 | End: 2021-07-01 | Stop reason: SDUPTHER

## 2021-06-16 RX ORDER — PREDNISONE 1 MG/1
5 TABLET ORAL DAILY
COMMUNITY

## 2021-06-16 NOTE — TELEPHONE ENCOUNTER
Caller: Aleks Monzon    Relationship: Self    Best call back number: [phone] (W)    What orders are you requesting (i.e. lab or imaging): REQUESTING LAB ORDER TO TEST FOR UTI HE STATES HE CAN NOT HAVE AN INFUSION IF HE STILL HAS AN INFECTION     In what timeframe would the patient need to come in:  SOON     Where will you receive your lab/imaging services: [facility]     Additional notes: REQUESTING CALL BACK FROM THE CLINICAL TEAM AS SOON AS THEY ARE AVAILABLE

## 2021-06-16 NOTE — PROGRESS NOTES
Subjective   Aleks Monzon is a 71 y.o. male.   Chief Complaint   Patient presents with   • OTHER     PT NEEDS TO MAKE SURE HE HAS NO INFECTION TO BE CLEARD FOR TREATMENT AT [facility]    • PENDING LAB     PENDING BMP IN CHART        History of Present Illness this is a follow-up for patient who has retroperitoneal fibrosis he is also recently been diagnosed as cancer of the bladder he has been treated at [facility] by Dr. Davis.  He is getting bladder infusions at this point he also has had prostate cancer in the past and is stable from that standpoint.  Recently he has been losing weight feeling weaker than usual I think a lot of this may be attributed to the weaning of prednisone down to 10 mg daily.    The following portions of the patient's history were reviewed and updated as appropriate: allergies, current medications, past family history, past medical history, past social history, past surgical history and problem list.    Review of Systems   Constitutional: Negative for activity change, appetite change, fatigue, fever, unexpected weight gain and unexpected weight loss.   HENT: Negative for swollen glands, trouble swallowing and voice change.    Eyes: Negative for blurred vision and visual disturbance.   Respiratory: Negative for cough and shortness of breath.    Cardiovascular: Negative for chest pain, palpitations and leg swelling.   Gastrointestinal: Negative for abdominal pain, constipation, diarrhea, nausea, vomiting and indigestion.   Endocrine: Negative for cold intolerance, heat intolerance, polydipsia and polyphagia.   Genitourinary: Negative for dysuria and frequency.   Musculoskeletal: Negative for arthralgias, back pain, joint swelling and neck pain.   Skin: Negative for color change, rash and skin lesions.   Neurological: Negative for dizziness, weakness, headache, memory problem and confusion.   Hematological: Does not bruise/bleed easily.   Psychiatric/Behavioral: Negative for agitation,  hallucinations and suicidal ideas. The patient is not nervous/anxious.        Objective   Past Medical History:   Diagnosis Date   • Arthritis    • Cancer (CMS/HCC)     PROSTATE   • Cellulitis    • Gout    • Hypertension    • IFG (impaired fasting glucose)       Past Surgical History:   Procedure Laterality Date   • AXILLARY LYMPH NODE BIOPSY/EXCISION Left 10/1/2020    Procedure: LEFT AXILLARY LYMPH NODE BIOPSY;  Surgeon: Dina To MD;  Location: [facility];  Service: General;  Laterality: Left;   • BACK SURGERY     • JOINT REPLACEMENT      BILATERAL HIP    • TOTAL HIP ARTHROPLASTY          Current Outpatient Medications:   •  allopurinol (ZYLOPRIM) 300 MG tablet, TAKE 1 TABLET BY MOUTH ONCE DAILY, Disp: 90 tablet, Rfl: 3  •  colchicine 0.6 MG tablet, Take 1 tablet by mouth 2 (Two) Times a Day As Needed for Muscle / Joint Pain., Disp: 20 tablet, Rfl: 5  •  diphenhydrAMINE (BENADRYL) 25 MG tablet, Take 25 mg by mouth Every 6 (Six) Hours As Needed for Allergies., Disp: , Rfl:   •  potassium chloride (MICRO-K) 10 MEQ CR capsule, Take 1 capsule by mouth Daily., Disp: 90 capsule, Rfl: 3  •  propranolol (INDERAL) 80 MG tablet, TAKE 2 TABLETS BY MOUTH AT BEDTIME, Disp: 180 tablet, Rfl: 3  •  tamsulosin (FLOMAX) 0.4 MG capsule 24 hr capsule, Take 0.4 mg by mouth Daily., Disp: , Rfl:   •  torsemide (DEMADEX) 20 MG tablet, Take 0.5 tablets by mouth 2 (two) times a day. 1   daily, Disp: 60 tablet, Rfl: 11     Vitals:    06/16/21 1404   BP: 120/80   Pulse: 81   Temp: 97.6 °F (36.4 °C)   SpO2: 98%         06/16/21  1404   Weight: 118 kg (261 lb)         Physical Exam  Vitals and nursing note reviewed.   Constitutional:       General: He is not in acute distress.     Appearance: Normal appearance. He is well-developed.   HENT:      Head: Normocephalic and atraumatic.      Right Ear: External ear normal.      Left Ear: External ear normal.      Nose: Nose normal.   Eyes:      Extraocular Movements: Extraocular movements  intact.      Conjunctiva/sclera: Conjunctivae normal.      Pupils: Pupils are equal, round, and reactive to light.   Cardiovascular:      Rate and Rhythm: Normal rate and regular rhythm.      Pulses: Normal pulses.      Heart sounds: Normal heart sounds.   Pulmonary:      Effort: Pulmonary effort is normal.      Breath sounds: Normal breath sounds.   Abdominal:      General: Bowel sounds are normal.      Palpations: Abdomen is soft.   Musculoskeletal:         General: Normal range of motion.      Cervical back: Normal range of motion and neck supple. No rigidity. No muscular tenderness.   Skin:     General: Skin is warm and dry.   Neurological:      General: No focal deficit present.      Mental Status: He is alert and oriented to person, place, and time.   Psychiatric:         Mood and Affect: Mood normal.         Behavior: Behavior normal.               Assessment/Plan   Diagnoses and all orders for this visit:    1. UTI symptoms (Primary)  -     POC Urinalysis Dipstick, Automated  -     Urine Culture - Urine, Urine, Clean Catch    2. Malignant neoplasm of urinary bladder, unspecified site (CMS/HCC)    3. Prostate cancer (CMS/HCC)    4. Retroperitoneal fibrosis    Other orders  -     sulfamethoxazole-trimethoprim (Bactrim DS) 800-160 MG per tablet; Take 1 tablet by mouth 2 (Two) Times a Day.  Dispense: 6 tablet; Refill: 0    Patient has an abnormal urine analysis today and I am going to go ahead and extend his antibiotic therapy until I can get a culture back.  I spent significant time reviewing his [facility] notes as well as rheumatology evaluations and treatment.  I really believe most of patient's weakness and weight loss is because he is been weaned down quickly on his prednisone I cannot rule out another etiology he is being scanned and watched closely for the retroperitoneal fibrosis.

## 2021-06-17 ENCOUNTER — TELEPHONE (OUTPATIENT)
Dept: INTERNAL MEDICINE | Facility: CLINIC | Age: 71
End: 2021-06-17

## 2021-06-17 NOTE — TELEPHONE ENCOUNTER
Caller: Violet Monzon    Relationship to patient: Emergency Contact    Best call back number: [phone]    Patient is needing:     Patient is requesting his UA results. He is leaving for [place] in the morning. Please advise.

## 2021-06-17 NOTE — TELEPHONE ENCOUNTER
Called wife and explained we have the dipstick UA results, but culture takes 48hrs minimum to get a report, sometimes longer.  Faxed results with office note to [facility] this afternoon.  She is going to call there and ask  whether or not they should make the trip tomorrow.

## 2021-06-18 ENCOUNTER — TELEPHONE (OUTPATIENT)
Dept: INTERNAL MEDICINE | Facility: CLINIC | Age: 71
End: 2021-06-18

## 2021-06-18 LAB
BACTERIA UR CULT: NO GROWTH
BACTERIA UR CULT: NORMAL

## 2021-06-18 NOTE — TELEPHONE ENCOUNTER
Patient returned call and was informed of results. He will call back with any other questions or concerns.

## 2021-06-29 ENCOUNTER — CLINICAL SUPPORT (OUTPATIENT)
Dept: INTERNAL MEDICINE | Facility: CLINIC | Age: 71
End: 2021-06-29

## 2021-06-29 ENCOUNTER — TELEPHONE (OUTPATIENT)
Dept: INTERNAL MEDICINE | Facility: CLINIC | Age: 71
End: 2021-06-29

## 2021-06-29 DIAGNOSIS — N39.0 URINARY TRACT INFECTION WITH HEMATURIA, SITE UNSPECIFIED: ICD-10-CM

## 2021-06-29 DIAGNOSIS — C67.9 MALIGNANT NEOPLASM OF URINARY BLADDER, UNSPECIFIED SITE (HCC): Primary | ICD-10-CM

## 2021-06-29 DIAGNOSIS — R31.9 URINARY TRACT INFECTION WITH HEMATURIA, SITE UNSPECIFIED: ICD-10-CM

## 2021-06-29 LAB
BILIRUB BLD-MCNC: NEGATIVE MG/DL
CLARITY, POC: CLEAR
COLOR UR: YELLOW
GLUCOSE UR STRIP-MCNC: NEGATIVE MG/DL
KETONES UR QL: NEGATIVE
LEUKOCYTE EST, POC: ABNORMAL
NITRITE UR-MCNC: NEGATIVE MG/ML
PH UR: 5.5 [PH] (ref 5–8)
PROT UR STRIP-MCNC: ABNORMAL MG/DL
RBC # UR STRIP: ABNORMAL /UL
SP GR UR: 1.01 (ref 1–1.03)
UROBILINOGEN UR QL: NORMAL

## 2021-06-29 PROCEDURE — 81003 URINALYSIS AUTO W/O SCOPE: CPT | Performed by: INTERNAL MEDICINE

## 2021-06-29 NOTE — PROGRESS NOTES
Pt asymptomatic, having UA for upcoming treatment in [place].  Reviewed with Dr. Heath - will send for culture today and [facility].  Patient's last UA was abnormal, but culture came back negative.

## 2021-06-29 NOTE — TELEPHONE ENCOUNTER
Caller: Aleks Monzon    Relationship: Self    Best call back number: [phone]     What orders are you requesting (i.e. lab or imaging): URINALYSIS    In what timeframe would the patient need to come in: ASAP, PATIENT REQUESTING SOMETIME TODAY    Where will you receive your lab/imaging services: LAB    Additional notes: PATIENT STATED HE HAS A PROCEDURE IN [place] AT [place] ON Friday 07/02/2021 AND THEY WILL NOT DO PROCEDURE UNLESS HE HAS HAD THIS URINALYSIS DONE. PLEASE ADVISE. PATIENT REQUESTING CALLBACK ONCE ORDER IS IN.

## 2021-07-01 NOTE — TELEPHONE ENCOUNTER
RX PENDED/ATTACHED (PHONED IN)    Called pt to inform that urine C&S does have bacterial growth this time, but report is still preliminary.  Dr. Heath is recommending 10 days of Ab's, which was called in to pharmacy today.  Faxed UA and C&S to [facility] @ [phone].  He will check with them in the morning to see if he can have his scheduled treatment tomorrow or not.

## 2021-07-02 RX ORDER — SULFAMETHOXAZOLE AND TRIMETHOPRIM 800; 160 MG/1; MG/1
1 TABLET ORAL 2 TIMES DAILY
Qty: 20 TABLET | Refills: 0 | OUTPATIENT
Start: 2021-07-02 | End: 2021-12-01

## 2021-07-04 LAB
BACTERIA UR CULT: ABNORMAL
BACTERIA UR CULT: ABNORMAL
OTHER ANTIBIOTIC SUSC ISLT: ABNORMAL

## 2021-08-02 RX ORDER — TAMSULOSIN HYDROCHLORIDE 0.4 MG/1
CAPSULE ORAL
Qty: 90 CAPSULE | Refills: 3 | Status: ON HOLD | OUTPATIENT
Start: 2021-08-02 | End: 2022-05-23

## 2021-08-13 DIAGNOSIS — N39.0 URINARY TRACT INFECTION WITHOUT HEMATURIA, SITE UNSPECIFIED: Primary | ICD-10-CM

## 2021-08-26 LAB
BACTERIA UR CULT: ABNORMAL
BACTERIA UR CULT: ABNORMAL
OTHER ANTIBIOTIC SUSC ISLT: ABNORMAL

## 2021-08-27 ENCOUNTER — TELEPHONE (OUTPATIENT)
Dept: INTERNAL MEDICINE | Facility: CLINIC | Age: 71
End: 2021-08-27

## 2021-08-27 NOTE — TELEPHONE ENCOUNTER
I called and spoke with [facility] . She had to put a note back to Dr. Davis's nurse. The  stated that they have the report and the nurse had scanned it into his chart. They have not called back as of 4:25PM Friday 8/27.    Contacted patient as well to make sure he has been taken care of through Urology. He stated that they have called him and given him Cipro.

## 2021-09-30 DIAGNOSIS — R60.9 EDEMA, UNSPECIFIED TYPE: ICD-10-CM

## 2021-09-30 RX ORDER — TORSEMIDE 20 MG/1
20 TABLET ORAL DAILY
Qty: 90 TABLET | Refills: 1 | Status: SHIPPED | OUTPATIENT
Start: 2021-09-30 | End: 2021-12-01

## 2021-10-19 ENCOUNTER — TRANSCRIBE ORDERS (OUTPATIENT)
Dept: ADMINISTRATIVE | Facility: HOSPITAL | Age: 71
End: 2021-10-19

## 2021-10-19 ENCOUNTER — HOSPITAL ENCOUNTER (OUTPATIENT)
Dept: GENERAL RADIOLOGY | Facility: HOSPITAL | Age: 71
Discharge: HOME | End: 2021-10-19
Admitting: INTERNAL MEDICINE

## 2021-10-19 DIAGNOSIS — M62.81 MUSCLE WEAKNESS: Primary | ICD-10-CM

## 2021-10-19 DIAGNOSIS — M62.81 MUSCLE WEAKNESS: ICD-10-CM

## 2021-10-19 PROCEDURE — 73030 X-RAY EXAM OF SHOULDER: CPT

## 2021-11-30 ENCOUNTER — HOSPITAL ENCOUNTER (OUTPATIENT)
Dept: INFUSION THERAPY | Age: 71
Setting detail: INFUSION SERIES
Discharge: HOME | End: 2021-11-30
Payer: MEDICARE

## 2021-11-30 VITALS
SYSTOLIC BLOOD PRESSURE: 90 MMHG | TEMPERATURE: 97.2 F | DIASTOLIC BLOOD PRESSURE: 60 MMHG | HEART RATE: 65 BPM | OXYGEN SATURATION: 96 %

## 2021-11-30 DIAGNOSIS — U07.1 COVID-19: ICD-10-CM

## 2021-11-30 DIAGNOSIS — U07.1 COVID-19: Primary | ICD-10-CM

## 2021-11-30 PROBLEM — M10.9 GOUT: Status: ACTIVE | Noted: 2021-01-06

## 2021-11-30 PROBLEM — E66.9 OBESITY (BMI 30-39.9): Status: ACTIVE | Noted: 2020-07-09

## 2021-11-30 PROBLEM — C61 PROSTATE CANCER (HCC): Status: ACTIVE | Noted: 2020-02-28

## 2021-11-30 PROBLEM — R73.01 IFG (IMPAIRED FASTING GLUCOSE): Status: ACTIVE | Noted: 2020-02-28

## 2021-11-30 PROBLEM — M10.9 GOUT, ARTHROPATHY: Status: ACTIVE | Noted: 2020-07-09

## 2021-11-30 PROBLEM — C67.0 MALIGNANT NEOPLASM OF TRIGONE OF URINARY BLADDER (HCC): Status: ACTIVE | Noted: 2021-04-16

## 2021-11-30 PROBLEM — M17.0 PRIMARY OSTEOARTHRITIS OF BOTH KNEES: Status: ACTIVE | Noted: 2020-07-09

## 2021-11-30 PROBLEM — R60.9 EDEMA: Status: ACTIVE | Noted: 2020-08-24

## 2021-11-30 PROBLEM — N13.5 URETERAL OBSTRUCTION, LEFT: Status: ACTIVE | Noted: 2021-03-12

## 2021-11-30 PROBLEM — N13.5 RETROPERITONEAL FIBROSIS: Status: ACTIVE | Noted: 2021-01-06

## 2021-11-30 PROBLEM — I10 BENIGN ESSENTIAL HTN: Status: ACTIVE | Noted: 2020-02-28

## 2021-11-30 PROBLEM — R97.20 ELEVATED PSA: Status: ACTIVE | Noted: 2020-07-09

## 2021-11-30 PROCEDURE — 2580000003 HC RX 258: Performed by: INTERNAL MEDICINE

## 2021-11-30 PROCEDURE — 6370000000 HC RX 637 (ALT 250 FOR IP): Performed by: INTERNAL MEDICINE

## 2021-11-30 PROCEDURE — 2500000003 HC RX 250 WO HCPCS: Performed by: INTERNAL MEDICINE

## 2021-11-30 PROCEDURE — M0245 HC IV INFUSION BAMLANIVIMAB & ETESEVIMAB W/MONITORING: HCPCS

## 2021-11-30 PROCEDURE — 6360000002 HC RX W HCPCS: Performed by: INTERNAL MEDICINE

## 2021-11-30 PROCEDURE — 96374 THER/PROPH/DIAG INJ IV PUSH: CPT

## 2021-11-30 RX ORDER — SODIUM CHLORIDE 0.9 % (FLUSH) 0.9 %
5-40 SYRINGE (ML) INJECTION PRN
Status: CANCELLED | OUTPATIENT
Start: 2021-11-30

## 2021-11-30 RX ORDER — SODIUM CHLORIDE 0.9 % (FLUSH) 0.9 %
5-40 SYRINGE (ML) INJECTION PRN
Status: DISCONTINUED | OUTPATIENT
Start: 2021-11-30 | End: 2021-12-01 | Stop reason: HOSPADM

## 2021-11-30 RX ORDER — ACETAMINOPHEN 325 MG/1
650 TABLET ORAL ONCE
Status: COMPLETED | OUTPATIENT
Start: 2021-11-30 | End: 2021-11-30

## 2021-11-30 RX ORDER — SODIUM CHLORIDE 9 MG/ML
INJECTION, SOLUTION INTRAVENOUS CONTINUOUS
Status: CANCELLED | OUTPATIENT
Start: 2021-11-30

## 2021-11-30 RX ORDER — DIPHENHYDRAMINE HYDROCHLORIDE 50 MG/ML
50 INJECTION INTRAMUSCULAR; INTRAVENOUS
Status: CANCELLED | OUTPATIENT
Start: 2021-11-30

## 2021-11-30 RX ORDER — EPINEPHRINE 1 MG/ML
0.3 INJECTION, SOLUTION, CONCENTRATE INTRAVENOUS PRN
Status: CANCELLED | OUTPATIENT
Start: 2021-11-30

## 2021-11-30 RX ORDER — DIPHENHYDRAMINE HYDROCHLORIDE 50 MG/ML
25 INJECTION INTRAMUSCULAR; INTRAVENOUS ONCE
Status: CANCELLED
Start: 2021-11-30 | End: 2021-11-30

## 2021-11-30 RX ORDER — PREDNISONE 1 MG/1
5 TABLET ORAL DAILY
COMMUNITY
Start: 2021-09-30

## 2021-11-30 RX ORDER — ACETAMINOPHEN 325 MG/1
650 TABLET ORAL ONCE
Status: CANCELLED
Start: 2021-11-30 | End: 2021-11-30

## 2021-11-30 RX ORDER — ACETAMINOPHEN 325 MG/1
650 TABLET ORAL
Status: CANCELLED | OUTPATIENT
Start: 2021-11-30

## 2021-11-30 RX ORDER — PROPRANOLOL HYDROCHLORIDE 80 MG/1
TABLET ORAL
COMMUNITY
Start: 2021-09-10 | End: 2022-05-25

## 2021-11-30 RX ORDER — CIPROFLOXACIN 500 MG/1
TABLET, FILM COATED ORAL
COMMUNITY
Start: 2021-08-23 | End: 2022-05-25

## 2021-11-30 RX ORDER — ONDANSETRON 2 MG/ML
8 INJECTION INTRAMUSCULAR; INTRAVENOUS
Status: CANCELLED | OUTPATIENT
Start: 2021-11-30

## 2021-11-30 RX ORDER — ALLOPURINOL 300 MG/1
300 TABLET ORAL DAILY
COMMUNITY
Start: 2021-09-30

## 2021-11-30 RX ORDER — POTASSIUM CHLORIDE 750 MG/1
CAPSULE, EXTENDED RELEASE ORAL
COMMUNITY
Start: 2021-04-08 | End: 2022-05-25

## 2021-11-30 RX ORDER — SODIUM CHLORIDE 9 MG/ML
INJECTION, SOLUTION INTRAVENOUS CONTINUOUS
Status: ACTIVE | OUTPATIENT
Start: 2021-11-30 | End: 2021-11-30

## 2021-11-30 RX ORDER — TORSEMIDE 20 MG/1
10 TABLET ORAL DAILY
COMMUNITY
Start: 2021-11-02

## 2021-11-30 RX ORDER — ALBUTEROL SULFATE 90 UG/1
4 AEROSOL, METERED RESPIRATORY (INHALATION) PRN
Status: CANCELLED | OUTPATIENT
Start: 2021-11-30

## 2021-11-30 RX ORDER — DIPHENHYDRAMINE HYDROCHLORIDE 50 MG/ML
25 INJECTION INTRAMUSCULAR; INTRAVENOUS ONCE
Status: DISCONTINUED | OUTPATIENT
Start: 2021-11-30 | End: 2021-12-02 | Stop reason: HOSPADM

## 2021-11-30 RX ADMIN — SODIUM CHLORIDE: 9 INJECTION, SOLUTION INTRAVENOUS at 15:00

## 2021-11-30 RX ADMIN — ACETAMINOPHEN 650 MG: 325 TABLET ORAL at 15:05

## 2021-11-30 RX ADMIN — SODIUM CHLORIDE: 9 INJECTION, SOLUTION INTRAVENOUS at 16:26

## 2021-11-30 ASSESSMENT — PAIN SCALES - GENERAL: PAINLEVEL_OUTOF10: 0

## 2021-12-01 ENCOUNTER — OFFICE VISIT (OUTPATIENT)
Dept: INTERNAL MEDICINE | Facility: CLINIC | Age: 71
End: 2021-12-01

## 2021-12-01 ENCOUNTER — TELEPHONE (OUTPATIENT)
Dept: INTERNAL MEDICINE | Facility: CLINIC | Age: 71
End: 2021-12-01

## 2021-12-01 VITALS
HEART RATE: 50 BPM | OXYGEN SATURATION: 98 % | HEIGHT: 73 IN | SYSTOLIC BLOOD PRESSURE: 98 MMHG | WEIGHT: 240 LBS | DIASTOLIC BLOOD PRESSURE: 72 MMHG | TEMPERATURE: 97.9 F | BODY MASS INDEX: 31.81 KG/M2

## 2021-12-01 DIAGNOSIS — E86.0 DEHYDRATION: ICD-10-CM

## 2021-12-01 DIAGNOSIS — U07.1 COVID-19: Primary | ICD-10-CM

## 2021-12-01 DIAGNOSIS — R19.7 DIARRHEA OF PRESUMED INFECTIOUS ORIGIN: ICD-10-CM

## 2021-12-01 DIAGNOSIS — I95.89 HYPOTENSION DUE TO HYPOVOLEMIA: ICD-10-CM

## 2021-12-01 DIAGNOSIS — E86.1 HYPOTENSION DUE TO HYPOVOLEMIA: ICD-10-CM

## 2021-12-01 PROCEDURE — 99214 OFFICE O/P EST MOD 30 MIN: CPT | Performed by: INTERNAL MEDICINE

## 2021-12-01 NOTE — TELEPHONE ENCOUNTER
Caller: Violet Monsivais    Relationship: Emergency Contact    Best call back number: [phone]     What is the best time to reach you: ANYTIME     Who are you requesting to speak with (clinical staff, provider,  specific staff member): CLINICAL STAFF     Do you know the name of the person who called: VIOLET MONSIVAIS     What was the call regarding: VIOLET STATED THAT PATIENT HAS BEEN HAVING ISSUES WITH LOW BLOOD PRESSURE AND WOULD LIKE TO KNOW IF HE SHOULD CONTINUE TAKING propranolol (INDERAL) 80 MG tablet, torsemide (DEMADEX) 20 MG tablet BEFORE UPCOMING APPOINTMENT ON 12/08/2021 OR IF HE SHOULD STOP TAKING NOW. VIOLET STATED THAT PATIENT WENT TO TRY AND GET INFUSION YESTERDAY BUT THEY WOULD NOT LET HIM DUE TO HIS BP BEING 79/58. PLEASE ADVISE.     Do you require a callback: YES

## 2021-12-01 NOTE — PROGRESS NOTES
Subjective   Aleks Monzon is a 71 y.o. male.   Chief Complaint   Patient presents with   • low BP     pt states blood pressure has been running lower.  yesterday it was 83/57; pt has not had any blood pressure medication since Monday        History of Present Illness patient is here stating that his blood pressures been low he blood pressure of 83/57 yesterday he has not taken any of his medications since Monday.  What he did not tell us over the phone is that he tested positive for Covid a week ago Monday his rheumatologist ordered an antibody infusion which she received yesterday at [facility].  Patient states he was in a truck with 3 other people going to a conference the all tested positive for Covid.    The following portions of the patient's history were reviewed and updated as appropriate: allergies, current medications, past family history, past medical history, past social history, past surgical history and problem list.    Review of Systems   Constitutional: Positive for fatigue and unexpected weight loss. Negative for activity change, appetite change, fever and unexpected weight gain.   HENT: Positive for postnasal drip. Negative for swollen glands, trouble swallowing and voice change.    Eyes: Negative for blurred vision and visual disturbance.   Respiratory: Negative for cough and shortness of breath.    Cardiovascular: Positive for leg swelling. Negative for chest pain and palpitations.   Gastrointestinal: Positive for diarrhea. Negative for abdominal pain, constipation, nausea, vomiting and indigestion.   Endocrine: Negative for cold intolerance, heat intolerance, polydipsia and polyphagia.   Genitourinary: Negative for dysuria and frequency.   Musculoskeletal: Negative for arthralgias, back pain, joint swelling and neck pain.   Skin: Negative for color change, rash and skin lesions.   Neurological: Negative for dizziness, weakness, headache, memory problem and confusion.   Hematological:  Does not bruise/bleed easily.   Psychiatric/Behavioral: Negative for agitation, hallucinations and suicidal ideas. The patient is not nervous/anxious.        Objective   Past Medical History:   Diagnosis Date   • Arthritis    • Cancer (HCC)     PROSTATE   • Cellulitis    • Gout    • Hypertension    • IFG (impaired fasting glucose)       Past Surgical History:   Procedure Laterality Date   • AXILLARY LYMPH NODE BIOPSY/EXCISION Left 10/1/2020    Procedure: LEFT AXILLARY LYMPH NODE BIOPSY;  Surgeon: Dina To MD;  Location: [facility];  Service: General;  Laterality: Left;   • BACK SURGERY     • JOINT REPLACEMENT      BILATERAL HIP    • TOTAL HIP ARTHROPLASTY          Current Outpatient Medications:   •  allopurinol (ZYLOPRIM) 300 MG tablet, TAKE 1 TABLET BY MOUTH ONCE DAILY, Disp: 90 tablet, Rfl: 3  •  colchicine 0.6 MG tablet, Take 1 tablet by mouth 2 (Two) Times a Day As Needed for Muscle / Joint Pain., Disp: 20 tablet, Rfl: 5  •  diphenhydrAMINE (BENADRYL) 25 MG tablet, Take 25 mg by mouth Every 6 (Six) Hours As Needed for Allergies., Disp: , Rfl:   •  predniSONE (DELTASONE) 10 MG tablet, Take 10 mg by mouth Daily., Disp: , Rfl:   •  propranolol (INDERAL) 80 MG tablet, TAKE 2 TABLETS BY MOUTH AT BEDTIME, Disp: 180 tablet, Rfl: 3  •  tamsulosin (FLOMAX) 0.4 MG capsule 24 hr capsule, TAKE 1 CAPSULE BY MOUTH EVERY EVENING AS DIRECTED. MAY TAKE TWICE DAILY IF SYMPTOMS DO NOT IMPROVE WITH EVENING DOSE., Disp: 90 capsule, Rfl: 3      Vitals:    12/01/21 1426   BP: 98/72   Pulse: 50   Temp: 97.9 °F (36.6 °C)   SpO2: 98%         12/01/21  1426   Weight: 109 kg (240 lb)       Body mass index is 31.66 kg/m².    Physical Exam  Vitals and nursing note reviewed.   Constitutional:       General: He is not in acute distress.     Appearance: He is well-developed. He is ill-appearing.   HENT:      Head: Normocephalic and atraumatic.      Right Ear: External ear normal.      Left Ear: External ear normal.      Nose: Nose  normal.   Eyes:      Extraocular Movements: Extraocular movements intact.      Conjunctiva/sclera: Conjunctivae normal.      Pupils: Pupils are equal, round, and reactive to light.   Cardiovascular:      Rate and Rhythm: Normal rate and regular rhythm.      Pulses: Normal pulses.      Heart sounds: Normal heart sounds.   Pulmonary:      Effort: Pulmonary effort is normal.      Breath sounds: Normal breath sounds.   Abdominal:      General: Bowel sounds are normal.      Palpations: Abdomen is soft.   Musculoskeletal:         General: Normal range of motion.      Cervical back: Normal range of motion and neck supple. No rigidity. No muscular tenderness.      Right lower leg: Edema present.      Left lower leg: Edema present.   Skin:     General: Skin is warm and dry.   Neurological:      General: No focal deficit present.      Mental Status: He is alert and oriented to person, place, and time.   Psychiatric:         Mood and Affect: Mood normal.         Behavior: Behavior normal.               Assessment/Plan   Diagnoses and all orders for this visit:    1. COVID-19 (Primary)    2. Diarrhea of presumed infectious origin    3. Hypotension due to hypovolemia    4. Dehydration      Unfortunately patient came through the entire screening process and was not picked up that he had Covid.  Patient tells me he was tested positive for Covid a week ago Monday after traveling with some friends to a meeting.  I have 1 in the truck had Covid.  In addition to that he was sent for antibody infusion by his rheumatologist yesterday at [facility].  Basically his blood pressures been running low he is having diarrhea I am sure he is getting dehydrated at this point I have encouraged him to  some Gatorade and try to replete his volume stay off of his diuretic and potassium therapy until his blood pressure comes up and can only resume if he has significant swelling.

## 2021-12-02 ENCOUNTER — TELEPHONE (OUTPATIENT)
Dept: INTERNAL MEDICINE | Facility: CLINIC | Age: 71
End: 2021-12-02

## 2021-12-02 NOTE — TELEPHONE ENCOUNTER
Called pt this a.m. to check on him, at Dr. Heath's request, and he says he feels some better this morning, has been trying to hydrate, /80 this morning.  Advised that if he begins to feel any worse at all, wife needs to take him to the ER as there is a concern for him being dehydrated.  He voiced understanding.

## 2021-12-10 ENCOUNTER — TELEPHONE (OUTPATIENT)
Dept: INTERNAL MEDICINE | Facility: CLINIC | Age: 71
End: 2021-12-10

## 2021-12-10 NOTE — TELEPHONE ENCOUNTER
Caller: Aleks Monzon    Relationship: Self    Best call back number: [phone]    What is the best time to reach you: ANYTIME    Who are you requesting to speak with (clinical staff, provider,  specific staff member): CLINICAL STAFF    Do you know the name of the person who called: PATIENT    What was the call regarding: PATIENT WANTED TO KNOW IF HE NEEDS LABS BEFORE OR AT THE TIME OF HIS APPOINTMENT?    Do you require a callback: YES

## 2021-12-13 ENCOUNTER — LAB (OUTPATIENT)
Dept: INTERNAL MEDICINE | Facility: CLINIC | Age: 71
End: 2021-12-13

## 2021-12-13 DIAGNOSIS — I10 BENIGN ESSENTIAL HTN: ICD-10-CM

## 2021-12-13 DIAGNOSIS — C61 PROSTATE CANCER (HCC): Primary | ICD-10-CM

## 2021-12-13 DIAGNOSIS — R73.01 IFG (IMPAIRED FASTING GLUCOSE): ICD-10-CM

## 2021-12-14 LAB
BUN SERPL-MCNC: 20 MG/DL (ref 8–23)
BUN/CREAT SERPL: 16.4 (ref 7–25)
CALCIUM SERPL-MCNC: 7.5 MG/DL (ref 8.6–10.5)
CHLORIDE SERPL-SCNC: 104 MMOL/L (ref 98–107)
CO2 SERPL-SCNC: 29.8 MMOL/L (ref 22–29)
CREAT SERPL-MCNC: 1.22 MG/DL (ref 0.76–1.27)
GLUCOSE SERPL-MCNC: 87 MG/DL (ref 65–99)
HBA1C MFR BLD: 5.04 % (ref 4.8–5.6)
POTASSIUM SERPL-SCNC: 3.8 MMOL/L (ref 3.5–5.2)
SODIUM SERPL-SCNC: 141 MMOL/L (ref 136–145)

## 2021-12-16 ENCOUNTER — OFFICE VISIT (OUTPATIENT)
Dept: INTERNAL MEDICINE | Facility: CLINIC | Age: 71
End: 2021-12-16

## 2021-12-16 VITALS
HEART RATE: 116 BPM | BODY MASS INDEX: 32.6 KG/M2 | OXYGEN SATURATION: 98 % | DIASTOLIC BLOOD PRESSURE: 70 MMHG | HEIGHT: 73 IN | SYSTOLIC BLOOD PRESSURE: 120 MMHG | WEIGHT: 246 LBS | TEMPERATURE: 96.8 F

## 2021-12-16 DIAGNOSIS — I10 BENIGN ESSENTIAL HTN: ICD-10-CM

## 2021-12-16 DIAGNOSIS — I10 PRIMARY HYPERTENSION: ICD-10-CM

## 2021-12-16 DIAGNOSIS — C67.0 MALIGNANT NEOPLASM OF TRIGONE OF URINARY BLADDER (HCC): ICD-10-CM

## 2021-12-16 DIAGNOSIS — C61 PROSTATE CANCER (HCC): ICD-10-CM

## 2021-12-16 DIAGNOSIS — N13.5 RETROPERITONEAL FIBROSIS: ICD-10-CM

## 2021-12-16 DIAGNOSIS — R73.01 IFG (IMPAIRED FASTING GLUCOSE): Primary | ICD-10-CM

## 2021-12-16 LAB — HBA1C MFR BLD: 5 %

## 2021-12-16 PROCEDURE — 83036 HEMOGLOBIN GLYCOSYLATED A1C: CPT | Performed by: INTERNAL MEDICINE

## 2021-12-16 PROCEDURE — 99214 OFFICE O/P EST MOD 30 MIN: CPT | Performed by: INTERNAL MEDICINE

## 2021-12-16 PROCEDURE — 3044F HG A1C LEVEL LT 7.0%: CPT | Performed by: INTERNAL MEDICINE

## 2021-12-16 NOTE — PROGRESS NOTES
Subjective   Aleks Monzon is a 71 y.o. male.   Chief Complaint   Patient presents with   • Hypertension   • Prediabetes     a1c: 5.0       History of Present Illness this is a follow-up for hypertension and prediabetes. I spent time reviewing his [facility] records regarding his recent cystoscopy and evaluation for bladder cancer. He is currently getting intravesicular treatments for bladder cancer. In addition to that he has retroperitoneal fibrosis which she is followed by Dr. Burrows as well as [facility]. The etiology of the retroperitoneal fibrosis escapes us at this time he has been on diuretic therapy but recently we pulled him off due to dehydration hypotension secondary to Covid.    The following portions of the patient's history were reviewed and updated as appropriate: allergies, current medications, past family history, past medical history, past social history, past surgical history and problem list.    Review of Systems   Constitutional: Negative for activity change, appetite change, fatigue, fever, unexpected weight gain and unexpected weight loss.   HENT: Negative for swollen glands, trouble swallowing and voice change.    Eyes: Negative for blurred vision and visual disturbance.   Respiratory: Negative for cough and shortness of breath.    Cardiovascular: Positive for leg swelling. Negative for chest pain and palpitations.   Gastrointestinal: Negative for abdominal pain, constipation, diarrhea, nausea, vomiting and indigestion.   Endocrine: Negative for cold intolerance, heat intolerance, polydipsia and polyphagia.   Genitourinary: Negative for dysuria and frequency.   Musculoskeletal: Negative for arthralgias, back pain, joint swelling and neck pain.   Skin: Negative for color change, rash and skin lesions.   Neurological: Negative for dizziness, weakness, headache, memory problem and confusion.   Hematological: Does not bruise/bleed easily.   Psychiatric/Behavioral: Negative for agitation,  hallucinations and suicidal ideas. The patient is not nervous/anxious.        Objective   Past Medical History:   Diagnosis Date   • Arthritis    • Cancer (HCC)     PROSTATE   • Cellulitis    • Gout    • Hypertension    • IFG (impaired fasting glucose)       Past Surgical History:   Procedure Laterality Date   • AXILLARY LYMPH NODE BIOPSY/EXCISION Left 10/1/2020    Procedure: LEFT AXILLARY LYMPH NODE BIOPSY;  Surgeon: Dina To MD;  Location: [facility];  Service: General;  Laterality: Left;   • BACK SURGERY     • JOINT REPLACEMENT      BILATERAL HIP    • TOTAL HIP ARTHROPLASTY          Current Outpatient Medications:   •  allopurinol (ZYLOPRIM) 300 MG tablet, TAKE 1 TABLET BY MOUTH ONCE DAILY, Disp: 90 tablet, Rfl: 3  •  colchicine 0.6 MG tablet, Take 1 tablet by mouth 2 (Two) Times a Day As Needed for Muscle / Joint Pain., Disp: 20 tablet, Rfl: 5  •  diphenhydrAMINE (BENADRYL) 25 MG tablet, Take 25 mg by mouth Every 6 (Six) Hours As Needed for Allergies., Disp: , Rfl:   •  predniSONE (DELTASONE) 5 MG tablet, Take 5 mg by mouth Daily., Disp: , Rfl:   •  propranolol (INDERAL) 80 MG tablet, TAKE 2 TABLETS BY MOUTH AT BEDTIME, Disp: 180 tablet, Rfl: 3  •  tamsulosin (FLOMAX) 0.4 MG capsule 24 hr capsule, TAKE 1 CAPSULE BY MOUTH EVERY EVENING AS DIRECTED. MAY TAKE TWICE DAILY IF SYMPTOMS DO NOT IMPROVE WITH EVENING DOSE., Disp: 90 capsule, Rfl: 3      Vitals:    12/16/21 1432   BP: 120/70   Pulse: 116   Temp: 96.8 °F (36 °C)   SpO2: 98%         12/16/21  1432   Weight: 112 kg (246 lb)       Body mass index is 32.46 kg/m².    Physical Exam  Vitals and nursing note reviewed.   Constitutional:       General: He is not in acute distress.     Appearance: Normal appearance. He is well-developed.   HENT:      Head: Normocephalic and atraumatic.      Right Ear: External ear normal.      Left Ear: External ear normal.      Nose: Nose normal.   Eyes:      Extraocular Movements: Extraocular movements intact.       Conjunctiva/sclera: Conjunctivae normal.      Pupils: Pupils are equal, round, and reactive to light.   Cardiovascular:      Rate and Rhythm: Normal rate and regular rhythm.      Pulses: Normal pulses.      Heart sounds: Normal heart sounds.   Pulmonary:      Effort: Pulmonary effort is normal.      Breath sounds: Normal breath sounds.   Abdominal:      General: Bowel sounds are normal.      Palpations: Abdomen is soft.   Musculoskeletal:         General: Normal range of motion.      Cervical back: Normal range of motion and neck supple. No rigidity. No muscular tenderness.      Right lower leg: Edema ( 3-4+ bilateral) present.      Left lower leg: Edema present.   Skin:     General: Skin is warm and dry.   Neurological:      General: No focal deficit present.      Mental Status: He is alert and oriented to person, place, and time.   Psychiatric:         Mood and Affect: Mood normal.         Behavior: Behavior normal.               Assessment/Plan   Diagnoses and all orders for this visit:    1. IFG (impaired fasting glucose) (Primary)  -     POC Glycated Hemoglobin, Total    2. Benign essential HTN    3. Prostate cancer (HCC)    4. Retroperitoneal fibrosis    5. Primary hypertension    6. Malignant neoplasm of trigone of urinary bladder (HCC)        At today's visit I am reinstituting patient's torsemide he can continue to use that on a as needed basis now that he is no longer dehydrated and his blood pressure is better. I did review [facility] records including recent cystoscopy and treatment plans for his bladder cancer. Spent time reviewing the CAT scan that shows significant atrophy of the left kidney. He had a recent BMP done shows good renal function which I suspect represents normally functioning right kidney. I have spent some time discussing the potential for the retroperitoneal fibrosis affecting the right kidney things that he should look for including decreased urination. I have gone ahead and added a  PSA to his previous lab work. We will continue to follow him on a 3-6 months basis.

## 2021-12-17 LAB
Lab: NORMAL
PSA SERPL-MCNC: 0.38 NG/ML (ref 0–4)
WRITTEN AUTHORIZATION: NORMAL

## 2021-12-27 RX ORDER — ALLOPURINOL 300 MG/1
TABLET ORAL
Qty: 90 TABLET | Refills: 0 | Status: ON HOLD | OUTPATIENT
Start: 2021-12-27 | End: 2022-05-23

## 2022-02-03 NOTE — PROGRESS NOTES
Subjective   Aleks Monzon is a 70 y.o. male.   Chief Complaint   Patient presents with   • Joint Swelling     SWELLING IN BOTH ANKLES STARTED THIS PAST FRIDAY        Patient's present for edema both lower extremities.  He is also noticing a lower blood pressure at home he is not complaining of shortness of breath or chest pain       The following portions of the patient's history were reviewed and updated as appropriate: allergies, current medications, past family history, past medical history, past social history, past surgical history and problem list.    Review of Systems   Constitutional: Negative for activity change, appetite change, fatigue, fever, unexpected weight gain and unexpected weight loss.   HENT: Negative for swollen glands, trouble swallowing and voice change.    Eyes: Negative for blurred vision and visual disturbance.   Respiratory: Negative for cough and shortness of breath.    Cardiovascular: Positive for leg swelling. Negative for chest pain and palpitations.   Gastrointestinal: Negative for abdominal pain, constipation, diarrhea, nausea, vomiting and indigestion.   Endocrine: Negative for cold intolerance, heat intolerance, polydipsia and polyphagia.   Genitourinary: Negative for dysuria and frequency.   Musculoskeletal: Negative for arthralgias, back pain, joint swelling and neck pain.   Skin: Negative for color change, rash and skin lesions.   Neurological: Negative for dizziness, weakness, headache, memory problem and confusion.   Hematological: Does not bruise/bleed easily.   Psychiatric/Behavioral: Negative for agitation, hallucinations and suicidal ideas. The patient is not nervous/anxious.        Objective   Past Medical History:   Diagnosis Date   • Hypertension    • IFG (impaired fasting glucose)       Past Surgical History:   Procedure Laterality Date   • BACK SURGERY     • TOTAL HIP ARTHROPLASTY          Current Outpatient Medications:   •  allopurinol (ZYLOPRIM) 300 MG tablet,  Take 1 tablet by mouth Daily., Disp: 90 tablet, Rfl: 3  •  colchicine 0.6 MG tablet, Take 1 tablet by mouth 2 (Two) Times a Day As Needed for Muscle / Joint Pain., Disp: 20 tablet, Rfl: 5  •  enalapril (VASOTEC) 20 MG tablet, TAKE 1 TABLET BY MOUTH ONCE DAILY., Disp: 30 tablet, Rfl: 11  •  fluticasone (FLONASE) 50 MCG/ACT nasal spray, 1 spray into the nostril(s) as directed by provider 2 (Two) Times a Day As Needed., Disp: , Rfl:   •  propranolol (INDERAL) 20 MG tablet, Take 20 mg by mouth 2 (Two) Times a Day., Disp: , Rfl:   •  tamsulosin (FLOMAX) 0.4 MG capsule 24 hr capsule, Take 1 capsule by mouth Daily As Needed., Disp: , Rfl:      Vitals:    07/09/20 1522   BP: 104/68   Pulse: 71   Temp: 97.7 °F (36.5 °C)   SpO2: 99%         07/09/20  1522   Weight: 129 kg (284 lb)     Patient's Body mass index is 35.5 kg/m². BMI is above normal parameters. Recommendations include: nutrition counseling.      Physical Exam   Constitutional: He is oriented to person, place, and time. He appears well-developed and well-nourished.   HENT:   Head: Normocephalic and atraumatic.   Right Ear: External ear normal.   Left Ear: External ear normal.   Mouth/Throat: Oropharynx is clear and moist.   Eyes: Pupils are equal, round, and reactive to light. Conjunctivae and EOM are normal.   Neck: Normal range of motion. Neck supple. No thyromegaly present.   Cardiovascular: Normal rate, regular rhythm, normal heart sounds and intact distal pulses.   Pulmonary/Chest: Effort normal and breath sounds normal.   Abdominal: Soft. Bowel sounds are normal.   Musculoskeletal: He exhibits edema ( Trace to 1+ both lower extremities).   Lymphadenopathy:     He has no cervical adenopathy.   Neurological: He is alert and oriented to person, place, and time.   Skin: Skin is warm and dry.   Psychiatric: He has a normal mood and affect. His behavior is normal. Thought content normal.   Nursing note and vitals reviewed.            Assessment/Plan   Diagnoses and  all orders for this visit:    1. Benign essential HTN (Primary)    2. High cholesterol    3. Obesity (BMI 30-39.9)    4. IFG (impaired fasting glucose)        Blood pressure is very low and is experiencing some edema there was a change at the pharmacy regarding his blood pressure medications were going to go ahead and stop his enalapril today and then let him continue his hydrochlorothiazide however if his blood pressure remains low or if his swelling does not improve I will change his hydrochlorothiazide to torsemide 10 mg daily          No

## 2022-03-01 ENCOUNTER — TRANSCRIBE ORDERS (OUTPATIENT)
Dept: ADMINISTRATIVE | Facility: HOSPITAL | Age: 72
End: 2022-03-01

## 2022-03-01 DIAGNOSIS — R60.1 GENERALIZED EDEMA: Primary | ICD-10-CM

## 2022-03-02 ENCOUNTER — APPOINTMENT (OUTPATIENT)
Dept: CARDIOLOGY | Facility: HOSPITAL | Age: 72
End: 2022-03-02

## 2022-03-30 RX ORDER — ALLOPURINOL 300 MG/1
TABLET ORAL
Qty: 90 TABLET | Refills: 0 | OUTPATIENT
Start: 2022-03-30

## 2022-05-02 ENCOUNTER — HOSPITAL ENCOUNTER (OUTPATIENT)
Dept: CARDIOLOGY | Facility: HOSPITAL | Age: 72
Discharge: HOME | End: 2022-05-02
Admitting: STUDENT IN AN ORGANIZED HEALTH CARE EDUCATION/TRAINING PROGRAM

## 2022-05-02 VITALS
HEIGHT: 73 IN | DIASTOLIC BLOOD PRESSURE: 70 MMHG | BODY MASS INDEX: 32.6 KG/M2 | SYSTOLIC BLOOD PRESSURE: 120 MMHG | WEIGHT: 246 LBS

## 2022-05-02 DIAGNOSIS — R60.1 GENERALIZED EDEMA: ICD-10-CM

## 2022-05-02 PROCEDURE — 25010000002 PERFLUTREN 6.52 MG/ML SUSPENSION: Performed by: INTERNAL MEDICINE

## 2022-05-02 PROCEDURE — 93306 TTE W/DOPPLER COMPLETE: CPT

## 2022-05-02 PROCEDURE — 93306 TTE W/DOPPLER COMPLETE: CPT | Performed by: INTERNAL MEDICINE

## 2022-05-02 RX ADMIN — PERFLUTREN 8.48 MG: 6.52 INJECTION, SUSPENSION INTRAVENOUS at 14:27

## 2022-05-03 LAB
BH CV ECHO MEAS - AO MAX PG: 7.4 MMHG
BH CV ECHO MEAS - AO MEAN PG: 5 MMHG
BH CV ECHO MEAS - AO ROOT DIAM: 4.1 CM
BH CV ECHO MEAS - AO V2 MAX: 136 CM/SEC
BH CV ECHO MEAS - AO V2 VTI: 28.2 CM
BH CV ECHO MEAS - AVA(I,D): 3.2 CM2
BH CV ECHO MEAS - EDV(CUBED): 51.5 ML
BH CV ECHO MEAS - EDV(MOD-SP4): 172 ML
BH CV ECHO MEAS - EF(MOD-SP4): 65.9 %
BH CV ECHO MEAS - ESV(CUBED): 18 ML
BH CV ECHO MEAS - ESV(MOD-SP4): 58.6 ML
BH CV ECHO MEAS - FS: 29.6 %
BH CV ECHO MEAS - IVS/LVPW: 1 CM
BH CV ECHO MEAS - IVSD: 1.29 CM
BH CV ECHO MEAS - LA DIMENSION: 3.9 CM
BH CV ECHO MEAS - LAT PEAK E' VEL: 11.4 CM/SEC
BH CV ECHO MEAS - LV DIASTOLIC VOL/BSA (35-75): 73.9 CM2
BH CV ECHO MEAS - LV MASS(C)D: 165.8 GRAMS
BH CV ECHO MEAS - LV MAX PG: 4.2 MMHG
BH CV ECHO MEAS - LV MEAN PG: 3 MMHG
BH CV ECHO MEAS - LV SYSTOLIC VOL/BSA (12-30): 25.2 CM2
BH CV ECHO MEAS - LV V1 MAX: 103 CM/SEC
BH CV ECHO MEAS - LV V1 VTI: 23.4 CM
BH CV ECHO MEAS - LVIDD: 3.7 CM
BH CV ECHO MEAS - LVIDS: 2.6 CM
BH CV ECHO MEAS - LVOT AREA: 3.8 CM2
BH CV ECHO MEAS - LVOT DIAM: 2.2 CM
BH CV ECHO MEAS - LVPWD: 1.29 CM
BH CV ECHO MEAS - MED PEAK E' VEL: 8.7 CM/SEC
BH CV ECHO MEAS - MV A MAX VEL: 91.1 CM/SEC
BH CV ECHO MEAS - MV DEC SLOPE: 335 CM/SEC2
BH CV ECHO MEAS - MV DEC TIME: 0.21 MSEC
BH CV ECHO MEAS - MV E MAX VEL: 70.8 CM/SEC
BH CV ECHO MEAS - MV E/A: 0.78
BH CV ECHO MEAS - SI(MOD-SP4): 48.7 ML/M2
BH CV ECHO MEAS - SV(LVOT): 89 ML
BH CV ECHO MEAS - SV(MOD-SP4): 113.4 ML
BH CV ECHO MEASUREMENTS AVERAGE E/E' RATIO: 7.04
LEFT ATRIUM VOLUME INDEX: 20.4 ML/M2
LEFT ATRIUM VOLUME: 47.6 CM3
MAXIMAL PREDICTED HEART RATE: 150 BPM
STRESS TARGET HR: 128 BPM

## 2022-05-21 ENCOUNTER — APPOINTMENT (OUTPATIENT)
Dept: GENERAL RADIOLOGY | Facility: HOSPITAL | Age: 72
End: 2022-05-21

## 2022-05-21 ENCOUNTER — HOSPITAL ENCOUNTER (EMERGENCY)
Facility: HOSPITAL | Age: 72
Discharge: HOME | End: 2022-05-21
Attending: EMERGENCY MEDICINE | Admitting: EMERGENCY MEDICINE

## 2022-05-21 VITALS
SYSTOLIC BLOOD PRESSURE: 112 MMHG | OXYGEN SATURATION: 99 % | RESPIRATION RATE: 16 BRPM | WEIGHT: 237.9 LBS | DIASTOLIC BLOOD PRESSURE: 85 MMHG | TEMPERATURE: 97.7 F | BODY MASS INDEX: 30.53 KG/M2 | HEART RATE: 78 BPM | HEIGHT: 74 IN
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
## 2022-05-21 DIAGNOSIS — Z96.649 PERIPROSTHETIC FRACTURE OF PROXIMAL END OF FEMUR: ICD-10-CM

## 2022-05-21 DIAGNOSIS — M25.551 RIGHT HIP PAIN: ICD-10-CM

## 2022-05-21 DIAGNOSIS — M97.8XXA PERIPROSTHETIC FRACTURE OF PROXIMAL END OF FEMUR: ICD-10-CM

## 2022-05-21 LAB
ALBUMIN SERPL-MCNC: 2.4 G/DL (ref 3.5–5.2)
ALBUMIN/GLOB SERPL: 1.4 G/DL
ALP SERPL-CCNC: 137 U/L (ref 39–117)
ALT SERPL W P-5'-P-CCNC: 21 U/L (ref 1–41)
ANION GAP SERPL CALCULATED.3IONS-SCNC: 8 MMOL/L (ref 5–15)
APTT PPP: 27.9 SECONDS (ref 24.1–35)
AST SERPL-CCNC: 27 U/L (ref 1–40)
BASOPHILS # BLD AUTO: 0.02 10*3/MM3 (ref 0–0.2)
BASOPHILS NFR BLD AUTO: 0.2 % (ref 0–1.5)
BILIRUB SERPL-MCNC: 0.2 MG/DL (ref 0–1.2)
BUN SERPL-MCNC: 27 MG/DL (ref 8–23)
BUN/CREAT SERPL: 22 (ref 7–25)
CALCIUM SPEC-SCNC: 7.5 MG/DL (ref 8.6–10.5)
CHLORIDE SERPL-SCNC: 106 MMOL/L (ref 98–107)
CO2 SERPL-SCNC: 27 MMOL/L (ref 22–29)
CREAT SERPL-MCNC: 1.23 MG/DL (ref 0.76–1.27)
DEPRECATED RDW RBC AUTO: 54.6 FL (ref 37–54)
EGFRCR SERPLBLD CKD-EPI 2021: 63.2 ML/MIN/1.73
EOSINOPHIL # BLD AUTO: 0.05 10*3/MM3 (ref 0–0.4)
EOSINOPHIL NFR BLD AUTO: 0.5 % (ref 0.3–6.2)
ERYTHROCYTE [DISTWIDTH] IN BLOOD BY AUTOMATED COUNT: 17.5 % (ref 12.3–15.4)
GLOBULIN UR ELPH-MCNC: 1.7 GM/DL
GLUCOSE SERPL-MCNC: 136 MG/DL (ref 65–99)
HCT VFR BLD AUTO: 39.4 % (ref 37.5–51)
HGB BLD-MCNC: 12.6 G/DL (ref 13–17.7)
IMM GRANULOCYTES # BLD AUTO: 0.05 10*3/MM3 (ref 0–0.05)
IMM GRANULOCYTES NFR BLD AUTO: 0.5 % (ref 0–0.5)
INR PPP: 1.16 (ref 0.91–1.09)
LYMPHOCYTES # BLD AUTO: 0.71 10*3/MM3 (ref 0.7–3.1)
LYMPHOCYTES NFR BLD AUTO: 7.3 % (ref 19.6–45.3)
MCH RBC QN AUTO: 27.5 PG (ref 26.6–33)
MCHC RBC AUTO-ENTMCNC: 32 G/DL (ref 31.5–35.7)
MCV RBC AUTO: 86 FL (ref 79–97)
MONOCYTES # BLD AUTO: 0.51 10*3/MM3 (ref 0.1–0.9)
MONOCYTES NFR BLD AUTO: 5.2 % (ref 5–12)
NEUTROPHILS NFR BLD AUTO: 8.41 10*3/MM3 (ref 1.7–7)
NEUTROPHILS NFR BLD AUTO: 86.3 % (ref 42.7–76)
NRBC BLD AUTO-RTO: 0 /100 WBC (ref 0–0.2)
PLATELET # BLD AUTO: 251 10*3/MM3 (ref 140–450)
PMV BLD AUTO: 9.2 FL (ref 6–12)
POTASSIUM SERPL-SCNC: 3.3 MMOL/L (ref 3.5–5.2)
PROT SERPL-MCNC: 4.1 G/DL (ref 6–8.5)
PROTHROMBIN TIME: 14.4 SECONDS (ref 11.9–14.6)
RBC # BLD AUTO: 4.58 10*6/MM3 (ref 4.14–5.8)
SARS-COV-2 RNA PNL SPEC NAA+PROBE: NOT DETECTED
SODIUM SERPL-SCNC: 141 MMOL/L (ref 136–145)
WBC NRBC COR # BLD: 9.75 10*3/MM3 (ref 3.4–10.8)

## 2022-05-21 PROCEDURE — 80053 COMPREHEN METABOLIC PANEL: CPT | Performed by: EMERGENCY MEDICINE

## 2022-05-21 PROCEDURE — 87635 SARS-COV-2 COVID-19 AMP PRB: CPT | Performed by: EMERGENCY MEDICINE

## 2022-05-21 PROCEDURE — 99283 EMERGENCY DEPT VISIT LOW MDM: CPT

## 2022-05-21 PROCEDURE — 96374 THER/PROPH/DIAG INJ IV PUSH: CPT

## 2022-05-21 PROCEDURE — 73502 X-RAY EXAM HIP UNI 2-3 VIEWS: CPT

## 2022-05-21 PROCEDURE — 85025 COMPLETE CBC W/AUTO DIFF WBC: CPT | Performed by: EMERGENCY MEDICINE

## 2022-05-21 PROCEDURE — 85730 THROMBOPLASTIN TIME PARTIAL: CPT | Performed by: EMERGENCY MEDICINE

## 2022-05-21 PROCEDURE — 85610 PROTHROMBIN TIME: CPT | Performed by: EMERGENCY MEDICINE

## 2022-05-21 PROCEDURE — 73552 X-RAY EXAM OF FEMUR 2/>: CPT

## 2022-05-21 PROCEDURE — 25010000002 FENTANYL CITRATE (PF) 50 MCG/ML SOLUTION: Performed by: EMERGENCY MEDICINE

## 2022-05-21 PROCEDURE — 71045 X-RAY EXAM CHEST 1 VIEW: CPT

## 2022-05-21 RX ORDER — FENTANYL CITRATE 50 UG/ML
50 INJECTION, SOLUTION INTRAMUSCULAR; INTRAVENOUS ONCE
Status: COMPLETED | OUTPATIENT
Start: 2022-05-21 | End: 2022-05-21

## 2022-05-21 RX ORDER — HYDROCODONE BITARTRATE AND ACETAMINOPHEN 5; 325 MG/1; MG/1
1 TABLET ORAL EVERY 6 HOURS PRN
Qty: 8 TABLET | Refills: 0 | Status: ON HOLD | OUTPATIENT
Start: 2022-05-21 | End: 2022-05-25

## 2022-05-21 RX ORDER — SODIUM CHLORIDE 0.9 % (FLUSH) 0.9 %
10 SYRINGE (ML) INJECTION AS NEEDED
Status: DISCONTINUED | OUTPATIENT
Start: 2022-05-21 | End: 2022-05-21 | Stop reason: HOSPADM

## 2022-05-21 RX ADMIN — FENTANYL CITRATE 50 MCG: 50 INJECTION INTRAMUSCULAR; INTRAVENOUS at 09:16

## 2022-05-22 ENCOUNTER — APPOINTMENT (OUTPATIENT)
Dept: CT IMAGING | Facility: HOSPITAL | Age: 72
End: 2022-05-22

## 2022-05-22 ENCOUNTER — HOSPITAL ENCOUNTER (INPATIENT)
Facility: HOSPITAL | Age: 72
LOS: 3 days | Discharge: NURSING, REHAB OR LONG-TERM CARE | End: 2022-05-25
Attending: EMERGENCY MEDICINE | Admitting: ORTHOPAEDIC SURGERY

## 2022-05-22 ENCOUNTER — APPOINTMENT (OUTPATIENT)
Dept: GENERAL RADIOLOGY | Facility: HOSPITAL | Age: 72
End: 2022-05-22

## 2022-05-22 DIAGNOSIS — M97.8XXA PERIPROSTHETIC FRACTURE OF PROXIMAL END OF FEMUR: ICD-10-CM

## 2022-05-22 DIAGNOSIS — Z74.09 IMPAIRED MOBILITY: ICD-10-CM

## 2022-05-22 DIAGNOSIS — S72.001A CLOSED FRACTURE OF RIGHT HIP, INITIAL ENCOUNTER: Primary | ICD-10-CM

## 2022-05-22 DIAGNOSIS — Z96.649 PERIPROSTHETIC FRACTURE OF PROXIMAL END OF FEMUR: ICD-10-CM

## 2022-05-22 DIAGNOSIS — Z78.9 DECREASED ACTIVITIES OF DAILY LIVING (ADL): ICD-10-CM

## 2022-05-22 DIAGNOSIS — M17.0 PRIMARY OSTEOARTHRITIS OF BOTH KNEES: ICD-10-CM

## 2022-05-22 LAB
ALBUMIN SERPL-MCNC: 1.9 G/DL (ref 3.5–5.2)
ALBUMIN/GLOB SERPL: 0.9 G/DL
ALP SERPL-CCNC: 118 U/L (ref 39–117)
ALT SERPL W P-5'-P-CCNC: 14 U/L (ref 1–41)
ANION GAP SERPL CALCULATED.3IONS-SCNC: 4 MMOL/L (ref 5–15)
AST SERPL-CCNC: 16 U/L (ref 1–40)
BASOPHILS # BLD AUTO: 0.03 10*3/MM3 (ref 0–0.2)
BASOPHILS NFR BLD AUTO: 0.3 % (ref 0–1.5)
BILIRUB SERPL-MCNC: 0.2 MG/DL (ref 0–1.2)
BUN SERPL-MCNC: 25 MG/DL (ref 8–23)
BUN/CREAT SERPL: 19.1 (ref 7–25)
CALCIUM SPEC-SCNC: 7.4 MG/DL (ref 8.6–10.5)
CHLORIDE SERPL-SCNC: 105 MMOL/L (ref 98–107)
CO2 SERPL-SCNC: 29 MMOL/L (ref 22–29)
CREAT SERPL-MCNC: 1.31 MG/DL (ref 0.76–1.27)
DEPRECATED RDW RBC AUTO: 54.8 FL (ref 37–54)
EGFRCR SERPLBLD CKD-EPI 2021: 58.6 ML/MIN/1.73
EOSINOPHIL # BLD AUTO: 0.01 10*3/MM3 (ref 0–0.4)
EOSINOPHIL NFR BLD AUTO: 0.1 % (ref 0.3–6.2)
ERYTHROCYTE [DISTWIDTH] IN BLOOD BY AUTOMATED COUNT: 17.5 % (ref 12.3–15.4)
GLOBULIN UR ELPH-MCNC: 2.1 GM/DL
GLUCOSE SERPL-MCNC: 122 MG/DL (ref 65–99)
HCT VFR BLD AUTO: 36.1 % (ref 37.5–51)
HGB BLD-MCNC: 11.6 G/DL (ref 13–17.7)
IMM GRANULOCYTES # BLD AUTO: 0.05 10*3/MM3 (ref 0–0.05)
IMM GRANULOCYTES NFR BLD AUTO: 0.5 % (ref 0–0.5)
LYMPHOCYTES # BLD AUTO: 0.56 10*3/MM3 (ref 0.7–3.1)
LYMPHOCYTES NFR BLD AUTO: 5.4 % (ref 19.6–45.3)
MCH RBC QN AUTO: 27.6 PG (ref 26.6–33)
MCHC RBC AUTO-ENTMCNC: 32.1 G/DL (ref 31.5–35.7)
MCV RBC AUTO: 85.7 FL (ref 79–97)
MONOCYTES # BLD AUTO: 0.82 10*3/MM3 (ref 0.1–0.9)
MONOCYTES NFR BLD AUTO: 7.9 % (ref 5–12)
NEUTROPHILS NFR BLD AUTO: 8.89 10*3/MM3 (ref 1.7–7)
NEUTROPHILS NFR BLD AUTO: 85.8 % (ref 42.7–76)
NRBC BLD AUTO-RTO: 0 /100 WBC (ref 0–0.2)
PLATELET # BLD AUTO: 245 10*3/MM3 (ref 140–450)
PMV BLD AUTO: 9 FL (ref 6–12)
POTASSIUM SERPL-SCNC: 3.6 MMOL/L (ref 3.5–5.2)
PROT SERPL-MCNC: 4 G/DL (ref 6–8.5)
RBC # BLD AUTO: 4.21 10*6/MM3 (ref 4.14–5.8)
SODIUM SERPL-SCNC: 138 MMOL/L (ref 136–145)
URATE SERPL-MCNC: 3.2 MG/DL (ref 3.4–7)
WBC NRBC COR # BLD: 10.36 10*3/MM3 (ref 3.4–10.8)

## 2022-05-22 PROCEDURE — 99283 EMERGENCY DEPT VISIT LOW MDM: CPT

## 2022-05-22 PROCEDURE — 80053 COMPREHEN METABOLIC PANEL: CPT | Performed by: EMERGENCY MEDICINE

## 2022-05-22 PROCEDURE — 73564 X-RAY EXAM KNEE 4 OR MORE: CPT

## 2022-05-22 PROCEDURE — 72192 CT PELVIS W/O DYE: CPT

## 2022-05-22 PROCEDURE — 85025 COMPLETE CBC W/AUTO DIFF WBC: CPT | Performed by: EMERGENCY MEDICINE

## 2022-05-22 PROCEDURE — 84550 ASSAY OF BLOOD/URIC ACID: CPT | Performed by: EMERGENCY MEDICINE

## 2022-05-22 RX ORDER — NALOXONE HCL 0.4 MG/ML
0.4 VIAL (ML) INJECTION
Status: DISCONTINUED | OUTPATIENT
Start: 2022-05-22 | End: 2022-05-25 | Stop reason: HOSPADM

## 2022-05-22 RX ORDER — SODIUM CHLORIDE 0.9 % (FLUSH) 0.9 %
10 SYRINGE (ML) INJECTION AS NEEDED
Status: DISCONTINUED | OUTPATIENT
Start: 2022-05-22 | End: 2022-05-25 | Stop reason: HOSPADM

## 2022-05-22 RX ORDER — HYDROCODONE BITARTRATE AND ACETAMINOPHEN 7.5; 325 MG/1; MG/1
1 TABLET ORAL EVERY 4 HOURS PRN
Status: DISCONTINUED | OUTPATIENT
Start: 2022-05-22 | End: 2022-05-23 | Stop reason: SDUPTHER

## 2022-05-23 PROBLEM — N18.31 STAGE 3A CHRONIC KIDNEY DISEASE (HCC): Chronic | Status: ACTIVE | Noted: 2022-05-23

## 2022-05-23 PROBLEM — S72.001A CLOSED FRACTURE OF RIGHT HIP, INITIAL ENCOUNTER: Status: ACTIVE | Noted: 2022-05-23

## 2022-05-23 LAB — FOLATE SERPL-MCNC: 7.68 NG/ML (ref 4.78–24.2)

## 2022-05-23 PROCEDURE — 97166 OT EVAL MOD COMPLEX 45 MIN: CPT

## 2022-05-23 PROCEDURE — 97163 PT EVAL HIGH COMPLEX 45 MIN: CPT

## 2022-05-23 PROCEDURE — 63710000001 PREDNISONE PER 5 MG: Performed by: FAMILY MEDICINE

## 2022-05-23 PROCEDURE — 25010000002 ENOXAPARIN PER 10 MG: Performed by: FAMILY MEDICINE

## 2022-05-23 PROCEDURE — 82746 ASSAY OF FOLIC ACID SERUM: CPT | Performed by: FAMILY MEDICINE

## 2022-05-23 RX ORDER — TAMSULOSIN HYDROCHLORIDE 0.4 MG/1
0.4 CAPSULE ORAL DAILY
Status: DISCONTINUED | OUTPATIENT
Start: 2022-05-23 | End: 2022-05-24

## 2022-05-23 RX ORDER — ALLOPURINOL 300 MG/1
300 TABLET ORAL DAILY
Status: DISCONTINUED | OUTPATIENT
Start: 2022-05-23 | End: 2022-05-24

## 2022-05-23 RX ORDER — ENALAPRIL MALEATE 20 MG/1
20 TABLET ORAL DAILY
Status: ON HOLD | COMMUNITY
End: 2022-05-23

## 2022-05-23 RX ORDER — TORSEMIDE 20 MG/1
20 TABLET ORAL DAILY
COMMUNITY

## 2022-05-23 RX ORDER — ALLOPURINOL 300 MG/1
300 TABLET ORAL DAILY
COMMUNITY

## 2022-05-23 RX ORDER — TAMSULOSIN HYDROCHLORIDE 0.4 MG/1
1 CAPSULE ORAL DAILY
COMMUNITY

## 2022-05-23 RX ORDER — FAMOTIDINE 20 MG/1
20 TABLET, FILM COATED ORAL 2 TIMES DAILY PRN
Status: DISCONTINUED | OUTPATIENT
Start: 2022-05-23 | End: 2022-05-25 | Stop reason: HOSPADM

## 2022-05-23 RX ORDER — COLCHICINE 0.6 MG/1
0.6 TABLET ORAL 2 TIMES DAILY PRN
Status: DISCONTINUED | OUTPATIENT
Start: 2022-05-23 | End: 2022-05-25 | Stop reason: HOSPADM

## 2022-05-23 RX ORDER — PREDNISONE 1 MG/1
5 TABLET ORAL DAILY
Status: DISCONTINUED | OUTPATIENT
Start: 2022-05-23 | End: 2022-05-25 | Stop reason: HOSPADM

## 2022-05-23 RX ORDER — ENOXAPARIN SODIUM 100 MG/ML
40 INJECTION SUBCUTANEOUS EVERY 24 HOURS
Status: DISCONTINUED | OUTPATIENT
Start: 2022-05-23 | End: 2022-05-25

## 2022-05-23 RX ORDER — ONDANSETRON 2 MG/ML
4 INJECTION INTRAMUSCULAR; INTRAVENOUS EVERY 6 HOURS PRN
Status: DISCONTINUED | OUTPATIENT
Start: 2022-05-23 | End: 2022-05-25 | Stop reason: HOSPADM

## 2022-05-23 RX ORDER — PROPRANOLOL HYDROCHLORIDE 40 MG/1
80 TABLET ORAL EVERY 12 HOURS SCHEDULED
Status: DISCONTINUED | OUTPATIENT
Start: 2022-05-23 | End: 2022-05-25 | Stop reason: HOSPADM

## 2022-05-23 RX ORDER — LANOLIN ALCOHOL/MO/W.PET/CERES
6 CREAM (GRAM) TOPICAL NIGHTLY PRN
Status: DISCONTINUED | OUTPATIENT
Start: 2022-05-23 | End: 2022-05-25 | Stop reason: HOSPADM

## 2022-05-23 RX ORDER — HYDROCODONE BITARTRATE AND ACETAMINOPHEN 5; 325 MG/1; MG/1
1 TABLET ORAL EVERY 6 HOURS PRN
Status: DISCONTINUED | OUTPATIENT
Start: 2022-05-23 | End: 2022-05-25 | Stop reason: HOSPADM

## 2022-05-23 RX ADMIN — COLCHICINE 0.6 MG: 0.6 TABLET ORAL at 12:08

## 2022-05-23 RX ADMIN — HYDROCODONE BITARTRATE AND ACETAMINOPHEN 1 TABLET: 5; 325 TABLET ORAL at 18:24

## 2022-05-23 RX ADMIN — ALLOPURINOL 300 MG: 300 TABLET ORAL at 09:24

## 2022-05-23 RX ADMIN — TAMSULOSIN HYDROCHLORIDE 0.4 MG: 0.4 CAPSULE ORAL at 09:24

## 2022-05-23 RX ADMIN — PREDNISONE 5 MG: 5 TABLET ORAL at 09:24

## 2022-05-23 RX ADMIN — ENOXAPARIN SODIUM 40 MG: 100 INJECTION SUBCUTANEOUS at 09:24

## 2022-05-23 RX ADMIN — HYDROCODONE BITARTRATE AND ACETAMINOPHEN 1 TABLET: 5; 325 TABLET ORAL at 12:07

## 2022-05-24 LAB
ANION GAP SERPL CALCULATED.3IONS-SCNC: 5 MMOL/L (ref 5–15)
BASOPHILS # BLD AUTO: 0.03 10*3/MM3 (ref 0–0.2)
BASOPHILS NFR BLD AUTO: 0.5 % (ref 0–1.5)
BUN SERPL-MCNC: 28 MG/DL (ref 8–23)
BUN/CREAT SERPL: 25.7 (ref 7–25)
CALCIUM SPEC-SCNC: 7.7 MG/DL (ref 8.6–10.5)
CHLORIDE SERPL-SCNC: 106 MMOL/L (ref 98–107)
CO2 SERPL-SCNC: 29 MMOL/L (ref 22–29)
CREAT SERPL-MCNC: 1.09 MG/DL (ref 0.76–1.27)
DEPRECATED RDW RBC AUTO: 55.8 FL (ref 37–54)
EGFRCR SERPLBLD CKD-EPI 2021: 73 ML/MIN/1.73
EOSINOPHIL # BLD AUTO: 0.05 10*3/MM3 (ref 0–0.4)
EOSINOPHIL NFR BLD AUTO: 0.8 % (ref 0.3–6.2)
ERYTHROCYTE [DISTWIDTH] IN BLOOD BY AUTOMATED COUNT: 17.3 % (ref 12.3–15.4)
GLUCOSE SERPL-MCNC: 106 MG/DL (ref 65–99)
HCT VFR BLD AUTO: 33.4 % (ref 37.5–51)
HGB BLD-MCNC: 10.3 G/DL (ref 13–17.7)
IMM GRANULOCYTES # BLD AUTO: 0.04 10*3/MM3 (ref 0–0.05)
IMM GRANULOCYTES NFR BLD AUTO: 0.6 % (ref 0–0.5)
IRON 24H UR-MRATE: 23 MCG/DL (ref 59–158)
IRON SATN MFR SERPL: 10 % (ref 20–50)
LYMPHOCYTES # BLD AUTO: 0.88 10*3/MM3 (ref 0.7–3.1)
LYMPHOCYTES NFR BLD AUTO: 14.3 % (ref 19.6–45.3)
MCH RBC QN AUTO: 27.3 PG (ref 26.6–33)
MCHC RBC AUTO-ENTMCNC: 30.8 G/DL (ref 31.5–35.7)
MCV RBC AUTO: 88.6 FL (ref 79–97)
MONOCYTES # BLD AUTO: 0.61 10*3/MM3 (ref 0.1–0.9)
MONOCYTES NFR BLD AUTO: 9.9 % (ref 5–12)
NEUTROPHILS NFR BLD AUTO: 4.56 10*3/MM3 (ref 1.7–7)
NEUTROPHILS NFR BLD AUTO: 73.9 % (ref 42.7–76)
NRBC BLD AUTO-RTO: 0 /100 WBC (ref 0–0.2)
PLATELET # BLD AUTO: 242 10*3/MM3 (ref 140–450)
PMV BLD AUTO: 9.7 FL (ref 6–12)
POTASSIUM SERPL-SCNC: 3.8 MMOL/L (ref 3.5–5.2)
RBC # BLD AUTO: 3.77 10*6/MM3 (ref 4.14–5.8)
SODIUM SERPL-SCNC: 140 MMOL/L (ref 136–145)
TIBC SERPL-MCNC: 221 MCG/DL (ref 298–536)
TRANSFERRIN SERPL-MCNC: 148 MG/DL (ref 200–360)
VIT B12 BLD-MCNC: 212 PG/ML (ref 211–946)
WBC NRBC COR # BLD: 6.17 10*3/MM3 (ref 3.4–10.8)

## 2022-05-24 PROCEDURE — 97535 SELF CARE MNGMENT TRAINING: CPT

## 2022-05-24 PROCEDURE — 85025 COMPLETE CBC W/AUTO DIFF WBC: CPT | Performed by: FAMILY MEDICINE

## 2022-05-24 PROCEDURE — 80048 BASIC METABOLIC PNL TOTAL CA: CPT | Performed by: FAMILY MEDICINE

## 2022-05-24 PROCEDURE — 25010000002 ENOXAPARIN PER 10 MG: Performed by: FAMILY MEDICINE

## 2022-05-24 PROCEDURE — 83540 ASSAY OF IRON: CPT | Performed by: FAMILY MEDICINE

## 2022-05-24 PROCEDURE — 84466 ASSAY OF TRANSFERRIN: CPT | Performed by: FAMILY MEDICINE

## 2022-05-24 PROCEDURE — 63710000001 PREDNISONE PER 5 MG: Performed by: FAMILY MEDICINE

## 2022-05-24 PROCEDURE — 82607 VITAMIN B-12: CPT | Performed by: FAMILY MEDICINE

## 2022-05-24 PROCEDURE — 97530 THERAPEUTIC ACTIVITIES: CPT

## 2022-05-24 RX ORDER — TORSEMIDE 20 MG/1
20 TABLET ORAL DAILY
Status: DISCONTINUED | OUTPATIENT
Start: 2022-05-24 | End: 2022-05-25 | Stop reason: HOSPADM

## 2022-05-24 RX ORDER — ALLOPURINOL 300 MG/1
300 TABLET ORAL DAILY
Status: DISCONTINUED | OUTPATIENT
Start: 2022-05-24 | End: 2022-05-25 | Stop reason: HOSPADM

## 2022-05-24 RX ORDER — TAMSULOSIN HYDROCHLORIDE 0.4 MG/1
0.4 CAPSULE ORAL DAILY
Status: DISCONTINUED | OUTPATIENT
Start: 2022-05-24 | End: 2022-05-25 | Stop reason: HOSPADM

## 2022-05-24 RX ADMIN — Medication 10 ML: at 08:46

## 2022-05-24 RX ADMIN — ENOXAPARIN SODIUM 40 MG: 100 INJECTION SUBCUTANEOUS at 08:46

## 2022-05-24 RX ADMIN — HYDROCODONE BITARTRATE AND ACETAMINOPHEN 1 TABLET: 5; 325 TABLET ORAL at 14:53

## 2022-05-24 RX ADMIN — ALLOPURINOL 300 MG: 300 TABLET ORAL at 08:45

## 2022-05-24 RX ADMIN — HYDROCODONE BITARTRATE AND ACETAMINOPHEN 1 TABLET: 5; 325 TABLET ORAL at 08:46

## 2022-05-24 RX ADMIN — PREDNISONE 5 MG: 5 TABLET ORAL at 08:45

## 2022-05-25 ENCOUNTER — HOSPITAL ENCOUNTER (INPATIENT)
Age: 72
LOS: 14 days | Discharge: HOME HEALTH | DRG: 560 | End: 2022-06-08
Attending: PSYCHIATRY & NEUROLOGY | Admitting: PSYCHIATRY & NEUROLOGY
Payer: MEDICARE

## 2022-05-25 VITALS
RESPIRATION RATE: 16 BRPM | DIASTOLIC BLOOD PRESSURE: 76 MMHG | WEIGHT: 232.37 LBS | OXYGEN SATURATION: 95 % | SYSTOLIC BLOOD PRESSURE: 110 MMHG | TEMPERATURE: 98.3 F | BODY MASS INDEX: 29.82 KG/M2 | HEART RATE: 77 BPM | HEIGHT: 74 IN

## 2022-05-25 DIAGNOSIS — Z87.81 S/P RIGHT HIP FRACTURE: Primary | ICD-10-CM

## 2022-05-25 PROBLEM — M25.559 HIP PAIN: Status: ACTIVE | Noted: 2022-05-25

## 2022-05-25 PROBLEM — R26.9 GAIT ABNORMALITY: Status: ACTIVE | Noted: 2022-05-25

## 2022-05-25 LAB
APPEARANCE FLD: ABNORMAL
BACTERIA: NEGATIVE /HPF
BILIRUBIN URINE: NEGATIVE
BLOOD, URINE: NEGATIVE
CLARITY: CLEAR
COLOR FLD: ABNORMAL
COLOR: YELLOW
CRYSTALS, UA: ABNORMAL /HPF
EPITHELIAL CELLS, UA: 1 /HPF (ref 0–5)
GLUCOSE URINE: NEGATIVE MG/DL
HYALINE CASTS: 1 /HPF (ref 0–8)
INR BLD: 1.12 (ref 0.88–1.18)
KETONES, URINE: NEGATIVE MG/DL
LEUKOCYTE ESTERASE, URINE: ABNORMAL
LYMPHOCYTES NFR FLD MANUAL: 10 %
NEUTROPHILS NFR FLD MANUAL: 90 %
NITRITE, URINE: NEGATIVE
PH UA: 7 (ref 5–8)
PREALBUMIN: 11 MG/DL (ref 20–40)
PROTEIN UA: NEGATIVE MG/DL
PROTHROMBIN TIME: 14.4 SEC (ref 12–14.6)
RBC # FLD AUTO: <1000 /MM3
RBC UA: 1 /HPF (ref 0–4)
SPECIFIC GRAVITY UA: 1.01 (ref 1–1.03)
TSH REFLEX FT4: 1.17 UIU/ML (ref 0.35–5.5)
UROBILINOGEN, URINE: 0.2 E.U./DL
VITAMIN B-12: 319 PG/ML (ref 211–946)
WBC # FLD AUTO: 1490 /MM3
WBC UA: 2 /HPF (ref 0–5)

## 2022-05-25 PROCEDURE — 89060 EXAM SYNOVIAL FLUID CRYSTALS: CPT | Performed by: STUDENT IN AN ORGANIZED HEALTH CARE EDUCATION/TRAINING PROGRAM

## 2022-05-25 PROCEDURE — 63710000001 PREDNISONE PER 5 MG: Performed by: FAMILY MEDICINE

## 2022-05-25 PROCEDURE — 36415 COLL VENOUS BLD VENIPUNCTURE: CPT

## 2022-05-25 PROCEDURE — 82607 VITAMIN B-12: CPT

## 2022-05-25 PROCEDURE — 97110 THERAPEUTIC EXERCISES: CPT

## 2022-05-25 PROCEDURE — 1180000000 HC REHAB R&B

## 2022-05-25 PROCEDURE — 85610 PROTHROMBIN TIME: CPT

## 2022-05-25 PROCEDURE — 97530 THERAPEUTIC ACTIVITIES: CPT

## 2022-05-25 PROCEDURE — 87205 SMEAR GRAM STAIN: CPT | Performed by: STUDENT IN AN ORGANIZED HEALTH CARE EDUCATION/TRAINING PROGRAM

## 2022-05-25 PROCEDURE — 25010000002 ENOXAPARIN PER 10 MG: Performed by: FAMILY MEDICINE

## 2022-05-25 PROCEDURE — 81001 URINALYSIS AUTO W/SCOPE: CPT

## 2022-05-25 PROCEDURE — 87070 CULTURE OTHR SPECIMN AEROBIC: CPT | Performed by: STUDENT IN AN ORGANIZED HEALTH CARE EDUCATION/TRAINING PROGRAM

## 2022-05-25 PROCEDURE — 87015 SPECIMEN INFECT AGNT CONCNTJ: CPT | Performed by: STUDENT IN AN ORGANIZED HEALTH CARE EDUCATION/TRAINING PROGRAM

## 2022-05-25 PROCEDURE — 0S9D3ZX DRAINAGE OF LEFT KNEE JOINT, PERCUTANEOUS APPROACH, DIAGNOSTIC: ICD-10-PCS | Performed by: ORTHOPAEDIC SURGERY

## 2022-05-25 PROCEDURE — 6360000002 HC RX W HCPCS: Performed by: PSYCHIATRY & NEUROLOGY

## 2022-05-25 PROCEDURE — 84134 ASSAY OF PREALBUMIN: CPT

## 2022-05-25 PROCEDURE — 84443 ASSAY THYROID STIM HORMONE: CPT

## 2022-05-25 PROCEDURE — 89051 BODY FLUID CELL COUNT: CPT | Performed by: PHYSICIAN ASSISTANT

## 2022-05-25 RX ORDER — TAMSULOSIN HYDROCHLORIDE 0.4 MG/1
0.4 CAPSULE ORAL DAILY
Status: DISCONTINUED | OUTPATIENT
Start: 2022-05-25 | End: 2022-06-08 | Stop reason: HOSPADM

## 2022-05-25 RX ORDER — HYDROCODONE BITARTRATE AND ACETAMINOPHEN 5; 325 MG/1; MG/1
1 TABLET ORAL EVERY 6 HOURS PRN
Status: ON HOLD | COMMUNITY
End: 2022-06-08 | Stop reason: HOSPADM

## 2022-05-25 RX ORDER — HYDROCODONE BITARTRATE AND ACETAMINOPHEN 5; 325 MG/1; MG/1
1 TABLET ORAL EVERY 6 HOURS PRN
Status: DISCONTINUED | OUTPATIENT
Start: 2022-05-25 | End: 2022-06-08 | Stop reason: HOSPADM

## 2022-05-25 RX ORDER — ACETAMINOPHEN 325 MG/1
650 TABLET ORAL EVERY 4 HOURS PRN
Status: DISCONTINUED | OUTPATIENT
Start: 2022-05-25 | End: 2022-06-08 | Stop reason: HOSPADM

## 2022-05-25 RX ORDER — TAMSULOSIN HYDROCHLORIDE 0.4 MG/1
0.4 CAPSULE ORAL DAILY
Status: ON HOLD | COMMUNITY
End: 2022-06-08 | Stop reason: HOSPADM

## 2022-05-25 RX ORDER — ENOXAPARIN SODIUM 100 MG/ML
30 INJECTION SUBCUTANEOUS 2 TIMES DAILY
Status: DISCONTINUED | OUTPATIENT
Start: 2022-05-25 | End: 2022-05-26

## 2022-05-25 RX ORDER — ALLOPURINOL 300 MG/1
300 TABLET ORAL DAILY
Status: DISCONTINUED | OUTPATIENT
Start: 2022-05-25 | End: 2022-06-08 | Stop reason: HOSPADM

## 2022-05-25 RX ORDER — POLYETHYLENE GLYCOL 3350 17 G/17G
17 POWDER, FOR SOLUTION ORAL DAILY PRN
Status: DISCONTINUED | OUTPATIENT
Start: 2022-05-25 | End: 2022-06-08 | Stop reason: HOSPADM

## 2022-05-25 RX ORDER — TORSEMIDE 10 MG/1
10 TABLET ORAL DAILY
Status: DISCONTINUED | OUTPATIENT
Start: 2022-05-25 | End: 2022-06-08 | Stop reason: HOSPADM

## 2022-05-25 RX ORDER — DIPHENHYDRAMINE HCL 25 MG
25 TABLET ORAL EVERY 6 HOURS PRN
Status: DISCONTINUED | OUTPATIENT
Start: 2022-05-25 | End: 2022-06-08 | Stop reason: HOSPADM

## 2022-05-25 RX ORDER — COLCHICINE 0.6 MG/1
0.6 TABLET ORAL 2 TIMES DAILY PRN
Status: DISCONTINUED | OUTPATIENT
Start: 2022-05-25 | End: 2022-06-08 | Stop reason: HOSPADM

## 2022-05-25 RX ORDER — HYDROCODONE BITARTRATE AND ACETAMINOPHEN 5; 325 MG/1; MG/1
1 TABLET ORAL EVERY 6 HOURS PRN
Qty: 8 TABLET | Refills: 0 | Status: SHIPPED | OUTPATIENT
Start: 2022-05-25 | End: 2022-05-27

## 2022-05-25 RX ORDER — PREDNISONE 1 MG/1
5 TABLET ORAL DAILY
Status: DISCONTINUED | OUTPATIENT
Start: 2022-05-25 | End: 2022-06-08 | Stop reason: HOSPADM

## 2022-05-25 RX ADMIN — HYDROCODONE BITARTRATE AND ACETAMINOPHEN 1 TABLET: 5; 325 TABLET ORAL at 08:15

## 2022-05-25 RX ADMIN — PREDNISONE 5 MG: 5 TABLET ORAL at 08:17

## 2022-05-25 RX ADMIN — ENOXAPARIN SODIUM 30 MG: 100 INJECTION SUBCUTANEOUS at 21:20

## 2022-05-25 RX ADMIN — ALLOPURINOL 300 MG: 300 TABLET ORAL at 08:17

## 2022-05-25 RX ADMIN — DICLOFENAC 2 G: 10 GEL TOPICAL at 13:10

## 2022-05-25 RX ADMIN — TORSEMIDE 20 MG: 20 TABLET ORAL at 08:17

## 2022-05-25 RX ADMIN — ENOXAPARIN SODIUM 40 MG: 100 INJECTION SUBCUTANEOUS at 08:16

## 2022-05-25 RX ADMIN — TAMSULOSIN HYDROCHLORIDE 0.4 MG: 0.4 CAPSULE ORAL at 08:18

## 2022-05-25 ASSESSMENT — PAIN SCALES - GENERAL: PAINLEVEL_OUTOF10: 0

## 2022-05-25 NOTE — PROGRESS NOTES
Ilah Hamman arrived to room # 23-14-20-09. Presented with: right hip fracture  Mental Status: Patient is oriented and alert. Vitals:    05/25/22 1805   BP: (!) 125/90   Pulse: 89   Resp: 18   Temp: (!) 96.7 °F (35.9 °C)   SpO2: 94%     Patient safety contract and falls prevention contract reviewed with patient Yes. Oriented Patient to room. Call light within reach. Yes.   Needs, issues or concerns expressed at this time: no.      Electronically signed by Karina Richey RN on 5/25/2022 at 6:07 PM

## 2022-05-25 NOTE — PLAN OF CARE
[address] TREATMENT PLAN      Deno Blanchard    : 1950  Acct #: [[id number]]  MRN: [id number]   PHYSICIAN:  Solomon Pro MD  Primary Problem    Patient Active Problem List   Diagnosis    Hypertension    Hyperlipidemia    Retroperitoneal mass    Abnormal CT of the abdomen    Fatigue    Axillary mass, left    COVID-19    Benign essential HTN    Edema    Elevated PSA    Gout    Gout, arthropathy    IFG (impaired fasting glucose)    Malignant neoplasm of trigone of urinary bladder (HCC)    Obesity (BMI 30-39. 9)    Primary osteoarthritis of both knees    Prostate cancer ([facility] [address]    Retroperitoneal fibrosis    Ureteral obstruction, left    S/P right hip fracture    Hip pain    Gait abnormality    Moderate malnutrition (HCC)       Rehabilitation Diagnosis:     S/P right hip fracture [Z87.81]       ADMIT DATE:2022   CARE PLAN     NURSING:  Deon Blanchard while on this unit will:     [] Be continent of bowel and bladder      [x] Have an adequate number of bowel movements   [x] Urinate with no urinary retention >300ml in bladder   [] Complete bladder protocol with tran removal   [x] Maintain O2 SATs at ___%   [x] Have pain managed while on ARU        [] Be pain free by discharge    [x] Have no skin breakdown while on ARU   [] Have improved skin integrity via wound measurements   [x] Have no signs/symptoms of infection at the wound site  [x] Be free from injury during hospitalization   [] Complete education with patient/family with understanding demonstrated for:  [] Adjustment   [x] Other:   Nursing interventions may include bowel/bladder training, education for medical assistive devices, medication education, O2 saturation management, energy conservation, stress management techniques, fall prevention, alarms protocol, seating and positioning, skin/wound care, pressure relief instruction,dressing changes,  infection protection, DVT prophylaxis, and/or assistance with in room safety with transfers to bed, toilet, wheelchair, shower as well as bathroom activities and hygiene. Patient/caregiver education for:   [x] Disease/sustained injury/management      [x] Medication Use   [] Surgical intervention   [x] Safety   [] Body mechanics and or joint protection   [] Health maintenance       IRF-REBEKAH  Bladder and Bowel Continence  Bladder Continence: Always continent  Bowel Continence: Always continent       PHYSICAL THERAPY:  Goals:                  Short Term Goals  Time Frame for Short term goals: 1 week  Short term goal 1: SBA bed mobility  Short term goal 2: SBA TF surface to surface  Short term goal 3: SBA ambulation with AD 50 FT  Short term goal 4: IND WC propulsion 150 FT  Short term goal 5: SBA car transfer            Long Term Goals  Time Frame for Long term goals : 2 weeks  Long term goal 1: IND bed mobility  Long term goal 2: IND TF surface to surace  Long term goal 3: IND ambulation 150 FT with AD  Long term goal 4: IND car transfer  Long term goal 5: IND 12 steps up/down  These goals were reviewed with this patient at the time of assessment and Jenn Al is in agreement.      Plan of Care: Frequency:  [x] 5 days per week, 90 minutes per day                             []  5 days per week, 60 minutes per day               Current Treatment Recommendations: Strengthening,ROM,Balance training,Functional mobility training,Transfer training,ADL/Self-care training,Endurance training,Wheelchair mobility training,Gait training,Stair training,Home exercise program,Safety education & training,Return to work related activity,Equipment evaluation, education, & procurement,Patient/Caregiver education & training,Therapeutic activities    IRF-REBEKAH  Roll Left and Right  Assistance Needed: Supervision or touching assistance  CARE Score: 4  Discharge Goal: Independent  Sit to Lying  Assistance Needed: Partial/moderate assistance  CARE Score: 3  Discharge Goal: Independent  Lying to Sitting on Side of Bed  Assistance Needed: Partial/moderate assistance  CARE Score: 3  Discharge Goal: Independent  Sit to Stand  Assistance Needed: Partial/moderate assistance  CARE Score: 3  Discharge Goal: Independent  Chair/Bed-to-Chair Transfer  Assistance Needed: Supervision or touching assistance  CARE Score: 4  Discharge Goal: Independent  Car Transfer  Reason if not Attempted: Not attempted due to medical condition or safety concerns  CARE Score: 88  Discharge Goal: Independent  Walk 10 Feet  Assistance Needed: Supervision or touching assistance  CARE Score: 4  Discharge Goal: Independent  Walk 50 Feet with Two Turns  Reason if not Attempted: Not attempted due to medical condition or safety concerns  CARE Score: 88  Discharge Goal: Independent  Walk 150 Feet  Reason if not Attempted: Not attempted due to medical condition or safety concerns  CARE Score: 88  Discharge Goal: Independent  Walking 10 Feet on Uneven Surfaces  Reason if not Attempted: Not attempted due to medical condition or safety concerns  CARE Score: 88  Discharge Goal: Supervision or touching assistance  1 Step (Curb)  Reason if not Attempted: Not attempted due to medical condition or safety concerns  CARE Score: 88  Discharge Goal: Supervision or touching assistance  4 Steps  Reason if not Attempted: Not attempted due to medical condition or safety concerns  CARE Score: 88  Discharge Goal: Not Attempted  12 Steps  Reason if not Attempted: Not attempted due to medical condition or safety concerns  CARE Score: 88  Discharge Goal: Not Attempted  Wheel 50 Feet with Two Turns  Assistance Needed: Supervision or touching assistance  CARE Score: 4  Discharge Goal: Independent  Wheel 150 Feet  Assistance Needed: Supervision or touching assistance  CARE Score: 4  Discharge Goal: Independent    OCCUPATIONAL THERAPY:  Goals:             Short Term Goals  Time Frame for Short term goals: 2 weeks  Short Term Goal 1: Mod A with clothing management/hygiene for toileting.   Short Term Goal 2: CGA with toilet transfers. Short Term Goal 3: Min A with bathing hgyiene. Short Term Goal 4: Min A with LB dressing using AE. Short Term Goal 5: Min A with donning/doffing socks and shoes using AE. Short Term Goal 6: Patient will complete 1-2 handed static standing task for 3 minutes, requiring CGA. :  Long Term Goals  Time Frame for Long term goals : 3-4 weeks  Long Term Goal 1: Modified independent with toileting and toilet transfers. Long Term Goal 2: Modified independent with bathing hygiene. Long Term Goal 3: Modified independent with overall dressing. Long Term Goal 4: Patient verbalize DME nees. Long Term Goal 5: Independent with HEP.  :    These goals were reviewed with this patient at the time of assessment and Carle Schwab is in agreement    Plan of Care: Frequency:   [x] 5 days per week, 90 minutes per day     [] 5 days per week, 60 minutes per day                Plan  Current Treatment Recommendations: Strengthening,Balance training,ROM,Functional mobility training,Wheelchair mobility training,Endurance training,Neuromuscular re-education,Equipment evaluation, education, & procurement,Patient/Caregiver education & training,Safety education & training,Self-Care / ADL,Home management training            IRF-REBEKAH  Eating  Assistance Needed: Setup or clean-up assistance  CARE Score: 5  Discharge Goal: Independent  Oral Hygiene  Assistance Needed: Setup or clean-up assistance  CARE Score: 5  Discharge Goal: [address] needed: Dependent  CARE Score: 1  Discharge Goal: Independent  Shower/Bathe Self  Assistance Needed: Partial/moderate assistance  CARE Score: 3  Discharge Goal: Independent  Upper Body Dressing  Assistance Needed: Setup or clean-up assistance  CARE Score: 5  Discharge Goal: Independent  Lower Body Dressing  Assistance Needed: Dependent  CARE Score: 1  Discharge Goal: Independent  Putting On/Taking Off Footwear  Assistance Needed: Dependent  CARE Score: 1  Discharge Goal: Independent  Toilet Transfer  Assistance needed: Dependent  Comment: Mod A x2  CARE Score: 1  Discharge Goal: Independent  Picking Up Object  Reason if not Attempted: Not attempted due to medical condition or safety concerns  CARE Score: 88  Discharge Goal: Supervision or touching assistance  Treatments may include IADL retraining, strengthening, safety education and training, patient/caregiver education and training, equipment evaluation/ training/procurement, neuromuscular reeducation, wheelchair mobility training. SPEECH THERAPY:   Plan of Care and Goals:   LTG    FIM Goals: Comprehension:    Expression:    Social:    Problem Solving:    Memory:                                                  IRF-REBEKAH  Hearing, Speech, and Vision  Expression of Ideas and Wants: Without difficulty  Understanding Verbal and Non-Verbal Content: Understands  Cognitive Patterns  Cognitive Pattern Assessment Used: BIMS  Repetition of Three Words (First Attempt): 3  Temporal Orientation: Year: Correct  Temporal Orientation: Month: Accurate within 5 days  Temporal Orientation: Day: Correct  Able to recall \"sock: Yes, no cue required  Able to recall \"blue\": Yes, no cue required  Able to recall \"bed\": Yes, after cueing (\"a piece of furniture\")  BIMS Summary Score: 14          Plan of Care:  Frequency:   [] 5 days per week, 60 minutes per day                            [x] Not appropriate for Speech Therapy  Treatments may include speech/language/communication therapy, cognitive training, group therapy, education, and/or dysphagia therapy based on the above goals. These goals were reviewed with this patient at the time of assessment and Debbie Husbands is in agreement. CASE MANAGEMENT:  Goals:   Assist patient/family with discharge planning, patient/family counseling,  and coordination with insurance during ARU stay.   Patient Goals: pain managed, be able to get up, move around follow restrictions, take care of self while healing               Activities Prior to Admit:      Active : Yes  Mode of Transportation: Truck  Occupation: Full time employment  Leisure & Hobbies: Used to play tennis and basketball, mow and bush hog, farming, Go to [employer] every year         Jose Kiran will be seen a minimum of 3 hours of therapy per day/a minimum of 5 out of 7 days per week. [] In this rare instance due to the nature of this patient's medical involvement, this patient will be seen 15 hours per week (900 minutes within a 7 day period). Treatments may include therapeutic exercises, gait training, neuromuscular re-ed, transfer training, community reintegration, bed mobility, w/c mobility and training, self care, home mgmt, cognitive training, energy conservation,dysphagia tx, speech/language/communication therapy, group therapy, and patient/family education. In addition, dietician/nutritionist may monitor calorie count as well as intake and collaboratively work with SLP on dietary upgrades. Neuropsychology/Psychology may evaluate and provide necessary support. Medical issues being managed closely and that require 24 hour availability of a physician:   [] Swallowing Precautions  [x] Bowel/Bladder Fx  [x] Weight bearing precautions   [] Wound Care    [x] Pain Mgmt   [] Infection Protection   [x] DVT Prophylaxis   [x] Fall Precautions  [] Fluid/Electrolyte/Nutrition Balance   [] Voice Protection   [] Respiratory  [] Other:    Medical Prognosis: [x] Good  [] Fair    [] Guarded   Total expected IRF days:  13  Anticipated discharge destination:    [] Home Independently   [x] Home with supervision    []SNF     [] Other                                           Physician anticipated functional outcomes:  Ambulate household distances independently with assistive device.   IPOC brief synthesis: Acute inpatient rehabilitation with occupational therapy,  physical therapy, 180 minutes, 5 every 7   days will address basic and advancing mobility with self-care   instruction and adaptive equipment training. Caregiver education will   be offered. Expected length of stay prior to the supervised level of   functional discharge to home with home care is 13 days. Assessment and Plan:  1. Status post right periprosthetic fracture/non surgical-pain control/mobilization  2. History of gout-allopurinol/colchicine as needed  3. DVT prophylaxis- Xarelto  4. BPH/history of prostate cancer-Flomax  5. Hypertension-on medicine monitor  6. GI-bowel regimen  7. Pain control-Norco as needed  8. Arthritis- monitor   9. Prednisone patient indicates is taken for some connective tissue disorder to avoid his ureter from closing off-has a diagnosis of retroperitoneal fibrosis  10.   PT/OT            Case Mgmt: Electronically signed by RYAN Alas on 5/26/2022 at 1:50 PM      OT: Electronically signed by Joaquin Pugh OT on 5/26/22 at 3:28 PM CDT      PT:Electronically signed by Seble Trejo PT on 5/26/2022 at 3:59 PM      RN: Electronically signed by Mary Kate Choi RN on 5/25/22 at 6:00 PM CDT      ST:  Electronically Signed By:  Jovon Dawn M.S., CCC-SLP  5/26/2022,8:58 AM.

## 2022-05-26 PROBLEM — E44.0 MODERATE MALNUTRITION (HCC): Chronic | Status: ACTIVE | Noted: 2022-05-26

## 2022-05-26 LAB
ALBUMIN SERPL-MCNC: 1.4 G/DL (ref 3.5–5.2)
ALP BLD-CCNC: 113 U/L (ref 40–130)
ALT SERPL-CCNC: 13 U/L (ref 5–41)
ANION GAP SERPL CALCULATED.3IONS-SCNC: 6 MMOL/L (ref 7–19)
AST SERPL-CCNC: 13 U/L (ref 5–40)
BASOPHILS ABSOLUTE: 0 K/UL (ref 0–0.2)
BASOPHILS RELATIVE PERCENT: 0.2 % (ref 0–1)
BILIRUB SERPL-MCNC: <0.2 MG/DL (ref 0.2–1.2)
BUN BLDV-MCNC: 32 MG/DL (ref 8–23)
CALCIUM SERPL-MCNC: 7.8 MG/DL (ref 8.8–10.2)
CHLORIDE BLD-SCNC: 107 MMOL/L (ref 98–111)
CO2: 26 MMOL/L (ref 22–29)
CREAT SERPL-MCNC: 1.1 MG/DL (ref 0.5–1.2)
CYTO UR: NORMAL
EOSINOPHILS ABSOLUTE: 0 K/UL (ref 0–0.6)
EOSINOPHILS RELATIVE PERCENT: 0 % (ref 0–5)
GFR AFRICAN AMERICAN: >59
GFR NON-AFRICAN AMERICAN: >60
GLUCOSE BLD-MCNC: 120 MG/DL (ref 74–109)
HCT VFR BLD CALC: 32.5 % (ref 42–52)
HEMOGLOBIN: 10 G/DL (ref 14–18)
IMMATURE GRANULOCYTES #: 0 K/UL
LAB AP CASE REPORT: NORMAL
LYMPHOCYTES ABSOLUTE: 1 K/UL (ref 1.1–4.5)
LYMPHOCYTES RELATIVE PERCENT: 16.3 % (ref 20–40)
Lab: NORMAL
MCH RBC QN AUTO: 27 PG (ref 27–31)
MCHC RBC AUTO-ENTMCNC: 30.8 G/DL (ref 33–37)
MCV RBC AUTO: 87.8 FL (ref 80–94)
MONOCYTES ABSOLUTE: 0.4 K/UL (ref 0–0.9)
MONOCYTES RELATIVE PERCENT: 6.3 % (ref 0–10)
NEUTROPHILS ABSOLUTE: 4.5 K/UL (ref 1.5–7.5)
NEUTROPHILS RELATIVE PERCENT: 76.7 % (ref 50–65)
PATH REPORT.FINAL DX SPEC: NORMAL
PATH REPORT.GROSS SPEC: NORMAL
PDW BLD-RTO: 16.8 % (ref 11.5–14.5)
PLATELET # BLD: 253 K/UL (ref 130–400)
PMV BLD AUTO: 9.4 FL (ref 9.4–12.4)
POTASSIUM REFLEX MAGNESIUM: 4.1 MMOL/L (ref 3.5–5)
PREALBUMIN: 11 MG/DL (ref 20–40)
RBC # BLD: 3.7 M/UL (ref 4.7–6.1)
SODIUM BLD-SCNC: 139 MMOL/L (ref 136–145)
TOTAL PROTEIN: 3.4 G/DL (ref 6.6–8.7)
WBC # BLD: 5.8 K/UL (ref 4.8–10.8)

## 2022-05-26 PROCEDURE — 97530 THERAPEUTIC ACTIVITIES: CPT

## 2022-05-26 PROCEDURE — 1180000000 HC REHAB R&B

## 2022-05-26 PROCEDURE — 97165 OT EVAL LOW COMPLEX 30 MIN: CPT

## 2022-05-26 PROCEDURE — 84134 ASSAY OF PREALBUMIN: CPT

## 2022-05-26 PROCEDURE — 97161 PT EVAL LOW COMPLEX 20 MIN: CPT

## 2022-05-26 PROCEDURE — 36415 COLL VENOUS BLD VENIPUNCTURE: CPT

## 2022-05-26 PROCEDURE — 6360000002 HC RX W HCPCS: Performed by: PSYCHIATRY & NEUROLOGY

## 2022-05-26 PROCEDURE — 85025 COMPLETE CBC W/AUTO DIFF WBC: CPT

## 2022-05-26 PROCEDURE — 97116 GAIT TRAINING THERAPY: CPT

## 2022-05-26 PROCEDURE — 6370000000 HC RX 637 (ALT 250 FOR IP): Performed by: PSYCHIATRY & NEUROLOGY

## 2022-05-26 PROCEDURE — 99223 1ST HOSP IP/OBS HIGH 75: CPT | Performed by: PSYCHIATRY & NEUROLOGY

## 2022-05-26 PROCEDURE — 80053 COMPREHEN METABOLIC PANEL: CPT

## 2022-05-26 PROCEDURE — 97535 SELF CARE MNGMENT TRAINING: CPT

## 2022-05-26 PROCEDURE — 97110 THERAPEUTIC EXERCISES: CPT

## 2022-05-26 RX ADMIN — HYDROCODONE BITARTRATE AND ACETAMINOPHEN 1 TABLET: 5; 325 TABLET ORAL at 07:45

## 2022-05-26 RX ADMIN — RIVAROXABAN 10 MG: 10 TABLET, FILM COATED ORAL at 17:27

## 2022-05-26 RX ADMIN — PREDNISONE 5 MG: 5 TABLET ORAL at 07:45

## 2022-05-26 RX ADMIN — TORSEMIDE 10 MG: 10 TABLET ORAL at 07:45

## 2022-05-26 RX ADMIN — HYDROCODONE BITARTRATE AND ACETAMINOPHEN 1 TABLET: 5; 325 TABLET ORAL at 20:30

## 2022-05-26 RX ADMIN — ALLOPURINOL 300 MG: 300 TABLET ORAL at 07:45

## 2022-05-26 ASSESSMENT — PAIN SCALES - GENERAL
PAINLEVEL_OUTOF10: 1
PAINLEVEL_OUTOF10: 0
PAINLEVEL_OUTOF10: 1
PAINLEVEL_OUTOF10: 0

## 2022-05-26 ASSESSMENT — PAIN DESCRIPTION - LOCATION
LOCATION: KNEE
LOCATION: KNEE

## 2022-05-26 ASSESSMENT — PAIN DESCRIPTION - ORIENTATION
ORIENTATION: LEFT
ORIENTATION: LEFT

## 2022-05-26 ASSESSMENT — PAIN DESCRIPTION - DESCRIPTORS
DESCRIPTORS: ACHING
DESCRIPTORS: ACHING

## 2022-05-26 ASSESSMENT — PAIN - FUNCTIONAL ASSESSMENT
PAIN_FUNCTIONAL_ASSESSMENT: ACTIVITIES ARE NOT PREVENTED
PAIN_FUNCTIONAL_ASSESSMENT: ACTIVITIES ARE NOT PREVENTED

## 2022-05-26 NOTE — PROGRESS NOTES
Physical Therapy EVALUATION Note  DATE:  2022  NAME:  Page Stevens  :  1950  (81 y.o.,male)  MRN:  [id number]    HEIGHT:  Height: 6' 2\" (188 cm)  WEIGHT:  Weight: 232 lb 6.4 oz (105.4 kg)    PATIENT DIAGNOSIS(ES):    Diagnosis: s/p right periproshetic hip fracture; non-op    Additional Pertinent Hx: arthritis, prostate cancer, cellulitis, gout, HTN, IFG, COVID 19, back surgery, B THR,   RESTRICTIONS/PRECAUTIONS:    Restrictions/Precautions  Restrictions/Precautions: Weight Bearing,Fall Risk     OVERALL  ORIENTATION STATUS:  Overall Orientation Status: Within Normal Limits  PAIN:  Pain Level: 1       Pain Location: Knee     Pain Orientation: Left      GROSS ASSESSMENT        POSTURE/BALANCE  Balance  Sitting - Static: Fair,+  Standing - Static: Fair,-       ACTIVITY TOLERANCE  Activity Tolerance  Activity Tolerance: Patient tolerated evaluation without incident      BED MOBILITY  Bed mobility  Rolling to Left: Minimal assistance,Contact guard assistance  Rolling to Right: Minimal assistance,Contact guard assistance  Supine to Sit: Minimal assistance,Moderate assistance (assisted BLE off the bed o the L side of bed)  Sit to Supine: Minimal assistance,Moderate assistance (had to assist BLE onto bed, from the left side of the bed)        TRANSFERS  Transfers  Sit to Stand: Minimal Assistance,Moderate Assistance  Stand to sit: Minimal Assistance,Moderate Assistance  Bed to Chair: Minimal assistance,Contact guard assistance (stand pivot)       AMBULATION 1  Ambulation  Surface: level tile  Device: Parallel Bars  Other Apparatus: Wheelchair follow  Assistance: Contact guard assistance,Minimal assistance  Quality of Gait: patient maintained TTWB, he followed a 3 point gait pattern, patient had a tendency to keep  L foot in plantarflexion during stance phase and would not acheive full neutral ankle  Distance: 12 FT  More Ambulation?: Yes  AMBULATION 2  Ambulation 2  Surface - 2: level tile  Device 2: Parallel Bars  Other Apparatus 2: Wheelchair follow  Assistance 2: Contact guard assistance  Quality of Gait 2: patient was cued to allow his L foot to come cammy complete contact with the floor to acheive a neutral ankle, patient was still maintaining TTWB, continuing the 3 point gait pattern  Distance: 12 FT  STAIRS     WHEELCHAIR PROPULSION 1  Propulsion 1  Propulsion: Manual  Level: Level Tile  Method: ROSA GRANT  Level of Assistance: Supervision,Stand by assistance  Description/ Details: patient able to propel without break from room to therapy gym  Distance: 200 FT  WHEELCHAIR PROPULSION 2       GOALS:  Short Term Goals  Time Frame for Short term goals: 1 week  Short term goal 1: SBA bed mobility  Short term goal 2: SBA TF surface to surface  Short term goal 3: SBA ambulation with AD 50 FT  Short term goal 4: IND WC propulsion 150 FT  Short term goal 5: SBA car transfer    Long Term Goals  Time Frame for Long term goals : 2 weeks  Long term goal 1: IND bed mobility  Long term goal 2: IND TF surface to surace  Long term goal 3: IND ambulation 150 FT with AD  Long term goal 4: IND car transfer  Long term goal 5: IND 12 steps up/down  HOME LIVING:     Type of Home: House  Home Layout: Multi-level  Home Access: Stairs to enter without rails        ASSESSMENT (IMPAIRMENTS/BARRIERS):     Assessment: patient was very agreeable to therapy, he presented with decreased ROM, strength, functional mobility, and endurance bilaterally  Activity Tolerance: Patient tolerated evaluation without incident     PLAN:  Plan  Plan: 5-7 times per week  Plan weeks: 2 weeks  Current Treatment Recommendations: Strengthening,ROM,Balance training,Functional mobility training,Transfer training,ADL/Self-care training,Endurance training,Wheelchair mobility training,Gait training,Stair training,Home exercise program,Safety education & training,Return to work related activity,Equipment evaluation, education, & procurement,Patient/Caregiver education & training,Therapeutic activities  Discharge Recommendations: Home with [address] REASONABLY EXPECTED TO ACTIVELY PARTICIPATE IN AT LEAST 3 HOURS OF INTENSIVE THERAPY PER DAY AT LEAST 5 DAYS PER WEEK, AND BE EXPECTED TO MAKE MEASURABLE IMPROVEMENT THAT WILL BE OF PRACTICAL VALUE TO IMPROVE THE PATIENT'S FUNCTIONAL CAPACITY OR ADAPTATION TO IMPAIRMENTS.    PATIENT GOAL FOR REHAB:  RETURN TO PRIOR LEVEL OF FUNCTION       IRF/REBEKAH  Roll Left and Right  Assistance Needed: Supervision or touching assistance  CARE Score: 4  Discharge Goal: Independent    Sit to Lying  Assistance Needed: Partial/moderate assistance  CARE Score: 3  Discharge Goal: Independent    Lying to Sitting on Side of Bed  Assistance Needed: Partial/moderate assistance  CARE Score: 3  Discharge Goal: Independent    Sit to Stand  Assistance Needed: Partial/moderate assistance  CARE Score: 3  Discharge Goal: Independent    Chair/Bed-to-Chair Transfer  Assistance Needed: Supervision or touching assistance  CARE Score: 4  Discharge Goal: Independent    Car Transfer  Reason if not Attempted: Not attempted due to medical condition or safety concerns  CARE Score: 88  Discharge Goal: Independent    Walk 10 Feet  Assistance Needed: Supervision or touching assistance  CARE Score: 4  Discharge Goal: Independent    Walk 50 Feet with Two Turns  Reason if not Attempted: Not attempted due to medical condition or safety concerns  CARE Score: 88  Discharge Goal: Independent    Walk 150 Feet  Reason if not Attempted: Not attempted due to medical condition or safety concerns  CARE Score: 88  Discharge Goal: Independent    Walking 10 Feet on Uneven Surfaces  Reason if not Attempted: Not attempted due to medical condition or safety concerns  CARE Score: 88  Discharge Goal: Supervision or touching assistance    1 Step (Curb)  Reason if not Attempted: Not attempted due to medical condition or safety concerns  CARE Score: 88  Discharge Goal: Independent    4 Steps  Reason if not Attempted: Not attempted due to medical condition or safety concerns  CARE Score: 88  Discharge Goal: Independent    12 Steps  Reason if not Attempted: Not attempted due to medical condition or safety concerns  CARE Score: 88  Discharge Goal: Independent    Wheel 50 Feet with Two Turns  Assistance Needed: Supervision or touching assistance  CARE Score: 4  Discharge Goal: Independent    Wheel 150 Feet  Assistance Needed: Supervision or touching assistance  CARE Score: 4  Discharge Goal: Independent      LAST TREATMENT TIME  PT Individual Minutes  Time In: 1345  Time Out: 1430  Minutes: 45      Electronically signed by Sherri Epley, SPT on 5/26/2022 at 3:25 PM

## 2022-05-26 NOTE — PROGRESS NOTES
4 Eyes Skin Assessment    Emma Gray is being assessed upon:   Admission    I agree that Kennedy Mccray RN, along with Kimber Green LPN have performed a thorough Head to Toe Skin Assessment on the patient. ALL assessment sites listed below have been assessed. Areas assessed by both nurses:     [x]   Head, Face, and Ears   [x]   Shoulders, Back, and Chest  [x]   Arms, Elbows, and Hands   [x]   Coccyx, Sacrum, and Ischium  [x]   Legs, Feet, and Heels    Does the Patient have Skin Breakdown?   No  Zhou Prevention initiated:   No  Wound Care Orders initiated:   No    [facility] nurse consulted for Pressure Injury (Stage 3,4, Unstageable, DTI, NWPT, and Complex wounds) and New or Established Ostomies:   No        Primary Nurse eSignature: Riya Yo RN on 5/25/2022 at 7:39 PM      Co-Signer eSignature: Electronically signed by Kimber Green LPN on 7/66/01 at 7:67 PM CDT

## 2022-05-26 NOTE — PROGRESS NOTES
05/26/22 1345   Restrictions/Precautions   Restrictions/Precautions Weight Bearing; Fall Risk   Lower Extremity Weight Bearing Restrictions   Right Lower Extremity Weight Bearing Partial Weight Bearing   Partial Weight Bearing Percentage Or Pounds 50%   Left Lower Extremity Weight Bearing Weight Bearing As Tolerated   Propulsion 1   Propulsion Manual   Level Level Tile   Method RUE;ROSA   Level of Assistance Supervision;Stand by assistance   Description/ Details patient able to propel without break from room to therapy gym   Distance 200 FTx 2   PT Exercises   Exercise Treatment seated BLE AAROM hip flexion, knee extension, manual resistance hip adduction, abduction, ankle pumps x15,  2 sets   Activity Tolerance   Activity Tolerance Patient tolerated treatment well   Assessment   Assessment patient tolerated treatment well this afternoon, he presents with tightness in his anterior hip and thigh, continnues to have decreased strength and ROM   Safety Devices   Type of Devices Left in chair;Chair alarm in place   PT Individual Minutes   Time In 1345   Time Out 1430   Minutes 45     Electronically signed by SESAR Kirk on 5/26/2022 at 3:30 PM

## 2022-05-26 NOTE — PLAN OF CARE
Problem: Discharge Planning  Goal: Discharge to home or other facility with appropriate resources  Outcome: Progressing     Problem: Safety - Adult  Goal: Free from fall injury  Outcome: Progressing     Problem: Pain  Goal: Verbalizes/displays adequate comfort level or baseline comfort level  Outcome: Progressing     Problem: ABCDS Injury Assessment  Goal: Absence of physical injury  Outcome: Progressing     Problem: Nutrition Deficit:  Goal: Optimize nutritional status  Outcome: Progressing

## 2022-05-26 NOTE — PLAN OF CARE
Problem: Discharge Planning  Goal: Discharge to home or other facility with appropriate resources  Outcome: Progressing  Flowsheets  Taken 5/25/2022 1922 by Don Catherine RN  Discharge to home or other facility with appropriate resources: Refer to discharge planning if patient needs post-hospital services based on physician order or complex needs related to functional status, cognitive ability or social support system  Taken 5/25/2022 1823 by Ro Martel RN  Discharge to home or other facility with appropriate resources: Identify barriers to discharge with patient and caregiver     Problem: Safety - Adult  Goal: Free from fall injury  Outcome: Progressing  Flowsheets (Taken 5/26/2022 0029)  Free From Fall Injury: Instruct family/caregiver on patient safety     Problem: Pain  Goal: Verbalizes/displays adequate comfort level or baseline comfort level  Outcome: Progressing     Problem: ABCDS Injury Assessment  Goal: Absence of physical injury  Outcome: Progressing  Flowsheets (Taken 5/26/2022 0029)  Absence of Physical Injury: Implement safety measures based on patient assessment

## 2022-05-26 NOTE — PROGRESS NOTES
Comprehensive Nutrition Assessment    Type and Reason for Visit:  Initial,Positive Nutrition Screen    Nutrition Recommendations/Plan:   1. Ensure Enlive once daily     Malnutrition Assessment:  Malnutrition Status: Moderate malnutrition (05/26/22 1303)    Context:  Chronic Illness     Findings of the 6 clinical characteristics of malnutrition:  Energy Intake:  Mild decrease in energy intake (Comment)  Weight Loss:  Greater than 5% over 1 month     Body Fat Loss:  Mild body fat loss Triceps   Muscle Mass Loss:  No significant muscle mass loss    Fluid Accumulation:  No significant fluid accumulation     Strength:  Not Performed    Nutrition Assessment:    Following for new admission to inpatient rehab. Pt meets criteria for Moderate Malnutrition AEB significant wt loss (5.3%, 13 lbs X 1 month) and mild muscle loss per pt report. Pt states wt loss has been occuring since Jan 2021 d/t bladder CA treatment and decreased appetite/po intake. Pt agreeable to Ensure Enlive ONS once daily. Nutrition Related Findings:    non-pitting BLE edema, glucose 120- pt recieving prednisone Wound Type: None       Current Nutrition Intake & Therapies:    Average Meal Intake: %  Average Supplements Intake: None Ordered  ADULT DIET; Regular    Anthropometric Measures:  Height: 6' 2\" (188 cm)  Ideal Body Weight (IBW): 190 lbs (86 kg)    Admission Body Weight: 232 lb (105.2 kg) (Bed scale)  Current Body Weight: 232 lb (105.2 kg),   IBW. Weight Source: Bed Scale  Current BMI (kg/m2): 29.8  Usual Body Weight: 295 lb (133.8 kg) (1/2021)  % Weight Change (Calculated): -21.4                    BMI Categories: Overweight (BMI 25.0-29. 9)    Estimated Daily Nutrient Needs:  Energy Requirements Based On: Kcal/kg  Weight Used for Energy Requirements: Current (25-30 kcals/kg)  Energy (kcal/day): 7108-8645 kcals/day  Weight Used for Protein Requirements: Ideal (1.8-2.2 g/kg)  Protein (g/day): 155-189 g/pro/day  Method Used for Fluid Requirements: 1 ml/kcal  Fluid (ml/day): 9312-2641 mL/fluid/day    Nutrition Diagnosis:   · Moderate malnutrition,In context of chronic illness related to catabolic illness as evidenced by poor intake prior to admission,weight loss greater than or equal to 5% in 1 month,mild muscle loss    Nutrition Interventions:   Food and/or Nutrient Delivery: Continue Current Diet,Start Oral Nutrition Supplement  Nutrition Education/Counseling: Education not indicated  Coordination of Nutrition Care: Continue to monitor while inpatient  Plan of Care discussed with: Patient and Spouse    Goals:     Goals: PO intake 75% or greater,by next RD assessment       Nutrition Monitoring and Evaluation:   Behavioral-Environmental Outcomes: None Identified  Food/Nutrient Intake Outcomes: Food and Nutrient Intake,Supplement Intake  Physical Signs/Symptoms Outcomes: Biochemical Data,Nutrition Focused Physical Findings,Weight,Fluid Status or Edema    Discharge Planning:     Too soon to determine     Amy Jc MS, RD, LD  Contact: [phone]

## 2022-05-26 NOTE — PROGRESS NOTES
Occupational Therapy  Facility/Department: [facility] 8 REHAB UNIT  Occupational Therapy Treatment Note     Name: Jeremie Harvey  : 1950  MRN: [id number]  Date of Service: 2022    Discharge Recommendations:  Home with Home health OT          Patient Diagnosis(es): There were no encounter diagnoses. Past Medical History:  has a past medical history of Allergic rhinitis, Cancer ([address] Chronic kidney disease, Colonic polyp, DDD (degenerative disc disease), Hyperlipidemia, Hypertension, and Other disorders of kidney and ureter in diseases classified elsewhere. Past Surgical History:  has a past surgical history that includes Colonoscopy (11/3/2004); back surgery; Upper gastrointestinal endoscopy (N/A, 9/3/2020); Total hip arthroplasty (Bilateral); and fracture surgery. Treatment Diagnosis: Periprosthetic fracture around internal prosthetic right hip joint; non-surgerical      Assessment    Decreased functional mobility ; Decreased ADL status; Decreased ROM; Decreased strength; Decreased balance; Decreased high-level IADLs    Patient requires increased assistance with ADLs due decreased ability to complete sit>stands without assistance. He requires skilled OT to address the above deficits and return the pt home independently.            Plan   Plan  Current Treatment Recommendations: Strengthening,Balance training,ROM,Functional mobility training,Wheelchair mobility training,Endurance training,Neuromuscular re-education,Equipment evaluation, education, & procurement,Patient/Caregiver education & training,Safety education & training,Self-Care / ADL,Home management training     Restrictions  Restrictions/Precautions  Restrictions/Precautions: Weight Bearing,Fall Risk  Lower Extremity Weight Bearing Restrictions  Right Lower Extremity Weight Bearing: Partial Weight Bearing  Partial Weight Bearing Percentage Or Pounds: 50%  Left Lower Extremity Weight Bearing: Weight Bearing As Tolerated    Subjective   General  Patient assessed for rehabilitation services?: Yes     Social/Functional History  Social/Functional History  Lives With: Spouse  Type of Home: House  Home Layout: Multi-level  Home Access: Stairs to enter without rails  Bathroom Shower/Tub: Tub/Shower unit,Walk-in shower  Bathroom Accessibility: Walker accessible (1/2 bathroom downstairs is walker accessible, w/c accessible for upstairs bathroom)  Home Equipment: Rollator,Walker, rolling  Has the patient had two or more falls in the past year or any fall with injury in the past year?: No  ADL Assistance: Independent  Homemaking Assistance: Independent  Ambulation Assistance: Independent  Transfer Assistance: Independent  Active : Yes  Mode of Transportation: Truck  Occupation: Full time employment  Type of Occupation: farm store owner  [address]: Used to play tennis and basketball, mow and bush hog, farming, Go to [place] every year  IADL Comments: Grilling; wife does all homemaking tasks.        Objective   Heart Rate: 72  Heart Rate Source: Monitor  BP: 124/86  BP Location: Right upper arm  MAP (Calculated): 98.67  Resp: 18  SpO2: 98 %  O2 Device: None (Room air)  Vision Exceptions: Wears glasses for reading  Hearing: Within functional limits          Safety Devices  Type of Devices: Left in chair;Chair alarm in place                 Activity Tolerance  Activity Tolerance: Patient tolerated treatment well           Cognition  Overall Cognitive Status: WNL          05/26/22 0900   Balance   Sitting Balance Stand by assistance   Standing Balance Moderate assistance      05/26/22 0900   Transfers   Sit to stand Maximum assistance   Stand to sit Moderate assistance   Transfer Comments elevated bed>w/c using RW   Toilet Transfers   Toilet - Technique Stand step   Equipment Used Grab bars  (with BSC over toilet)   Toilet Transfer Moderate assistance;2 Person assistance                  LUE AROM (degrees)  LUE AROM : Exceptions  L Shoulder Flexion 0-180: 40  L Shoulder ABduction 0-180: 40  Left Hand AROM (degrees)  Left Hand AROM: WFL  RUE AROM (degrees)  RUE AROM : WFL  Right Hand AROM (degrees)  Right Hand AROM: WFL            OutComes Score    Eating  Assistance Needed: Setup or clean-up assistance  CARE Score: 5  Discharge Goal: Independent    Oral Hygiene  Assistance Needed: Setup or clean-up assistance  CARE Score: 5  Discharge Goal: [address] needed: Dependent  CARE Score: 1  Discharge Goal: Independent     Shower/Bathe Self  Assistance Needed: Partial/moderate assistance  CARE Score: 3  Discharge Goal: Independent     Upper Body Dressing  Assistance Needed: Setup or clean-up assistance  CARE Score: 5  Discharge Goal: Independent     Lower Body Dressing  Assistance Needed: Dependent  CARE Score: 1  Discharge Goal: Independent     Putting On/Taking Off Footwear  Assistance Needed: Dependent  CARE Score: 1  Discharge Goal: Independent     Toilet Transfer  Assistance needed: Dependent  Comment: Mod A x2  CARE Score: 1  Discharge Goal: Independent     Picking Up Object  Reason if not Attempted: Not attempted due to medical condition or safety concerns  CARE Score: 88  Discharge Goal: Supervision or touching assistance          Goals  Short Term Goals  Time Frame for Short term goals: 2 weeks  Short Term Goal 1: Mod A with clothing management/hygiene for toileting. Short Term Goal 2: CGA with toilet transfers. Short Term Goal 3: Min A with bathing hgyiene. Short Term Goal 4: Min A with LB dressing using AE. Short Term Goal 5: Min A with donning/doffing socks and shoes using AE. Short Term Goal 6: Patient will complete 1-2 handed static standing task for 3 minutes, requiring CGA. Long Term Goals  Time Frame for Long term goals : 3-4 weeks  Long Term Goal 1: Modified independent with toileting and toilet transfers. Long Term Goal 2: Modified independent with bathing hygiene.   Long Term Goal 3: Modified independent with overall dressing. Long Term Goal 4: Patient verbalize DME nees. Long Term Goal 5: Independent with HEP. Patient Goals   Patient goals : Patient wants to be able to walk up and down his two flights of stairs in his home.        Therapy Time   Individual Concurrent Group Co-treatment   Time In 0900         Time Out 1045         Minutes 105         Timed Code Treatment Minutes: [address], OT

## 2022-05-26 NOTE — H&P
[facility]   History and Physical        Patient:   Sharla Junior  MR#:    [id number]  Account Number:                   [id number]      Room:    26/[facility]   YOB: 1950  Date of Progress Note: 5/26/2022  Time of Note                           8:39 AM  Attending Physician:  Candelario Dockery MD        Admitting diagnosis:Periprosthetic fracture around internal prosthetic right hip joint, initial encounter    Secondary diagnoses:Essential (primary) hypertension,Chronic kidney disease, stage 3a,Obesity, unspecified,Pure hypercholesterolemia, unspecified,Malignant neoplasm of prostate,Personal history of COVID-19,Unilateral primary osteoarthritis, left knee,Anemia in chronic kidney disease,Effusion, left knee    CHIEF COMPLAINT: Periprosthetic fracture right hip/nonsurgical      HISTORY OF PRESENT ILLNESS:   This 81 y.o. male with history of arthritis , prostate cancer, cellulitis, gout, HTN, IFG (impaired fasting glucose), and COVID 19. He presented to [facility] ER on 5/21/22 after having a fall at home. X-rays done revealed a right periprosthetic hip fracture. He was discharged home with plans for f/u with ortho as outpatient. He return to ER on 5/22/22 with increased pain and swelling of right hip and left knee. Left knee x-ray done showed Tricompartmental osteoarthrosis of the left knee with a moderate joint effusion. CT of pelvis done showed Mildly displaced intertrochanteric fracture of the right hip. The fracture is periprosthetic in location with exposure of the proximal intertrochanteric component of the prosthesis. The femoral head prosthesis remains well located within the acetabulum. He was admitted to his PCP Dr. Bella Stanton for pain control. Consult for orthopedic surgery. He was seen by Dr. Amy Cortes, who recommended non-op treatment. He will be partial weight bearing <50% on his right lower extremity, ok to ambulate with rolling walker.  Dr. Amy Cortes aspirated patient's left knee and gave an injection, it was felt the pain/swelling was d/t overuse. He will be weightbearing as tolerated on his left lower extremity. Switching from Lovenox to Xarelto for DVT prophylaxis. Patient has been hyperglycemic throughout his acute care stay with history of IFG. No current hemoglobin A1C. Patient still c/o of pain at 5/10 being managed with PO pain medications. He is participating in both PT/OT. He is felt to need a stay on Rehab to work towards his goal of returning home with his wife. He is now felt ready to start the Rehab program.    REVIEW OF SYSTEMS:  Constitutional  No fever or chills. No diaphoresis or significant fatigue. HENT   No tinnitus or significant hearing loss. Eyes  no sudden vision change or eye pain  Respiratory  no significant shortness of breath or cough  Cardiovascular  no chest pain No palpitations or significant leg swelling  Gastrointestinal  no abdominal swelling or pain. Genitourinary  No difficulty urinating, dysuria  Musculoskeletal  no back pain or myalgia. Skin  no color change or rash  Neurologic  No seizures. No lateralizing weakness. Hematologic  no easy bruising or excessive bleeding. Psychiatric  no severe anxiety or nervousness. All other review of systems are negative.       Past Medical History:      Diagnosis Date    Allergic rhinitis     [address] [address]     Chronic kidney disease     Colonic polyp     DDD (degenerative disc disease)     Hyperlipidemia     Hypertension     Other disorders of kidney and ureter in diseases classified elsewhere        Past Surgical History:      Procedure Laterality Date    BACK SURGERY      COLONOSCOPY  11/3/2004        FRACTURE SURGERY      TOTAL HIP ARTHROPLASTY Bilateral     UPPER GASTROINTESTINAL ENDOSCOPY N/A 9/3/2020    Dr RAMSES Olivarez-w/EUS w/fna-Very difficult to identify the mass in question on CT scan-Benign       Medications in Hospital:      Current Facility-Administered Medications:     rivaroxaban (Rema Oscar) tablet 10 mg, 10 mg, Oral, Daily, Valarie Marie MD    allopurinol (ZYLOPRIM) tablet 300 mg, 300 mg, Oral, Daily, Valarie Marie MD, 300 mg at 05/26/22 0745    colchicine (COLCRYS) tablet 0.6 mg, 0.6 mg, Oral, BID PRN, Valarie Marie MD    diphenhydrAMINE (BENADRYL) tablet 25 mg, 25 mg, Oral, Q6H PRN, Valarie Marie MD    HYDROcodone-acetaminophen (NORCO) 5-325 MG per tablet 1 tablet, 1 tablet, Oral, Q6H PRN, Valarie Marie MD, 1 tablet at 05/26/22 0745    predniSONE (DELTASONE) tablet 5 mg, 5 mg, Oral, Daily, Valarie Marie MD, 5 mg at 05/26/22 0745    tamsulosin (FLOMAX) capsule 0.4 mg, 0.4 mg, Oral, Daily, Valarie Marie MD    torsemide (DEMADEX) tablet 10 mg, 10 mg, Oral, Daily, Valarie Marie MD, 10 mg at 05/26/22 0745    acetaminophen (TYLENOL) tablet 650 mg, 650 mg, Oral, Q4H PRN, Valarie Marie MD    polyethylene glycol (GLYCOLAX) packet 17 g, 17 g, Oral, Daily PRN, Valarie Marie MD    Allergies:  Niacin    Social History:   TOBACCO:   reports that he has never smoked. He has never used smokeless tobacco.  ETOH:   reports no history of alcohol use. Family History:       Problem Relation Age of Onset    Stroke Mother     Deep Vein Thrombosis Mother     Pacemaker Father     Heart Disease Father            Physical Exam:    Vitals: /86   Pulse 72   Temp (!) 96.3 °F (35.7 °C) (Temporal)   Resp 18   Ht 6' 2\" (1.88 m)   Wt 232 lb 6.4 oz (105.4 kg)   SpO2 98%   BMI 29.84 kg/m²     Constitutional  well developed, well nourished.     Eyes  conjunctiva normal.  Pupils not tested  Ear, nose, throat -hearing intact to finger rub No scars, masses, or lesions over external nose or ears, no atrophy of tongue  Neck-symmetric, no masses noted, no jugular vein distension  Respiration- chest wall appears symmetric, good expansion,   normal effort without use of accessory muscles  Musculoskeletal  no significant wasting of muscles noted, no bony deformities  Extremities-no clubbing, cyanosis or edema  Skin  warm, dry, and intact. No rash, erythema, or pallor. Psychiatric  mood, affect, and behavior appear normal.      Neurological exam  Awake, alert, fluent oriented x 3 appropriate affect  Attention and concentration appear appropriate  Recent and remote memory appears unremarkable  Speech normal without dysarthria  No clear issues with language of fund of knowledge    Cranial Nerve Exam   CN II- Visual fields grossly unremarkable  CN III, IV,VI-EOMI, No nystagmus, conjugate eye movements, no ptosis  CN V-sensation intact to LT over face  CN VII-no facial assymetry  CN VIII-Hearing intact to finger rub  CN IX and X- Palate not tested  CN XI-not test shoulder shrug  CN XII-Tongue midline with no fasciculations or fibrillations    Motor Exam  Limited elevation in the shoulders good  strength   Antigravity with resistance left lower extremity antigravity  Limited antigravity right proximal lower extremity able to dorsiflex and plantarflex right foot      Sensory Exam  Sensation intact to light touch and temperature upper and lower extremities bilaterally    Reflexes   Not tested    Tremors- no tremors in hands or head noted    Gait  Not tested    Coordination  Finger to nose-unremarkable        CBC:   Recent Labs     05/26/22 0223   WBC 5.8   HGB 10.0*          BMP:    Recent Labs     05/26/22 0223      K 4.1      CO2 26   BUN 32*   CREATININE 1.1   GLUCOSE 120*       Hepatic:   Recent Labs     05/26/22 0223   AST 13   ALT 13   BILITOT <0.2   ALKPHOS 113       Lipids: No results for input(s): CHOL, HDL in the last 72 hours. Invalid input(s): LDLCALCU    INR:   Recent Labs     05/25/22  1854   INR 1.12           Assessment and Plan     1. Status post right periprosthetic fracture/non surgicalpain control/mobilization  2. History of goutallopurinol/colchicine as needed  3. DVT prophylaxis Xarelto  4. BPH/history of prostate cancerFlomax  5.   Hypertensionon medicine monitor  6. GIbowel regimen  7. Pain controlNorco as needed  8. Arthritis monitor   9. Prednisone patient indicates is taken for some connective tissue disorder to avoid his ureter from closing off-has a diagnosis of retroperitoneal fibrosis  10. PT/OT      Patient's functional assessment  Prior to hospitalization the patient was continent of bowel and continent of bladder    Current therapy  Requires PT, OT and/or speech therapy    Anticipated discharge approximately 13 days    Functional assessment  All notes from reehab data were reviewed regarding the patient's functional status. Anticipated discharge setting  Home with home care    No clear barriers to discharge    The patient appears to be an appropriate candidate for inpatient rehabilitation    Sufficiently stable: Patient's condition is sufficiently stable at the time of admission to allow the patient to actively participate in an intensive rehabilitation program    Close medical supervision: A rehabilitation physician, or other licensed treating physician with specialized training and experience in inpatient rehabilitation, will conduct face-to-face visits with the patient a minimum of at least 3 days per week throughout the patient's stay    This patient requires close medical supervision for the active management of the ongoing conditions and potential complications stated in the admission note    Intensive rehabilitation nursing: The patient demonstrates the need for 24-hour rehabilitation nursing care for active management of the multiple medical issues documented in the admission note    Appropriate therapy needed: The patient requires the active and ongoing therapeutic intervention of at least 2 therapeutic disciplines, one of which must be physical or occupational therapy and/or speech therapy    Intensive therapy:  The patient requires and is reasonably expected to actively participate in at least 3 hours of therapy per day at least 5 days per week, and expected to make measurable improvements that will be of practical value to improve the patient's functional capacity or adaptation to impairments. In addition, therapy treatments will begin within 36 hours from midnight of the day of the patient's admission to the inpatient rehabilitation facility    Expected duration and frequency therapy: 180 minutes per day, 5 days per week    Interdisciplinary team: The patient demonstrates the need for an interdisciplinary team for active management of the following medical issues including ataxia, motor planning, balance, disease management, elimination, endurance, family training, education, independent ADLs, pain management, precautions, range of motion, safety, strength, and transfers    I have reviewed the preadmission screening documents and concur with the findings. I believe the patient meets criteria and is sufficiently stable to allow participation in the program. This requires an intensive level of therapy, close medical supervision, and an interdisciplinary team approach provided through an individualized plan of care. I approve admitting this patient for an intensive inpatient rehabilitation program.    Please feel free to call with any questions   [phone] (cell phone)    Dr Tom Palacios certified in Neurology  Board Certified in Clinical Neurophysiology  Fellowship Trained in [place] [address] Neurophysiology      EMR Dragon/transcription disclaimer:Significant part of this  encounter note is electronic transcription/translation of spoken language to printed text. The electronic translation of spoken language may be erroneous, or at times, nonsensical words or phrases may be inadvertently transcribed.  Although I have reviewed the note for such errors, some may still exist.

## 2022-05-27 PROCEDURE — 97535 SELF CARE MNGMENT TRAINING: CPT

## 2022-05-27 PROCEDURE — 97110 THERAPEUTIC EXERCISES: CPT

## 2022-05-27 PROCEDURE — 6370000000 HC RX 637 (ALT 250 FOR IP): Performed by: PSYCHIATRY & NEUROLOGY

## 2022-05-27 PROCEDURE — 97116 GAIT TRAINING THERAPY: CPT

## 2022-05-27 PROCEDURE — 1180000000 HC REHAB R&B

## 2022-05-27 PROCEDURE — 97530 THERAPEUTIC ACTIVITIES: CPT

## 2022-05-27 PROCEDURE — 99233 SBSQ HOSP IP/OBS HIGH 50: CPT | Performed by: PSYCHIATRY & NEUROLOGY

## 2022-05-27 RX ADMIN — RIVAROXABAN 10 MG: 10 TABLET, FILM COATED ORAL at 17:06

## 2022-05-27 RX ADMIN — HYDROCODONE BITARTRATE AND ACETAMINOPHEN 1 TABLET: 5; 325 TABLET ORAL at 12:05

## 2022-05-27 RX ADMIN — TORSEMIDE 10 MG: 10 TABLET ORAL at 07:40

## 2022-05-27 RX ADMIN — HYDROCODONE BITARTRATE AND ACETAMINOPHEN 1 TABLET: 5; 325 TABLET ORAL at 22:36

## 2022-05-27 RX ADMIN — ALLOPURINOL 300 MG: 300 TABLET ORAL at 07:40

## 2022-05-27 RX ADMIN — POLYETHYLENE GLYCOL 3350 17 G: 17 POWDER, FOR SOLUTION ORAL at 22:36

## 2022-05-27 RX ADMIN — PREDNISONE 5 MG: 5 TABLET ORAL at 07:40

## 2022-05-27 ASSESSMENT — PAIN DESCRIPTION - LOCATION
LOCATION: KNEE
LOCATION: KNEE

## 2022-05-27 ASSESSMENT — PAIN SCALES - GENERAL
PAINLEVEL_OUTOF10: 4
PAINLEVEL_OUTOF10: 2
PAINLEVEL_OUTOF10: 2
PAINLEVEL_OUTOF10: 0

## 2022-05-27 ASSESSMENT — PAIN DESCRIPTION - DESCRIPTORS
DESCRIPTORS: ACHING
DESCRIPTORS: ACHING

## 2022-05-27 ASSESSMENT — PAIN DESCRIPTION - ORIENTATION
ORIENTATION: LEFT
ORIENTATION: LEFT

## 2022-05-27 NOTE — PROGRESS NOTES
Name: Emma Gray  MRN: [id number]  Date of Service:  5/27/2022 05/27/22 1100   Restrictions/Precautions   Restrictions/Precautions Weight Bearing; Fall Risk   Lower Extremity Weight Bearing Restrictions   Right Lower Extremity Weight Bearing Partial Weight Bearing   Partial Weight Bearing Percentage Or Pounds 50%   Left Lower Extremity Weight Bearing Weight Bearing As Tolerated   General   Chart Reviewed Yes   Patient assessed for rehabilitation services? Yes   Additional Pertinent Hx arthritis, prostate cancer, cellulitis, gout, HTN, IFG, COVID 19, back surgery, B THR,    Diagnosis s/p right periproshetic hip fracture; non-op   Transfers   Sit to Stand Minimal Assistance; Moderate Assistance  (In //)   Stand to sit Minimal Assistance  (In //)   Ambulation   WB Status R: PWB >50%, L: WBAT   Ambulation   Surface level tile   Device Parallel Bars   Other Apparatus Wheelchair follow   Assistance Contact guard assistance;Minimal assistance   Quality of Gait patient maintained PWB, he followed a 3 point gait pattern, patient had a tendency to keep  L foot in plantarflexion during stance phase and would not acheive full neutral ankle, had difficulty advancing RLE today- PTA assist at times    Distance 12 FT   Comments PATIENT PUT APPROX 20# FORCE THROUGH RIGHT LE DURING STANCE   PT Exercises   Exercise Treatment Sitting in w/c, PTA performed manual lymph drainage/STM R calf fpr ~2-3 min. Sitting RLE ther-ex: AROM DF/PF X 20, PROM hip flexion X 10   Assessment   Assessment Pt presents with increased difficulty this tx, reports he had not taken pain pill in a while- nurse notified post tx. Pt had difficulty advancing RLE in //, required PTA assist intermittently. Pt also presents with R calf swelling/tightness, able to tolerate RLE ther-ex with min facial grimacing- slow movement of leg. Plan to progress towards stair training when appropriate.     Discharge Recommendations Continue to assess pending progress;Home with Home health PT;24 hour supervision or assist   Safety Devices   Type of Devices Patient at risk for falls;Gait belt;Left in chair;Chair alarm in place;Call light within reach         Electronically signed by Yamel Kwogn PTA on 5/27/2022 at 12:47 PM

## 2022-05-27 NOTE — PROGRESS NOTES
Patient:   Evonne Villa  MR#:    [id number]   Room:    2367/722-82   YOB: 1950  Date of Progress Note: 5/27/2022  Time of Note                           7:44 AM  Consulting Physician:   Shanita Oconnor M.D. Attending Physician:  Shanita Oconnor MD     Chief complaint Periprosthetic fracture right hip/nonsurgical    S:This 81 y.o. male  with history of arthritis , prostate cancer, cellulitis, gout, HTN, IFG (impaired fasting glucose), and COVID 19. He presented to [facility] ER on 5/21/22 after having a fall at home. X-rays done revealed a right periprosthetic hip fracture. He was discharged home with plans for f/u with ortho as outpatient. He return to ER on 5/22/22 with increased pain and swelling of right hip and left knee. Left knee x-ray done showed Tricompartmental osteoarthrosis of the left knee with a moderate joint effusion. CT of pelvis done showed Mildly displaced intertrochanteric fracture of the right hip. The fracture is periprosthetic in location with exposure of the proximal intertrochanteric component of the prosthesis. The femoral head prosthesis remains well located within the acetabulum. He was admitted to his PCP Dr. Frieda Freeman for pain control. Consult for orthopedic surgery. He was seen by Dr. Jolie Pyle, who recommended non-op treatment. He will be partial weight bearing <50% on his right lower extremity, ok to ambulate with rolling walker. Dr. Jolie Pyle aspirated patient's left knee and gave an injection, it was felt the pain/swelling was d/t overuse. He will be weightbearing as tolerated on his left lower extremity. Switching from Lovenox to Xarelto for DVT prophylaxis. Patient has been hyperglycemic throughout his acute care stay with history of IFG. No current hemoglobin A1C. Patient still c/o of pain at 5/10 being managed with PO pain medications. He is participating in both PT/OT. He is felt to need a stay on Rehab to work towards his goal of returning home with his wife.  He is now felt ready to start the Rehab program.  Feels well this morning. Pain is relatively well controlled.     REVIEW OF SYSTEMS:  Constitutional: No fevers No chills  Neck:No stiffness  Respiratory: No shortness of breath  Cardiovascular: No chest pain No palpitations  Gastrointestinal: No abdominal pain    Genitourinary: No Dysuria  Neurological: No headache, no confusion    Past Medical History:      Diagnosis Date    Allergic rhinitis     Cancer ([address]     Chronic kidney disease     Colonic polyp     DDD (degenerative disc disease)     Hyperlipidemia     Hypertension     Other disorders of kidney and ureter in diseases classified elsewhere        Past Surgical History:      Procedure Laterality Date    BACK SURGERY      COLONOSCOPY  11/3/2004        FRACTURE SURGERY      TOTAL HIP ARTHROPLASTY Bilateral     UPPER GASTROINTESTINAL ENDOSCOPY N/A 9/3/2020    Dr RAMSES Olivarez-w/EUS w/fna-Very difficult to identify the mass in question on CT scan-Benign       Medications in Hospital:      Current Facility-Administered Medications:     rivaroxaban (XARELTO) tablet 10 mg, 10 mg, Oral, Daily, Willy Viramontes MD, 10 mg at 05/26/22 1727    allopurinol (ZYLOPRIM) tablet 300 mg, 300 mg, Oral, Daily, Willy Viramontes MD, 300 mg at 05/27/22 0740    colchicine (COLCRYS) tablet 0.6 mg, 0.6 mg, Oral, BID PRN, Willy Viramontes MD    diphenhydrAMINE (BENADRYL) tablet 25 mg, 25 mg, Oral, Q6H PRN, Willy Viramontes MD    HYDROcodone-acetaminophen (NORCO) 5-325 MG per tablet 1 tablet, 1 tablet, Oral, Q6H PRN, Willy Viramontes MD, 1 tablet at 05/26/22 2030    predniSONE (DELTASONE) tablet 5 mg, 5 mg, Oral, Daily, Willy Viramontes MD, 5 mg at 05/27/22 0740    tamsulosin (FLOMAX) capsule 0.4 mg, 0.4 mg, Oral, Daily, Willy Viramontes MD    torsemide (DEMADEX) tablet 10 mg, 10 mg, Oral, Daily, Willy Viramontes MD, 10 mg at 05/27/22 0740    acetaminophen (TYLENOL) tablet 650 mg, 650 mg, Oral, Q4H PRN, Willy Viramontes MD    polyethylene glycol (GLYCOLAX) packet 17 g, 17 g, Oral, Daily PRN, Himanshu Lawton MD    Allergies:  Niacin    Social History:   TOBACCO:   reports that he has never smoked. He has never used smokeless tobacco.  ETOH:   reports no history of alcohol use. Family History:       Problem Relation Age of Onset    Stroke Mother     Deep Vein Thrombosis Mother     Pacemaker Father     Heart Disease Father          PHYSICAL EXAM:  BP (!) 124/91   Pulse 67   Temp (!) 96.6 °F (35.9 °C) (Temporal)   Resp 20   Ht 6' 2\" (1.88 m)   Wt 232 lb 6.4 oz (105.4 kg)   SpO2 98%   BMI 29.84 kg/m²     Constitutional  well developed, well nourished. Eyes  conjunctiva normal.   Ear, nose, throat - No scars, masses, or lesions over external nose or ears, no atrophy of tongue  Neck-symmetric, no masses noted, no jugular vein distension  Respiration- chest wall appears symmetric, good expansion,   normal effort without use of accessory muscles  Musculoskeletal  no significant wasting of muscles noted, no bony deformities  Extremities-no clubbing, cyanosis or edema  Skin  warm, dry, and intact. No rash, erythema, or pallor. Psychiatric  mood, affect, and behavior appear normal.      Neurological exam  Awake, alert, fluent oriented appropriate affect  Attention and concentration appear appropriate  Recent and remote memory appears unremarkable  Speech normal without dysarthria  No clear issues with language of fund of knowledge     Cranial Nerve Exam     CN III, IV,VI-EOMI, No nystagmus, conjugate eye movements, no ptosis    CN VII-no facial assymetry       Motor Exam  antigravity throughout upper extremities bilaterally      Tremors- no tremors in hands or head noted     Gait  Not tested     Nursing/pcp notes, imaging,labs and vitals reviewed.      PT,OT and/or speech notes reviewed    Lab Results   Component Value Date    WBC 5.8 05/26/2022    HGB 10.0 (L) 05/26/2022    HCT 32.5 (L) 05/26/2022    MCV 87.8 05/26/2022     05/26/2022 Lab Results   Component Value Date     05/26/2022    K 4.1 05/26/2022     05/26/2022    CO2 26 05/26/2022    BUN 32 (H) 05/26/2022    CREATININE 1.1 05/26/2022    GLUCOSE 120 (H) 05/26/2022    CALCIUM 7.8 (L) 05/26/2022    PROT 3.4 (L) 05/26/2022    LABALBU 1.4 (L) 05/26/2022    BILITOT <0.2 05/26/2022    ALKPHOS 113 05/26/2022    AST 13 05/26/2022    ALT 13 05/26/2022    LABGLOM >60 05/26/2022    GFRAA >59 05/26/2022    AGRATIO 1.3 09/02/2020    GLOB 1.9 09/02/2020     Lab Results   Component Value Date    INR 1.12 05/25/2022    PROTIME 14.4 05/25/2022       Liza Petit   Student Physical Therapist   Physical Therapy   Progress Notes       Cosign Needed   Date of Service:  5/26/2022  3:30 PM                 Cosign Needed                Show:Clear all  []Manual[x]Template[]Copied    Added by:  [x]Guy Varma      []Jose Angel for details         05/26/22 1345   Restrictions/Precautions   Restrictions/Precautions Weight Bearing; Fall Risk   Lower Extremity Weight Bearing Restrictions   Right Lower Extremity Weight Bearing Partial Weight Bearing   Partial Weight Bearing Percentage Or Pounds 50%   Left Lower Extremity Weight Bearing Weight Bearing As Tolerated   Propulsion 1   Propulsion Manual   Level Level Tile   Method RUE;MICAHE   Level of Assistance Supervision;Stand by assistance   Description/ Details patient able to propel without break from room to therapy gym   Distance 200 FTx 2   PT Exercises   Exercise Treatment seated BLE AAROM hip flexion, knee extension, manual resistance hip adduction, abduction, ankle pumps x15,  2 sets   Activity Tolerance   Activity Tolerance Patient tolerated treatment well   Assessment   Assessment patient tolerated treatment well this afternoon, he presents with tightness in his anterior hip and thigh, continnues to have decreased strength and ROM   Safety Devices   Type of Devices Left in chair;Chair alarm in place   PT Individual Minutes   Time In 1345   Time Out 1430 Minutes 45      Electronically signed by SESAR Marie on 5/26/2022 at 3:30 PM                           RECORD REVIEW: Previous medical records, medications were reviewed at today's visit    IMPRESSION:   1. Status post right periprosthetic fracture/non surgicalpain control/mobilization  2. History of goutallopurinol/colchicine as needed  3. DVT prophylaxis Xarelto  4. BPH/history of prostate cancerFlomax  5. Hypertensionon medicine monitor-slightly elevated  6. GIbowel regimen  7. Pain controlNorco as needed-comfortable  8. Arthritis monitor   9. Prednisone patient indicates is taken for some connective tissue disorder to avoid his ureter from closing off-has a diagnosis of retroperitoneal fibrosis  10.   PT/OT    Supportive care      Expected duration and frequency therapy: 180 minutes per day, 5 days per week    [address] [facility]  [phone] CELL  Dr Janelle Godoy

## 2022-05-27 NOTE — PROGRESS NOTES
Occupational Therapy  Facility/Department: [facility] 8 REHAB UNIT  Occupational Therapy Treatment Note     Name: Micheline Mcgrath  : 1950  MRN: [id number]  Date of Service: 2022    Discharge Recommendations:  Home with Home health OT          Patient Diagnosis(es): There were no encounter diagnoses. Past Medical History:  has a past medical history of Allergic rhinitis, [address] [address] Chronic kidney disease, Colonic polyp, DDD (degenerative disc disease), Hyperlipidemia, Hypertension, and Other disorders of kidney and ureter in diseases classified elsewhere. Past Surgical History:  has a past surgical history that includes Colonoscopy (11/3/2004); back surgery; Upper gastrointestinal endoscopy (N/A, 9/3/2020); Total hip arthroplasty (Bilateral); and fracture surgery. Treatment Diagnosis: Periprosthetic fracture around internal prosthetic right hip joint; non-surgerical      Assessment   Performance deficits / Impairments: Decreased functional mobility ; Decreased ADL status; Decreased ROM; Decreased strength;Decreased balance;Decreased high-level IADLs  Treatment Diagnosis: Periprosthetic fracture around internal prosthetic right hip joint; non-surgerical  Activity Tolerance  Activity Tolerance: Patient Tolerated treatment well        Plan   Plan  Current Treatment Recommendations: Strengthening,Balance training,ROM,Functional mobility training,Wheelchair mobility training,Endurance training,Neuromuscular re-education,Equipment evaluation, education, & procurement,Patient/Caregiver education & training,Safety education & training,Self-Care / ADL,Home management training     Restrictions  Restrictions/Precautions  Restrictions/Precautions: Weight Bearing,Fall Risk  Lower Extremity Weight Bearing Restrictions  Right Lower Extremity Weight Bearing: Partial Weight Bearing  Partial Weight Bearing Percentage Or Pounds: 50%  Left Lower Extremity Weight Bearing: Weight Bearing As Tolerated    Subjective   General  Patient assessed for rehabilitation services?: Yes     Social/Functional History  Social/Functional History  Lives With: Spouse  Type of Home: House  Home Layout: Multi-level  Home Access: Stairs to enter without rails  Bathroom Shower/Tub: Tub/Shower unit,Walk-in shower  Bathroom Accessibility: Walker accessible (1/2 bathroom downstairs is walker accessible, w/c accessible for upstairs bathroom)  Home Equipment: Rollator,Walker, rolling  Has the patient had two or more falls in the past year or any fall with injury in the past year?: No  ADL Assistance: Independent  Homemaking Assistance: Independent  Ambulation Assistance: Independent  Transfer Assistance: Independent  Active : Yes  Mode of Transportation: Truck  Occupation: Full time employment  Type of Occupation: farm store owner  [address]: Used to play tennis and basketball, mow and bush hog, farming, Go to [place] every year  IADL Comments: Grilling; wife does all homemaking tasks. Objective   Heart Rate: 67  Heart Rate Source: Monitor  BP: (!) 124/91  BP Location: Left upper arm  MAP (Calculated): 102  Resp: 20  SpO2: 98 %  O2 Device: None (Room air)             Safety Devices  Type of Devices: Patient at risk for falls;Gait belt;Left in chair;Chair alarm in place;Call light within reach                       Transfers  Stand Step Transfers: Minimal assistance  Sit to stand:  Moderate assistance  Stand to sit: Minimal assistance  Transfer Comments: elevated bed>w/c using RW          05/27/22 0900   Balance   Sitting Balance Stand by assistance   Standing Balance Minimal assistance         OutComes Score      Putting On/Taking Off Footwear  Assistance Needed: Partial/moderate assistance  Comment: able don/doff socks using AE with VC, Mod A for shoes with shoe horn  CARE Score: 3  Discharge Goal: Independent     Goals  Short Term Goals  Time Frame for Short term goals: 2 weeks  Short Term Goal 1: Mod A with clothing management/hygiene for toileting. Short Term Goal 2: CGA with toilet transfers. Short Term Goal 3: Min A with bathing hgyiene. Short Term Goal 4: Min A with LB dressing using AE. Short Term Goal 5: Min A with donning/doffing socks and shoes using AE. Short Term Goal 6: Patient will complete 1-2 handed static standing task for 3 minutes, requiring CGA. Long Term Goals  Time Frame for Long term goals : 3-4 weeks  Long Term Goal 1: Modified independent with toileting and toilet transfers. Long Term Goal 2: Modified independent with bathing hygiene. Long Term Goal 3: Modified independent with overall dressing. Long Term Goal 4: Patient verbalize DME nees. Long Term Goal 5: Independent with HEP. Patient Goals   Patient goals : Patient wants to be able to walk up and down his two flights of stairs in his home.        Therapy Time   Individual Concurrent Group Co-treatment   Time In 0900         Time Out 1000         Minutes 60         Timed Code Treatment Minutes: 75 Lauri Fitch OT

## 2022-05-27 NOTE — PROGRESS NOTES
Name: Page Stevens  MRN: [id number]  Date of Service:  5/27/2022 05/27/22 1400   Restrictions/Precautions   Restrictions/Precautions Weight Bearing; Fall Risk   Lower Extremity Weight Bearing Restrictions   Right Lower Extremity Weight Bearing Partial Weight Bearing   Partial Weight Bearing Percentage Or Pounds 50%   Left Lower Extremity Weight Bearing Weight Bearing As Tolerated   General   Chart Reviewed Yes   Patient assessed for rehabilitation services? Yes   Additional Pertinent Hx arthritis, prostate cancer, cellulitis, gout, HTN, IFG, COVID 19, back surgery, B THR,    Diagnosis s/p right periproshetic hip fracture; non-op   General Comment   Comments Pt reports he had pain pill around 12-12:30 pm. Pt still reporting fatigue/higher pain this afternoon. Subjective   Subjective Pt sitting in w/c in room, agrees to participate in therapy. Subjective   Pain R hip: 5/10 with movement    Wheelchair Activities   Propulsion Yes   Propulsion 1   Propulsion Manual   Level Level Tile   Method RUE;LUE   Level of Assistance Supervision;Stand by assistance   Distance 200'   PT Exercises   Exercise Treatment Sitting BLE ther-ex: DF/PF X 20, LAQ 2 X 10 AROM LLE/AAROM RLE, knee flexion L/R AROM- with washcloth 2 X 10    Activity Tolerance   Activity Tolerance Patient limited by fatigue;Patient tolerated treatment well;Patient limited by pain   Assessment   Assessment Pt RLE presents with more stiffness with AAROM/AROM vs PROM. Pt presents with increased R hip pain/discomfort vs yesterday, pt reports not taking pain med on time this morning. Plan to perform car transfer next tx, progress towards stairs as appropriate.     Discharge Recommendations Continue to assess pending progress;Home with Home health PT;24 hour supervision or assist   Safety Devices   Type of Devices Patient at risk for falls;Gait belt;Left in chair;Chair alarm in place;Call light within reach         Electronically signed by Guerline Sanders PTA on 5/27/2022 at 3:40 PM

## 2022-05-27 NOTE — PROGRESS NOTES
Occupational Therapy  Facility/Department: [facility] 8 REHAB UNIT  Occupational Therapy Treatment Note     Name: Jaclyn Ferreira  : 1950  MRN: [id number]  Date of Service: 2022    Discharge Recommendations:  Home with Home health OT          Patient Diagnosis(es): There were no encounter diagnoses. Past Medical History:  has a past medical history of Allergic rhinitis, Cancer ([facility] [address] Chronic kidney disease, Colonic polyp, DDD (degenerative disc disease), Hyperlipidemia, Hypertension, and Other disorders of kidney and ureter in diseases classified elsewhere. Past Surgical History:  has a past surgical history that includes Colonoscopy (11/3/2004); back surgery; Upper gastrointestinal endoscopy (N/A, 9/3/2020); Total hip arthroplasty (Bilateral); and fracture surgery. Treatment Diagnosis: Periprosthetic fracture around internal prosthetic right hip joint; non-surgerical      Assessment   Performance deficits / Impairments: Decreased functional mobility ; Decreased ADL status; Decreased ROM; Decreased strength;Decreased balance;Decreased high-level IADLs  Treatment Diagnosis: Periprosthetic fracture around internal prosthetic right hip joint; non-surgerical  Activity Tolerance  Activity Tolerance: Patient Tolerated treatment well        Plan   Plan  Current Treatment Recommendations: Strengthening,Balance training,ROM,Functional mobility training,Wheelchair mobility training,Endurance training,Neuromuscular re-education,Equipment evaluation, education, & procurement,Patient/Caregiver education & training,Safety education & training,Self-Care / ADL,Home management training     Restrictions  Restrictions/Precautions  Restrictions/Precautions: Weight Bearing,Fall Risk  Lower Extremity Weight Bearing Restrictions  Right Lower Extremity Weight Bearing: Partial Weight Bearing  Partial Weight Bearing Percentage Or Pounds: 50%  Left Lower Extremity Weight Bearing: Weight Bearing As Tolerated    Subjective   General  Patient assessed for rehabilitation services?: Yes     Social/Functional History  Social/Functional History  Lives With: Spouse  Type of Home: House  Home Layout: Multi-level  Home Access: Stairs to enter without rails  Bathroom Shower/Tub: Tub/Shower unit,Walk-in shower  Bathroom Accessibility: Walker accessible (1/2 bathroom downstairs is walker accessible, w/c accessible for upstairs bathroom)  Home Equipment: Rollator,Walker, rolling  Has the patient had two or more falls in the past year or any fall with injury in the past year?: No  ADL Assistance: Independent  Homemaking Assistance: Independent  Ambulation Assistance: Independent  Transfer Assistance: Independent  Active : Yes  Mode of Transportation: Truck  Occupation: Full time employment  Type of Occupation: farm store owner  [address]: Used to play tennis and basketball, mow and bush hog, farming, [place] every [place]  IADL Comments: Grilling; wife does all homemaking tasks. Objective   Heart Rate: 67  Heart Rate Source: Monitor  BP: (!) 124/91  BP Location: Left upper arm  MAP (Calculated): 102  Resp: 20  SpO2: 98 %  O2 Device: None (Room air)             Safety Devices  Type of Devices: Left in chair;Call light within reach; Chair alarm in place  Transfers  Stand Step Transfers: Minimal assistance  Sit to stand: Moderate assistance  Stand to sit: Minimal assistance  Transfer Comments: to/from w/c to table top      Exercise Treatment: Rickshaw: RUE: 15#, LUE: 13#: 4 sets of 15, Self ROM for LUE: 2 sets of 10, AROM for BUE while standin sets of 10 in all shoulder planes    Goals  Short Term Goals  Time Frame for Short term goals: 2 weeks  Short Term Goal 1: Mod A with clothing management/hygiene for toileting. Short Term Goal 2: CGA with toilet transfers. Short Term Goal 3: Min A with bathing hgyiene. Short Term Goal 4: Min A with LB dressing using AE.   Short Term Goal 5: Min A with donning/doffing socks and shoes using AE.  Short Term Goal 6: Patient will complete 1-2 handed static standing task for 3 minutes, requiring CGA. Long Term Goals  Time Frame for Long term goals : 3-4 weeks  Long Term Goal 1: Modified independent with toileting and toilet transfers. Long Term Goal 2: Modified independent with bathing hygiene. Long Term Goal 3: Modified independent with overall dressing. Long Term Goal 4: Patient verbalize DME nees. Long Term Goal 5: Independent with HEP. Patient Goals   Patient goals : Patient wants to be able to walk up and down his two flights of stairs in his home.        Therapy Time   Individual Concurrent Group Co-treatment   Time In 1300         Time Out 1345         Minutes 45         Timed Code Treatment Minutes: [address], OT

## 2022-05-28 PROCEDURE — 6370000000 HC RX 637 (ALT 250 FOR IP): Performed by: PSYCHIATRY & NEUROLOGY

## 2022-05-28 PROCEDURE — 97530 THERAPEUTIC ACTIVITIES: CPT

## 2022-05-28 PROCEDURE — 97535 SELF CARE MNGMENT TRAINING: CPT

## 2022-05-28 PROCEDURE — 99232 SBSQ HOSP IP/OBS MODERATE 35: CPT | Performed by: PSYCHIATRY & NEUROLOGY

## 2022-05-28 PROCEDURE — 1180000000 HC REHAB R&B

## 2022-05-28 PROCEDURE — 97116 GAIT TRAINING THERAPY: CPT

## 2022-05-28 PROCEDURE — 97110 THERAPEUTIC EXERCISES: CPT

## 2022-05-28 RX ADMIN — RIVAROXABAN 10 MG: 10 TABLET, FILM COATED ORAL at 17:30

## 2022-05-28 RX ADMIN — PREDNISONE 5 MG: 5 TABLET ORAL at 08:03

## 2022-05-28 RX ADMIN — ALLOPURINOL 300 MG: 300 TABLET ORAL at 08:03

## 2022-05-28 RX ADMIN — HYDROCODONE BITARTRATE AND ACETAMINOPHEN 1 TABLET: 5; 325 TABLET ORAL at 16:54

## 2022-05-28 RX ADMIN — TORSEMIDE 10 MG: 10 TABLET ORAL at 08:03

## 2022-05-28 ASSESSMENT — PAIN DESCRIPTION - ORIENTATION: ORIENTATION: RIGHT

## 2022-05-28 ASSESSMENT — PAIN SCALES - GENERAL
PAINLEVEL_OUTOF10: 0
PAINLEVEL_OUTOF10: 5
PAINLEVEL_OUTOF10: 2

## 2022-05-28 ASSESSMENT — PAIN DESCRIPTION - LOCATION: LOCATION: HIP

## 2022-05-28 ASSESSMENT — PAIN DESCRIPTION - DESCRIPTORS: DESCRIPTORS: ACHING;THROBBING

## 2022-05-28 NOTE — PROGRESS NOTES
05/28/22 0923 05/28/22 0946 05/28/22 0947   Transfers   Sit to Stand  --   --   --    Stand to sit  --   --   --    Ambulation   WB Status R: PWB >50%, L: WBAT  --   --    Ambulation   Surface level tile  --   --    Device Parallel Bars  --   --    Other Apparatus Wheelchair follow  --   --    Assistance Minimal assistance  --   --    Quality of Gait 3-point gait pattern. Keeps R foot plantarflexed during stance phase. Difficulty advancing RLE; required assist to advance at times. --   --    Comments Maintains PWB RLE.  --   --    Wheelchair Activities   Propulsion Yes  --   --    Propulsion 1   Propulsion Manual  --   --    Level Level Tile  --   --    Method RUE;LUE  --   --    Level of Assistance Stand by assistance  --   --    Distance 200'  --   --    Distance 12' x2  --   --    Conditions Requiring Skilled Therapeutic Intervention   Assessment  --   --  Pt. improving with amb. Required less assist with advancing RLE today. Activity Tolerance   Activity Tolerance  --  Patient limited by endurance; Patient limited by pain  --       05/28/22 0955   Transfers   Sit to Stand Minimal Assistance; Moderate Assistance  (Pulling up in parallel bars.)   Stand to sit Minimal Assistance   Ambulation   WB Status  --    Ambulation   Surface  --    Device  --    Other Apparatus  --    Assistance  --    Quality of Gait  --    Comments  --    Wheelchair Activities   Propulsion  --    Propulsion 1   Propulsion  --    Level  --    Method  --    Level of Assistance  --    Distance  --    Distance  --    Conditions Requiring Skilled Therapeutic Intervention   Assessment  --    Activity Tolerance   Activity Tolerance  --    Electronically signed by Miguel Aguilar PTA on 5/28/2022 at 10:28 AM

## 2022-05-28 NOTE — PROGRESS NOTES
accessible for upstairs bathroom)  Home Equipment: Rollator,Walker, rolling  Has the patient had two or more falls in the past year or any fall with injury in the past year?: No  ADL Assistance: Independent  Homemaking Assistance: Independent  Ambulation Assistance: Independent  Transfer Assistance: Independent  Active : Yes  Mode of Transportation: Truck  Occupation: Full time employment  Type of Occupation: farm store owner  Leisure & Hobbies: Used to play tennis and basketball, mow and bush hog, farming, Go to [email or web address] every year  IADL Comments: Grilling; wife does all homemaking tasks. Objective   Heart Rate: 78  Heart Rate Source: Monitor  BP: 100/67  MAP (Calculated): 78  Resp: 16  SpO2: 97 %  O2 Device: None (Room air)                                                                                       G-Code     OutComes Score                                                  AM-PAC Score             Goals  Short Term Goals  Time Frame for Short term goals: 2 weeks  Short Term Goal 1: Mod A with clothing management/hygiene for toileting. Short Term Goal 2: CGA with toilet transfers. Short Term Goal 3: Min A with bathing hgyiene. Short Term Goal 4: Min A with LB dressing using AE. Short Term Goal 5: Min A with donning/doffing socks and shoes using AE. Short Term Goal 6: Patient will complete 1-2 handed static standing task for 3 minutes, requiring CGA. Long Term Goals  Time Frame for Long term goals : 3-4 weeks  Long Term Goal 1: Modified independent with toileting and toilet transfers. Long Term Goal 2: Modified independent with bathing hygiene. Long Term Goal 3: Modified independent with overall dressing. Long Term Goal 4: Patient verbalize DME nees. Long Term Goal 5: Independent with HEP. Patient Goals   Patient goals : Patient wants to be able to walk up and down his two flights of stairs in his home.        Therapy Time   Individual Concurrent Group Co-treatment   Time In 0700 Time Out 0755 (7:55)         Minutes 55         Timed Code Treatment Minutes: [address] Carlos Plasencia, OT

## 2022-05-28 NOTE — PROGRESS NOTES
Patient:   Micheline Mcgrath  MR#:    [id number]   Room:    [facility]/295-   YOB: 1950  Date of Progress Note: 5/28/2022  Time of Note                           10:06 AM  Consulting Physician:   Yeison St M.D. Attending Physician:  Yeison St MD     Chief complaint Periprosthetic fracture right hip/nonsurgical    S:This 81 y.o. male  with history of arthritis , prostate cancer, cellulitis, gout, HTN, IFG (impaired fasting glucose), and COVID 19. He presented to [facility] ER on 5/21/22 after having a fall at home. X-rays done revealed a right periprosthetic hip fracture. He was discharged home with plans for f/u with ortho as outpatient. He return to ER on 5/22/22 with increased pain and swelling of right hip and left knee. Left knee x-ray done showed Tricompartmental osteoarthrosis of the left knee with a moderate joint effusion. CT of pelvis done showed Mildly displaced intertrochanteric fracture of the right hip. The fracture is periprosthetic in location with exposure of the proximal intertrochanteric component of the prosthesis. The femoral head prosthesis remains well located within the acetabulum. He was admitted to his PCP Dr. Srinivas Pereyra for pain control. Consult for orthopedic surgery. He was seen by Dr. Grayson Escobedo, who recommended non-op treatment. He will be partial weight bearing <50% on his right lower extremity, ok to ambulate with rolling walker. Dr. Grayson Escobedo aspirated patient's left knee and gave an injection, it was felt the pain/swelling was d/t overuse. He will be weightbearing as tolerated on his left lower extremity. Switching from Lovenox to Xarelto for DVT prophylaxis. Patient has been hyperglycemic throughout his acute care stay with history of IFG. No current hemoglobin A1C. Patient still c/o of pain at 5/10 being managed with PO pain medications. He is participating in both PT/OT. He is felt to need a stay on Rehab to work towards his goal of returning home with his wife.  He is now felt ready to start the Rehab program.  Feels comfortable. Overall feels well.       REVIEW OF SYSTEMS:  Constitutional: No fevers No chills  Neck:No stiffness  Respiratory: No shortness of breath  Cardiovascular: No chest pain No palpitations  Gastrointestinal: No abdominal pain    Genitourinary: No Dysuria  Neurological: No headache, no confusion    Past Medical History:      Diagnosis Date    Allergic rhinitis     Cancer ([address]     Chronic kidney disease     Colonic polyp     DDD (degenerative disc disease)     Hyperlipidemia     Hypertension     Other disorders of kidney and ureter in diseases classified elsewhere        Past Surgical History:      Procedure Laterality Date    BACK SURGERY      COLONOSCOPY  11/3/2004        FRACTURE SURGERY      TOTAL HIP ARTHROPLASTY Bilateral     UPPER GASTROINTESTINAL ENDOSCOPY N/A 9/3/2020    Dr RAMSES Olivarez-w/EUS w/fna-Very difficult to identify the mass in question on CT scan-Benign       Medications in Hospital:      Current Facility-Administered Medications:     rivaroxaban (XARELTO) tablet 10 mg, 10 mg, Oral, Daily, Yeison St MD, 10 mg at 05/27/22 1706    allopurinol (ZYLOPRIM) tablet 300 mg, 300 mg, Oral, Daily, Yeison St MD, 300 mg at 05/28/22 0803    colchicine (COLCRYS) tablet 0.6 mg, 0.6 mg, Oral, BID PRN, Yeison St MD    diphenhydrAMINE (BENADRYL) tablet 25 mg, 25 mg, Oral, Q6H PRN, Yeison St MD    HYDROcodone-acetaminophen (NORCO) 5-325 MG per tablet 1 tablet, 1 tablet, Oral, Q6H PRN, Yeison St MD, 1 tablet at 05/27/22 2236    predniSONE (DELTASONE) tablet 5 mg, 5 mg, Oral, Daily, Yeison St MD, 5 mg at 05/28/22 0803    tamsulosin (FLOMAX) capsule 0.4 mg, 0.4 mg, Oral, Daily, Yeison St MD    torsemide (DEMADEX) tablet 10 mg, 10 mg, Oral, Daily, Yeison St MD, 10 mg at 05/28/22 0803    acetaminophen (TYLENOL) tablet 650 mg, 650 mg, Oral, Q4H PRN, Yeison St MD    polyethylene glycol (GLYCOLAX) packet 17 g, 17 g, Oral, Daily PRN, Meri Peñaloza MD, 17 g at 05/27/22 5743    Allergies:  Niacin    Social History:   TOBACCO:   reports that he has never smoked. He has never used smokeless tobacco.  ETOH:   reports no history of alcohol use. Family History:       Problem Relation Age of Onset    Stroke Mother     Deep Vein Thrombosis Mother     Pacemaker Father     Heart Disease Father          PHYSICAL EXAM:  /67   Pulse 78   Temp (!) 96.6 °F (35.9 °C) (Temporal)   Resp 16   Ht 6' 2\" (1.88 m)   Wt 232 lb 6.4 oz (105.4 kg)   SpO2 97%   BMI 29.84 kg/m²     Constitutional  well developed, well nourished. Eyes  conjunctiva normal.   Ear, nose, throat - No scars, masses, or lesions over external nose or ears, no atrophy of tongue  Neck-symmetric, no masses noted, no jugular vein distension  Respiration- chest wall appears symmetric, good expansion,   normal effort without use of accessory muscles  Musculoskeletal  no significant wasting of muscles noted, no bony deformities  Extremities-no clubbing, cyanosis or edema  Skin  warm, dry, and intact. No rash, erythema, or pallor. Psychiatric  mood, affect, and behavior appear normal.      Neurological exam  Awake, alert, fluent oriented appropriate affect  Attention and concentration appear appropriate  Recent and remote memory appears unremarkable  Speech normal without dysarthria  No clear issues with language of fund of knowledge     Cranial Nerve Exam     CN III, IV,VI-EOMI, No nystagmus, conjugate eye movements, no ptosis    CN VII-no facial assymetry       Motor Exam  antigravity throughout upper extremities bilaterally      Tremors- no tremors in hands or head noted     Gait  Not tested     Nursing/pcp notes, imaging,labs and vitals reviewed.      PT,OT and/or speech notes reviewed    Lab Results   Component Value Date    WBC 5.8 05/26/2022    HGB 10.0 (L) 05/26/2022    HCT 32.5 (L) 05/26/2022    MCV 87.8 05/26/2022     05/26/2022     Lab Results   Component Value Date     05/26/2022    K 4.1 05/26/2022     05/26/2022    CO2 26 05/26/2022    BUN 32 (H) 05/26/2022    CREATININE 1.1 05/26/2022    GLUCOSE 120 (H) 05/26/2022    CALCIUM 7.8 (L) 05/26/2022    PROT 3.4 (L) 05/26/2022    LABALBU 1.4 (L) 05/26/2022    BILITOT <0.2 05/26/2022    ALKPHOS 113 05/26/2022    AST 13 05/26/2022    ALT 13 05/26/2022    LABGLOM >60 05/26/2022    GFRAA >59 05/26/2022    AGRATIO 1.3 09/02/2020    GLOB 1.9 09/02/2020     Lab Results   Component Value Date    INR 1.12 05/25/2022    PROTIME 14.4 05/25/2022       Annie    Student Physical Therapist   Physical Therapy   Progress Notes       Cosign Needed   Date of Service:  5/26/2022  3:30 PM                 Cosign Needed                Show:Clear all  []Manual[x]Template[]Copied    Added by:  [x]Guy Saez      []Jose Angel for details         05/26/22 1345   Restrictions/Precautions   Restrictions/Precautions Weight Bearing; Fall Risk   Lower Extremity Weight Bearing Restrictions   Right Lower Extremity Weight Bearing Partial Weight Bearing   Partial Weight Bearing Percentage Or Pounds 50%   Left Lower Extremity Weight Bearing Weight Bearing As Tolerated   Propulsion 1   Propulsion Manual   Level Level Tile   Method RUE;LUE   Level of Assistance Supervision;Stand by assistance   Description/ Details patient able to propel without break from room to therapy gym   Distance 200 FTx 2   PT Exercises   Exercise Treatment seated BLE AAROM hip flexion, knee extension, manual resistance hip adduction, abduction, ankle pumps x15,  2 sets   Activity Tolerance   Activity Tolerance Patient tolerated treatment well   Assessment   Assessment patient tolerated treatment well this afternoon, he presents with tightness in his anterior hip and thigh, continnues to have decreased strength and ROM   Safety Devices   Type of Devices Left in chair;Chair alarm in place   PT Individual Minutes   Time In 1345 Time Out 1430   Minutes 45      Electronically signed by Sharlotte Krabbe, SPT on 5/26/2022 at 3:30 PM                           RECORD REVIEW: Previous medical records, medications were reviewed at today's visit    IMPRESSION:   1. Status post right periprosthetic fracture/non surgicalpain control/mobilization  2. History of goutallopurinol/colchicine as needed  3. DVT prophylaxis Xarelto  4. BPH/history of prostate cancerFlomax  5. Hypertensionon medicine monitor-slightly elevated  6. GIbowel regimen  7. Pain controlNorco as needed-comfortable  8. Arthritis monitor   9. Prednisone patient indicates is taken for some connective tissue disorder to avoid his ureter from closing off-has a diagnosis of retroperitoneal fibrosis  10.   PT/OT    Continue supportive care      Expected duration and frequency therapy: 180 minutes per day, 5 days per week    [address] [facility]  Dr Hortencia Wei

## 2022-05-28 NOTE — PROGRESS NOTES
Occupational Therapy  Facility/Department: [facility] 8 REHAB UNIT  Daily Treatment Note    Name: Page Stevens  : 1950  MRN: [id number]  Date of Service: 2022    Discharge Recommendations:  Home with Home health OT          Patient Diagnosis(es): There were no encounter diagnoses. Past Medical History:  has a past medical history of Allergic rhinitis, Cancer ([address] Chronic kidney disease, Colonic polyp, DDD (degenerative disc disease), Hyperlipidemia, Hypertension, and Other disorders of kidney and ureter in diseases classified elsewhere. Past Surgical History:  has a past surgical history that includes Colonoscopy (11/3/2004); back surgery; Upper gastrointestinal endoscopy (N/A, 9/3/2020); Total hip arthroplasty (Bilateral); and fracture surgery.     Treatment Diagnosis: Periprosthetic fracture around internal prosthetic right hip joint; non-surgerical      Assessment   Assessment: Pt. tolerated exercises well           Plan   Plan  Current Treatment Recommendations: Strengthening,Balance training,ROM,Functional mobility training,Wheelchair mobility training,Endurance training,Neuromuscular re-education,Equipment evaluation, education, & procurement,Patient/Caregiver education & training,Safety education & training,Self-Care / ADL,Home management training     Restrictions  Restrictions/Precautions  Restrictions/Precautions: Weight Bearing,Fall Risk  Lower Extremity Weight Bearing Restrictions  Right Lower Extremity Weight Bearing: Partial Weight Bearing  Partial Weight Bearing Percentage Or Pounds: 50%  Left Lower Extremity Weight Bearing: Weight Bearing As Tolerated    Subjective   General  Patient assessed for rehabilitation services?: Yes  Pain: \"stiffness\"  Social/Functional History  Social/Functional History  Lives With: Spouse  Type of Home: House  Home Layout: Multi-level  Home Access: Stairs to enter without rails  Bathroom Shower/Tub: Tub/Shower unit,Walk-in shower  Bathroom Accessibility: Arvil Swisher accessible (1/2 bathroom downstairs is walker accessible, w/c accessible for upstairs bathroom)  Home Equipment: Rollator,Walker, rolling  Has the patient had two or more falls in the past year or any fall with injury in the past year?: No  ADL Assistance: Independent  Homemaking Assistance: Independent  Ambulation Assistance: Independent  Transfer Assistance: Independent  Active : Yes  Mode of Transportation: Truck  Occupation: Full time employment  Type of Occupation: farm store owner  Leisure & Hobbies: Used to play tennis and basketball, mow and bush hog, farming, Go to [employer] every year  IADL Comments: Grilling; wife does all homemaking tasks. Objective   Heart Rate: 78  Heart Rate Source: Monitor  BP: 100/67  MAP (Calculated): 78  Resp: 16  SpO2: 97 %  O2 Device: None (Room air)                               Activity Tolerance  Activity Tolerance: Patient limited by endurance; Patient limited by pain     Transfers  Sit to stand: Moderate assistance                     Exercise Treatment: 2 sets x 10 B elbow extension red theraband, B elbow flex with 2#, B shld flex 2#                           G-Code     OutComes Score                                                  AM-PAC Score             Goals  Short Term Goals  Time Frame for Short term goals: 2 weeks  Short Term Goal 1: Mod A with clothing management/hygiene for toileting. Short Term Goal 2: CGA with toilet transfers. Short Term Goal 3: Min A with bathing hgyiene. Short Term Goal 4: Min A with LB dressing using AE. Short Term Goal 5: Min A with donning/doffing socks and shoes using AE. Short Term Goal 6: Patient will complete 1-2 handed static standing task for 3 minutes, requiring CGA. Long Term Goals  Time Frame for Long term goals : 3-4 weeks  Long Term Goal 1: Modified independent with toileting and toilet transfers. Long Term Goal 2: Modified independent with bathing hygiene.   Long Term Goal 3: Modified independent with overall dressing. Long Term Goal 4: Patient verbalize DME nees. Long Term Goal 5: Independent with HEP. Patient Goals   Patient goals : Patient wants to be able to walk up and down his two flights of stairs in his home.        Therapy Time   Individual Concurrent Group Co-treatment   Time In 1000         Time Out 1035         Minutes 35         Timed Code Treatment Minutes: [address] Janee Linda

## 2022-05-28 NOTE — PLAN OF CARE
Problem: Discharge Planning  Goal: Discharge to home or other facility with appropriate resources  Outcome: Progressing  Flowsheets (Taken 5/28/2022 0808)  Discharge to home or other facility with appropriate resources: Refer to discharge planning if patient needs post-hospital services based on physician order or complex needs related to functional status, cognitive ability or social support system

## 2022-05-28 NOTE — PROGRESS NOTES
05/28/22 1345 05/28/22 1346 05/28/22 1348   Transfers   Sit to Stand Minimal Assistance; Moderate Assistance  (to RW)  --   --    Stand to sit Minimal Assistance  --   --    Bed to Chair Minimal assistance  (with RW)  --   --    Ambulation   WB Status  --  R: PWB >50%, L: WBAT  --    Ambulation   Surface  --  level tile  --    Device  --  Parallel Bars  --    Other Apparatus  --  Wheelchair follow  --    Assistance  --  Minimal assistance  --    Quality of Gait  --  3-point gait pattern. Keeps R foot plantarflexed during stance phase. Stood for a couple minutes first to get R hip more flexible. Able to advance RLE, though inconsistent step length. --    Comments  --  Maintains PWB RLE.  --    Propulsion 1   Distance  --  15'  --    Conditions Requiring Skilled Therapeutic Intervention   Assessment  --   --  Able to amb 12' in parallel bars. Back to bed at end of session. Activity Tolerance   Activity Tolerance  --   --  Patient limited by endurance; Patient limited by pain   Safety Devices   Type of Devices  --   --   --       05/28/22 1349   Transfers   Sit to Stand  --    Stand to sit  --    Bed to Chair  --    Ambulation   WB Status  --    Ambulation   Surface  --    Device  --    Other Apparatus  --    Assistance  --    Quality of Gait  --    Comments  --    Propulsion 1   Distance  --    Conditions Requiring Skilled Therapeutic Intervention   Assessment  --    Activity Tolerance   Activity Tolerance  --    Safety Devices   Type of Devices Call light within reach   Electronically signed by Filemon Rehman PTA on 5/28/2022 at 1:50 PM

## 2022-05-29 PROCEDURE — 99232 SBSQ HOSP IP/OBS MODERATE 35: CPT | Performed by: PSYCHIATRY & NEUROLOGY

## 2022-05-29 PROCEDURE — 1180000000 HC REHAB R&B

## 2022-05-29 PROCEDURE — 6370000000 HC RX 637 (ALT 250 FOR IP): Performed by: PSYCHIATRY & NEUROLOGY

## 2022-05-29 RX ORDER — DOCUSATE SODIUM 100 MG/1
100 CAPSULE, LIQUID FILLED ORAL DAILY
Status: DISCONTINUED | OUTPATIENT
Start: 2022-05-29 | End: 2022-06-08 | Stop reason: HOSPADM

## 2022-05-29 RX ADMIN — RIVAROXABAN 10 MG: 10 TABLET, FILM COATED ORAL at 17:30

## 2022-05-29 RX ADMIN — ALLOPURINOL 300 MG: 300 TABLET ORAL at 07:35

## 2022-05-29 RX ADMIN — HYDROCODONE BITARTRATE AND ACETAMINOPHEN 1 TABLET: 5; 325 TABLET ORAL at 20:47

## 2022-05-29 RX ADMIN — TORSEMIDE 10 MG: 10 TABLET ORAL at 07:35

## 2022-05-29 RX ADMIN — DOCUSATE SODIUM 100 MG: 100 CAPSULE, LIQUID FILLED ORAL at 09:03

## 2022-05-29 RX ADMIN — PREDNISONE 5 MG: 5 TABLET ORAL at 07:35

## 2022-05-29 ASSESSMENT — PAIN DESCRIPTION - ORIENTATION: ORIENTATION: RIGHT

## 2022-05-29 ASSESSMENT — PAIN SCALES - GENERAL
PAINLEVEL_OUTOF10: 0
PAINLEVEL_OUTOF10: 5

## 2022-05-29 ASSESSMENT — PAIN DESCRIPTION - LOCATION: LOCATION: HIP

## 2022-05-29 ASSESSMENT — PAIN DESCRIPTION - DESCRIPTORS: DESCRIPTORS: ACHING

## 2022-05-29 ASSESSMENT — PAIN - FUNCTIONAL ASSESSMENT: PAIN_FUNCTIONAL_ASSESSMENT: ACTIVITIES ARE NOT PREVENTED

## 2022-05-29 NOTE — PROGRESS NOTES
Patient:   Deon Blanchard  MR#:    [id number]   Room:    [address] [place] [address]   YOB: 1950  Date of Progress Note: 5/29/2022  Time of Note                           11:22 AM  Consulting Physician:   Solomon Pro M.D. Attending Physician:  Solomon Pro MD     Chief complaint Periprosthetic fracture right hip/nonsurgical    S:This 81 y.o. male  with history of arthritis , prostate cancer, cellulitis, gout, HTN, IFG (impaired fasting glucose), and COVID 19. He presented to [facility] ER on 5/21/22 after having a fall at home. X-rays done revealed a right periprosthetic hip fracture. He was discharged home with plans for f/u with ortho as outpatient. He return to ER on 5/22/22 with increased pain and swelling of right hip and left knee. Left knee x-ray done showed Tricompartmental osteoarthrosis of the left knee with a moderate joint effusion. CT of pelvis done showed Mildly displaced intertrochanteric fracture of the right hip. The fracture is periprosthetic in location with exposure of the proximal intertrochanteric component of the prosthesis. The femoral head prosthesis remains well located within the acetabulum. He was admitted to his PCP Dr. Latrice Abdi for pain control. Consult for orthopedic surgery. He was seen by Dr. Indira Shearer, who recommended non-op treatment. He will be partial weight bearing <50% on his right lower extremity, ok to ambulate with rolling walker. Dr. Indira Shearer aspirated patient's left knee and gave an injection, it was felt the pain/swelling was d/t overuse. He will be weightbearing as tolerated on his left lower extremity. Switching from Lovenox to Xarelto for DVT prophylaxis. Patient has been hyperglycemic throughout his acute care stay with history of IFG. No current hemoglobin A1C. Patient still c/o of pain at 5/10 being managed with PO pain medications. He is participating in both PT/OT. He is felt to need a stay on Rehab to work towards his goal of returning home with his wife.  He is now felt ready to start the Rehab program.  Feels constipated but otherwise doing well. Pain is relatively well controlled.     REVIEW OF SYSTEMS:  Constitutional: No fevers No chills  Neck:No stiffness  Respiratory: No shortness of breath  Cardiovascular: No chest pain No palpitations  Gastrointestinal: No abdominal pain    Genitourinary: No Dysuria  Neurological: No headache, no confusion    Past Medical History:      Diagnosis Date    Allergic rhinitis     Cancer ([address]     Chronic kidney disease     Colonic polyp     DDD (degenerative disc disease)     Hyperlipidemia     Hypertension     Other disorders of kidney and ureter in diseases classified elsewhere        Past Surgical History:      Procedure Laterality Date    BACK SURGERY      COLONOSCOPY  11/3/2004        FRACTURE SURGERY      TOTAL HIP ARTHROPLASTY Bilateral     UPPER GASTROINTESTINAL ENDOSCOPY N/A 9/3/2020    Dr RAMSES Olivarez-w/EUS w/fna-Very difficult to identify the mass in question on CT scan-Benign       Medications in Hospital:      Current Facility-Administered Medications:     docusate sodium (COLACE) capsule 100 mg, 100 mg, Oral, Daily, Tori Gil MD, 100 mg at 05/29/22 0903    rivaroxaban (XARELTO) tablet 10 mg, 10 mg, Oral, Daily, Tori Gil MD, 10 mg at 05/28/22 1730    allopurinol (ZYLOPRIM) tablet 300 mg, 300 mg, Oral, Daily, Tori Gil MD, 300 mg at 05/29/22 0735    colchicine (COLCRYS) tablet 0.6 mg, 0.6 mg, Oral, BID PRN, Tori Gil MD    diphenhydrAMINE (BENADRYL) tablet 25 mg, 25 mg, Oral, Q6H PRN, Tori Gil MD    HYDROcodone-acetaminophen (NORCO) 5-325 MG per tablet 1 tablet, 1 tablet, Oral, Q6H PRN, Tori Gil MD, 1 tablet at 05/28/22 1654    predniSONE (DELTASONE) tablet 5 mg, 5 mg, Oral, Daily, Tori Gil MD, 5 mg at 05/29/22 0735    tamsulosin (FLOMAX) capsule 0.4 mg, 0.4 mg, Oral, Daily, Tori Gil MD    torsemide (DEMADEX) tablet 10 mg, 10 mg, Oral, Daily, Jakob Escobar, MD, 10 mg at 05/29/22 0735    acetaminophen (TYLENOL) tablet 650 mg, 650 mg, Oral, Q4H PRN, Duy Lu MD    polyethylene glycol (GLYCOLAX) packet 17 g, 17 g, Oral, Daily PRN, Duy Lu MD, 17 g at 05/27/22 4916    Allergies:  Niacin    Social History:   TOBACCO:   reports that he has never smoked. He has never used smokeless tobacco.  ETOH:   reports no history of alcohol use. Family History:       Problem Relation Age of Onset    Stroke Mother     Deep Vein Thrombosis Mother     Pacemaker Father     Heart Disease Father          PHYSICAL EXAM:  /82   Pulse 84   Temp (!) 96.5 °F (35.8 °C) (Temporal)   Resp 18   Ht 6' 2\" (1.88 m)   Wt 227 lb 7 oz (103.2 kg)   SpO2 97%   BMI 29.20 kg/m²     Constitutional  well developed, well nourished. Eyes  conjunctiva normal.   Ear, nose, throat - No scars, masses, or lesions over external nose or ears, no atrophy of tongue  Neck-symmetric, no masses noted, no jugular vein distension  Respiration- chest wall appears symmetric, good expansion,   normal effort without use of accessory muscles  Musculoskeletal  no significant wasting of muscles noted, no bony deformities  Extremities-no clubbing, cyanosis or edema  Skin  warm, dry, and intact. No rash, erythema, or pallor. Psychiatric  mood, affect, and behavior appear normal.      Neurological exam  Awake, alert, fluent oriented appropriate affect  Attention and concentration appear appropriate  Recent and remote memory appears unremarkable  Speech normal without dysarthria  No clear issues with language of fund of knowledge     Cranial Nerve Exam     CN III, IV,VI-EOMI, No nystagmus, conjugate eye movements, no ptosis    CN VII-no facial assymetry       Motor Exam  antigravity throughout upper extremities bilaterally      Tremors- no tremors in hands or head noted     Gait  Not tested     Nursing/pcp notes, imaging,labs and vitals reviewed.      PT,OT and/or speech notes reviewed    Lab Results   Component Value Date    WBC 5.8 05/26/2022    HGB 10.0 (L) 05/26/2022    HCT 32.5 (L) 05/26/2022    MCV 87.8 05/26/2022     05/26/2022     Lab Results   Component Value Date     05/26/2022    K 4.1 05/26/2022     05/26/2022    CO2 26 05/26/2022    BUN 32 (H) 05/26/2022    CREATININE 1.1 05/26/2022    GLUCOSE 120 (H) 05/26/2022    CALCIUM 7.8 (L) 05/26/2022    PROT 3.4 (L) 05/26/2022    LABALBU 1.4 (L) 05/26/2022    BILITOT <0.2 05/26/2022    ALKPHOS 113 05/26/2022    AST 13 05/26/2022    ALT 13 05/26/2022    LABGLOM >60 05/26/2022    GFRAA >59 05/26/2022    AGRATIO 1.3 09/02/2020    GLOB 1.9 09/02/2020     Lab Results   Component Value Date    INR 1.12 05/25/2022    PROTIME 14.4 05/25/2022       Lyndsey Necessary   Student Physical Therapist   Physical Therapy   Progress Notes       Cosign Needed   Date of Service:  5/26/2022  3:30 PM                 Cosign Needed                Show:Clear all  []Manual[x]Template[]Copied    Added by:  [x]Guy Butler      []Hover for details         05/26/22 1345   Restrictions/Precautions   Restrictions/Precautions Weight Bearing; Fall Risk   Lower Extremity Weight Bearing Restrictions   Right Lower Extremity Weight Bearing Partial Weight Bearing   Partial Weight Bearing Percentage Or Pounds 50%   Left Lower Extremity Weight Bearing Weight Bearing As Tolerated   Propulsion 1   Propulsion Manual   Level Level Tile   Method RUE;LUE   Level of Assistance Supervision;Stand by assistance   Description/ Details patient able to propel without break from room to therapy gym   Distance 200 FTx 2   PT Exercises   Exercise Treatment seated BLE AAROM hip flexion, knee extension, manual resistance hip adduction, abduction, ankle pumps x15,  2 sets   Activity Tolerance   Activity Tolerance Patient tolerated treatment well   Assessment   Assessment patient tolerated treatment well this afternoon, he presents with tightness in his anterior hip and thigh, continnues to have decreased strength and ROM   Safety Devices   Type of Devices Left in chair;Chair alarm in place   PT Individual Minutes   Time In 1345   Time Out 1430   Minutes 45      Electronically signed by SESAR Zuniga on 5/26/2022 at 3:30 PM                           RECORD REVIEW: Previous medical records, medications were reviewed at today's visit    IMPRESSION:   1. Status post right periprosthetic fracture/non surgicalpain control/mobilization  2. History of goutallopurinol/colchicine as needed  3. DVT prophylaxis Xarelto  4. BPH/history of prostate cancerFlomax  5. Hypertensionon medicine monitor-slightly elevated  6. GIbowel regimen  7. Pain controlNorco as needed-comfortable  8. Arthritis monitor   9. Prednisone patient indicates is taken for some connective tissue disorder to avoid his ureter from closing off-has a diagnosis of retroperitoneal fibrosis  10.   PT/OT    Continue current care      Expected duration and frequency therapy: 180 minutes per day, 5 days per week    [address] [place] [address]  [phone] CELL  Dr Hilda Norwood

## 2022-05-30 LAB
ALBUMIN SERPL-MCNC: 1.9 G/DL (ref 3.5–5.2)
ALP BLD-CCNC: 131 U/L (ref 40–130)
ALT SERPL-CCNC: 29 U/L (ref 5–41)
ANION GAP SERPL CALCULATED.3IONS-SCNC: 5 MMOL/L (ref 7–19)
AST SERPL-CCNC: 22 U/L (ref 5–40)
BACTERIA FLD CULT: NORMAL
BASOPHILS ABSOLUTE: 0 K/UL (ref 0–0.2)
BASOPHILS RELATIVE PERCENT: 0.5 % (ref 0–1)
BILIRUB SERPL-MCNC: 0.3 MG/DL (ref 0.2–1.2)
BUN BLDV-MCNC: 30 MG/DL (ref 8–23)
CALCIUM SERPL-MCNC: 7.7 MG/DL (ref 8.8–10.2)
CHLORIDE BLD-SCNC: 108 MMOL/L (ref 98–111)
CO2: 28 MMOL/L (ref 22–29)
CREAT SERPL-MCNC: 1.1 MG/DL (ref 0.5–1.2)
EOSINOPHILS ABSOLUTE: 0.2 K/UL (ref 0–0.6)
EOSINOPHILS RELATIVE PERCENT: 2.4 % (ref 0–5)
GFR AFRICAN AMERICAN: >59
GFR NON-AFRICAN AMERICAN: >60
GLUCOSE BLD-MCNC: 93 MG/DL (ref 74–109)
GRAM STN SPEC: NORMAL
GRAM STN SPEC: NORMAL
HCT VFR BLD CALC: 34.8 % (ref 42–52)
HEMOGLOBIN: 10.4 G/DL (ref 14–18)
IMMATURE GRANULOCYTES #: 0.1 K/UL
LYMPHOCYTES ABSOLUTE: 1.3 K/UL (ref 1.1–4.5)
LYMPHOCYTES RELATIVE PERCENT: 19.8 % (ref 20–40)
MCH RBC QN AUTO: 26.7 PG (ref 27–31)
MCHC RBC AUTO-ENTMCNC: 29.9 G/DL (ref 33–37)
MCV RBC AUTO: 89.2 FL (ref 80–94)
MONOCYTES ABSOLUTE: 0.6 K/UL (ref 0–0.9)
MONOCYTES RELATIVE PERCENT: 9.8 % (ref 0–10)
NEUTROPHILS ABSOLUTE: 4.2 K/UL (ref 1.5–7.5)
NEUTROPHILS RELATIVE PERCENT: 66.1 % (ref 50–65)
PDW BLD-RTO: 17.2 % (ref 11.5–14.5)
PLATELET # BLD: 272 K/UL (ref 130–400)
PMV BLD AUTO: 8.6 FL (ref 9.4–12.4)
POTASSIUM REFLEX MAGNESIUM: 4.2 MMOL/L (ref 3.5–5)
RBC # BLD: 3.9 M/UL (ref 4.7–6.1)
SODIUM BLD-SCNC: 141 MMOL/L (ref 136–145)
TOTAL PROTEIN: 3.1 G/DL (ref 6.6–8.7)
WBC # BLD: 6.3 K/UL (ref 4.8–10.8)

## 2022-05-30 PROCEDURE — 97530 THERAPEUTIC ACTIVITIES: CPT

## 2022-05-30 PROCEDURE — 99232 SBSQ HOSP IP/OBS MODERATE 35: CPT | Performed by: PSYCHIATRY & NEUROLOGY

## 2022-05-30 PROCEDURE — 97116 GAIT TRAINING THERAPY: CPT

## 2022-05-30 PROCEDURE — 97535 SELF CARE MNGMENT TRAINING: CPT

## 2022-05-30 PROCEDURE — 85025 COMPLETE CBC W/AUTO DIFF WBC: CPT

## 2022-05-30 PROCEDURE — 1180000000 HC REHAB R&B

## 2022-05-30 PROCEDURE — 6370000000 HC RX 637 (ALT 250 FOR IP): Performed by: PSYCHIATRY & NEUROLOGY

## 2022-05-30 PROCEDURE — 36415 COLL VENOUS BLD VENIPUNCTURE: CPT

## 2022-05-30 PROCEDURE — 80053 COMPREHEN METABOLIC PANEL: CPT

## 2022-05-30 PROCEDURE — 97110 THERAPEUTIC EXERCISES: CPT

## 2022-05-30 RX ADMIN — PREDNISONE 5 MG: 5 TABLET ORAL at 07:36

## 2022-05-30 RX ADMIN — TORSEMIDE 10 MG: 10 TABLET ORAL at 07:36

## 2022-05-30 RX ADMIN — RIVAROXABAN 10 MG: 10 TABLET, FILM COATED ORAL at 17:07

## 2022-05-30 RX ADMIN — ALLOPURINOL 300 MG: 300 TABLET ORAL at 07:36

## 2022-05-30 RX ADMIN — DOCUSATE SODIUM 100 MG: 100 CAPSULE, LIQUID FILLED ORAL at 07:36

## 2022-05-30 RX ADMIN — HYDROCODONE BITARTRATE AND ACETAMINOPHEN 1 TABLET: 5; 325 TABLET ORAL at 07:36

## 2022-05-30 RX ADMIN — HYDROCODONE BITARTRATE AND ACETAMINOPHEN 1 TABLET: 5; 325 TABLET ORAL at 20:50

## 2022-05-30 ASSESSMENT — PAIN DESCRIPTION - ORIENTATION
ORIENTATION: RIGHT
ORIENTATION: RIGHT;OUTER
ORIENTATION: RIGHT

## 2022-05-30 ASSESSMENT — PAIN SCALES - GENERAL
PAINLEVEL_OUTOF10: 3
PAINLEVEL_OUTOF10: 0
PAINLEVEL_OUTOF10: 1
PAINLEVEL_OUTOF10: 2
PAINLEVEL_OUTOF10: 0

## 2022-05-30 ASSESSMENT — PAIN DESCRIPTION - PAIN TYPE: TYPE: ACUTE PAIN

## 2022-05-30 ASSESSMENT — PAIN DESCRIPTION - LOCATION
LOCATION: HIP

## 2022-05-30 ASSESSMENT — PAIN DESCRIPTION - ONSET: ONSET: ON-GOING

## 2022-05-30 ASSESSMENT — PAIN DESCRIPTION - DESCRIPTORS
DESCRIPTORS: ACHING
DESCRIPTORS: ACHING

## 2022-05-30 ASSESSMENT — PAIN DESCRIPTION - DIRECTION: RADIATING_TOWARDS: LEG

## 2022-05-30 ASSESSMENT — PAIN DESCRIPTION - FREQUENCY: FREQUENCY: INTERMITTENT

## 2022-05-30 NOTE — PROGRESS NOTES
Occupational Therapy     05/30/22 1445   Restrictions/Precautions   Restrictions/Precautions Weight Bearing; Fall Risk   Lower Extremity Weight Bearing Restrictions   Right Lower Extremity Weight Bearing Partial Weight Bearing   Partial Weight Bearing Percentage Or Pounds 50%   ADL   LE Dressing Moderate assistance  (with AE)   Balance   Sitting Balance Supervision   Standing Balance Contact guard assistance   Transfers   Sit to stand Minimal assistance   Stand to sit Minimal assistance   Transfer Comments w/c -> recliner   Type of ROM/Therapeutic Exercise   Type of ROM/Therapeutic Exercise [facility]   Assessment   Performance deficits / Impairments Decreased functional mobility ; Decreased ADL status; Decreased ROM; Decreased strength;Decreased balance;Decreased high-level IADLs   Treatment Diagnosis Periprosthetic fracture around internal prosthetic right hip joint; non-surgerical   Timed Code Treatment Minutes 45 Minutes   Activity Tolerance   Activity Tolerance Patient Tolerated treatment well

## 2022-05-30 NOTE — PROGRESS NOTES
05/30/22 1300   Restrictions/Precautions   Restrictions/Precautions Weight Bearing; Fall Risk   Lower Extremity Weight Bearing Restrictions   Right Lower Extremity Weight Bearing Partial Weight Bearing   Partial Weight Bearing Percentage Or Pounds 50%   Left Lower Extremity Weight Bearing Weight Bearing As Tolerated   Subjective   Subjective In room with wife present. Agreeable to therapy. Wife voiced concern regarding Mr. Juana Dockery going up steps to get inside home. Pain Assessment   Pain Level 3   Patient's Stated Pain Goal 0 - No pain   Pain Location Hip   Pain Orientation Right; Outer   Transfers   Sit to Stand Minimal Assistance   Stand to sit Contact guard assistance  (decreased control with descending )   Stand Pivot Transfers Contact guard assistance  (RW)   Propulsion 1   Propulsion Manual   Level Level Tile   Method RUE;LUE   Level of Assistance Stand by assistance   Description/ Details improved endurance and speed   Distance 200' x 2    Exercises   Heelslides AAROM, x 10 with RLE    Gluteal Sets 2 x 10    Hip Abduction AAROM, 2 x 10   (supine on mat table )   Knee Long Arc Quad 2 x 10   (Seated EOB)   Conditions Requiring Skilled Therapeutic Intervention   Body Structures, Functions, Activity Limitations Requiring Skilled Therapeutic Intervention Decreased functional mobility ; Decreased ROM; Decreased strength;Decreased endurance;Decreased balance; Increased pain   Assessment Increased pain this afternoon and did not tolerate supine positioning on mat table. Addressed gentle R hip ROM as the lack of mobility is contributing to his decreased upright posture in standing/ambulation. Plan   Current Treatment Recommendations Strengthening;ROM;Balance training;Functional mobility training;Transfer training;ADL/Self-care training; Endurance training; Wheelchair mobility training;Gait training;Stair training;Home exercise program;Safety education & training;Return to work related activity; Equipment evaluation, education, & procurement;Patient/Caregiver education & training; Therapeutic activities   Safety Devices   Type of Devices Call light within reach; Chair alarm in place;Gait belt;Patient at risk for falls; Left in chair  (in room )

## 2022-05-30 NOTE — PROGRESS NOTES
Occupational Therapy     05/30/22 0815   Restrictions/Precautions   Restrictions/Precautions Weight Bearing; Fall Risk   Lower Extremity Weight Bearing Restrictions   Right Lower Extremity Weight Bearing Partial Weight Bearing   Partial Weight Bearing Percentage Or Pounds 50%   Left Lower Extremity Weight Bearing Weight Bearing As Tolerated   Transfers   Sit to stand Minimal assistance   Stand to sit Minimal assistance   Transfer Comments from 26\" height bed   Wheelchair Bed Transfers   Wheelchair/Bed - Technique Stand step   Equipment Used Other   Level of Asssistance Contact guard assistance   Type of ROM/Therapeutic Exercise   Type of ROM/Therapeutic Exercise Shaun Ashley   Comment 13#    Assessment   Performance deficits / Impairments Decreased functional mobility ; Decreased ADL status; Decreased ROM; Decreased strength;Decreased balance;Decreased high-level IADLs   Treatment Diagnosis Periprosthetic fracture around internal prosthetic right hip joint; non-surgerical   Timed Code Treatment Minutes 45 Minutes   Activity Tolerance   Activity Tolerance Patient Tolerated treatment well

## 2022-05-30 NOTE — PLAN OF CARE
Problem: Discharge Planning  Goal: Discharge to home or other facility with appropriate resources  5/30/2022 1857 by Tri Bennett RN  Outcome: Progressing  5/30/2022 1854 by Tri Bennett RN  Outcome: Progressing  5/30/2022 0828 by Liza Huang RN  Outcome: Progressing  Flowsheets (Taken 5/30/2022 6006)  Discharge to home or other facility with appropriate resources: Identify barriers to discharge with patient and caregiver     Problem: Safety - Adult  Goal: Free from fall injury  5/30/2022 1857 by Tri Bennett RN  Outcome: Progressing  5/30/2022 1854 by Tri Bennett RN  Outcome: Progressing  5/30/2022 0828 by Liza Huang RN  Outcome: Progressing     Problem: Pain  Goal: Verbalizes/displays adequate comfort level or baseline comfort level  5/30/2022 1857 by Tri Bennett RN  Outcome: Progressing  5/30/2022 1854 by Tri Bennett RN  Outcome: Progressing  5/30/2022 0828 by Liza Huang RN  Outcome: Progressing     Problem: ABCDS Injury Assessment  Goal: Absence of physical injury  5/30/2022 1857 by Tri Bennett RN  Outcome: Progressing  5/30/2022 1854 by Tri Bennett RN  Outcome: Progressing  5/30/2022 0828 by Liza Huang RN  Outcome: Progressing     Problem: Nutrition Deficit:  Goal: Optimize nutritional status  5/30/2022 1857 by Tri Bennett RN  Outcome: Progressing  5/30/2022 1854 by Tri Bennett RN  Outcome: Progressing  5/30/2022 0828 by Liza Huang RN  Outcome: Progressing     Problem: Skin/Tissue Integrity  Goal: Absence of new skin breakdown  Description: 1. Monitor for areas of redness and/or skin breakdown  2. Assess vascular access sites hourly  3. Every 4-6 hours minimum:  Change oxygen saturation probe site  4. Every 4-6 hours:  If on nasal continuous positive airway pressure, respiratory therapy assess nares and determine need for appliance change or resting period.   5/30/2022 1857 by Tri Bennett RN  Outcome: Progressing  5/30/2022 1854 by Asim Foster Chiquis RN  Outcome: Progressing  5/30/2022 0828 by Adriel Mora RN  Outcome: Progressing

## 2022-05-30 NOTE — PROGRESS NOTES
05/30/22 0900   Restrictions/Precautions   Restrictions/Precautions Weight Bearing; Fall Risk   Lower Extremity Weight Bearing Restrictions   Right Lower Extremity Weight Bearing Partial Weight Bearing   Partial Weight Bearing Percentage Or Pounds 50%   Left Lower Extremity Weight Bearing Weight Bearing As Tolerated   Subjective   Subjective Patient brought to gym by OT. He is feeling good today, agreeable to therapy. Pain Assessment   Pain Level   (1-2)   Patient's Stated Pain Goal 0 - No pain   Oxygen Therapy   O2 Device None (Room air)   Transfers   Sit to Stand Minimal Assistance  (RW)   Stand to sit Contact guard assistance  (VC for controlled descent )   Ambulation   Surface level tile   Device Rolling Walker   Other Apparatus Wheelchair follow   Assistance Contact guard assistance   Quality of Gait No B heel strike, R foot in plantar flexed position. VC for RW management for step to gait pattern leading with RLE. Forward flexed trunk. Comments Unable to achieve 50% PWB over RLE, patient fearful    Propulsion 1   Propulsion Manual   Level Level Tile   Method RUE;LUE   Level of Assistance Stand by assistance   Description/ Details Maintained straight path without difficulty, good performance with turns   Distance 200'   Distance 6'x2   Exercises   Knee Long Arc Quad AAROM with OP applied at end range knee extension   (RLE)   Ankle Pumps x20, manual OP into dorsiflexion at end range for stretch   (RLE)   Conditions Requiring Skilled Therapeutic Intervention   Body Structures, Functions, Activity Limitations Requiring Skilled Therapeutic Intervention Decreased functional mobility ; Decreased ROM; Decreased strength;Decreased endurance;Decreased balance; Increased pain   Assessment Patient lacks ROM of RLE to achieve full fully erect posture and neutral dorsiflexion in standing, likely related to pain and swelling. Progressed to ambulating with RW on level surface with CGA.   He reported minimal pain with standing/ambulating. Plan   Current Treatment Recommendations Strengthening;ROM;Balance training;Functional mobility training;Transfer training;ADL/Self-care training; Endurance training; Wheelchair mobility training;Gait training;Stair training;Home exercise program;Safety education & training;Return to work related activity; Equipment evaluation, education, & procurement;Patient/Caregiver education & training; Therapeutic activities   Safety Devices   Type of Devices Call light within reach; Chair alarm in place;Gait belt;Patient at risk for falls; Left in chair

## 2022-05-30 NOTE — PATIENT CARE CONFERENCE
[facility] ACUTE INPATIENT REHABILITATION  TEAM CONFERENCE NOTE    Date: 2022  Patient Name: Maurisio Recinos        MRN: [id number]    : 1950  (81 y.o.)  Gender: male      Diagnosis: s/p right periproshetic hip fracture; non-op      PHYSICAL THERAPY  GROSS ASSESSMENT       BED MOBILITY  Bed mobility  Rolling to Left: Minimal assistance,Contact guard assistance  Rolling to Right: Minimal assistance,Contact guard assistance  Supine to Sit: Minimal assistance,Moderate assistance (assisted BLE off the bed o the L side of bed)  Sit to Supine: Minimal assistance,Moderate assistance (Assist with LEs and verbal cues for technique.)  Bed Mobility Comments: PERFORMED HYRBID LOG ROLL SUPINE TO/FROM SIT AS WELL AS PROPER SCOOT TURN WTIH THERAPIST ASSISTING RIGHT LE.  SCOOT TURN TECHNIQUE PREFERABLE       TRANSFERS  Transfers  Sit to Stand: Minimal Assistance  Stand to sit: Contact guard assistance (decreased control with descending )  Bed to Chair: Minimal assistance (with RW)  Stand Pivot Transfers: Contact guard assistance (RW)  WHEELCHAIR PROPULSION  Propulsion 1  Propulsion: Manual  Level: Level Tile  Method: ROSA GRANT  Level of Assistance: Stand by assistance  Description/ Details: improved endurance and speed  Distance: 200' x 2   AMBULATION  Ambulation  Surface: level tile  Device: Rolling Walker  Other Apparatus: Wheelchair follow  Assistance: Contact guard assistance  Quality of Gait: No B heel strike, R foot in plantar flexed position. VC for RW management for step to gait pattern leading with RLE. Forward flexed trunk.    Distance: 6'x2  Comments: Unable to achieve 50% PWB over RLE, patient fearful   More Ambulation?: Yes  STAIRS     GOALS:  Short Term Goals  Time Frame for Short term goals: 1 week  Short term goal 1: SBA bed mobility  Short term goal 2: SBA TF surface to surface  Short term goal 3: SBA ambulation with AD 50 FT  Short term goal 4: IND WC propulsion 150 FT  Short term goal 5: SBA car transfer    Long Term Goals  Time Frame for Long term goals : 2 weeks  Long term goal 1: IND bed mobility  Long term goal 2: IND TF surface to surace  Long term goal 3: IND ambulation 150 FT with AD  Long term goal 4: IND car transfer  Long term goal 5: IND 12 steps up/down    ASSESSMENT:  Assessment: Increased pain this afternoon and did not tolerate supine positioning on mat table. Addressed gentle R hip ROM as the lack of mobility is contributing to his decreased upright posture in standing/ambulation. SPEECH THERAPY      OCCUPATIONAL THERAPY    CURRENT IRF-REBEKAH SCORES  Eating: CARE Score: 5       Oral Hygiene: CARE Score: 5        Toileting: CARE Score: 1         Shower/Bathe: CARE Score: 3           Upper Body Dressing: CARE Score: 5         Lower Body Dressing: CARE Score: 1          Footwear: CARE Score: 3  Comment: able don/doff socks using AE with VC, Mod A for shoes with shoe horn       Toilet Transfers: CARE Score: 1  Comment: Mod A x2       Picking Up Object:  CARE Score: 88          UE Functioning:  LUE AROM (degrees)   LUE AROM  Exceptions   L Shoulder Flexion 0-180 40   L Shoulder ABduction 0-180 40   Left Hand AROM (degrees)   Left Hand AROM WFL   RUE AROM (degrees)   RUE AROM  WFL   Right Hand AROM (degrees)   Right Hand AROM WFL       Pain Assessment:          STGs:  Short Term Goals  Time Frame for Short term goals: 2 weeks  Short Term Goal 1: Mod A with clothing management/hygiene for toileting. Short Term Goal 2: CGA with toilet transfers. Short Term Goal 3: Min A with bathing hgyiene. Short Term Goal 4: Min A with LB dressing using AE. Short Term Goal 5: Min A with donning/doffing socks and shoes using AE. Short Term Goal 6: Patient will complete 1-2 handed static standing task for 3 minutes, requiring CGA. LTGs:  Long Term Goals  Time Frame for Long term goals : 3-4 weeks  Long Term Goal 1: Modified independent with toileting and toilet transfers.   Long Term Goal 2: Modified independent with bathing hygiene. Long Term Goal 3: Modified independent with overall dressing. Long Term Goal 4: Patient verbalize DME nees. Long Term Goal 5: Independent with HEP. Assessment:  Performance deficits / Impairments: Decreased functional mobility ,Decreased ADL status,Decreased ROM,Decreased strength,Decreased balance,Decreased high-level IADLs                  NUTRITION  Current Wt: Weight: 227 lb 7 oz (103.2 kg) / Body mass index is 29.2 kg/m². Admission Wt: Admission Body Weight: 232 lb (105.2 kg) (Bed scale)  Oral Diet Orders: Regular  Oral Nutrition Supplement (ONS) Orders: Ensure Enlive once daily     Pt meets criteria for Moderate Malnutrition AEB significant wt loss (5.3%, 13 lbs X 1 month) and mild muscle loss per pt report. Pt states wt loss has been occuring since Jan 2021 d/t bladder CA treatment and decreased appetite/po intake. Pt agreeable to Ensure Enlive ONS once daily. Please see nutrition note for details. NURSING    Wounds/Incisions/Ulcers: No skin issues identified     Zhou Scale Score: 17    Pain: Patient's pain is currently controlled with Norco prn. Consultations/Labs/X-rays:     Family Education: Family available and participating in education     Fall Risk:  Zamora Total Score: 76    Fall in the last week?no      Other Nursing Issues: Pills whole. Cont B/B.  L knee arthritis (recent aspiration of fluids). BM 29. Up x2 assist Orval Di steady. Xarelto. Reg diet. SOCIAL WORK/CASE MANAGEMENT  Assessment: Pleasant and motivated- eagerly hoping to be able to manage steps at home. Wife is attentive and helpful.     Discharge Plan   Estimated Length of Stay: 6/8/22  Destination: discharge home with supervision    Pass: No    Services at Discharge: [address] [facility], Occupational Therapy and Nursing Other per evaluations    Equipment at Discharge: walker, likely will have to stay on level of home that does not have bathroom- thus needing bedside commode, may require hospital bed    Progress made in the prior week:  1. Mod A for LB dressing with AE  2.  3.  4.  5.      Goals for following week:  1. CGA for toilet t/fs  2.   3.   4.   5.     Factors facilitating achievement of predicted outcomes: Motivated, Cooperative and Pleasant    Barriers to the achievement of predicted outcomes: Pain, Decreased endurance, Upper extremity weakness and Lower extremity weakness    Team Members Present at Conference:  : Margareth Patel 23   Occupational Therapist: Judy Corado OTR/L  Physical Therapist: Laura Yanes PT,DPT  Speech Therapist: N/A  Nurse: Roby Hodgkins, RN,BSN   Nurse Manager:  Roby Hodgkins, RN, BSN  Dietitian:  Arjun Rome, MS, RD, LD  Rehab Director:        I approve the established interdisciplinary plan of care as documented within the medical record of Cuca Rizvi

## 2022-05-30 NOTE — PROGRESS NOTES
Patient:   Lorenzo Perez  MR#:    [id number]   Room:    0001/829-73   YOB: 1950  Date of Progress Note: 5/30/2022  Time of Note                           9:24 AM  Consulting Physician:   Aaliyah Flynn M.D. Attending Physician:  Aaliyah Flynn MD     Chief complaint Periprosthetic fracture right hip/nonsurgical    S:This 81 y.o. male  with history of arthritis , prostate cancer, cellulitis, gout, HTN, IFG (impaired fasting glucose), and COVID 19. He [facility] to [facility] ER on 5/21/22 after having a fall at home. X-rays done revealed a right periprosthetic hip fracture. He was discharged home with plans for f/u with ortho as outpatient. He return to ER on 5/22/22 with increased pain and swelling of right hip and left knee. Left knee x-ray done showed Tricompartmental osteoarthrosis of the left knee with a moderate joint effusion. CT of pelvis done showed Mildly displaced intertrochanteric fracture of the right hip. The fracture is periprosthetic in location with exposure of the proximal intertrochanteric component of the prosthesis. The femoral head prosthesis remains well located within the acetabulum. He was admitted to his PCP Dr. Jeff Koch for pain control. Consult for orthopedic surgery. He was seen by Dr. Janet Steiner, who recommended non-op treatment. He will be partial weight bearing <50% on his right lower extremity, ok to ambulate with rolling walker. Dr. Janet Steiner aspirated patient's left knee and gave an injection, it was felt the pain/swelling was d/t overuse. He will be weightbearing as tolerated on his left lower extremity. Switching from Lovenox to Xarelto for DVT prophylaxis. Patient has been hyperglycemic throughout his acute care stay with history of IFG. No current hemoglobin A1C. Patient still c/o of pain at 5/10 being managed with PO pain medications. He is participating in both PT/OT. He is felt to need a stay on Rehab to work towards his goal of returning home with his wife.  He is now felt 0716    acetaminophen (TYLENOL) tablet 650 mg, 650 mg, Oral, Q4H PRN, Shaheed Romero MD    polyethylene glycol (GLYCOLAX) packet 17 g, 17 g, Oral, Daily PRN, Shaheed Romero MD, 17 g at 05/27/22 5092    Allergies:  Niacin    Social History:   TOBACCO:   reports that he has never smoked. He has never used smokeless tobacco.  ETOH:   reports no history of alcohol use. Family History:       Problem Relation Age of Onset    Stroke Mother     Deep Vein Thrombosis Mother     Pacemaker Father     Heart Disease Father          PHYSICAL EXAM:  /88   Pulse 83   Temp (!) 96 °F (35.6 °C) (Temporal)   Resp 16   Ht 6' 2\" (1.88 m)   Wt 227 lb 7 oz (103.2 kg)   SpO2 99%   BMI 29.20 kg/m²     Constitutional  well developed, well nourished. Eyes  conjunctiva normal.   Ear, nose, throat - No scars, masses, or lesions over external nose or ears, no atrophy of tongue  Neck-symmetric, no masses noted, no jugular vein distension  Respiration- chest wall appears symmetric, good expansion,   normal effort without use of accessory muscles  Musculoskeletal  no significant wasting of muscles noted, no bony deformities  Extremities-no clubbing, cyanosis or edema  Skin  warm, dry, and intact. No rash, erythema, or pallor. Psychiatric  mood, affect, and behavior appear normal.      Neurological exam  Awake, alert, fluent oriented appropriate affect  Attention and concentration appear appropriate  Recent and remote memory appears unremarkable  Speech normal without dysarthria  No clear issues with language of fund of knowledge     Cranial Nerve Exam     CN III, IV,VI-EOMI, No nystagmus, conjugate eye movements, no ptosis    CN VII-no facial assymetry       Motor Exam  antigravity throughout upper extremities bilaterally      Tremors- no tremors in hands or head noted     Gait  Not tested     Nursing/pcp notes, imaging,labs and vitals reviewed.      PT,OT and/or speech notes reviewed    Lab Results   Component Value Date WBC 6.3 05/30/2022    HGB 10.4 (L) 05/30/2022    HCT 34.8 (L) 05/30/2022    MCV 89.2 05/30/2022     05/30/2022     Lab Results   Component Value Date     05/30/2022    K 4.2 05/30/2022     05/30/2022    CO2 28 05/30/2022    BUN 30 (H) 05/30/2022    CREATININE 1.1 05/30/2022    GLUCOSE 93 05/30/2022    CALCIUM 7.7 (L) 05/30/2022    PROT 3.1 (L) 05/30/2022    LABALBU 1.9 (L) 05/30/2022    BILITOT 0.3 05/30/2022    ALKPHOS 131 (H) 05/30/2022    AST 22 05/30/2022    ALT 29 05/30/2022    LABGLOM >60 05/30/2022    GFRAA >59 05/30/2022    AGRATIO 1.3 09/02/2020    GLOB 1.9 09/02/2020     Lab Results   Component Value Date    INR 1.12 05/25/2022    PROTIME 14.4 05/25/2022       Gertrudis Watkins PTA   Physical Therapist Assistant   Specialty:  Physical Therapist   Progress Notes       Cosign Needed   Date of Service:  5/28/2022  1:50 PM                 Cosign Needed                Show:Clear all  []Manual[x]Template[]Copied    Added by:  [x]Jaki Rojas PTA      []Jose Angel for details         05/28/22 1345 05/28/22 1346 05/28/22 1348   Transfers   Sit to Stand Minimal Assistance; Moderate Assistance  (to RW)  --   --    Stand to sit Minimal Assistance  --   --    Bed to Chair Minimal assistance  (with RW)  --   --    Ambulation   WB Status  --  R: PWB >50%, L: WBAT  --    Ambulation   Surface  --  level tile  --    Device  --  Parallel Bars  --    Other Apparatus  --  Wheelchair follow  --    Assistance  --  Minimal assistance  --    Quality of Gait  --  3-point gait pattern. Keeps R foot plantarflexed during stance phase. Stood for a couple minutes first to get R hip more flexible. Able to advance RLE, though inconsistent step length. --    Comments  --  Maintains PWB RLE.  --    Propulsion 1   Distance  --  15'  --    Conditions Requiring Skilled Therapeutic Intervention   Assessment  --   --  Able to amb 12' in parallel bars. Back to bed at end of session.    Activity Tolerance   Activity Tolerance  -- --  Patient limited by endurance; Patient limited by pain   Safety Devices   Type of Devices  --   --   --         05/28/22 1349   Transfers   Sit to Stand  --    Stand to sit  --    Bed to Chair  --    Ambulation   WB Status  --    Ambulation   Surface  --    Device  --    Other Apparatus  --    Assistance  --    Quality of Gait  --    Comments  --    Propulsion 1   Distance  --    Conditions Requiring Skilled Therapeutic Intervention   Assessment  --    Activity Tolerance   Activity Tolerance  --    Safety Devices   Type of Devices Call light within reach   Electronically signed by Girish Vargas PTA on 5/28/2022 at 1:50 PM                      RECORD REVIEW: Previous medical records, medications were reviewed at today's visit    IMPRESSION:   1. Status post right periprosthetic fracture/non surgicalpain control/mobilization  2. History of goutallopurinol/colchicine as needed  3. DVT prophylaxis Xarelto  4. BPH/history of prostate cancer refusing Flomax   5. Hypertensionon medicine monitor-slightly elevated  6. GIbowel regimen  7. Pain controlNorco as needed-comfortable  8. Arthritis monitor   9. Prednisone patient indicates is taken for some connective tissue disorder to avoid his ureter from closing off-has a diagnosis of retroperitoneal fibrosis  10.   PT/OT    Continue care as noted      Expected duration and frequency therapy: 180 minutes per day, 5 days per week    [address] [facility]  [phone] CELL  Dr Farzana Balderas

## 2022-05-31 PROCEDURE — 1180000000 HC REHAB R&B

## 2022-05-31 PROCEDURE — 99233 SBSQ HOSP IP/OBS HIGH 50: CPT | Performed by: PSYCHIATRY & NEUROLOGY

## 2022-05-31 PROCEDURE — 97530 THERAPEUTIC ACTIVITIES: CPT

## 2022-05-31 PROCEDURE — 6370000000 HC RX 637 (ALT 250 FOR IP): Performed by: PSYCHIATRY & NEUROLOGY

## 2022-05-31 PROCEDURE — 97110 THERAPEUTIC EXERCISES: CPT

## 2022-05-31 PROCEDURE — 97116 GAIT TRAINING THERAPY: CPT

## 2022-05-31 RX ADMIN — ALLOPURINOL 300 MG: 300 TABLET ORAL at 07:23

## 2022-05-31 RX ADMIN — DOCUSATE SODIUM 100 MG: 100 CAPSULE, LIQUID FILLED ORAL at 07:23

## 2022-05-31 RX ADMIN — HYDROCODONE BITARTRATE AND ACETAMINOPHEN 1 TABLET: 5; 325 TABLET ORAL at 07:23

## 2022-05-31 RX ADMIN — RIVAROXABAN 10 MG: 10 TABLET, FILM COATED ORAL at 17:41

## 2022-05-31 RX ADMIN — TORSEMIDE 10 MG: 10 TABLET ORAL at 07:23

## 2022-05-31 RX ADMIN — HYDROCODONE BITARTRATE AND ACETAMINOPHEN 1 TABLET: 5; 325 TABLET ORAL at 21:51

## 2022-05-31 RX ADMIN — PREDNISONE 5 MG: 5 TABLET ORAL at 07:23

## 2022-05-31 ASSESSMENT — PAIN DESCRIPTION - DESCRIPTORS
DESCRIPTORS: ACHING
DESCRIPTORS: ACHING

## 2022-05-31 ASSESSMENT — PAIN DESCRIPTION - LOCATION
LOCATION: HIP
LOCATION: HIP

## 2022-05-31 ASSESSMENT — PAIN SCALES - GENERAL
PAINLEVEL_OUTOF10: 4
PAINLEVEL_OUTOF10: 0
PAINLEVEL_OUTOF10: 1

## 2022-05-31 ASSESSMENT — PAIN - FUNCTIONAL ASSESSMENT: PAIN_FUNCTIONAL_ASSESSMENT: ACTIVITIES ARE NOT PREVENTED

## 2022-05-31 ASSESSMENT — PAIN DESCRIPTION - ORIENTATION: ORIENTATION: RIGHT

## 2022-05-31 NOTE — PROGRESS NOTES
05/31/22 1300   Lower Extremity Weight Bearing Restrictions   Right Lower Extremity Weight Bearing Partial Weight Bearing   Partial Weight Bearing Percentage Or Pounds 50%   Subjective   Subjective in room with wife present he was eager to go for treatment   Subjective   Pain more \"stiffness\" today   Transfers   Sit to Stand Minimal Assistance   Stand to sit Stand by assistance   Ambulation   WB Status R: PWB >50%, L: WBAT   Ambulation   Surface level tile   Device Parallel Bars   Assistance Contact guard assistance;Stand by assistance   Quality of Gait patient stood in the parallel bars and worked on advancing the R LE no full steps were taken, patient was able to advance R LE and complete proper heel toe contact, but was not able to complete a full toe off   Distance 0 FT   Comments increased pain afterward, more of a burning sensation after in his R anterior hip, patient was able to place about 30-40# of weight through the R LE    Ambulation 2   Surface - 2 level tile   Device 2 Parallel Bars   Assistance 2 Contact guard assistance;Stand by assistance   Quality of Gait 2 patient was instructed to weight shift from the L LE to the R LE to increase his confidence in the R LE   Distance 0 FT   Wheelchair Activities   Propulsion Yes   Propulsion 1   Propulsion Manual   Level Level Tile   Method RUE;LUE   Level of Assistance Stand by assistance   Description/ Details improved endurance, patient was able to propel from his room to the therapy gym with no breaks   Distance 200 x 2   PT Exercises   Exercise Treatment seated R hip flexion AAROM, hip abduction/adduction w/ manual resistance, glut sets, hip extension x 10 all bilat, 2 sets   Activity Tolerance   Activity Tolerance Patient tolerated treatment well   Assessment   Assessment patient is still nervous and lacks confidence in his R LE but worked today on gaining conifidence, see ambulation   Safety Devices   Type of Devices Chair alarm in place; Left in chair PT Individual Minutes   Time In 1300   Time Out 1345   Minutes 45     Electronically signed by SESAR Bowden on 5/31/2022 at 2:50 PM

## 2022-05-31 NOTE — PROGRESS NOTES
Patient:   Miguel Walker  MR#:    [id number]   Room:    [address]   YOB: 1950  Date of Progress Note: 5/31/2022  Time of Note                           8:17 AM  Consulting Physician:   Carli Jurado M.D. Attending Physician:  Carli Jurado MD     Chief complaint Periprosthetic fracture right hip/nonsurgical    S:This 81 y.o. male  with history of arthritis , prostate cancer, cellulitis, gout, HTN, IFG (impaired fasting glucose), and COVID 19. He presented to [facility] ER on 5/21/22 after having a fall at home. X-rays done revealed a right periprosthetic hip fracture. He was discharged home with plans for f/u with ortho as outpatient. He return to ER on 5/22/22 with increased pain and swelling of right hip and left knee. Left knee x-ray done showed Tricompartmental osteoarthrosis of the left knee with a moderate joint effusion. CT of pelvis done showed Mildly displaced intertrochanteric fracture of the right hip. The fracture is periprosthetic in location with exposure of the proximal intertrochanteric component of the prosthesis. The femoral head prosthesis remains well located within the acetabulum. He was admitted to his PCP Dr. Bernice Desai for pain control. Consult for orthopedic surgery. He was seen by Dr. Kimberly Rinaldi, who recommended non-op treatment. He will be partial weight bearing <50% on his right lower extremity, ok to ambulate with rolling walker. Dr. Kimberly Rinaldi aspirated patient's left knee and gave an injection, it was felt the pain/swelling was d/t overuse. He will be weightbearing as tolerated on his left lower extremity. Switching from Lovenox to Xarelto for DVT prophylaxis. Patient has been hyperglycemic throughout his acute care stay with history of IFG. No current hemoglobin A1C. Patient still c/o of pain at 5/10 being managed with PO pain medications. He is participating in both PT/OT. He is felt to need a stay on Rehab to work towards his goal of returning home with his wife.  He is now felt ready to start the Rehab program. No new issues.      REVIEW OF SYSTEMS:  Constitutional: No fevers No chills  Neck:No stiffness  Respiratory: No shortness of breath  Cardiovascular: No chest pain No palpitations  Gastrointestinal: No abdominal pain    Genitourinary: No Dysuria  Neurological: No headache, no confusion    Past Medical History:      Diagnosis Date    Allergic rhinitis     [facility] [address]     Chronic kidney disease     Colonic polyp     DDD (degenerative disc disease)     Hyperlipidemia     Hypertension     Other disorders of kidney and ureter in diseases classified elsewhere        Past Surgical History:      Procedure Laterality Date    BACK SURGERY      COLONOSCOPY  11/3/2004        FRACTURE SURGERY      TOTAL HIP ARTHROPLASTY Bilateral     UPPER GASTROINTESTINAL ENDOSCOPY N/A 9/3/2020    Dr RAMSES Olivarez-w/EUS w/fna-Very difficult to identify the mass in question on CT scan-Benign       Medications in Hospital:      Current Facility-Administered Medications:     docusate sodium (COLACE) capsule 100 mg, 100 mg, Oral, Daily, Arie Chappell MD, 100 mg at 05/31/22 0723    rivaroxaban (XARELTO) tablet 10 mg, 10 mg, Oral, Daily, Arie Chappell MD, 10 mg at 05/30/22 1707    allopurinol (ZYLOPRIM) tablet 300 mg, 300 mg, Oral, Daily, Arie Chappell MD, 300 mg at 05/31/22 0723    colchicine (COLCRYS) tablet 0.6 mg, 0.6 mg, Oral, BID PRN, Arie Chappell MD    diphenhydrAMINE (BENADRYL) tablet 25 mg, 25 mg, Oral, Q6H PRN, rAie Chappell MD    HYDROcodone-acetaminophen (NORCO) 5-325 MG per tablet 1 tablet, 1 tablet, Oral, Q6H PRN, Arie Chappell MD, 1 tablet at 05/31/22 0723    predniSONE (DELTASONE) tablet 5 mg, 5 mg, Oral, Daily, Arie Chappell MD, 5 mg at 05/31/22 0723    [Held by provider] tamsulosin (FLOMAX) capsule 0.4 mg, 0.4 mg, Oral, Daily, Arie Chappell MD    torsemide (DEMADEX) tablet 10 mg, 10 mg, Oral, Daily, Arie Chappell MD, 10 mg at 05/31/22 0723    acetaminophen (TYLENOL) tablet 650 mg, 650 mg, Oral, Q4H PRN, Guicho Martinez MD    polyethylene glycol (GLYCOLAX) packet 17 g, 17 g, Oral, Daily PRN, Guicho Martinez MD, 17 g at 05/27/22 7123    Allergies:  Niacin    Social History:   TOBACCO:   reports that he has never smoked. He has never used smokeless tobacco.  ETOH:   reports no history of alcohol use. Family History:       Problem Relation Age of Onset    Stroke Mother     Deep Vein Thrombosis Mother     Pacemaker Father     Heart Disease Father          PHYSICAL EXAM:  /83   Pulse 77   Temp (!) 96.4 °F (35.8 °C) (Temporal)   Resp 18   Ht 6' 2\" (1.88 m)   Wt 227 lb 7 oz (103.2 kg)   SpO2 98%   BMI 29.20 kg/m²     Constitutional  well developed, well nourished. Eyes  conjunctiva normal.   Ear, nose, throat - No scars, masses, or lesions over external nose or ears, no atrophy of tongue  Neck-symmetric, no masses noted, no jugular vein distension  Respiration- chest wall appears symmetric, good expansion,   normal effort without use of accessory muscles  Musculoskeletal  no significant wasting of muscles noted, no bony deformities  Extremities-no clubbing, cyanosis or edema  Skin  warm, dry, and intact. No rash, erythema, or pallor. Psychiatric  mood, affect, and behavior appear normal.      Neurological exam  Awake, alert, fluent oriented appropriate affect  Attention and concentration appear appropriate  Recent and remote memory appears unremarkable  Speech normal without dysarthria  No clear issues with language of fund of knowledge     Cranial Nerve Exam     CN III, IV,VI-EOMI, No nystagmus, conjugate eye movements, no ptosis    CN VII-no facial assymetry       Motor Exam  antigravity throughout upper extremities bilaterally      Tremors- no tremors in hands or head noted     Gait  Not tested     Nursing/pcp notes, imaging,labs and vitals reviewed.      PT,OT and/or speech notes reviewed    Lab Results   Component Value Date    WBC 6.3 05/30/2022    HGB 10.4 (L) 05/30/2022    HCT 34.8 (L) 05/30/2022    MCV 89.2 05/30/2022     05/30/2022     Lab Results   Component Value Date     05/30/2022    K 4.2 05/30/2022     05/30/2022    CO2 28 05/30/2022    BUN 30 (H) 05/30/2022    CREATININE 1.1 05/30/2022    GLUCOSE 93 05/30/2022    CALCIUM 7.7 (L) 05/30/2022    PROT 3.1 (L) 05/30/2022    LABALBU 1.9 (L) 05/30/2022    BILITOT 0.3 05/30/2022    ALKPHOS 131 (H) 05/30/2022    AST 22 05/30/2022    ALT 29 05/30/2022    LABGLOM >60 05/30/2022    GFRAA >59 05/30/2022    AGRATIO 1.3 09/02/2020    GLOB 1.9 09/02/2020     Lab Results   Component Value Date    INR 1.12 05/25/2022    PROTIME 14.4 05/25/2022       Jeniffer Gamboa PTA   Physical Therapist Assistant   Specialty:  Physical Therapist   Progress Notes       Cosign Needed   Date of Service:  5/28/2022  1:50 PM                 Cosign Needed                Show:Clear all  []Manual[x]Template[]Copied    Added by:  [x]Jaki Ibanez PTA      []Jose Angel for details         05/28/22 1345 05/28/22 1346 05/28/22 1348   Transfers   Sit to Stand Minimal Assistance; Moderate Assistance  (to RW)  --   --    Stand to sit Minimal Assistance  --   --    Bed to Chair Minimal assistance  (with RW)  --   --    Ambulation   WB Status  --  R: PWB >50%, L: WBAT  --    Ambulation   Surface  --  level tile  --    Device  --  Parallel Bars  --    Other Apparatus  --  Wheelchair follow  --    Assistance  --  Minimal assistance  --    Quality of Gait  --  3-point gait pattern. Keeps R foot plantarflexed during stance phase. Stood for a couple minutes first to get R hip more flexible. Able to advance RLE, though inconsistent step length. --    Comments  --  Maintains PWB RLE.  --    Propulsion 1   Distance  --  15'  --    Conditions Requiring Skilled Therapeutic Intervention   Assessment  --   --  Able to amb 12' in parallel bars. Back to bed at end of session.    Activity Tolerance   Activity Tolerance  --   -- Patient limited by endurance; Patient limited by pain   Safety Devices   Type of Devices  --   --   --         05/28/22 1349   Transfers   Sit to Stand  --    Stand to sit  --    Bed to Chair  --    Ambulation   WB Status  --    Ambulation   Surface  --    Device  --    Other Apparatus  --    Assistance  --    Quality of Gait  --    Comments  --    Propulsion 1   Distance  --    Conditions Requiring Skilled Therapeutic Intervention   Assessment  --    Activity Tolerance   Activity Tolerance  --    Safety Devices   Type of Devices Call light within reach   Electronically signed by Zakia Santos PTA on 5/28/2022 at 1:50 PM                      RECORD REVIEW: Previous medical records, medications were reviewed at today's visit    IMPRESSION:   1. Status post right periprosthetic fracture/non surgicalpain control/mobilization  2. History of goutallopurinol/colchicine as needed  3. DVT prophylaxis Xarelto  4. BPH/history of prostate cancer refusing Flomax   5. Hypertensionon medicine monitor-slightly elevated  6. GIbowel regimen  7. Pain controlNorco as needed-comfortable  8. Arthritis monitor   9. Prednisone patient indicates is taken for some connective tissue disorder to avoid his ureter from closing off-has a diagnosis of retroperitoneal fibrosis  10. PT/OT    Continue care as noted    Team conference with PT/OT/speech/nursing/Care coordinator to review in depth patients care and discharge planning. At least 35 minutes spent coordinating care for patient >50% of time spent in coordination of care.       ft SBA  Ambulation 6 ft RW CGA  0 steps    ELOS pending     Expected duration and frequency therapy: 180 minutes per day, 5 days per week    [address] [facility]  [phone] CELL  Dr [address]

## 2022-05-31 NOTE — PROGRESS NOTES
Nutrition Assessment     Type and Reason for Visit: Reassess    Nutrition Recommendations/Plan:   1. Continue current POC at this time. Malnutrition Assessment:  Malnutrition Status: Moderate malnutrition    Nutrition Assessment:  Pt remains at risk for further nutrition compromise AEB reported fair appetite today. Pt previously eating well, however states he has noticed a decrease in appetite today. Taking Ensure well. Continue current POC at this time. Estimated Daily Nutrient Needs:  Energy (kcal):  6890-2234 kcals/day Weight Used for Energy Requirements: Current (25-30 kcals/kg)     Protein (g):  155-189 g/pro/day Weight Used for Protein Requirements: Ideal (1.8-2.2 g/kg)        Fluid (ml/day):  9564-4120 mL/fluid/day Method Used for Fluid Requirements: 1 ml/kcal    Nutrition Related Findings:   non-pitting BLE edema Wound Type: None    Current Nutrition Therapies:    ADULT ORAL NUTRITION SUPPLEMENT; Dinner; Standard High Calorie/High Protein Oral Supplement  ADULT DIET;  Regular    Anthropometric Measures:  · Height: 6' 2\" (188 cm)  · Current Body Wt: 227 lb (103 kg)   · BMI: 29.1    Nutrition Diagnosis:   · Moderate malnutrition,In context of chronic illness related to catabolic illness as evidenced by poor intake prior to admission,weight loss greater than or equal to 5% in 1 month,mild muscle loss    Nutrition Interventions:   Food and/or Nutrient Delivery: Continue Oral Nutrition Supplement,Continue Current Diet  Nutrition Education/Counseling: Education not indicated  Coordination of Nutrition Care: Continue to monitor while inpatient  Plan of Care discussed with: Patient and Spouse    Goals:  Previous Goal Met: Progressing toward Goal(s)  Goals: PO intake 75% or greater,by next RD assessment       Nutrition Monitoring and Evaluation:   Behavioral-Environmental Outcomes: None Identified  Food/Nutrient Intake Outcomes: Food and Nutrient Intake,Supplement Intake  Physical Signs/Symptoms Outcomes: Biochemical Data,Nutrition Focused Physical Findings,Weight,Fluid Status or Edema    Discharge Planning:     Too soon to determine     Aries Delcid MS, RD, LD  Contact: [phone]

## 2022-05-31 NOTE — PROGRESS NOTES
Occupational Therapy  Facility/Department: [facility] 8 REHAB UNIT  Occupational Therapy Treatment Note     Name: Jacky Burnett  : 1950  MRN: [id number]  Date of Service: 2022    Discharge Recommendations:  Home with Home health OT          Patient Diagnosis(es): There were no encounter diagnoses. Past Medical History:  has a past medical history of Allergic rhinitis, Cancer ([facility] [address] Chronic kidney disease, Colonic polyp, DDD (degenerative disc disease), Hyperlipidemia, Hypertension, and Other disorders of kidney and ureter in diseases classified elsewhere. Past Surgical History:  has a past surgical history that includes Colonoscopy (11/3/2004); back surgery; Upper gastrointestinal endoscopy (N/A, 9/3/2020); Total hip arthroplasty (Bilateral); and fracture surgery. Treatment Diagnosis: Periprosthetic fracture around internal prosthetic right hip joint; non-surgerical      Assessment   Performance deficits / Impairments: Decreased functional mobility ; Decreased ADL status; Decreased ROM; Decreased strength;Decreased balance;Decreased high-level IADLs  Treatment Diagnosis: Periprosthetic fracture around internal prosthetic right hip joint; non-surgerical  Activity Tolerance  Activity Tolerance: Patient Tolerated treatment well        Plan   Plan  Current Treatment Recommendations: Strengthening,Balance training,ROM,Functional mobility training,Wheelchair mobility training,Endurance training,Neuromuscular re-education,Equipment evaluation, education, & procurement,Patient/Caregiver education & training,Safety education & training,Self-Care / ADL,Home management training     Restrictions  Restrictions/Precautions  Restrictions/Precautions: Weight Bearing,Fall Risk  Lower Extremity Weight Bearing Restrictions  Right Lower Extremity Weight Bearing: Partial Weight Bearing  Partial Weight Bearing Percentage Or Pounds: 50%  Left Lower Extremity Weight Bearing: Weight Bearing As Tolerated    Subjective   General  Patient assessed for rehabilitation services?: Yes  Pain: more \"stiffness\" today  Social/Functional History  Social/Functional History  Lives With: Spouse  Type of Home: House  Home Layout: Multi-level  Home Access: Stairs to enter without rails  Bathroom Shower/Tub: Tub/Shower unit,Walk-in shower  Bathroom Accessibility: Walker accessible (1/2 bathroom downstairs is walker accessible, w/c accessible for upstairs bathroom)  Home Equipment: Rollator,Walker, rolling  Has the patient had two or more falls in the past year or any fall with injury in the past year?: No  ADL Assistance: Independent  Homemaking Assistance: Independent  Ambulation Assistance: Independent  Transfer Assistance: Independent  Active : Yes  Mode of Transportation: Truck  Occupation: Full time employment  Type of Occupation: farm store owner  [address]: Used to play tennis and basketball, mow and bush hog, farming, Go to [employer] every year  IADL Comments: Grilling; wife does all homemaking tasks. Objective   Heart Rate: 77  Heart Rate Source: Monitor  BP: 112/83  BP Location: Left upper arm  MAP (Calculated): 92.67  Resp: 18  SpO2: 98 %  O2 Device: None (Room air)             Safety Devices  Type of Devices: Chair alarm in place; Left in chair                 Activity Tolerance  Activity Tolerance: Patient tolerated treatment well         Exercise Treatment: 1# cane/wand: 3 sets of 10 in all planes; 2# on RUE, 2# on LUE distally: 10 reps in all planes          Goals  Short Term Goals  Time Frame for Short term goals: 2 weeks  Short Term Goal 1: Mod A with clothing management/hygiene for toileting. Short Term Goal 2: CGA with toilet transfers. Short Term Goal 3: Min A with bathing hgyiene. Short Term Goal 4: Min A with LB dressing using AE. Short Term Goal 5: Min A with donning/doffing socks and shoes using AE. Short Term Goal 6: Patient will complete 1-2 handed static standing task for 3 minutes, requiring CGA.   Long Term Goals  Time Frame for Long term goals : 3-4 weeks  Long Term Goal 1: Modified independent with toileting and toilet transfers. Long Term Goal 2: Modified independent with bathing hygiene. Long Term Goal 3: Modified independent with overall dressing. Long Term Goal 4: Patient verbalize DME nees. Long Term Goal 5: Independent with HEP. Patient Goals   Patient goals : Patient wants to be able to walk up and down his two flights of stairs in his home.        Therapy Time   Individual Concurrent Group Co-treatment   Time In 1860         Time Out 1430         Minutes 45         Timed Code Treatment Minutes: [address], OT

## 2022-05-31 NOTE — PROGRESS NOTES
05/31/22 0900   Lower Extremity Weight Bearing Restrictions   Right Lower Extremity Weight Bearing Partial Weight Bearing   Partial Weight Bearing Percentage Or Pounds 50%   Transfers   Sit to Stand Minimal Assistance   Stand to sit Stand by assistance;Contact guard assistance   Ambulation   WB Status R: PWB >50%, L: WBAT   Ambulation   Surface level tile   Device Rolling Walker   Other Apparatus Wheelchair follow   Assistance Contact guard assistance   Quality of Gait patient had a tendency to plantarflex B LE in order to clear his R LE, he had forward flexed posture and downward gaze, had R knee extended throughout ambulation, 3 point gait pattern   Distance 6 FT   More Ambulation? Yes   Ambulation 2   Surface - 2 level tile   Device 2 Rolling Walker   Other Apparatus 2 Wheelchair follow   Assistance 2 Contact guard assistance   Quality of Gait 2 patient was instructed to not plantarflex prior to advancing his R LE, he was able to acheive some dorsiflexion to allow for R foot clearance, patient maintained downward gaze and forward flexed posture, 3 point gait pattern, patient still unable to flex his R knee to allow for easier R foot clearance   Distance 15 FT   PT Exercises   Exercise Treatment seated R hip flexion x 10 AAROM, R hip extension x 10, heel slides R LE x 10, hip abduction/ adduction x 10   Assessment   Assessment patient is still very fearful with his R LE his strength is increasing although his ROM and motor control within his R ankle are limited, see ambulation   Safety Devices   Type of Devices Chair alarm in place;Call light within reach; Left in chair   PT Individual Minutes   Time In 0900   Time Out 1000   Minutes 60     Electronically signed by SESAR Phan on 5/31/2022 at 10:39 AM

## 2022-05-31 NOTE — PROGRESS NOTES
Occupational Therapy  Facility/Department: [facility] 8 REHAB UNIT  Occupational Therapy Treatment Note     Name: Sharla Junior  : 1950  MRN: [id number]  Date of Service: 2022    Discharge Recommendations:  Home with Home health OT          Patient Diagnosis(es): There were no encounter diagnoses. Past Medical History:  has a past medical history of Allergic rhinitis, Cancer ([address] Chronic kidney disease, Colonic polyp, DDD (degenerative disc disease), Hyperlipidemia, Hypertension, and Other disorders of kidney and ureter in diseases classified elsewhere. Past Surgical History:  has a past surgical history that includes Colonoscopy (11/3/2004); back surgery; Upper gastrointestinal endoscopy (N/A, 9/3/2020); Total hip arthroplasty (Bilateral); and fracture surgery. Treatment Diagnosis: Periprosthetic fracture around internal prosthetic right hip joint; non-surgerical      Assessment     Decreased functional mobility ; Decreased ADL status; Decreased ROM;  Decreased strength; Decreased balance; Decreased high-level IADLs           Plan   Plan  Current Treatment Recommendations: Strengthening,Balance training,ROM,Functional mobility training,Wheelchair mobility training,Endurance training,Neuromuscular re-education,Equipment evaluation, education, & procurement,Patient/Caregiver education & training,Safety education & training,Self-Care / ADL,Home management training     Restrictions  Restrictions/Precautions  Restrictions/Precautions: Weight Bearing,Fall Risk  Lower Extremity Weight Bearing Restrictions  Right Lower Extremity Weight Bearing: Partial Weight Bearing  Partial Weight Bearing Percentage Or Pounds: 50%  Left Lower Extremity Weight Bearing: Weight Bearing As Tolerated    Subjective   General  Patient assessed for rehabilitation services?: Yes  Pain: more \"stiffness\" today  Social/Functional History  Social/Functional History  Lives With: Spouse  Type of Home: House  Home Layout: Multi-level  Home Access: Stairs to enter without rails  Bathroom Shower/Tub: Tub/Shower unit,Walk-in shower  Bathroom Accessibility: Walker accessible (1/2 bathroom downstairs is walker accessible, w/c accessible for upstairs bathroom)  Home Equipment: Rollator,Walker, rolling  Has the patient had two or more falls in the past year or any fall with injury in the past year?: No  ADL Assistance: Independent  Homemaking Assistance: Independent  Ambulation Assistance: Independent  Transfer Assistance: Independent  Active : Yes  Mode of Transportation: Truck  Occupation: Full time employment  Type of Occupation: farm store owner  Leisure & Hobbies: Used to play tennis and basketball, mow and bush hog, farming, Go to [employer] every year  IADL Comments: Grilling; wife does all homemaking tasks. Objective   Heart Rate: 77  Heart Rate Source: Monitor  BP: 112/83  BP Location: Left upper arm  MAP (Calculated): 92.67  Resp: 18  SpO2: 98 %  O2 Device: None (Room air)             Safety Devices  Type of Devices: Chair alarm in place;Call light within reach; Left in chair             05/31/22 0815   Balance   Standing Balance Contact guard assistance   Functional Mobility   Functional - Mobility Device Wheelchair   Activity To/From therapy gym   Assist Level Independent             05/31/22 0815   Transfers   Sit to stand Minimal assistance   Stand to sit Minimal assistance   Transfer Comments bed>w/c, bed slightly elevated         Goals  Short Term Goals  Time Frame for Short term goals: 2 weeks  Short Term Goal 1: Mod A with clothing management/hygiene for toileting. Short Term Goal 2: CGA with toilet transfers. Short Term Goal 3: Min A with bathing hgyiene. Short Term Goal 4: Min A with LB dressing using AE. Short Term Goal 5: Min A with donning/doffing socks and shoes using AE. Short Term Goal 6: Patient will complete 1-2 handed static standing task for 3 minutes, requiring CGA.   Long Term Goals  Time Frame for Long term goals : 3-4 weeks  Long Term Goal 1: Modified independent with toileting and toilet transfers. Long Term Goal 2: Modified independent with bathing hygiene. Long Term Goal 3: Modified independent with overall dressing. Long Term Goal 4: Patient verbalize DME nees. Long Term Goal 5: Independent with HEP. Patient Goals   Patient goals : Patient wants to be able to walk up and down his two flights of stairs in his home.        Therapy Time   Individual Concurrent Group Co-treatment   Time In 0815         Time Out 0900         Minutes 45         Timed Code Treatment Minutes: [address], OT

## 2022-06-01 PROCEDURE — 6370000000 HC RX 637 (ALT 250 FOR IP): Performed by: PSYCHIATRY & NEUROLOGY

## 2022-06-01 PROCEDURE — 99232 SBSQ HOSP IP/OBS MODERATE 35: CPT | Performed by: PSYCHIATRY & NEUROLOGY

## 2022-06-01 PROCEDURE — 97110 THERAPEUTIC EXERCISES: CPT

## 2022-06-01 PROCEDURE — 97116 GAIT TRAINING THERAPY: CPT

## 2022-06-01 PROCEDURE — 1180000000 HC REHAB R&B

## 2022-06-01 PROCEDURE — 97530 THERAPEUTIC ACTIVITIES: CPT

## 2022-06-01 PROCEDURE — 97535 SELF CARE MNGMENT TRAINING: CPT

## 2022-06-01 RX ADMIN — HYDROCODONE BITARTRATE AND ACETAMINOPHEN 1 TABLET: 5; 325 TABLET ORAL at 08:09

## 2022-06-01 RX ADMIN — PREDNISONE 5 MG: 5 TABLET ORAL at 08:10

## 2022-06-01 RX ADMIN — RIVAROXABAN 10 MG: 10 TABLET, FILM COATED ORAL at 17:03

## 2022-06-01 RX ADMIN — HYDROCODONE BITARTRATE AND ACETAMINOPHEN 1 TABLET: 5; 325 TABLET ORAL at 22:56

## 2022-06-01 RX ADMIN — DOCUSATE SODIUM 100 MG: 100 CAPSULE, LIQUID FILLED ORAL at 08:09

## 2022-06-01 RX ADMIN — ALLOPURINOL 300 MG: 300 TABLET ORAL at 08:10

## 2022-06-01 RX ADMIN — TORSEMIDE 10 MG: 10 TABLET ORAL at 08:10

## 2022-06-01 ASSESSMENT — PAIN DESCRIPTION - LOCATION
LOCATION: HIP

## 2022-06-01 ASSESSMENT — PAIN DESCRIPTION - ORIENTATION
ORIENTATION: RIGHT

## 2022-06-01 ASSESSMENT — PAIN DESCRIPTION - DESCRIPTORS
DESCRIPTORS: ACHING

## 2022-06-01 ASSESSMENT — PAIN SCALES - GENERAL
PAINLEVEL_OUTOF10: 4
PAINLEVEL_OUTOF10: 2

## 2022-06-01 ASSESSMENT — PAIN DESCRIPTION - PAIN TYPE: TYPE: ACUTE PAIN

## 2022-06-01 NOTE — PROGRESS NOTES
Occupational Therapy  Facility/Department: [facility] REHAB UNIT  Occupational Therapy Treatment Note     Name: Cuca Pacheco  : 1950  MRN: [id number]  Date of Service: 2022    Discharge Recommendations:  Home with Home health OT          Patient Diagnosis(es): There were no encounter diagnoses. Past Medical History:  has a past medical history of Allergic rhinitis, Cancer ([address] Chronic kidney disease, Colonic polyp, DDD (degenerative disc disease), Hyperlipidemia, Hypertension, and Other disorders of kidney and ureter in diseases classified elsewhere. Past Surgical History:  has a past surgical history that includes Colonoscopy (11/3/2004); back surgery; Upper gastrointestinal endoscopy (N/A, 9/3/2020); Total hip arthroplasty (Bilateral); and fracture surgery. Treatment Diagnosis: Periprosthetic fracture around internal prosthetic right hip joint; non-surgerical      Assessment   Performance deficits / Impairments: Decreased functional mobility ; Decreased ADL status; Decreased ROM; Decreased strength;Decreased balance;Decreased high-level IADLs  Assessment: Patient is now WBAT on RLE.   Treatment Diagnosis: Periprosthetic fracture around internal prosthetic right hip joint; non-surgerical  Activity Tolerance  Activity Tolerance: Patient Tolerated treatment well        Plan   Plan  Current Treatment Recommendations: Strengthening,Balance training,ROM,Functional mobility training,Wheelchair mobility training,Endurance training,Neuromuscular re-education,Equipment evaluation, education, & procurement,Patient/Caregiver education & training,Safety education & training,Self-Care / ADL,Home management training     Restrictions  Restrictions/Precautions  Restrictions/Precautions: Weight Bearing,Fall Risk  Lower Extremity Weight Bearing Restrictions  Right Lower Extremity Weight Bearing: Weight Bearing As Tolerated  Partial Weight Bearing Percentage Or Pounds: WBAT  Left Lower Extremity Weight Bearing: Weight Bearing As Tolerated    Subjective   General  Patient assessed for rehabilitation services?: Yes     Social/Functional History  Social/Functional History  Lives With: Spouse  Type of Home: House  Home Layout: Multi-level  Home Access: Stairs to enter without rails  Bathroom Shower/Tub: Tub/Shower unit,Walk-in shower  Bathroom Accessibility: Walker accessible (1/2 bathroom downstairs is walker accessible, w/c accessible for upstairs bathroom)  Home Equipment: Rollator,Walker, rolling  Has the patient had two or more falls in the past year or any fall with injury in the past year?: No  ADL Assistance: Independent  Homemaking Assistance: Independent  Ambulation Assistance: Independent  Transfer Assistance: Independent  Active : Yes  Mode of Transportation: Truck  Occupation: Full time employment  Type of Occupation: farm store owner  [address]: Used to play tennis and basketball, mow and bush hog, farming, Go to [employer] every year  IADL Comments: Grilling; wife does all homemaking tasks. Objective   Heart Rate: 90  Heart Rate Source: Monitor  BP: 110/83  MAP (Calculated): 92  Resp: 16  SpO2: 96 %  Safety Devices  Type of Devices: Chair alarm in place; Left in chair      Activity Tolerance  Activity Tolerance: Patient tolerated treatment well     Transfers  Sit to stand: Minimal assistance  Stand to sit: Minimal assistance  Transfer Comments: bed>w/c using RW     Exercise Treatment: Rickshaw: LUE: 10#, RUE: 13#: 4 sets of 15, Pulley: 4 minutes x2         OutComes Score       Putting On/Taking Off Footwear  Assistance Needed: Partial/moderate assistance  Comment: able to don/doff socks using AE, Min A for R shoe. Attempted to use shoe horn, but unable to. CARE Score: 3  Discharge Goal: Independent       Goals  Short Term Goals  Time Frame for Short term goals: 2 weeks  Short Term Goal 1: MET  Short Term Goal 2: CGA with toilet transfers. Short Term Goal 3: Min A with bathing hgyiene.   Short Term Goal 4: Min A with LB dressing using AE. Short Term Goal 5: Min A with donning/doffing socks and shoes using AE. Short Term Goal 6: Patient will complete 1-2 handed static standing task for 3 minutes, requiring CGA. Long Term Goals  Time Frame for Long term goals : 3-4 weeks  Long Term Goal 1: Modified independent with toileting and toilet transfers. Long Term Goal 2: Modified independent with bathing hygiene. Long Term Goal 3: Modified independent with overall dressing. Long Term Goal 4: Patient verbalize DME nees. Long Term Goal 5: Independent with HEP. Patient Goals   Patient goals : Patient wants to be able to walk up and down his two flights of stairs in his home.        Therapy Time   Individual Concurrent Group Co-treatment   Time In 7102         Time Out 1200         Minutes 45         Timed Code Treatment Minutes: 75 Lauri Fitch OT

## 2022-06-01 NOTE — PROGRESS NOTES
06/01/22 1000   Restrictions/Precautions   Restrictions/Precautions Weight Bearing; Fall Risk   Lower Extremity Weight Bearing Restrictions   Right Lower Extremity Weight Bearing Weight Bearing As Tolerated   Partial Weight Bearing Percentage Or Pounds WBAT   Left Lower Extremity Weight Bearing Weight Bearing As Tolerated   General Comment   Comments patient reports he feels more \"stiff\" this morning   Subjective   Subjective in room with wife very eager for treatment this morning   Transfers   Sit to Stand Minimal Assistance;Contact guard assistance   Stand to sit Stand by assistance  (initial stand to sit had an uncontrolled descent)   Ambulation   WB Status WBAT   Ambulation   Surface level tile   Device Parallel Bars   Other Apparatus Wheelchair follow   Assistance Contact guard assistance   Quality of Gait patient stood in the // bars to initiate weight shifting before taking some steps, during ambulation he was able to follow a 3 point gait pattern with a improved heel toe landing but still no toe off, patient has increased knee flexion however still not enough to clear the R LE, patient also has a forward flexed posture and does not acheive full hip extension during stance or walking   Distance 12 FT   Comments slight increase in pain level but more \"stiffness\" noted than pain   More Ambulation?  Yes   Ambulation 2   Surface - 2 level tile   Device 2 Parallel Bars   Assistance 2 Contact guard assistance   Quality of Gait 2 followed 3 point gait pattern with multiple VC to stand erect and acheive full hip extension, he also required VC to take shorter steps as he had a tendency to take excessively long steps with his R LE    Distance 24 FT   Comments patient putting approx 40-50 # throught R LE    PT Exercises   Exercise Treatment seated B LE hip flexion AAROM R, hip extension manual resistance, glut sets, knee extension, heel dorsiflexion, hip abduction, hip adduction   Activity Tolerance   Activity Tolerance Patient tolerated treatment well   Assessment   Assessment patient did well with treatment today he was able to bear more weight and tolerate more standing today, see ambulation   PT Individual Minutes   Time In 1000   Time Out 1100   Minutes 60     Electronically signed by SESAR Bowden on 6/1/2022 at 11:05 AM

## 2022-06-01 NOTE — PROGRESS NOTES
Patient:   Phylicia Denise  MR#:    [id number]   Room:    3787/278-03   YOB: 1950  Date of Progress Note: 6/1/2022  Time of Note                           8:04 AM  Consulting Physician:   Willy Viramontes M.D. Attending Physician:  Willy Viramontes MD     Chief complaint Periprosthetic fracture right hip/nonsurgical    S:This 81 y.o. male  with history of arthritis , prostate cancer, cellulitis, gout, HTN, IFG (impaired fasting glucose), and COVID 19. He presented to [facility] ER on 5/21/22 after having a fall at home. X-rays done revealed a right periprosthetic hip fracture. He was discharged home with plans for f/u with ortho as outpatient. He return to ER on 5/22/22 with increased pain and swelling of right hip and left knee. Left knee x-ray done showed Tricompartmental osteoarthrosis of the left knee with a moderate joint effusion. CT of pelvis done showed Mildly displaced intertrochanteric fracture of the right hip. The fracture is periprosthetic in location with exposure of the proximal intertrochanteric component of the prosthesis. The femoral head prosthesis remains well located within the acetabulum. He was admitted to his PCP Dr. Betty Calvin for pain control. Consult for orthopedic surgery. He was seen by Dr. Amy Cox, who recommended non-op treatment. He will be partial weight bearing <50% on his right lower extremity, ok to ambulate with rolling walker. Dr. Amy Cox aspirated patient's left knee and gave an injection, it was felt the pain/swelling was d/t overuse. He will be weightbearing as tolerated on his left lower extremity. Switching from Lovenox to Xarelto for DVT prophylaxis. Patient has been hyperglycemic throughout his acute care stay with history of IFG. No current hemoglobin A1C. Patient still c/o of pain at 5/10 being managed with PO pain medications. He is participating in both PT/OT. He is felt to need a stay on Rehab to work towards his goal of returning home with his wife.  He is now felt ready to start the Rehab program.  Feels well this morning. No new complaints.     REVIEW OF SYSTEMS:  Constitutional: No fevers No chills  Neck:No stiffness  Respiratory: No shortness of breath  Cardiovascular: No chest pain No palpitations  Gastrointestinal: No abdominal pain    Genitourinary: No Dysuria  Neurological: No headache, no confusion    Past Medical History:      Diagnosis Date    Allergic rhinitis     Cancer ([address]     Chronic kidney disease     Colonic polyp     DDD (degenerative disc disease)     Hyperlipidemia     Hypertension     Other disorders of kidney and ureter in diseases classified elsewhere        Past Surgical History:      Procedure Laterality Date    BACK SURGERY      COLONOSCOPY  11/3/2004        FRACTURE SURGERY      TOTAL HIP ARTHROPLASTY Bilateral     UPPER GASTROINTESTINAL ENDOSCOPY N/A 9/3/2020    Dr RAMSES Olivarez-w/EUS w/fna-Very difficult to identify the mass in question on CT scan-Benign       Medications in Hospital:      Current Facility-Administered Medications:     docusate sodium (COLACE) capsule 100 mg, 100 mg, Oral, Daily, Liliane Bajwa MD, 100 mg at 05/31/22 0723    rivaroxaban (XARELTO) tablet 10 mg, 10 mg, Oral, Daily, Liliane Bajwa MD, 10 mg at 05/31/22 1741    allopurinol (ZYLOPRIM) tablet 300 mg, 300 mg, Oral, Daily, Liliane Bajwa MD, 300 mg at 05/31/22 0723    colchicine (COLCRYS) tablet 0.6 mg, 0.6 mg, Oral, BID PRN, Liliane Bajwa MD    diphenhydrAMINE (BENADRYL) tablet 25 mg, 25 mg, Oral, Q6H PRN, Liliane Bajwa MD    HYDROcodone-acetaminophen (NORCO) 5-325 MG per tablet 1 tablet, 1 tablet, Oral, Q6H PRN, Liliane Bajwa MD, 1 tablet at 05/31/22 2151    predniSONE (DELTASONE) tablet 5 mg, 5 mg, Oral, Daily, Liliane Bajwa MD, 5 mg at 05/31/22 0723    [Held by provider] tamsulosin (FLOMAX) capsule 0.4 mg, 0.4 mg, Oral, Daily, Liliane Bajwa MD    torsemide (DEMADEX) tablet 10 mg, 10 mg, Oral, Daily, Liliane Bajwa MD, 10 mg at 05/31/22 0723    acetaminophen (TYLENOL) tablet 650 mg, 650 mg, Oral, Q4H PRN, Janelle Godoy MD    polyethylene glycol (GLYCOLAX) packet 17 g, 17 g, Oral, Daily PRN, Janelle Godoy MD, 17 g at 05/27/22 9901    Allergies:  Niacin    Social History:   TOBACCO:   reports that he has never smoked. He has never used smokeless tobacco.  ETOH:   reports no history of alcohol use. Family History:       Problem Relation Age of Onset    Stroke Mother     Deep Vein Thrombosis Mother     Pacemaker Father     Heart Disease Father          PHYSICAL EXAM:  /83   Pulse 90   Temp 97.3 °F (36.3 °C) (Temporal)   Resp 18   Ht 6' 2\" (1.88 m)   Wt 227 lb 7 oz (103.2 kg)   SpO2 96%   BMI 29.20 kg/m²     Constitutional  well developed, well nourished. Eyes  conjunctiva normal.   Ear, nose, throat - No scars, masses, or lesions over external nose or ears, no atrophy of tongue  Neck-symmetric, no masses noted, no jugular vein distension  Respiration- chest wall appears symmetric, good expansion,   normal effort without use of accessory muscles  Musculoskeletal  no significant wasting of muscles noted, no bony deformities  Extremities-no clubbing, cyanosis or edema  Skin  warm, dry, and intact. No rash, erythema, or pallor. Psychiatric  mood, affect, and behavior appear normal.      Neurological exam  Awake, alert, fluent oriented appropriate affect  Attention and concentration appear appropriate  Recent and remote memory appears unremarkable  Speech normal without dysarthria  No clear issues with language of fund of knowledge     Cranial Nerve Exam     CN III, IV,VI-EOMI, No nystagmus, conjugate eye movements, no ptosis    CN VII-no facial assymetry       Motor Exam  antigravity throughout upper extremities bilaterally      Tremors- no tremors in hands or head noted     Gait  Not tested     Nursing/pcp notes, imaging,labs and vitals reviewed.      PT,OT and/or speech notes reviewed    Lab Results   Component Value Date    WBC 6.3 05/30/2022    HGB 10.4 (L) 05/30/2022    HCT 34.8 (L) 05/30/2022    MCV 89.2 05/30/2022     05/30/2022     Lab Results   Component Value Date     05/30/2022    K 4.2 05/30/2022     05/30/2022    CO2 28 05/30/2022    BUN 30 (H) 05/30/2022    CREATININE 1.1 05/30/2022    GLUCOSE 93 05/30/2022    CALCIUM 7.7 (L) 05/30/2022    PROT 3.1 (L) 05/30/2022    LABALBU 1.9 (L) 05/30/2022    BILITOT 0.3 05/30/2022    ALKPHOS 131 (H) 05/30/2022    AST 22 05/30/2022    ALT 29 05/30/2022    LABGLOM >60 05/30/2022    GFRAA >59 05/30/2022    AGRATIO 1.3 09/02/2020    GLOB 1.9 09/02/2020     Lab Results   Component Value Date    INR 1.12 05/25/2022    PROTIME 14.4 05/25/2022     Chevis Bars   Student Physical Therapist   Physical Therapy   Progress Notes       Cosign Needed   Date of Service:  5/31/2022  2:49 PM                 Cosign Needed                Show:Clear all  []Manual[x]Template[]Copied    Added by:  [x]Guy Bernal      []Jose Angel for details         05/31/22 1300   Lower Extremity Weight Bearing Restrictions   Right Lower Extremity Weight Bearing Partial Weight Bearing   Partial Weight Bearing Percentage Or Pounds 50%   Subjective   Subjective in room with wife present he was eager to go for treatment   Subjective   Pain more \"stiffness\" today   Transfers   Sit to Stand Minimal Assistance   Stand to sit Stand by assistance   Ambulation   WB Status R: PWB >50%, L: WBAT   Ambulation   Surface level tile   Device Parallel Bars   Assistance Contact guard assistance;Stand by assistance   Quality of Gait patient stood in the parallel bars and worked on advancing the R LE no full steps were taken, patient was able to advance R LE and complete proper heel toe contact, but was not able to complete a full toe off   Distance 0 FT   Comments increased pain afterward, more of a burning sensation after in his R anterior hip, patient was able to place about 30-40# of weight through the R LE Ambulation 2   Surface - 2 level tile   Device 2 Parallel Bars   Assistance 2 Contact guard assistance;Stand by assistance   Quality of Gait 2 patient was instructed to weight shift from the L LE to the R LE to increase his confidence in the R LE   Distance 0 FT   Wheelchair Activities   Propulsion Yes   Propulsion 1   Propulsion Manual   Level Level Tile   Method RUE;LUE   Level of Assistance Stand by assistance   Description/ Details improved endurance, patient was able to propel from his room to the therapy gym with no breaks   Distance 200 x 2   PT Exercises   Exercise Treatment seated R hip flexion AAROM, hip abduction/adduction w/ manual resistance, glut sets, hip extension x 10 all bilat, 2 sets   Activity Tolerance   Activity Tolerance Patient tolerated treatment well   Assessment   Assessment patient is still nervous and lacks confidence in his R LE but worked today on gaining conifidence, see ambulation   Safety Devices   Type of Devices Chair alarm in place; Left in chair   PT Individual Minutes   Time In 1300   Time Out 1345   Minutes 45      Electronically signed by SESAR Bolivar on 5/31/2022 at 2:50 PM                         RECORD REVIEW: Previous medical records, medications were reviewed at today's visit    IMPRESSION:   1. Status post right periprosthetic fracture/non surgicalpain control/mobilization  2. History of goutallopurinol/colchicine as needed  3. DVT prophylaxis Xarelto  4. BPH/history of prostate cancer refusing Flomax   5. Hypertensionon medicine monitor-slightly elevated  6. GIbowel regimen  7. Pain controlNorco as needed-comfortable  8. Arthritis monitor   9. Prednisone patient indicates is taken for some connective tissue disorder to avoid his ureter from closing off-has a diagnosis of retroperitoneal fibrosis  10.   PT/OT      Continue present care    ELOS pending     Expected duration and frequency therapy: 180 minutes per day, 5 days per week    CALL WITH ANY QUESTIONS  [phone] CELL  Dr Valarie Marie

## 2022-06-01 NOTE — PROGRESS NOTES
Name: Micheline Mcgrath  MRN: [id number]  Date of Service:  6/1/2022 06/01/22 1500   Restrictions/Precautions   Restrictions/Precautions Weight Bearing; Fall Risk   Lower Extremity Weight Bearing Restrictions   Right Lower Extremity Weight Bearing Weight Bearing As Tolerated   Partial Weight Bearing Percentage Or Pounds WBAT   Left Lower Extremity Weight Bearing Weight Bearing As Tolerated   General   Chart Reviewed Yes   Patient assessed for rehabilitation services? Yes   Additional Pertinent Hx arthritis, prostate cancer, cellulitis, gout, HTN, IFG, COVID 19, back surgery, B THR,    Diagnosis s/p right periproshetic hip fracture; non-op   Subjective   Subjective Pt sitting in w/c in room, agrees to therapy. Wife present in room. Subjective   Pain R hip:  1/10 with no movement, 3/10 and stiff: with WB/amb. Transfers   Sit to Stand Minimal Assistance;Contact guard assistance  (In //)   Stand to sit Stand by assistance  (In //)   Ambulation   WB Status WBAT   Ambulation   Surface level tile   Device Parallel Bars   Assistance Contact guard assistance   Quality of Gait 3 point gait, fwd flexed posture, increased R knee flexion to compensate for no toe off    Distance 20' X 2  (4 complete turns in //)   Comments ~4-5 min sitting rest in between    Wheelchair Activities   Propulsion Yes   Propulsion 1   Propulsion Manual   Level Level Tile   Method RUE;LUE   Level of Assistance Supervision   Description/ Details improved endurance, patient was able to propel from his room to the therapy gym and vice versa with no breaks. Multiple L and R turns    Distance 200 x 2   Activity Tolerance   Activity Tolerance Patient tolerated treatment well   Assessment   Assessment Pt able to increase overall amb distance in // including complete turns. Plan to progress pt to amb. with RW when appropriate.     Discharge Recommendations Continue to assess pending progress;Home with Home health PT;24 hour supervision or assist   Safety

## 2022-06-01 NOTE — PROGRESS NOTES
Occupational Therapy  Facility/Department: [facility] 8 REHAB UNIT  Occupational Therapy Treatment Note     Name: Rosa Archuleta  : 1950  MRN: [id number]  Date of Service: 2022    Discharge Recommendations:  Home with Home health OT          Patient Diagnosis(es): There were no encounter diagnoses. Past Medical History:  has a past medical history of Allergic rhinitis, Cancer ([facility] [address] Chronic kidney disease, Colonic polyp, DDD (degenerative disc disease), Hyperlipidemia, Hypertension, and Other disorders of kidney and ureter in diseases classified elsewhere. Past Surgical History:  has a past surgical history that includes Colonoscopy (11/3/2004); back surgery; Upper gastrointestinal endoscopy (N/A, 9/3/2020); Total hip arthroplasty (Bilateral); and fracture surgery. Treatment Diagnosis: Periprosthetic fracture around internal prosthetic right hip joint; non-surgerical      Assessment   Performance deficits / Impairments: Decreased functional mobility ; Decreased ADL status; Decreased ROM; Decreased strength;Decreased balance;Decreased high-level IADLs  Treatment Diagnosis: Periprosthetic fracture around internal prosthetic right hip joint; non-surgerical           Plan   Plan  Current Treatment Recommendations: Strengthening,Balance training,ROM,Functional mobility training,Wheelchair mobility training,Endurance training,Neuromuscular re-education,Equipment evaluation, education, & procurement,Patient/Caregiver education & training,Safety education & training,Self-Care / ADL,Home management training     Restrictions  Restrictions/Precautions  Restrictions/Precautions: Weight Bearing,Fall Risk  Lower Extremity Weight Bearing Restrictions  Right Lower Extremity Weight Bearing: Weight Bearing As Tolerated  Partial Weight Bearing Percentage Or Pounds: WBAT  Left Lower Extremity Weight Bearing: Weight Bearing As Tolerated    Subjective   General  Patient assessed for rehabilitation services?: Yes     Social/Functional History  Social/Functional History  Lives With: Spouse  Type of Home: House  Home Layout: Multi-level  Home Access: Stairs to enter without rails  Bathroom Shower/Tub: Tub/Shower unit,Walk-in shower  Bathroom Accessibility: Walker accessible (1/2 bathroom downstairs is walker accessible, w/c accessible for upstairs bathroom)  Home Equipment: Rollator,Walker, rolling  Has the patient had two or more falls in the past year or any fall with injury in the past year?: No  ADL Assistance: Independent  Homemaking Assistance: Independent  Ambulation Assistance: Independent  Transfer Assistance: Independent  Active : Yes  Mode of Transportation: Truck  Occupation: Full time employment  Type of Occupation: farm store owner  [address]: Used to play tennis and basketball, mow and bush hog, farming, Go to [employer] every year  IADL Comments: Grilling; wife does all homemaking tasks.        Objective   Heart Rate: 90  Heart Rate Source: Monitor  BP: 110/83  MAP (Calculated): 92  Resp: 16  SpO2: 96 %          Activity Tolerance  Activity Tolerance: Patient tolerated treatment well         06/01/22 0705   Balance   Sitting Balance Supervision   Standing Balance Contact guard assistance   Functional Mobility   Functional - Mobility Device Wheelchair   Activity To/From therapy gym   Assist Level Independent      06/01/22 0705   Transfers   Sit to stand Minimal assistance   Stand to sit Minimal assistance   Transfer Comments bed>w/c using RW     OutComes Score    Eating  Assistance Needed: Independent  CARE Score: 6  Discharge Goal: Independent    Oral Hygiene  Assistance Needed: Independent  CARE Score: 6  [address]charge Goal: [address] needed: Supervision or touching assistance  Comment: CGA  CARE Score: 4  Discharge Goal: Independent     Shower/Bathe Self  Assistance Needed: Partial/moderate assistance  Comment: Shower; Min A for buttocks  CARE Score: 3  Discharge Goal: Independent Upper Body Dressing  Assistance Needed: Setup or clean-up assistance  CARE Score: 5  Discharge Goal: Independent     Lower Body Dressing  Assistance Needed: Supervision or touching assistance  Comment: using AE; CGA at GB while standing  CARE Score: 4  Discharge Goal: Independent     Putting On/Taking Off Footwear  Assistance Needed: Partial/moderate assistance  Comment: able don/doff socks using AE with VC, Mod A for shoes with shoe horn  CARE Score: 3  Discharge Goal: Independent     Toilet Transfer  Assistance needed: Partial/moderate assistance  Comment: Mod A  CARE Score: 3  Discharge Goal: Independent     Goals  Short Term Goals  Time Frame for Short term goals: 2 weeks  Short Term Goal 1: MET  Short Term Goal 2: CGA with toilet transfers. Short Term Goal 3: Min A with bathing hgyiene. Short Term Goal 4: Min A with LB dressing using AE. Short Term Goal 5: Min A with donning/doffing socks and shoes using AE. Short Term Goal 6: Patient will complete 1-2 handed static standing task for 3 minutes, requiring CGA. Long Term Goals  Time Frame for Long term goals : 3-4 weeks  Long Term Goal 1: Modified independent with toileting and toilet transfers. Long Term Goal 2: Modified independent with bathing hygiene. Long Term Goal 3: Modified independent with overall dressing. Long Term Goal 4: Patient verbalize DME nees. Long Term Goal 5: Independent with HEP. Patient Goals   Patient goals : Patient wants to be able to walk up and down his two flights of stairs in his home.        Therapy Time   Individual Concurrent Group Co-treatment   Time In 9477         Time Out 0805         Minutes 60         Timed Code Treatment Minutes: 75 Lauri Fitch OT

## 2022-06-01 NOTE — PLAN OF CARE
Problem: Discharge Planning  Goal: Discharge to home or other facility with appropriate resources  Outcome: Progressing     Problem: Safety - Adult  Goal: Free from fall injury  Outcome: Progressing     Problem: Pain  Goal: Verbalizes/displays adequate comfort level or baseline comfort level  Outcome: Progressing     Problem: ABCDS Injury Assessment  Goal: Absence of physical injury  Outcome: Progressing     Problem: Nutrition Deficit:  Goal: Optimize nutritional status  Outcome: Progressing  Flowsheets (Taken 5/31/2022 1254 by Vannesa López, MS, RD, LD)  Nutrient intake appropriate for improving, restoring, or maintaining nutritional needs:   Assess nutritional status and recommend course of action   Monitor oral intake, labs, and treatment plans   Recommend appropriate diets, oral nutritional supplements, and vitamin/mineral supplements     Problem: Skin/Tissue Integrity  Goal: Absence of new skin breakdown  Outcome: Progressing

## 2022-06-01 NOTE — PROGRESS NOTES
I called Dr. Vitaly Carter office 5/31 to clarify weightbearing status; left message. Efraín Rome DESHAWN received phone call at  from [place] Posadas with Dr. Vitaly Carter office stating that he is WBAT bilateral lower extremities.

## 2022-06-02 LAB
ALBUMIN SERPL-MCNC: 1.9 G/DL (ref 3.5–5.2)
ALP BLD-CCNC: 166 U/L (ref 40–130)
ALT SERPL-CCNC: 20 U/L (ref 5–41)
ANION GAP SERPL CALCULATED.3IONS-SCNC: 6 MMOL/L (ref 7–19)
AST SERPL-CCNC: 15 U/L (ref 5–40)
BASOPHILS ABSOLUTE: 0 K/UL (ref 0–0.2)
BASOPHILS RELATIVE PERCENT: 0.5 % (ref 0–1)
BILIRUB SERPL-MCNC: 0.3 MG/DL (ref 0.2–1.2)
BUN BLDV-MCNC: 32 MG/DL (ref 8–23)
CALCIUM SERPL-MCNC: 7.7 MG/DL (ref 8.8–10.2)
CHLORIDE BLD-SCNC: 107 MMOL/L (ref 98–111)
CO2: 27 MMOL/L (ref 22–29)
CREAT SERPL-MCNC: 1.1 MG/DL (ref 0.5–1.2)
EOSINOPHILS ABSOLUTE: 0.1 K/UL (ref 0–0.6)
EOSINOPHILS RELATIVE PERCENT: 2.1 % (ref 0–5)
GFR AFRICAN AMERICAN: >59
GFR NON-AFRICAN AMERICAN: >60
GLUCOSE BLD-MCNC: 88 MG/DL (ref 74–109)
HCT VFR BLD CALC: 34.3 % (ref 42–52)
HEMOGLOBIN: 10.6 G/DL (ref 14–18)
IMMATURE GRANULOCYTES #: 0.1 K/UL
LYMPHOCYTES ABSOLUTE: 1.1 K/UL (ref 1.1–4.5)
LYMPHOCYTES RELATIVE PERCENT: 17.2 % (ref 20–40)
MCH RBC QN AUTO: 27.5 PG (ref 27–31)
MCHC RBC AUTO-ENTMCNC: 30.9 G/DL (ref 33–37)
MCV RBC AUTO: 88.9 FL (ref 80–94)
MONOCYTES ABSOLUTE: 0.8 K/UL (ref 0–0.9)
MONOCYTES RELATIVE PERCENT: 11.5 % (ref 0–10)
NEUTROPHILS ABSOLUTE: 4.4 K/UL (ref 1.5–7.5)
NEUTROPHILS RELATIVE PERCENT: 67.6 % (ref 50–65)
PDW BLD-RTO: 17.2 % (ref 11.5–14.5)
PLATELET # BLD: 319 K/UL (ref 130–400)
PMV BLD AUTO: 9.1 FL (ref 9.4–12.4)
POTASSIUM REFLEX MAGNESIUM: 4.2 MMOL/L (ref 3.5–5)
RBC # BLD: 3.86 M/UL (ref 4.7–6.1)
SODIUM BLD-SCNC: 140 MMOL/L (ref 136–145)
TOTAL PROTEIN: 3.2 G/DL (ref 6.6–8.7)
WBC # BLD: 6.5 K/UL (ref 4.8–10.8)

## 2022-06-02 PROCEDURE — 99232 SBSQ HOSP IP/OBS MODERATE 35: CPT | Performed by: PSYCHIATRY & NEUROLOGY

## 2022-06-02 PROCEDURE — 97110 THERAPEUTIC EXERCISES: CPT

## 2022-06-02 PROCEDURE — 80053 COMPREHEN METABOLIC PANEL: CPT

## 2022-06-02 PROCEDURE — 97530 THERAPEUTIC ACTIVITIES: CPT

## 2022-06-02 PROCEDURE — 85025 COMPLETE CBC W/AUTO DIFF WBC: CPT

## 2022-06-02 PROCEDURE — 1180000000 HC REHAB R&B

## 2022-06-02 PROCEDURE — 97116 GAIT TRAINING THERAPY: CPT

## 2022-06-02 PROCEDURE — 6370000000 HC RX 637 (ALT 250 FOR IP): Performed by: PSYCHIATRY & NEUROLOGY

## 2022-06-02 PROCEDURE — 36415 COLL VENOUS BLD VENIPUNCTURE: CPT

## 2022-06-02 RX ADMIN — DOCUSATE SODIUM 100 MG: 100 CAPSULE, LIQUID FILLED ORAL at 08:39

## 2022-06-02 RX ADMIN — PREDNISONE 5 MG: 5 TABLET ORAL at 08:39

## 2022-06-02 RX ADMIN — ALLOPURINOL 300 MG: 300 TABLET ORAL at 08:39

## 2022-06-02 RX ADMIN — TORSEMIDE 10 MG: 10 TABLET ORAL at 08:39

## 2022-06-02 RX ADMIN — APIXABAN 2.5 MG: 2.5 TABLET, FILM COATED ORAL at 16:58

## 2022-06-02 NOTE — PROGRESS NOTES
Patient:   Evonne Villa  MR#:    [id number]   Room:    6658/407-06   YOB: 1950  Date of Progress Note: 6/2/2022  Time of Note                           8:51 AM  Consulting Physician:   Shanita Oconnor M.D. Attending Physician:  Shanita Oconnor MD     Chief complaint Periprosthetic fracture right hip/nonsurgical    S:This 81 y.o. male  with history of arthritis , prostate cancer, cellulitis, gout, HTN, IFG (impaired fasting glucose), and COVID 19. He presented to [facility] ER on 5/21/22 after having a fall at home. X-rays done revealed a right periprosthetic hip fracture. He was discharged home with plans for f/u with ortho as outpatient. He return to ER on 5/22/22 with increased pain and swelling of right hip and left knee. Left knee x-ray done showed Tricompartmental osteoarthrosis of the left knee with a moderate joint effusion. CT of pelvis done showed Mildly displaced intertrochanteric fracture of the right hip. The fracture is periprosthetic in location with exposure of the proximal intertrochanteric component of the prosthesis. The femoral head prosthesis remains well located within the acetabulum. He was admitted to his PCP Dr. Frieda Freeman for pain control. Consult for orthopedic surgery. He was seen by Dr. Jolie Pyle, who recommended non-op treatment. He will be partial weight bearing <50% on his right lower extremity, ok to ambulate with rolling walker. Dr. Jolie Pyle aspirated patient's left knee and gave an injection, it was felt the pain/swelling was d/t overuse. He will be weightbearing as tolerated on his left lower extremity. Switching from Lovenox to Xarelto for DVT prophylaxis. Patient has been hyperglycemic throughout his acute care stay with history of IFG. No current hemoglobin A1C. Patient still c/o of pain at 5/10 being managed with PO pain medications. He is participating in both PT/OT. He is felt to need a stay on Rehab to work towards his goal of returning home with his wife.  He is now felt ready to start the Rehab program.  Doing well this morning. Feels comfortable.     REVIEW OF SYSTEMS:  Constitutional: No fevers No chills  Neck:No stiffness  Respiratory: No shortness of breath  Cardiovascular: No chest pain No palpitations  Gastrointestinal: No abdominal pain    Genitourinary: No Dysuria  Neurological: No headache, no confusion    Past Medical History:      Diagnosis Date    Allergic rhinitis     Cancer ([address]     Chronic kidney disease     Colonic polyp     DDD (degenerative disc disease)     Hyperlipidemia     Hypertension     Other disorders of kidney and ureter in diseases classified elsewhere        Past Surgical History:      Procedure Laterality Date    BACK SURGERY      COLONOSCOPY  11/3/2004        FRACTURE SURGERY      TOTAL HIP ARTHROPLASTY Bilateral     UPPER GASTROINTESTINAL ENDOSCOPY N/A 9/3/2020    Dr RAMSES Olivarez-w/EUS w/fna-Very difficult to identify the mass in question on CT scan-Benign       Medications in Hospital:      Current Facility-Administered Medications:     apixaban (ELIQUIS) tablet 2.5 mg, 2.5 mg, Oral, BID, Hemant Jhaveri MD    docusate sodium (COLACE) capsule 100 mg, 100 mg, Oral, Daily, Hemant Jhaveri MD, 100 mg at 06/02/22 0839    allopurinol (ZYLOPRIM) tablet 300 mg, 300 mg, Oral, Daily, Hemant Jhaveri MD, 300 mg at 06/02/22 0839    colchicine (COLCRYS) tablet 0.6 mg, 0.6 mg, Oral, BID PRN, Hemant Jhaveri MD    diphenhydrAMINE (BENADRYL) tablet 25 mg, 25 mg, Oral, Q6H PRN, Hemant Jhaveri MD    HYDROcodone-acetaminophen (NORCO) 5-325 MG per tablet 1 tablet, 1 tablet, Oral, Q6H PRN, Hemant Jhaveri MD, 1 tablet at 06/01/22 1526    predniSONE (DELTASONE) tablet 5 mg, 5 mg, Oral, Daily, Hemant Jhaveri MD, 5 mg at 06/02/22 0839    [Held by provider] tamsulosin (FLOMAX) capsule 0.4 mg, 0.4 mg, Oral, Daily, Hemant Jhaveri MD    torsemide (DEMADEX) tablet 10 mg, 10 mg, Oral, Daily, Hemant Jhaveri MD, 10 mg at 06/02/22 0839    acetaminophen (TYLENOL) tablet 650 mg, 650 mg, Oral, Q4H PRN, Carli Jurado MD    polyethylene glycol (GLYCOLAX) packet 17 g, 17 g, Oral, Daily PRN, Carli Jurado MD, 17 g at 05/27/22 5923    Allergies:  Niacin    Social History:   TOBACCO:   reports that he has never smoked. He has never used smokeless tobacco.  ETOH:   reports no history of alcohol use. Family History:       Problem Relation Age of Onset    Stroke Mother     Deep Vein Thrombosis Mother     Pacemaker Father     Heart Disease Father          PHYSICAL EXAM:  /86   Pulse 79   Temp (!) 96.4 °F (35.8 °C) (Temporal)   Resp 16   Ht 6' 2\" (1.88 m)   Wt 227 lb 7 oz (103.2 kg)   SpO2 99%   BMI 29.20 kg/m²     Constitutional  well developed, well nourished. Eyes  conjunctiva normal.   Ear, nose, throat - No scars, masses, or lesions over external nose or ears, no atrophy of tongue  Neck-symmetric, no masses noted, no jugular vein distension  Respiration- chest wall appears symmetric, good expansion,   normal effort without use of accessory muscles  Musculoskeletal  no significant wasting of muscles noted, no bony deformities  Extremities-no clubbing, cyanosis or edema  Skin  warm, dry, and intact. No rash, erythema, or pallor. Psychiatric  mood, affect, and behavior appear normal.      Neurological exam  Awake, alert, fluent oriented appropriate affect  Attention and concentration appear appropriate  Recent and remote memory appears unremarkable  Speech normal without dysarthria  No clear issues with language of fund of knowledge     Cranial Nerve Exam     CN III, IV,VI-EOMI, No nystagmus, conjugate eye movements, no ptosis    CN VII-no facial assymetry       Motor Exam  antigravity throughout upper extremities bilaterally      Tremors- no tremors in hands or head noted     Gait  Not tested     Nursing/pcp notes, imaging,labs and vitals reviewed.      PT,OT and/or speech notes reviewed    Lab Results   Component Value Date    WBC 6.5 06/02/2022    HGB 10.6 (L) 06/02/2022    HCT 34.3 (L) 06/02/2022    MCV 88.9 06/02/2022     06/02/2022     Lab Results   Component Value Date     06/02/2022    K 4.2 06/02/2022     06/02/2022    CO2 27 06/02/2022    BUN 32 (H) 06/02/2022    CREATININE 1.1 06/02/2022    GLUCOSE 88 06/02/2022    CALCIUM 7.7 (L) 06/02/2022    PROT 3.2 (L) 06/02/2022    LABALBU 1.9 (L) 06/02/2022    BILITOT 0.3 06/02/2022    ALKPHOS 166 (H) 06/02/2022    AST 15 06/02/2022    ALT 20 06/02/2022    LABGLOM >60 06/02/2022    GFRAA >59 06/02/2022    AGRATIO 1.3 09/02/2020    GLOB 1.9 09/02/2020     Lab Results   Component Value Date    INR 1.12 05/25/2022    PROTIME 14.4 05/25/2022     Levi Viveros   Student Physical Therapist   Physical Therapy   Progress Notes       Cosign Needed   Date of Service:  5/31/2022  2:49 PM                 Cosign Needed                Show:Clear all  []Manual[x]Template[]Copied    Added by:  [x]Guy Cabral Friday      []Hover for details         05/31/22 1300   Lower Extremity Weight Bearing Restrictions   Right Lower Extremity Weight Bearing Partial Weight Bearing   Partial Weight Bearing Percentage Or Pounds 50%   Subjective   Subjective in room with wife present he was eager to go for treatment   Subjective   Pain more \"stiffness\" today   Transfers   Sit to Stand Minimal Assistance   Stand to sit Stand by assistance   Ambulation   WB Status R: PWB >50%, L: WBAT   Ambulation   Surface level tile   Device Parallel Bars   Assistance Contact guard assistance;Stand by assistance   Quality of Gait patient stood in the parallel bars and worked on advancing the R LE no full steps were taken, patient was able to advance R LE and complete proper heel toe contact, but was not able to complete a full toe off   Distance 0 FT   Comments increased pain afterward, more of a burning sensation after in his R anterior hip, patient was able to place about 30-40# of weight through the R LE    Ambulation 2   Surface - 2 level tile   Device 2 Parallel Bars   Assistance 2 Contact guard assistance;Stand by assistance   Quality of Gait 2 patient was instructed to weight shift from the L LE to the R LE to increase his confidence in the R LE   Distance 0 FT   Wheelchair Activities   Propulsion Yes   Propulsion 1   Propulsion Manual   Level Level Tile   Method RUE;LUE   Level of Assistance Stand by assistance   Description/ Details improved endurance, patient was able to propel from his room to the therapy gym with no breaks   Distance 200 x 2   PT Exercises   Exercise Treatment seated R hip flexion AAROM, hip abduction/adduction w/ manual resistance, glut sets, hip extension x 10 all bilat, 2 sets   Activity Tolerance   Activity Tolerance Patient tolerated treatment well   Assessment   Assessment patient is still nervous and lacks confidence in his R LE but worked today on gaining conifidence, see ambulation   Safety Devices   Type of Devices Chair alarm in place; Left in chair   PT Individual Minutes   Time In 1300   Time Out 1345   Minutes 45      Electronically signed by SESAR Casillas on 5/31/2022 at 2:50 PM                         RECORD REVIEW: Previous medical records, medications were reviewed at today's visit    IMPRESSION:   1. Status post right periprosthetic fracture/non surgicalpain control/mobilization  2. History of goutallopurinol/colchicine as needed  3. DVT prophylaxis Xarelto  4. BPH/history of prostate cancer refusing Flomax   5. Hypertensionon medicine monitor-slightly elevated  6. GIbowel regimen  7. Pain controlNorco as needed-comfortable  8. Arthritis monitor   9. Prednisone patient indicates is taken for some connective tissue disorder to avoid his ureter from closing off-has a diagnosis of retroperitoneal fibrosis  10.   PT/OT      Continue current care    ELOS pending     Expected duration and frequency therapy: 180 minutes per day, 5 days per week    [address] [place] [address]  [phone] CELL  Dr Bernadette Garcia

## 2022-06-02 NOTE — PROGRESS NOTES
06/02/22 1515   Restrictions/Precautions   Restrictions/Precautions Fall Risk;Weight Bearing   Lower Extremity Weight Bearing Restrictions   Right Lower Extremity Weight Bearing Weight Bearing As Tolerated   Partial Weight Bearing Percentage Or Pounds WBAT   Left Lower Extremity Weight Bearing Weight Bearing As Tolerated   General   Diagnosis s/p right periproshetic hip fracture; non-op   Subjective   Pain 0/10   Transfers   Sit to Stand Contact guard assistance   Stand to sit Contact guard assistance;Minimal Assistance  (plop in chair)   Bed to Chair Contact guard assistance   Ambulation   Surface level tile   Device Rolling Walker   Other Apparatus Wheelchair follow   Assistance Stand by assistance   Quality of Gait 3 pt gait, forward flexed at hips, head down. Distance 70   Propulsion 1   Propulsion Manual   Level Level Tile   Method RUE;LUE   Level of Assistance Stand by assistance   Distance 200   PT Exercises   Exercise Treatment seaated ex to RLE 2 sets 10 reps ,  min manual resist to ankles and hip ext, active LAQ   Assessment   Assessment increased amb distance, still with abnormal standing and gait posture, suspect tight hip flexors   Safety Devices   Type of Devices All fall risk precautions in place;Call light within reach;Gait belt;Patient at risk for falls; Left in chair;Chair alarm in place   PT Individual Minutes   Time In 1515   Time Out 1600   Minutes 45

## 2022-06-02 NOTE — PROGRESS NOTES
Physical Therapy  Name: Yoshi Chen  MRN:  [id number]  Date of service:  6/2/2022 06/02/22 0900   Restrictions/Precautions   Restrictions/Precautions Weight Bearing; Fall Risk   Lower Extremity Weight Bearing Restrictions   Right Lower Extremity Weight Bearing Weight Bearing As Tolerated   Partial Weight Bearing Percentage Or Pounds WBAT   Left Lower Extremity Weight Bearing Weight Bearing As Tolerated   Subjective   Subjective Cont with stiffnes; seated on eob and ready for therapy   Subjective   Subjective stiff/sore anterior hip   Pain 2/10 at rest, greater when moving hip into flex as with gt thoough not quantified   Transfers   Sit to Stand Contact guard assistance   Stand to sit Contact guard assistance   Bed to Chair Contact guard assistance   Comment multiple STS from WC to // bars and RW (always pushing up from [facility])   Ambulation   WB Status WBAT   Ambulation   Surface level tile   Device Parallel Bars   Assistance Stand by assistance   Quality of Gait 3 point gait, fwd flexed posture, increased R knee flexion to compensate for no toe off    Gait Deviations Slow Amber   Distance 40'   Ambulation 2   Surface - 2 level tile   Device 2 Rolling Walker   Other Apparatus 2   (no WC follow)   Assistance 2 Contact guard assistance   Quality of Gait 2 followed 3 point gait pattern with multiple VC to stand erect, take shorter steps as he had a tendency to take excessively long steps with his R LE, and inc distance that RW is advanced    Gait Deviations Slow Amber   Distance 40'   PT Exercises   A/AROM Exercises B LE seated x 2/10: laq, hip ext (manual resist), AP, HS curls (manual resist)   Assessment   Assessment demonstrated improved quality of gt and ability to use RW; needs cues and extra time and cont to rely heavily on AD for WBing   PT Individual Minutes   Time In 0900   Time Out 1000   Minutes 60       Electronically signed by Ladonna Gonsalez PTA on 6/2/2022 at 10:17 AM

## 2022-06-02 NOTE — PROGRESS NOTES
Nutrition Assessment     Type and Reason for Visit: Reassess    Nutrition Recommendations/Plan:   1. Continue POC     Malnutrition Assessment:  Malnutrition Status: Moderate malnutrition    Nutrition Assessment:  Pt continues to improve nutritionally AEB po intake meals usually >50%,  >75% yesterday, also consuming ONS. Will continue POC and follow. Estimated Daily Nutrient Needs:  Energy (kcal):  8387-3173 kcals/day Weight Used for Energy Requirements: Current (25-30 kcals/kg)     Protein (g):  155-189 g/pro/day Weight Used for Protein Requirements: Ideal (1.8-2.2 g/kg)        Fluid (ml/day):  5590-9167 mL/fluid/day Method Used for Fluid Requirements: 1 ml/kcal    Nutrition Related Findings:   non-pitting BLE edema Wound Type: None    Current Nutrition Therapies:    ADULT ORAL NUTRITION SUPPLEMENT; Dinner; Standard High Calorie/High Protein Oral Supplement  ADULT DIET; Regular    Anthropometric Measures:  · Height: 6' 2\" (188 cm)  · Current Body Wt: 227 lb (103 kg)   · BMI: 29.1    Nutrition Diagnosis:   · Moderate malnutrition,In context of chronic illness related to catabolic illness as evidenced by poor intake prior to admission,weight loss greater than or equal to 5% in 1 month,mild muscle loss      Nutrition Interventions:   Food and/or Nutrient Delivery: Continue Current Diet,Continue Oral Nutrition Supplement  Nutrition Education/Counseling: Education not indicated  Coordination of Nutrition Care: Continue to monitor while inpatient  Plan of Care discussed with: Patient and Spouse    Goals:  Previous Goal Met: Goal(s) Achieved  Goals: PO intake 75% or greater,by next RD assessment       Nutrition Monitoring and Evaluation:   Behavioral-Environmental Outcomes: None Identified  Food/Nutrient Intake Outcomes: Food and Nutrient Intake,Supplement Intake  Physical Signs/Symptoms Outcomes: Biochemical Data,Nutrition Focused Physical Findings,Weight,Fluid Status or Edema    Discharge Planning:     Too soon to determine     Jesus Hayes MS, RD, LD  Contact: [phone]

## 2022-06-02 NOTE — PLAN OF CARE
Problem: Discharge Planning  Goal: Discharge to home or other facility with appropriate resources  Outcome: Progressing     Problem: Safety - Adult  Goal: Free from fall injury  Outcome: Progressing     Problem: Pain  Goal: Verbalizes/displays adequate comfort level or baseline comfort level  Outcome: Progressing     Problem: ABCDS Injury Assessment  Goal: Absence of physical injury  Outcome: Progressing     Problem: Nutrition Deficit:  Goal: Optimize nutritional status  Outcome: Progressing     Problem: Skin/Tissue Integrity  Goal: Absence of new skin breakdown  Outcome: Progressing

## 2022-06-02 NOTE — PLAN OF CARE
Problem: Safety - Adult  Goal: Free from fall injury  6/2/2022 1143 by Anna Lopez, RN  Outcome: Progressing  Flowsheets (Taken 6/2/2022 1137)  Free From Fall Injury: Instruct family/caregiver on patient safety  6/2/2022 0443 by Tika Mercado, RN  Outcome: Progressing   Up with 1 assist with walker

## 2022-06-03 PROCEDURE — 1180000000 HC REHAB R&B

## 2022-06-03 PROCEDURE — 6370000000 HC RX 637 (ALT 250 FOR IP): Performed by: PSYCHIATRY & NEUROLOGY

## 2022-06-03 PROCEDURE — 99232 SBSQ HOSP IP/OBS MODERATE 35: CPT | Performed by: PSYCHIATRY & NEUROLOGY

## 2022-06-03 PROCEDURE — 97530 THERAPEUTIC ACTIVITIES: CPT

## 2022-06-03 PROCEDURE — 97110 THERAPEUTIC EXERCISES: CPT

## 2022-06-03 PROCEDURE — 97116 GAIT TRAINING THERAPY: CPT

## 2022-06-03 RX ADMIN — DOCUSATE SODIUM 100 MG: 100 CAPSULE, LIQUID FILLED ORAL at 07:42

## 2022-06-03 RX ADMIN — PREDNISONE 5 MG: 5 TABLET ORAL at 07:42

## 2022-06-03 RX ADMIN — HYDROCODONE BITARTRATE AND ACETAMINOPHEN 1 TABLET: 5; 325 TABLET ORAL at 23:46

## 2022-06-03 RX ADMIN — TORSEMIDE 10 MG: 10 TABLET ORAL at 07:41

## 2022-06-03 RX ADMIN — APIXABAN 2.5 MG: 2.5 TABLET, FILM COATED ORAL at 20:59

## 2022-06-03 RX ADMIN — HYDROCODONE BITARTRATE AND ACETAMINOPHEN 1 TABLET: 5; 325 TABLET ORAL at 11:28

## 2022-06-03 RX ADMIN — APIXABAN 2.5 MG: 2.5 TABLET, FILM COATED ORAL at 07:42

## 2022-06-03 RX ADMIN — ALLOPURINOL 300 MG: 300 TABLET ORAL at 07:41

## 2022-06-03 ASSESSMENT — PAIN DESCRIPTION - ORIENTATION: ORIENTATION: RIGHT

## 2022-06-03 ASSESSMENT — PAIN SCALES - GENERAL
PAINLEVEL_OUTOF10: 1
PAINLEVEL_OUTOF10: 4
PAINLEVEL_OUTOF10: 4

## 2022-06-03 ASSESSMENT — PAIN - FUNCTIONAL ASSESSMENT: PAIN_FUNCTIONAL_ASSESSMENT: ACTIVITIES ARE NOT PREVENTED

## 2022-06-03 ASSESSMENT — PAIN DESCRIPTION - LOCATION: LOCATION: HIP

## 2022-06-03 ASSESSMENT — PAIN DESCRIPTION - DESCRIPTORS: DESCRIPTORS: ACHING

## 2022-06-03 NOTE — PROGRESS NOTES
Name: Jeremie Harvey  MRN: [id number]  Date of Service:  6/3/2022       06/03/22 1400   Restrictions/Precautions   Restrictions/Precautions Fall Risk;Weight Bearing   Lower Extremity Weight Bearing Restrictions   Right Lower Extremity Weight Bearing Weight Bearing As Tolerated   Partial Weight Bearing Percentage Or Pounds WBAT   Left Lower Extremity Weight Bearing Weight Bearing As Tolerated   General   Chart Reviewed Yes   Patient assessed for rehabilitation services? Yes   Additional Pertinent Hx arthritis, prostate cancer, cellulitis, gout, HTN, IFG, COVID 19, back surgery, B THR,    Diagnosis s/p right periproshetic hip fracture; non-op   General Comment   Comments Wife present initially. Pt continues to present with BLE swelling- decreased slightly vs previous txs. Subjective   Subjective Pt sitting in w/c in room, agrees to participate in therapy. Subjective   Subjective stiff/sore anterior R hip    Pain 0/10   Transfers   Sit to Stand Minimal Assistance;Contact guard assistance   Stand to sit Contact guard assistance;Stand by assistance   Ambulation   WB Status WBAT   Ambulation   Surface level tile   Device Rolling Walker   Assistance Contact guard assistance;Stand by assistance   Quality of Gait 3 pt gait, forward flexed at hips, increased R hip hike to propel RLE- decreased since morning tx    Gait Deviations Slow Amber;Decreased step height   Distance 80'   Comments 2 L turns to sit in w/c    Stairs/Curb   Stairs?  Yes   Stairs   # Steps  18   Stairs Height 6\"  (Also 4\")   Rails Left ascending  (Also B )   Device No Device   Assistance Moderate assistance;Contact guard assistance  (Mod A with L asc handrail, CGA with B handrails )   Comment Intermittent v/c's for correct BLE sequencing, instruction on asc/desc sideways with L asc handrail   Wheelchair Activities   Propulsion Yes   Propulsion 1   Propulsion Manual   Level Level Tile   Method RUE;LUE   Level of Assistance Supervision;Modified independent Description/ Details Multiple L and R turns    Distance 220'   Activity Tolerance   Activity Tolerance Patient tolerated treatment well;Patient limited by endurance   Assessment   Assessment Pt able to perform stair training today- see above for specifics. Pt presents with decreased compensated R hike during amb. this tx.     Discharge Recommendations Continue to assess pending progress;Home with Home health PT;Home with assist PRN   Education   Education Given To Patient   Education Provided Home Exercise Program   Education Method Demonstration;Verbal   Barriers to Learning None   Education Outcome Verbalized understanding   Safety Devices   Type of Devices Patient at risk for falls;Gait belt;Left in chair;Chair alarm in place;Call light within reach         Electronically signed by Efraín Rome PTA on 6/3/2022 at 4:07 PM

## 2022-06-03 NOTE — PLAN OF CARE
Problem: Discharge Planning  Goal: Discharge to home or other facility with appropriate resources  Recent Flowsheet Documentation  Taken 6/2/2022 2338 by Lena Almonte RN  Discharge to home or other facility with appropriate resources:   Identify barriers to discharge with patient and caregiver   Refer to discharge planning if patient needs post-hospital services based on physician order or complex needs related to functional status, cognitive ability or social support system  6/2/2022 1143 by Quinton Castano RN  Outcome: Progressing     Problem: Safety - Adult  Goal: Free from fall injury  Recent Flowsheet Documentation  Taken 6/2/2022 2347 by Lena Almonte RN  Free From Fall Injury: Instruct family/caregiver on patient safety  6/2/2022 1143 by Quinton Castano RN  Outcome: Progressing  Flowsheets (Taken 6/2/2022 1137)  Free From Fall Injury: Instruct family/caregiver on patient safety

## 2022-06-03 NOTE — PROGRESS NOTES
Occupational Therapy  Facility/Department: [facility] 8 REHAB UNIT  Occupational Therapy Treatment Note   Name: Coty Alejandro  : 1950  MRN: [id number]  Date of Service: 6/3/2022    Discharge Recommendations:  Home with Home health OT  OT Equipment Recommendations  Equipment Needed: No  Other: Already has RW, w/c and BSC. Patient Diagnosis(es): There were no encounter diagnoses. Past Medical History:  has a past medical history of Allergic rhinitis, Cancer ([facility] [address] Chronic kidney disease, Colonic polyp, DDD (degenerative disc disease), Hyperlipidemia, Hypertension, and Other disorders of kidney and ureter in diseases classified elsewhere. Past Surgical History:  has a past surgical history that includes Colonoscopy (11/3/2004); back surgery; Upper gastrointestinal endoscopy (N/A, 9/3/2020); Total hip arthroplasty (Bilateral); and fracture surgery. Treatment Diagnosis: Periprosthetic fracture around internal prosthetic right hip joint; non-surgerical      Assessment   Performance deficits / Impairments: Decreased functional mobility ; Decreased ADL status; Decreased ROM; Decreased strength;Decreased balance;Decreased high-level IADLs  Treatment Diagnosis: Periprosthetic fracture around internal prosthetic right hip joint; non-surgerical  Activity Tolerance  Activity Tolerance: Patient Tolerated treatment well        Plan   Plan  Current Treatment Recommendations: Strengthening,Balance training,ROM,Functional mobility training,Wheelchair mobility training,Endurance training,Neuromuscular re-education,Equipment evaluation, education, & procurement,Patient/Caregiver education & training,Safety education & training,Self-Care / ADL,Home management training     Restrictions  Restrictions/Precautions  Restrictions/Precautions: Fall Risk,Weight Bearing  Lower Extremity Weight Bearing Restrictions  Right Lower Extremity Weight Bearing: Weight Bearing As Tolerated  Partial Weight Bearing Percentage Or Pounds: WBAT  Left Lower Extremity Weight Bearing: Weight Bearing As Tolerated    Subjective   General  Patient assessed for rehabilitation services?: Yes     Social/Functional History  Social/Functional History  Lives With: Spouse  Type of Home: House  Home Layout: Multi-level  Home Access: Stairs to enter without rails  Bathroom Shower/Tub: Tub/Shower unit,Walk-in shower  Bathroom Accessibility: Walker accessible (1/2 bathroom downstairs is walker accessible, w/c accessible for upstairs bathroom)  Home Equipment: Rollator,Walker, rolling,Wheelchair-manual  Has the patient had two or more falls in the past year or any fall with injury in the past year?: No  ADL Assistance: Independent  Homemaking Assistance: Independent  Ambulation Assistance: Independent  Transfer Assistance: Independent  Active : Yes  Mode of Transportation: Truck  Occupation: Full time employment  Type of Occupation: farm store owner  Leisure & Hobbies: Used to play tennis and basketball, mow and bush hog, farming, Go to [employer] every year  IADL Comments: Grilling; wife does all homemaking tasks. Objective   Heart Rate: 79  Heart Rate Source: Monitor  BP: 108/83  MAP (Calculated): 91.33  Resp: 18  SpO2: 98 %  O2 Device: None (Room air)             Safety Devices  Type of Devices: Call light within reach; Chair alarm in place                 Activity Tolerance  Activity Tolerance: Patient tolerated treatment well;Patient limited by endurance     Transfers  Stand Step Transfers: Contact guard assistance  Sit to stand: Contact guard assistance  Stand to sit: Contact guard assistance  Transfer Comments: to/from w/c with 2 w/c cushions; increased time to complete     OutComes Score      Toilet Transfer  Assistance needed: Partial/moderate assistance  Comment:  Mod A  CARE Score: 3  Discharge Goal: Independent                 Goals  Short Term Goals  Time Frame for Short term goals: 2 weeks  Short Term Goal 1: MET  Short Term Goal 2: MET  Short Term Goal 3: MET  Short Term Goal 4: MET  Short Term Goal 5: MET  Short Term Goal 6: Patient will complete 1-2 handed static standing task for 3 minutes, requiring CGA. Long Term Goals  Time Frame for Long term goals : 3-4 weeks  Long Term Goal 1: Modified independent with toileting and toilet transfers. Long Term Goal 2: Modified independent with bathing hygiene. Long Term Goal 3: Modified independent with overall dressing. Long Term Goal 4: MET  Long Term Goal 5: Independent with HEP. Patient Goals   Patient goals : Patient wants to be able to walk up and down his two flights of stairs in his home.        Therapy Time   Individual Concurrent Group Co-treatment   Time In 1100         Time Out 1200         Minutes 60         Timed Code Treatment Minutes: [address], OT

## 2022-06-03 NOTE — PROGRESS NOTES
Patient:   Deon Blanchard  MR#:    [id number]   Room:    8948/874-16   YOB: 1950  Date of Progress Note: 6/3/2022  Time of Note                           9:59 AM  Consulting Physician:   Solomon Pro M.D. Attending Physician:  Solomon Pro MD     Chief complaint Periprosthetic fracture right hip/nonsurgical    S:This 81 y.o. male  with history of arthritis , prostate cancer, cellulitis, gout, HTN, IFG (impaired fasting glucose), and COVID 19. He presented to [facility] ER on 5/21/22 after having a fall at home. X-rays done revealed a right periprosthetic hip fracture. He was discharged home with plans for f/u with ortho as outpatient. He return to ER on 5/22/22 with increased pain and swelling of right hip and left knee. Left knee x-ray done showed Tricompartmental osteoarthrosis of the left knee with a moderate joint effusion. CT of pelvis done showed Mildly displaced intertrochanteric fracture of the right hip. The fracture is periprosthetic in location with exposure of the proximal intertrochanteric component of the prosthesis. The femoral head prosthesis remains well located within the acetabulum. He was admitted to his PCP Dr. Latrice Abdi for pain control. Consult for orthopedic surgery. He was seen by Dr. Indira Shearer, who recommended non-op treatment. He will be partial weight bearing <50% on his right lower extremity, ok to ambulate with rolling walker. Dr. Indira Shearer aspirated patient's left knee and gave an injection, it was felt the pain/swelling was d/t overuse. He will be weightbearing as tolerated on his left lower extremity. Switching from Lovenox to Xarelto for DVT prophylaxis. Patient has been hyperglycemic throughout his acute care stay with history of IFG. No current hemoglobin A1C. Patient still c/o of pain at 5/10 being managed with PO pain medications. He is participating in both PT/OT. He is felt to need a stay on Rehab to work towards his goal of returning home with his wife.  He is now felt ready to start the Rehab program.  Feels well this morning. No complaints. Hip is doing well.        REVIEW OF SYSTEMS:  Constitutional: No fevers No chills  Neck:No stiffness  Respiratory: No shortness of breath  Cardiovascular: No chest pain No palpitations  Gastrointestinal: No abdominal pain    Genitourinary: No Dysuria  Neurological: No headache, no confusion    Past Medical History:      Diagnosis Date    Allergic rhinitis     Cancer ([address]     Chronic kidney disease     Colonic polyp     DDD (degenerative disc disease)     Hyperlipidemia     Hypertension     Other disorders of kidney and ureter in diseases classified elsewhere        Past Surgical History:      Procedure Laterality Date    BACK SURGERY      COLONOSCOPY  11/3/2004        FRACTURE SURGERY      TOTAL HIP ARTHROPLASTY Bilateral     UPPER GASTROINTESTINAL ENDOSCOPY N/A 9/3/2020    Dr RAMSES Olivarez-w/EUS w/fna-Very difficult to identify the mass in question on CT scan-Benign       Medications in Hospital:      Current Facility-Administered Medications:     apixaban (ELIQUIS) tablet 2.5 mg, 2.5 mg, Oral, BID, Lisandra Herrera MD, 2.5 mg at 06/03/22 0742    docusate sodium (COLACE) capsule 100 mg, 100 mg, Oral, Daily, Lisandra Herrera MD, 100 mg at 06/03/22 0742    allopurinol (ZYLOPRIM) tablet 300 mg, 300 mg, Oral, Daily, Lisandra Herrera MD, 300 mg at 06/03/22 0741    colchicine (COLCRYS) tablet 0.6 mg, 0.6 mg, Oral, BID PRN, Lisandra Herrera MD    diphenhydrAMINE (BENADRYL) tablet 25 mg, 25 mg, Oral, Q6H PRN, Lisandra Herrera MD    HYDROcodone-acetaminophen (NORCO) 5-325 MG per tablet 1 tablet, 1 tablet, Oral, Q6H PRN, Lisandra Herrera MD, 1 tablet at 06/01/22 2256    predniSONE (DELTASONE) tablet 5 mg, 5 mg, Oral, Daily, Lisandra Herrera MD, 5 mg at 06/03/22 0742    [Held by provider] tamsulosin (FLOMAX) capsule 0.4 mg, 0.4 mg, Oral, Daily, Lisandra Herrera MD    torsemide (DEMADEX) tablet 10 mg, 10 mg, Oral, Daily, Jakob Escobar, MD, 10 mg at 06/03/22 0741    acetaminophen (TYLENOL) tablet 650 mg, 650 mg, Oral, Q4H PRN, Chinyere Raines MD    polyethylene glycol (GLYCOLAX) packet 17 g, 17 g, Oral, Daily PRN, Chinyere Raines MD, 17 g at 05/27/22 2843    Allergies:  Niacin    Social History:   TOBACCO:   reports that he has never smoked. He has never used smokeless tobacco.  ETOH:   reports no history of alcohol use. Family History:       Problem Relation Age of Onset    Stroke Mother     Deep Vein Thrombosis Mother     Pacemaker Father     Heart Disease Father          PHYSICAL EXAM:  /83   Pulse 79   Temp (!) 96.3 °F (35.7 °C) (Temporal)   Resp 16   Ht 6' 2\" (1.88 m)   Wt 227 lb 7 oz (103.2 kg)   SpO2 98%   BMI 29.20 kg/m²     Constitutional  well developed, well nourished. Eyes  conjunctiva normal.   Ear, nose, throat - No scars, masses, or lesions over external nose or ears, no atrophy of tongue  Neck-symmetric, no masses noted, no jugular vein distension  Respiration- chest wall appears symmetric, good expansion,   normal effort without use of accessory muscles  Musculoskeletal  no significant wasting of muscles noted, no bony deformities  Extremities-no clubbing, cyanosis or edema  Skin  warm, dry, and intact. No rash, erythema, or pallor. Psychiatric  mood, affect, and behavior appear normal.      Neurological exam  Awake, alert, fluent oriented appropriate affect  Attention and concentration appear appropriate  Recent and remote memory appears unremarkable  Speech normal without dysarthria  No clear issues with language of fund of knowledge     Cranial Nerve Exam     CN III, IV,VI-EOMI, No nystagmus, conjugate eye movements, no ptosis    CN VII-no facial assymetry       Motor Exam  antigravity throughout upper extremities bilaterally      Tremors- no tremors in hands or head noted     Gait  Not tested     Nursing/pcp notes, imaging,labs and vitals reviewed.      PT,OT and/or speech notes reviewed    Lab Results   Component Value Date    WBC 6.5 06/02/2022    HGB 10.6 (L) 06/02/2022    HCT 34.3 (L) 06/02/2022    MCV 88.9 06/02/2022     06/02/2022     Lab Results   Component Value Date     06/02/2022    K 4.2 06/02/2022     06/02/2022    CO2 27 06/02/2022    BUN 32 (H) 06/02/2022    CREATININE 1.1 06/02/2022    GLUCOSE 88 06/02/2022    CALCIUM 7.7 (L) 06/02/2022    PROT 3.2 (L) 06/02/2022    LABALBU 1.9 (L) 06/02/2022    BILITOT 0.3 06/02/2022    ALKPHOS 166 (H) 06/02/2022    AST 15 06/02/2022    ALT 20 06/02/2022    LABGLOM >60 06/02/2022    GFRAA >59 06/02/2022    AGRATIO 1.3 09/02/2020    GLOB 1.9 09/02/2020     Lab Results   Component Value Date    INR 1.12 05/25/2022    PROTIME 14.4 05/25/2022     Santana Haque, PT   Physical Therapist   Physical Therapy   Progress Notes      Signed   Date of Service:  6/2/2022  4:10 PM                 Signed                Show:Clear all  []Manual[x]Template[]Copied    Added by:  [x]Khoi Scales, PT      []Jose Angel for details         06/02/22 1515   Restrictions/Precautions   Restrictions/Precautions Fall Risk;Weight Bearing   Lower Extremity Weight Bearing Restrictions   Right Lower Extremity Weight Bearing Weight Bearing As Tolerated   Partial Weight Bearing Percentage Or Pounds WBAT   Left Lower Extremity Weight Bearing Weight Bearing As Tolerated   General   Diagnosis s/p right periproshetic hip fracture; non-op   Subjective   Pain 0/10   Transfers   Sit to Stand Contact guard assistance   Stand to sit Contact guard assistance;Minimal Assistance  (plop in chair)   Bed to Chair Contact guard assistance   Ambulation   Surface level tile   Device Rolling Walker   Other Apparatus Wheelchair follow   Assistance Stand by assistance   Quality of Gait 3 pt gait, forward flexed at hips, head down.    Distance 70   Propulsion 1   Propulsion Manual   Level Level Tile   Method RUE;LUE   Level of Assistance Stand by assistance   Distance 200   PT Exercises   Exercise Treatment seaated ex to RLE 2 sets 10 reps ,  min manual resist to ankles and hip ext, active LAQ   Assessment   Assessment increased amb distance, still with abnormal standing and gait posture, suspect tight hip flexors   Safety Devices   Type of Devices All fall risk precautions in place;Call light within reach;Gait belt;Patient at risk for falls; Left in chair;Chair alarm in place   PT Individual Minutes   Time In 1515   Time Out 1600   Minutes 45                          RECORD REVIEW: Previous medical records, medications were reviewed at today's visit    IMPRESSION:   1. Status post right periprosthetic fracture/non surgicalpain control/mobilization  2. History of goutallopurinol/colchicine as needed  3. DVT prophylaxis Xarelto  4. BPH/history of prostate cancer refusing Flomax   5. Hypertensionon medicine monitor-slightly elevated  6. GIbowel regimen  7. Pain controlNorco as needed-comfortable  8. Arthritis monitor   9. Prednisone patient indicates is taken for some connective tissue disorder to avoid his ureter from closing off-has a diagnosis of retroperitoneal fibrosis  10.   PT/OT      Continue present care    ELOS pending     Expected duration and frequency therapy: 180 minutes per day, 5 days per week    [address] [place] [address]  [phone] CELL  Dr Marybeth Cerda

## 2022-06-03 NOTE — PROGRESS NOTES
Name: Mariela Da Silva  MRN: [id number]  Date of Service:  6/3/2022       06/03/22 1000   Restrictions/Precautions   Restrictions/Precautions Fall Risk;Weight Bearing   Lower Extremity Weight Bearing Restrictions   Right Lower Extremity Weight Bearing Weight Bearing As Tolerated   Partial Weight Bearing Percentage Or Pounds WBAT   Left Lower Extremity Weight Bearing Weight Bearing As Tolerated   General   Chart Reviewed Yes   Patient assessed for rehabilitation services? Yes   Additional Pertinent Hx arthritis, prostate cancer, cellulitis, gout, HTN, IFG, COVID 19, back surgery, B THR,    Diagnosis s/p right periproshetic hip fracture; non-op   General Comment   Comments Wife present initially. Pt continues to present with BLE swelling- decreased slightly vs previous txs. Pt reports he attempted to sleep last night as flat as possible, plans to continue to strech out. Subjective   Subjective Pt sitting in w/c in room, agrees to participate in therapy.     Subjective   Subjective stiff/sore anterior R hip    Pain 0/10   Transfers   Sit to Stand Minimal Assistance   Stand to sit Contact guard assistance  (Slow descent today )   Ambulation   WB Status WBAT   Ambulation   Surface level tile   Device Rolling Walker   Assistance Contact guard assistance;Stand by assistance   Quality of Gait 3 pt gait, forward flexed at hips, increased R hip hike to propel RLE    Gait Deviations Slow Amber;Decreased step height   Distance 100'   Comments 2 L turns to sit in w/c    Wheelchair Activities   Propulsion Yes   Propulsion 1   Propulsion Manual   Level Level Tile   Method RUE;LUE   Level of Assistance Supervision;Modified independent   Description/ Details Backwards ~5', L and R turns    Distance 20', 90'   PT Exercises   Exercise Treatment Sitting BLE ther-ex: DF/PF 2 X 20, LAQ LLE X 15 and RLE X 10, hip flexion LLE X 15 and RLE X 10 AAROM   Activity Tolerance   Activity Tolerance Patient tolerated treatment well;Patient limited by endurance   Assessment   Assessment Pt able to increase amb. distance with RW, continues to present with some gait deviations- see above for specifics. Pt had no c/o of increased R hip pain, just some \"tightness\". Pt reports he has stairs in home and BR on ground level (where he would be staying) that does not have showering capabilities.     Discharge Recommendations Continue to assess pending progress;Home with Home health PT;24 hour supervision or assist   Safety Devices   Type of Devices Patient at risk for falls;Gait belt;Left in chair  (In OT gym )         Electronically signed by Marva Pablo PTA on 6/3/2022 at 12:39 PM

## 2022-06-03 NOTE — PLAN OF CARE
Problem: Safety - Adult  Goal: Free from fall injury  Outcome: Progressing  Flowsheets  Taken 6/3/2022 1046 by Zaida Santiago RN  Free From Fall Injury: Instruct family/caregiver on patient safety  Taken 6/2/2022 2347 by Joann Bocanegra RN  Free From Fall Injury: Instruct family/caregiver on patient safety   Up with 1-2 assist depending on fatigue level with gait belt and walker

## 2022-06-03 NOTE — PROGRESS NOTES
Occupational Therapy     06/02/22 1100   Restrictions/Precautions   Restrictions/Precautions Weight Bearing; Fall Risk   Lower Extremity Weight Bearing Restrictions   Partial Weight Bearing Percentage Or Pounds WBAT   Functional Mobility   Functional - Mobility Device Rolling Walker   Assist Level Contact guard assistance   Functional Mobility Comments short distance acts   Transfers   Sit to stand Contact guard assistance   Stand to sit Contact guard assistance   Transfer Comments max cues for technique   Type of ROM/Therapeutic Exercise   Type of ROM/Therapeutic Exercise Isaiah   Comment 15# L, 15# R   Assessment   Performance deficits / Impairments Decreased functional mobility ; Decreased ADL status; Decreased ROM; Decreased strength;Decreased balance;Decreased high-level IADLs   Treatment Diagnosis Periprosthetic fracture around internal prosthetic right hip joint; non-surgerical   Timed Code Treatment Minutes 60 Minutes   Activity Tolerance   Activity Tolerance Patient Tolerated treatment well

## 2022-06-03 NOTE — PROGRESS NOTES
Occupational Therapy     06/02/22 1300   Restrictions/Precautions   Restrictions/Precautions Weight Bearing; Fall Risk   Lower Extremity Weight Bearing Restrictions   Partial Weight Bearing Percentage Or Pounds WBAT   Functional Mobility   Functional - Mobility Device Wheelchair   Activity To/From therapy gym   Assist Level Supervision   Bed mobility   Sit to Supine Minimal assistance   Bed Mobility Comments on mat table   Transfers   Sit to stand Contact guard assistance   Stand to sit Contact guard assistance   Wheelchair Bed Transfers   Wheelchair/Bed - Technique Stand step   Level of Asssistance Contact guard assistance   Type of ROM/Therapeutic Exercise   Type of ROM/Therapeutic Exercise Cane/Wand   Comment supine- 4 planes, 20 reps   Assessment   Performance deficits / Impairments Decreased functional mobility ; Decreased ADL status; Decreased ROM; Decreased strength;Decreased balance;Decreased high-level IADLs   Assessment Pt encouraged to sleep with bed flatter d/t flexed posture   Treatment Diagnosis Periprosthetic fracture around internal prosthetic right hip joint; non-surgerical   Timed Code Treatment Minutes 45 Minutes   Activity Tolerance   Activity Tolerance Patient Tolerated treatment well

## 2022-06-04 PROCEDURE — 99232 SBSQ HOSP IP/OBS MODERATE 35: CPT | Performed by: PSYCHIATRY & NEUROLOGY

## 2022-06-04 PROCEDURE — 6370000000 HC RX 637 (ALT 250 FOR IP): Performed by: PSYCHIATRY & NEUROLOGY

## 2022-06-04 PROCEDURE — 1180000000 HC REHAB R&B

## 2022-06-04 RX ADMIN — APIXABAN 2.5 MG: 2.5 TABLET, FILM COATED ORAL at 08:34

## 2022-06-04 RX ADMIN — HYDROCODONE BITARTRATE AND ACETAMINOPHEN 1 TABLET: 5; 325 TABLET ORAL at 21:12

## 2022-06-04 RX ADMIN — APIXABAN 2.5 MG: 2.5 TABLET, FILM COATED ORAL at 21:11

## 2022-06-04 RX ADMIN — ALLOPURINOL 300 MG: 300 TABLET ORAL at 08:34

## 2022-06-04 RX ADMIN — TORSEMIDE 10 MG: 10 TABLET ORAL at 08:34

## 2022-06-04 RX ADMIN — PREDNISONE 5 MG: 5 TABLET ORAL at 08:34

## 2022-06-04 RX ADMIN — DOCUSATE SODIUM 100 MG: 100 CAPSULE, LIQUID FILLED ORAL at 08:34

## 2022-06-04 ASSESSMENT — PAIN SCALES - GENERAL
PAINLEVEL_OUTOF10: 2
PAINLEVEL_OUTOF10: 3
PAINLEVEL_OUTOF10: 5

## 2022-06-04 NOTE — PROGRESS NOTES
Patient:   Jose Kiran  MR#:    [id number]   Room:    [facility]/685-   YOB: 1950  Date of Progress Note: 6/4/2022  Time of Note                           9:59 AM  Consulting Physician:   Arie Chappell M.D. Attending Physician:  Arie Chappell MD     Chief complaint Periprosthetic fracture right hip/nonsurgical    S:This 81 y.o. male  with history of arthritis , prostate cancer, cellulitis, gout, HTN, IFG (impaired fasting glucose), and COVID 19. He presented to [facility] ER on 5/21/22 after having a fall at home. X-rays done revealed a right periprosthetic hip fracture. He was discharged home with plans for f/u with ortho as outpatient. He return to ER on 5/22/22 with increased pain and swelling of right hip and left knee. Left knee x-ray done showed Tricompartmental osteoarthrosis of the left knee with a moderate joint effusion. CT of pelvis done showed Mildly displaced intertrochanteric fracture of the right hip. The fracture is periprosthetic in location with exposure of the proximal intertrochanteric component of the prosthesis. The femoral head prosthesis remains well located within the acetabulum. He was admitted to his PCP Dr. Janene Corado for pain control. Consult for orthopedic surgery. He was seen by Dr. Aidee Sky, who recommended non-op treatment. He will be partial weight bearing <50% on his right lower extremity, ok to ambulate with rolling walker. Dr. Aidee Sky aspirated patient's left knee and gave an injection, it was felt the pain/swelling was d/t overuse. He will be weightbearing as tolerated on his left lower extremity. Switching from Lovenox to Xarelto for DVT prophylaxis. Patient has been hyperglycemic throughout his acute care stay with history of IFG. He is participating in both PT/OT. He is felt to need a stay on Rehab to work towards his goal of returning home with his wife. He is now felt ready to start the Rehab program.  Feels well this morning. Had a good day yesterday.   Doing well in terms of his hip discomfort.       REVIEW OF SYSTEMS:  Constitutional: No fevers No chills  Neck:No stiffness  Respiratory: No shortness of breath  Cardiovascular: No chest pain No palpitations  Gastrointestinal: No abdominal pain    Genitourinary: No Dysuria  Neurological: No headache, no confusion    Past Medical History:      Diagnosis Date    Allergic rhinitis     Cancer ([facility] [address]     Chronic kidney disease     Colonic polyp     DDD (degenerative disc disease)     Hyperlipidemia     Hypertension     Other disorders of kidney and ureter in diseases classified elsewhere        Past Surgical History:      Procedure Laterality Date    BACK SURGERY      COLONOSCOPY  11/3/2004        FRACTURE SURGERY      TOTAL HIP ARTHROPLASTY Bilateral     UPPER GASTROINTESTINAL ENDOSCOPY N/A 9/3/2020    Dr RAMSES Olivarez-w/EUS w/fna-Very difficult to identify the mass in question on CT scan-Benign       Medications in Hospital:      Current Facility-Administered Medications:     apixaban (ELIQUIS) tablet 2.5 mg, 2.5 mg, Oral, BID, Willy Viramontes MD, 2.5 mg at 06/04/22 0834    docusate sodium (COLACE) capsule 100 mg, 100 mg, Oral, Daily, Willy Viramontes MD, 100 mg at 06/04/22 0834    allopurinol (ZYLOPRIM) tablet 300 mg, 300 mg, Oral, Daily, Willy Viramontes MD, 300 mg at 06/04/22 0834    colchicine (COLCRYS) tablet 0.6 mg, 0.6 mg, Oral, BID PRN, Willy Viramontes MD    diphenhydrAMINE (BENADRYL) tablet 25 mg, 25 mg, Oral, Q6H PRN, Willy Viramontes MD    HYDROcodone-acetaminophen (NORCO) 5-325 MG per tablet 1 tablet, 1 tablet, Oral, Q6H PRN, Willy Viramontes MD, 1 tablet at 06/03/22 2346    predniSONE (DELTASONE) tablet 5 mg, 5 mg, Oral, Daily, Willy Viramontes MD, 5 mg at 06/04/22 0834    [Held by provider] tamsulosin (FLOMAX) capsule 0.4 mg, 0.4 mg, Oral, Daily, Willy Viramontes MD    torsemide (DEMADEX) tablet 10 mg, 10 mg, Oral, Daily, Willy Viramontes MD, 10 mg at 06/04/22 0834    acetaminophen (TYLENOL) tablet 650 mg, 650 mg, Oral, Q4H PRN, Lucero Douglass MD    polyethylene glycol (GLYCOLAX) packet 17 g, 17 g, Oral, Daily PRN, Lucero Douglass MD, 17 g at 05/27/22 9587    Allergies:  Niacin    Social History:   TOBACCO:   reports that he has never smoked. He has never used smokeless tobacco.  ETOH:   reports no history of alcohol use. Family History:       Problem Relation Age of Onset    Stroke Mother     Deep Vein Thrombosis Mother     Pacemaker Father     Heart Disease Father          PHYSICAL EXAM:  /70   Pulse 75   Temp 97 °F (36.1 °C) (Temporal)   Resp 20   Ht 6' 2\" (1.88 m)   Wt 226 lb 0.4 oz (102.5 kg)   SpO2 96%   BMI 29.02 kg/m²     Constitutional  well developed, well nourished. Eyes  conjunctiva normal.   Ear, nose, throat - No scars, masses, or lesions over external nose or ears, no atrophy of tongue  Neck-symmetric, no masses noted, no jugular vein distension  Respiration- chest wall appears symmetric, good expansion,   normal effort without use of accessory muscles  Musculoskeletal  no significant wasting of muscles noted, no bony deformities  Extremities-no clubbing, cyanosis or edema  Skin  warm, dry, and intact. No rash, erythema, or pallor. Psychiatric  mood, affect, and behavior appear normal.      Neurological exam  Awake, alert, fluent oriented appropriate affect  Attention and concentration appear appropriate  Recent and remote memory appears unremarkable  Speech normal without dysarthria  No clear issues with language of fund of knowledge     Cranial Nerve Exam     CN III, IV,VI-EOMI, No nystagmus, conjugate eye movements, no ptosis    CN VII-no facial assymetry       Motor Exam  antigravity throughout upper extremities bilaterally      Tremors- no tremors in hands or head noted     Gait  Not tested     Nursing/pcp notes, imaging,labs and vitals reviewed.      PT,OT and/or speech notes reviewed    Lab Results   Component Value Date    WBC 6.5 06/02/2022    HGB 10.6 (L) 06/02/2022 HCT 34.3 (L) 06/02/2022    MCV 88.9 06/02/2022     06/02/2022     Lab Results   Component Value Date     06/02/2022    K 4.2 06/02/2022     06/02/2022    CO2 27 06/02/2022    BUN 32 (H) 06/02/2022    CREATININE 1.1 06/02/2022    GLUCOSE 88 06/02/2022    CALCIUM 7.7 (L) 06/02/2022    PROT 3.2 (L) 06/02/2022    LABALBU 1.9 (L) 06/02/2022    BILITOT 0.3 06/02/2022    ALKPHOS 166 (H) 06/02/2022    AST 15 06/02/2022    ALT 20 06/02/2022    LABGLOM >60 06/02/2022    GFRAA >59 06/02/2022    AGRATIO 1.3 09/02/2020    GLOB 1.9 09/02/2020     Lab Results   Component Value Date    INR 1.12 05/25/2022    PROTIME 14.4 05/25/2022     Cecil Shaw, PT   Physical Therapist   Physical Therapy   Progress Notes      Signed   Date of Service:  6/2/2022  4:10 PM                 Signed                Show:Clear all  []Manual[x]Template[]Copied    Added by:  [x]Gregg Rubie Mcardle, PT      []Jose Angel for details         06/02/22 1515   Restrictions/Precautions   Restrictions/Precautions Fall Risk;Weight Bearing   Lower Extremity Weight Bearing Restrictions   Right Lower Extremity Weight Bearing Weight Bearing As Tolerated   Partial Weight Bearing Percentage Or Pounds WBAT   Left Lower Extremity Weight Bearing Weight Bearing As Tolerated   General   Diagnosis s/p right periproshetic hip fracture; non-op   Subjective   Pain 0/10   Transfers   Sit to Stand Contact guard assistance   Stand to sit Contact guard assistance;Minimal Assistance  (plop in chair)   Bed to Chair Contact guard assistance   Ambulation   Surface level tile   Device Rolling Walker   Other Apparatus Wheelchair follow   Assistance Stand by assistance   Quality of Gait 3 pt gait, forward flexed at hips, head down.    Distance 70   Propulsion 1   Propulsion Manual   Level Level Tile   Method RUE;LUE   Level of Assistance Stand by assistance   Distance 200   PT Exercises   Exercise Treatment seaated ex to RLE 2 sets 10 reps ,  min manual resist to ankles and hip ext, active LAQ   Assessment   Assessment increased amb distance, still with abnormal standing and gait posture, suspect tight hip flexors   Safety Devices   Type of Devices All fall risk precautions in place;Call light within reach;Gait belt;Patient at risk for falls; Left in chair;Chair alarm in place   PT Individual Minutes   Time In 1515   Time Out 1600   Minutes 45                          RECORD REVIEW: Previous medical records, medications were reviewed at today's visit    IMPRESSION:   1. Status post right periprosthetic fracture/non surgicalpain control/mobilization  2. History of goutallopurinol/colchicine as needed  3. DVT prophylaxis Xarelto  4. BPH/history of prostate cancer refusing Flomax   5. Hypertensionon medicine monitor-slightly elevated  6. GIbowel regimen  7. Pain controlNorco as needed-comfortable  8. Arthritis monitor   9. Prednisone patient indicates is taken for some connective tissue disorder to avoid his ureter from closing off-has a diagnosis of retroperitoneal fibrosis  10.   PT/OT      Continue current care    ELOS next Wednesday    Expected duration and frequency therapy: 180 minutes per day, 5 days per week    Johana Bhatia  [phone] CELL  Dr Janelle Godoy

## 2022-06-04 NOTE — PLAN OF CARE
Problem: Discharge Planning  Goal: Discharge to home or other facility with appropriate resources  6/3/2022 2302 by Chio Dickey RN  Outcome: Progressing  Flowsheets (Taken 6/3/2022 2253)  Discharge to home or other facility with appropriate resources: Identify barriers to discharge with patient and caregiver  6/3/2022 1048 by Anny Martinez RN  Outcome: Delbra Bridegroom (Taken 6/2/2022 2338 by Chio Dickey RN)  Discharge to home or other facility with appropriate resources:   Identify barriers to discharge with patient and caregiver   Refer to discharge planning if patient needs post-hospital services based on physician order or complex needs related to functional status, cognitive ability or social support system     Problem: Safety - Adult  Goal: Free from fall injury  6/3/2022 2302 by Chio Dickey RN  Outcome: Progressing  Flowsheets (Taken 6/3/2022 2301)  Free From Fall Injury: Instruct family/caregiver on patient safety  6/3/2022 1048 by Anny Martinez RN  Outcome: Progressing  Flowsheets  Taken 6/3/2022 1046 by Anny Martinez RN  Free From Fall Injury: Instruct family/caregiver on patient safety  Taken 6/2/2022 2347 by Chio Dickey RN  Free From Fall Injury: Instruct family/caregiver on patient safety     Problem: ABCDS Injury Assessment  Goal: Absence of physical injury  6/3/2022 2302 by Chio Dickey RN  Outcome: Progressing  Flowsheets (Taken 6/3/2022 2301)  Absence of Physical Injury: Implement safety measures based on patient assessment  6/3/2022 1048 by Anny Martinez RN  Outcome: Progressing  Flowsheets  Taken 6/3/2022 1046 by Anny Martinez RN  Absence of Physical Injury: Implement safety measures based on patient assessment  Taken 6/2/2022 2347 by Chio Dickey RN  Absence of Physical Injury: Implement safety measures based on patient assessment

## 2022-06-04 NOTE — PLAN OF CARE
Problem: Discharge Planning  Goal: Discharge to home or other facility with appropriate resources  6/4/2022 0939 by Dontrell Arciniega RN  Outcome: Progressing  Flowsheets (Taken 6/4/2022 0809)  Discharge to home or other facility with appropriate resources: Refer to discharge planning if patient needs post-hospital services based on physician order or complex needs related to functional status, cognitive ability or social support system  6/3/2022 2302 by Salvador Parekh RN  Outcome: Progressing  Flowsheets (Taken 6/3/2022 2253)  Discharge to home or other facility with appropriate resources: Identify barriers to discharge with patient and caregiver

## 2022-06-05 PROCEDURE — 1180000000 HC REHAB R&B

## 2022-06-05 PROCEDURE — 6370000000 HC RX 637 (ALT 250 FOR IP): Performed by: PSYCHIATRY & NEUROLOGY

## 2022-06-05 RX ADMIN — HYDROCODONE BITARTRATE AND ACETAMINOPHEN 1 TABLET: 5; 325 TABLET ORAL at 21:02

## 2022-06-05 RX ADMIN — ALLOPURINOL 300 MG: 300 TABLET ORAL at 07:34

## 2022-06-05 RX ADMIN — APIXABAN 2.5 MG: 2.5 TABLET, FILM COATED ORAL at 07:34

## 2022-06-05 RX ADMIN — APIXABAN 2.5 MG: 2.5 TABLET, FILM COATED ORAL at 21:02

## 2022-06-05 RX ADMIN — PREDNISONE 5 MG: 5 TABLET ORAL at 07:34

## 2022-06-05 RX ADMIN — DOCUSATE SODIUM 100 MG: 100 CAPSULE, LIQUID FILLED ORAL at 07:33

## 2022-06-05 RX ADMIN — TORSEMIDE 10 MG: 10 TABLET ORAL at 07:34

## 2022-06-05 ASSESSMENT — PAIN SCALES - GENERAL
PAINLEVEL_OUTOF10: 0
PAINLEVEL_OUTOF10: 0
PAINLEVEL_OUTOF10: 5

## 2022-06-05 NOTE — PLAN OF CARE
Problem: Discharge Planning  Goal: Discharge to home or other facility with appropriate resources  6/4/2022 2311 by Marquez Prasad RN  Outcome: Progressing  Flowsheets (Taken 6/4/2022 2308)  Discharge to home or other facility with appropriate resources: Refer to discharge planning if patient needs post-hospital services based on physician order or complex needs related to functional status, cognitive ability or social support system  6/4/2022 0939 by Divina Robles RN  Outcome: Progressing  Flowsheets (Taken 6/4/2022 0809)  Discharge to home or other facility with appropriate resources: Refer to discharge planning if patient needs post-hospital services based on physician order or complex needs related to functional status, cognitive ability or social support system     Problem: Safety - Adult  Goal: Free from fall injury  6/4/2022 2311 by Marquez Prasad RN  Outcome: Progressing  Flowsheets (Taken 6/4/2022 2310)  Free From Fall Injury: Instruct family/caregiver on patient safety  6/4/2022 0939 by Divina Robles RN  Outcome: Progressing     Problem: ABCDS Injury Assessment  Goal: Absence of physical injury  6/4/2022 2311 by Marquez Prasad RN  Outcome: Progressing  Flowsheets (Taken 6/4/2022 2310)  Absence of Physical Injury: Implement safety measures based on patient assessment  6/4/2022 0939 by Divina Robles RN  Outcome: Progressing

## 2022-06-05 NOTE — PLAN OF CARE
Problem: Discharge Planning  Goal: Discharge to home or other facility with appropriate resources  6/5/2022 1121 by Angelia Álvarez LPN  Outcome: Progressing  Flowsheets (Taken 6/5/2022 6100)  Discharge to home or other facility with appropriate resources: Refer to discharge planning if patient needs post-hospital services based on physician order or complex needs related to functional status, cognitive ability or social support system  6/4/2022 2311 by Jacky Case RN  Outcome: Progressing  Flowsheets (Taken 6/4/2022 2308)  Discharge to home or other facility with appropriate resources: Refer to discharge planning if patient needs post-hospital services based on physician order or complex needs related to functional status, cognitive ability or social support system     Problem: Safety - Adult  Goal: Free from fall injury  6/5/2022 1121 by Angelia Álvarez LPN  Outcome: Progressing  Flowsheets (Taken 6/5/2022 1045)  Free From Fall Injury: Instruct family/caregiver on patient safety  6/4/2022 2311 by Jacky Case RN  Outcome: Progressing  Flowsheets (Taken 6/4/2022 2310)  Free From Fall Injury: Instruct family/caregiver on patient safety     Problem: Pain  Goal: Verbalizes/displays adequate comfort level or baseline comfort level  6/5/2022 1121 by Angelia Álvarez LPN  Outcome: Progressing  6/4/2022 2311 by Jacky Case RN  Outcome: Progressing     Problem: ABCDS Injury Assessment  Goal: Absence of physical injury  6/5/2022 1121 by Angelia Álvarez LPN  Outcome: Progressing  6/4/2022 2311 by Jacky Case RN  Outcome: Progressing  Flowsheets (Taken 6/4/2022 2310)  Absence of Physical Injury: Implement safety measures based on patient assessment     Problem: Nutrition Deficit:  Goal: Optimize nutritional status  6/5/2022 1121 by Angelia Álvarez LPN  Outcome: Progressing  6/4/2022 2311 by Jacky Case RN  Outcome: Progressing     Problem: Skin/Tissue Integrity  Goal: Absence of new skin breakdown  Description: 1. Monitor for areas of redness and/or skin breakdown  2. Assess vascular access sites hourly  3. Every 4-6 hours minimum:  Change oxygen saturation probe site  4. Every 4-6 hours:  If on nasal continuous positive airway pressure, respiratory therapy assess nares and determine need for appliance change or resting period.   6/5/2022 1121 by Grzegorz Marks LPN  Outcome: Progressing  6/4/2022 2311 by Marquez Prasad RN  Outcome: Progressing

## 2022-06-06 LAB
ALBUMIN SERPL-MCNC: 1.7 G/DL (ref 3.5–5.2)
ALP BLD-CCNC: 205 U/L (ref 40–130)
ALT SERPL-CCNC: 18 U/L (ref 5–41)
ANION GAP SERPL CALCULATED.3IONS-SCNC: 4 MMOL/L (ref 7–19)
AST SERPL-CCNC: 16 U/L (ref 5–40)
BASOPHILS ABSOLUTE: 0 K/UL (ref 0–0.2)
BASOPHILS RELATIVE PERCENT: 0.8 % (ref 0–1)
BILIRUB SERPL-MCNC: 0.3 MG/DL (ref 0.2–1.2)
BUN BLDV-MCNC: 27 MG/DL (ref 8–23)
CALCIUM SERPL-MCNC: 7.6 MG/DL (ref 8.8–10.2)
CHLORIDE BLD-SCNC: 107 MMOL/L (ref 98–111)
CO2: 29 MMOL/L (ref 22–29)
CREAT SERPL-MCNC: 1 MG/DL (ref 0.5–1.2)
EOSINOPHILS ABSOLUTE: 0.1 K/UL (ref 0–0.6)
EOSINOPHILS RELATIVE PERCENT: 2.1 % (ref 0–5)
GFR AFRICAN AMERICAN: >59
GFR NON-AFRICAN AMERICAN: >60
GLUCOSE BLD-MCNC: 91 MG/DL (ref 74–109)
HCT VFR BLD CALC: 34.1 % (ref 42–52)
HEMOGLOBIN: 10.6 G/DL (ref 14–18)
IMMATURE GRANULOCYTES #: 0 K/UL
LYMPHOCYTES ABSOLUTE: 1 K/UL (ref 1.1–4.5)
LYMPHOCYTES RELATIVE PERCENT: 18.9 % (ref 20–40)
MCH RBC QN AUTO: 27.5 PG (ref 27–31)
MCHC RBC AUTO-ENTMCNC: 31.1 G/DL (ref 33–37)
MCV RBC AUTO: 88.3 FL (ref 80–94)
MONOCYTES ABSOLUTE: 0.6 K/UL (ref 0–0.9)
MONOCYTES RELATIVE PERCENT: 12.3 % (ref 0–10)
NEUTROPHILS ABSOLUTE: 3.3 K/UL (ref 1.5–7.5)
NEUTROPHILS RELATIVE PERCENT: 65.1 % (ref 50–65)
PDW BLD-RTO: 17.1 % (ref 11.5–14.5)
PLATELET # BLD: 343 K/UL (ref 130–400)
PMV BLD AUTO: 9 FL (ref 9.4–12.4)
POTASSIUM REFLEX MAGNESIUM: 4.4 MMOL/L (ref 3.5–5)
RBC # BLD: 3.86 M/UL (ref 4.7–6.1)
SODIUM BLD-SCNC: 140 MMOL/L (ref 136–145)
TOTAL PROTEIN: 2.9 G/DL (ref 6.6–8.7)
WBC # BLD: 5.1 K/UL (ref 4.8–10.8)

## 2022-06-06 PROCEDURE — 97110 THERAPEUTIC EXERCISES: CPT

## 2022-06-06 PROCEDURE — 6370000000 HC RX 637 (ALT 250 FOR IP): Performed by: PSYCHIATRY & NEUROLOGY

## 2022-06-06 PROCEDURE — 97116 GAIT TRAINING THERAPY: CPT

## 2022-06-06 PROCEDURE — 1180000000 HC REHAB R&B

## 2022-06-06 PROCEDURE — 97530 THERAPEUTIC ACTIVITIES: CPT

## 2022-06-06 PROCEDURE — 99232 SBSQ HOSP IP/OBS MODERATE 35: CPT | Performed by: PSYCHIATRY & NEUROLOGY

## 2022-06-06 PROCEDURE — 36415 COLL VENOUS BLD VENIPUNCTURE: CPT

## 2022-06-06 PROCEDURE — 80053 COMPREHEN METABOLIC PANEL: CPT

## 2022-06-06 PROCEDURE — 85025 COMPLETE CBC W/AUTO DIFF WBC: CPT

## 2022-06-06 RX ADMIN — APIXABAN 2.5 MG: 2.5 TABLET, FILM COATED ORAL at 08:11

## 2022-06-06 RX ADMIN — TORSEMIDE 10 MG: 10 TABLET ORAL at 08:11

## 2022-06-06 RX ADMIN — ALLOPURINOL 300 MG: 300 TABLET ORAL at 08:11

## 2022-06-06 RX ADMIN — DOCUSATE SODIUM 100 MG: 100 CAPSULE, LIQUID FILLED ORAL at 08:11

## 2022-06-06 RX ADMIN — PREDNISONE 5 MG: 5 TABLET ORAL at 08:11

## 2022-06-06 RX ADMIN — HYDROCODONE BITARTRATE AND ACETAMINOPHEN 1 TABLET: 5; 325 TABLET ORAL at 08:10

## 2022-06-06 RX ADMIN — APIXABAN 2.5 MG: 2.5 TABLET, FILM COATED ORAL at 21:48

## 2022-06-06 ASSESSMENT — PAIN SCALES - GENERAL
PAINLEVEL_OUTOF10: 2
PAINLEVEL_OUTOF10: 4

## 2022-06-06 ASSESSMENT — PAIN DESCRIPTION - DESCRIPTORS: DESCRIPTORS: ACHING

## 2022-06-06 ASSESSMENT — PAIN - FUNCTIONAL ASSESSMENT: PAIN_FUNCTIONAL_ASSESSMENT: ACTIVITIES ARE NOT PREVENTED

## 2022-06-06 ASSESSMENT — PAIN DESCRIPTION - LOCATION: LOCATION: HIP

## 2022-06-06 ASSESSMENT — PAIN DESCRIPTION - ORIENTATION: ORIENTATION: RIGHT

## 2022-06-06 NOTE — PLAN OF CARE
Problem: Discharge Planning  Goal: Discharge to home or other facility with appropriate resources  6/5/2022 2303 by Gertrudis Rowland RN  Outcome: Progressing  Flowsheets (Taken 6/5/2022 1943)  Discharge to home or other facility with appropriate resources: Refer to discharge planning if patient needs post-hospital services based on physician order or complex needs related to functional status, cognitive ability or social support system  6/5/2022 1121 by Shahnaz Ramirez LPN  Outcome: Progressing  Flowsheets (Taken 6/5/2022 0585)  Discharge to home or other facility with appropriate resources: Refer to discharge planning if patient needs post-hospital services based on physician order or complex needs related to functional status, cognitive ability or social support system     Problem: Safety - Adult  Goal: Free from fall injury  6/5/2022 2303 by Gertrudis Rowland RN  Outcome: Progressing  Flowsheets (Taken 6/5/2022 2302)  Free From Fall Injury: Instruct family/caregiver on patient safety  6/5/2022 1121 by Shahnaz Ramirez LPN  Outcome: Progressing  Flowsheets (Taken 6/5/2022 1045)  Free From Fall Injury: Instruct family/caregiver on patient safety     Problem: Pain  Goal: Verbalizes/displays adequate comfort level or baseline comfort level  6/5/2022 2303 by Gertrudis Rowland RN  Outcome: Progressing  6/5/2022 1121 by Shahnaz Ramirez LPN  Outcome: Progressing     Problem: ABCDS Injury Assessment  Goal: Absence of physical injury  6/5/2022 2303 by Gertrudis Rowland RN  Outcome: Progressing  Flowsheets (Taken 6/5/2022 2302)  Absence of Physical Injury: Implement safety measures based on patient assessment  6/5/2022 1121 by Shahnaz Ramirez LPN  Outcome: Progressing

## 2022-06-06 NOTE — PROGRESS NOTES
Comprehensive Nutrition Assessment    Type and Reason for Visit:  Reassess    Nutrition Recommendations/Plan:   1. Continue current POC. Malnutrition Assessment:  Malnutrition Status: Moderate malnutrition (06/06/22 1310)    Context:  Chronic Illness     Findings of the 6 clinical characteristics of malnutrition:  Energy Intake:  Mild decrease in energy intake (Comment)  Weight Loss:  Greater than 5% over 1 month     Body Fat Loss:  Mild body fat loss Triceps   Muscle Mass Loss:  No significant muscle mass loss    Fluid Accumulation:  No significant fluid accumulation     Strength:  Not Performed    Nutrition Assessment:    Pt continues to meet criteria for moderate malnutriton, however, continues to improve nutritionally. PO intake >75%. Pt receiving diuretic, may result in weight fluctuations. At this time continue current POC. Nutrition Related Findings:    RLE +2 edema, LLE non-pitting edema Wound Type: Skin Tears       Current Nutrition Intake & Therapies:    Average Meal Intake: %  Average Supplements Intake: 51-75%  ADULT ORAL NUTRITION SUPPLEMENT; Dinner; Standard High Calorie/High Protein Oral Supplement  ADULT DIET; Regular    Anthropometric Measures:  Height: 6' 2\" (188 cm)  Ideal Body Weight (IBW): 190 lbs (86 kg)    Admission Body Weight: 232 lb (105.2 kg) (Bed scale)  Current Body Weight: 226 lb (102.5 kg),   IBW. Weight Source: Bed Scale  Current BMI (kg/m2): 29  Usual Body Weight: 295 lb (133.8 kg) (1/2021)  % Weight Change (Calculated): -21.4  BMI Categories: Overweight (BMI 25.0-29. 9)    Estimated Daily Nutrient Needs:  Energy Requirements Based On: Kcal/kg  Weight Used for Energy Requirements: Current (25-30 kcals/kg)  Energy (kcal/day): 6006-4102 kcals/day  Weight Used for Protein Requirements: Ideal (1.8-2.2 g/kg)  Protein (g/day): 155-189 g/pro/day  Method Used for Fluid Requirements: 1 ml/kcal  Fluid (ml/day): 5164-6261 mL/fluid/day    Nutrition Diagnosis:   · Moderate malnutrition,In context of chronic illness related to catabolic illness as evidenced by poor intake prior to admission,weight loss greater than or equal to 5% in 1 month,mild muscle loss    Nutrition Interventions:   Food and/or Nutrient Delivery: Continue Current Diet,Continue Oral Nutrition Supplement  Nutrition Education/Counseling: Education not indicated  Coordination of Nutrition Care: Continue to monitor while inpatient  Plan of Care discussed with: Patient and Spouse    Goals:  Previous Goal Met: Goal(s) Achieved  Goals: Meet at least 75% of estimated needs    Nutrition Monitoring and Evaluation:   Food/Nutrient Intake Outcomes: Food and Nutrient Intake,Supplement Intake  Physical Signs/Symptoms Outcomes: Biochemical Data,Fluid Status or Edema,Hemodynamic Status,Nutrition Focused Physical Findings,Weight,Skin    Millie Trimble  Contact: [phone]

## 2022-06-06 NOTE — PROGRESS NOTES
06/06/22 1400   Restrictions/Precautions   Restrictions/Precautions Fall Risk;Weight Bearing   Lower Extremity Weight Bearing Restrictions   Right Lower Extremity Weight Bearing Weight Bearing As Tolerated   Partial Weight Bearing Percentage Or Pounds WBAT   Left Lower Extremity Weight Bearing Weight Bearing As Tolerated   General   Diagnosis s/p right periproshetic hip fracture; non-op   Subjective   Pain 3/10   Transfers   Sit to Stand Stand by assistance   Stand to sit Stand by assistance   Stairs   # Steps  18   Stairs Height 6\"   Rails Left ascending   Device No Device   Assistance Moderate assistance;Contact guard assistance   Comment 9 two rails; 9 one rail left ascending   Assessment   Assessment improved ability to ascend/descend stairs. he learned a new way to ascend/descend stairs that is needed for his home.    Safety Devices   Type of Devices Gait belt;Left in chair   PT Concurrent Minutes   Time In 1345   Time Out 1430   Minutes 45

## 2022-06-06 NOTE — PROGRESS NOTES
06/06/22 0900   Restrictions/Precautions   Restrictions/Precautions Fall Risk;Weight Bearing   Lower Extremity Weight Bearing Restrictions   Right Lower Extremity Weight Bearing Weight Bearing As Tolerated   Partial Weight Bearing Percentage Or Pounds WBAT   Left Lower Extremity Weight Bearing Weight Bearing As Tolerated   General   Diagnosis s/p right periproshetic hip fracture; non-op   Subjective   Pain 2/10   Transfers   Sit to Stand Stand by assistance   Stand to sit Stand by assistance   Bed to Chair Stand by assistance  (with rw)   Ambulation   Surface level tile   Device Rolling Walker   Other Apparatus Wheelchair follow   Assistance Stand by assistance   Quality of Gait 3 pt gait, forward flexed at hips, increased R hip hike to propel RLE- decreased since morning tx    Distance 70   Ambulation 2   Surface - 2 level tile   Device 2 Rolling Walker   Assistance 2 Stand by assistance   Quality of Gait 2 followed 3 point gait pattern with multiple VC to stand erect, take shorter steps as he had a tendency to take excessively long steps with his R LE, and inc distance that RW is advanced    Distance 100   Comments last 25' of gait able to perform reciprocal pattern   Ambulation 3   Surface - 3 level tile   Device 3 Rolling Walker   Other Apparatus 3 Wheelchair follow   Assistance 3 Stand by assistance   Quality of Gait 3 3 pt initially then reciporacla last 75'   Distance 125   Propulsion 1   Propulsion Manual   Level Level Tile   Method RUE;LUE   Level of Assistance Independent   Distance 200   Balance   Standing - Static Good;-   Assessment   Assessment improved amb juanjo, continuing to work on erect posture, intermittent  reciprocal gait pattern.    Safety Devices   Type of Devices Gait belt;Left in chair

## 2022-06-06 NOTE — PATIENT CARE CONFERENCE
[facility] ACUTE INPATIENT REHABILITATION  TEAM CONFERENCE NOTE    Date: 2022  Patient Name: Evonne Meadowsr        MRN: [id number]    : 1950  (81 y.o.)  Gender: male      Diagnosis: s/p right periproshetic hip fracture; non-op      PHYSICAL THERAPY  GROSS ASSESSMENT       BED MOBILITY  Bed mobility  Rolling to Left: Minimal assistance,Contact guard assistance  Rolling to Right: Minimal assistance,Contact guard assistance  Supine to Sit: Minimal assistance,Moderate assistance (assisted BLE off the bed o the L side of bed)  Sit to Supine: Minimal assistance  Bed Mobility Comments: on mat table       TRANSFERS  Transfers  Sit to Stand: Stand by assistance  Stand to sit: Stand by assistance  Bed to Chair: Stand by assistance (with rw)  Stand Pivot Transfers: Contact guard assistance (RW)  Comment: multiple STS from WC to // bars and RW (always pushing up from [facility])  WHEELCHAIR PROPULSION  Propulsion 1  Propulsion: Manual  Level: Level Tile  Method: ROSA GRANT  Level of Assistance: Independent  Description/ Details: Multiple L and R turns   Distance: 200  AMBULATION  Ambulation  Surface: level tile  Device: Rolling Walker  Other Apparatus: Wheelchair follow  Assistance: Stand by assistance  Quality of Gait: 3 pt gait, forward flexed at hips, increased R hip hike to propel RLE- decreased since morning tx   Gait Deviations: Slow Amber,Decreased step height  Distance: 70  Comments: 2 L turns to sit in w/c   More Ambulation?: Yes  STAIRS  Stairs  # Steps : 18  Stairs Height: 6\"  Rails: Left ascending  Device: No Device  Assistance:  Moderate assistance,Contact guard assistance  Comment: 9 two rails; 9 one rail left ascending  GOALS:  Short Term Goals  Time Frame for Short term goals: 1 week  Short term goal 1: SBA bed mobility  Short term goal 2: SBA TF surface to surface  Short term goal 3: SBA ambulation with AD 50 FT  Short term goal 4: IND WC propulsion 150 FT  Short term goal 5: SBA car transfer    Long Term Goals  Time Frame for Long term goals : 2 weeks  Long term goal 1: IND bed mobility  Long term goal 2: IND TF surface to surace  Long term goal 3: IND ambulation 150 FT with AD  Long term goal 4: IND car transfer  Long term goal 5: IND 12 steps up/down    ASSESSMENT:  Assessment: improved ability to ascend/descend stairs. he learned a new way to ascend/descend stairs that is needed for his home. SPEECH THERAPY      OCCUPATIONAL THERAPY    CURRENT IRF-REBEKAH SCORES  Eating: CARE Score: 6       Oral Hygiene: CARE Score: 6        Toileting: CARE Score: 4  Comment: SBA      Shower/Bathe: CARE Score: 4  Comment: Supervision        Upper Body Dressing: CARE Score: 5         Lower Body Dressing: CARE Score: 4  Comment: SBA with AE       Footwear: CARE Score: 3  Comment: able to don/doff socks using AE, Min A for R shoe. Attempted to use shoe horn, but unable to. Toilet Transfers: CARE Score: 4  Comment: SBA       Picking Up Object:  CARE Score: 88          UE Functioning:  LUE: AROM proximally (0-40), WFL distally   RUE: AROM WFL     Pain Assessment:   4/10 pain in R hip        STGs:  Short Term Goals  Time Frame for Short term goals: 2 weeks  Short Term Goal 1: MET  Short Term Goal 2: MET  Short Term Goal 3: MET  Short Term Goal 4: MET  Short Term Goal 5: MET  Short Term Goal 6: MET    LTGs:  Long Term Goals  Time Frame for Long term goals : 3-4 weeks  Long Term Goal 1: Modified independent with toileting and toilet transfers. Long Term Goal 2: Modified independent with bathing hygiene. Long Term Goal 3: Modified independent with overall dressing. Long Term Goal 4: MET  Long Term Goal 5: Independent with HEP. Assessment:  Performance deficits / Impairments: Decreased functional mobility ,Decreased ADL status,Decreased ROM,Decreased strength,Decreased balance,Decreased high-level IADLs                  NUTRITION  Current Wt: Weight: 226 lb 0.4 oz (102.5 kg) / Body mass index is 29.02 kg/m².   Admission Wt: Admission Body Weight: 232 lb (105.2 kg) (Bed scale)  Oral Diet Orders: Regular   Oral Nutrition Supplement (ONS) Orders: Ensure Enlive once daily    PO intake >75% and wt stable X 1 week. Continue current POC. Please see nutrition note for details. NURSING    Wounds/Incisions/Ulcers: multiple skin tears     Zhou Scale Score: 20    Pain: Patient's pain is currently controlled with norco 5-325 q 6 prn    Consultations/Labs/X-rays:     Family Education: No family available for education    Fall Risk:  Zamora Total Score: 72    Fall in the last week? No      Other Nursing Issues:   A/0 x 4; on eliquis. Last bm on 6/2. Has had low blood pressure but coming up after flomax put on hold. SOCIAL WORK/CASE MANAGEMENT  Assessment: Wife has participated with him in program.Their home has multiple steps- their children being very concerned encouraged him to consider SNF, however he and wife are satisfied with plan- having prepared one small area of home that he can manage in and as he has gone up/down steps -will have [address] staff work with him on that. Discharge Plan   Estimated Length of Stay: 6/8/22  Destination: discharge home with supervision    Pass: No    Services at Discharge: [address], Occupational Therapy and Nursing per evaluations    Equipment at Discharge: Already has rolling walker, wheelchair and bedside commode. Patient will be given script for hospital bed in case he decides he needs one when he gets home. Progress made in the prior week:  1. Improved distance/quality ambulation  2. SBA with ambulatory ADLs and IADLs. 3.  4.  5.      Goals for following week:  1. indep bed mobility  2. Discharge planning. 3.   4.   5.     Factors facilitating achievement of predicted outcomes:  Motivated, Cooperative, Pleasant and Sense of humor    Barriers to the achievement of predicted outcomes: Pain, Decreased endurance, Upper extremity weakness and Lower extremity weakness    Team Members Present at Conference:  : Margareth Medina 23   Occupational Therapist: Ana Novoa OTR/L  Physical Therapist: Jovana Das PT,DPT  Speech Therapist: N/A  Nurse: Gosia Salinas, RN,BSN   Nurse Manager:  Gosia Salinas RN, BSN  Dietitian:  Luis Felipe Oviedo, MS, RD, LD  Rehab Director:        I approve the established interdisciplinary plan of care as documented within the medical record of Miguel Walker.

## 2022-06-06 NOTE — PROGRESS NOTES
Durable Medical Equipment   Physician Order     Patient Name Adenike Wade  Patient [phone] (Home)     Patient Address: [address]   [address] [place] [id number]    Patient Height Height: 6' 2\" (188 cm)  Patient Weight 226 lb 0.4 oz (102.5 kg)   1950     DME NEEDED:   - [address] BED    1. [facility] PART A AND B    F/O Payor/Plan Precert #   MEDICARE/MEDICARE PART A AND B    Subscriber Subscriber #   Anamaria Carvajal [id number]   Address Phone   [address] [address] [address] [place] Pending sale to [facility] [phone]     2.  AETNA/AETNA SENIOR MEDICARE SUPP    F/O Payor/Plan Precert #   AETNA/AETNA [facility] MEDICARE SUPP    Subscriber Subscriber #   Anamaria Carvajal [id number]   Address Phone   [address] [facility] [place] [id number] [phone]       DIAGNOSIS:  Hospital Problem List  Date Reviewed: 2021     ICD-10-CM Priority Class Noted POA   S/P right hip fracture Z87.81 Medium  2022 Yes   Hip pain M25.559 Medium  2022 Yes   Gait abnormality R26.9 Medium  2022 Yes   Moderate malnutrition ([address] (Chronic) E44.0 Medium  2022 Yes   Hypertension I10   Unknown Yes   Hyperlipidemia E78.5   Unknown Yes   Benign essential HTN I10   2020 Yes   Gout M10.9   2021 Yes   Gout, arthropathy M10.9   2020 Yes   IFG (impaired fasting glucose) R73.01   2020 Yes   Malignant neoplasm of trigone of urinary bladder ([address] C67.0   2021 Yes     Admitting diagnosis:Periprosthetic fracture around internal prosthetic right hip joint, initial encounter     Secondary diagnoses:Essential (primary) hypertension,Chronic kidney disease, stage 3a,Obesity, unspecified,Pure hypercholesterolemia, unspecified,Malignant neoplasm of prostate,Personal history of COVID-19,Unilateral primary osteoarthritis, left knee,Anemia in chronic kidney disease,Effusion, left knee     CHIEF COMPLAINT: Periprosthetic fracture right hip/nonsurgical        HISTORY OF PRESENT ILLNESS:   This 81 y.o. male with history of arthritis , prostate cancer, cellulitis, gout, HTN, IFG (impaired fasting glucose), and COVID 19. He presented to [facility] ER on 5/21/22 after having a fall at home. X-rays done revealed a right periprosthetic hip fracture. He was discharged home with plans for f/u with ortho as outpatient. He return to ER on 5/22/22 with increased pain and swelling of right hip and left knee. Left knee x-ray done showed Tricompartmental osteoarthrosis of the left knee with a moderate joint effusion. CT of pelvis done showed Mildly displaced intertrochanteric fracture of the right hip. The fracture is periprosthetic in location with exposure of the proximal intertrochanteric component of the prosthesis. The femoral head prosthesis remains well located within the acetabulum. He was admitted to his PCP Dr. Hilton Majano for pain control. Consult for orthopedic surgery. He was seen by Dr. Juan Day, who recommended non-op treatment. He will be partial weight bearing <50% on his right lower extremity, ok to ambulate with rolling walker. Dr. Juan Day aspirated patient's left knee and gave an injection, it was felt the pain/swelling was d/t overuse. He will be weightbearing as tolerated on his left lower extremity. Switching from Lovenox to Xarelto for DVT prophylaxis. Patient has been hyperglycemic throughout his acute care stay with history of IFG. No current hemoglobin A1C. Patient still c/o of pain at 5/10 being managed with PO pain medications. He is participating in both PT/OT. He is felt to need a stay on Rehab to work towards his goal of returning home with his wife.  He is now felt ready to start the Rehab program.  ____________________ _____________________ ________________   Physician Signature      Physician Name (print)   Physician NPI          Date

## 2022-06-06 NOTE — PROGRESS NOTES
Patient:   Matthew Montes  MR#:    [id number]   Room:    0268/352-71   YOB: 1950  Date of Progress Note: 6/6/2022  Time of Note                           8:06 AM  Consulting Physician:   Bernadette Garcia M.D. Attending Physician:  Bernadette Garcia MD     Chief complaint Periprosthetic fracture right hip/nonsurgical    S:This 81 y.o. male  with history of arthritis , prostate cancer, cellulitis, gout, HTN, IFG (impaired fasting glucose), and COVID 19. He presented to [facility] ER on 5/21/22 after having a fall at home. X-rays done revealed a right periprosthetic hip fracture. He was discharged home with plans for f/u with ortho as outpatient. He return to ER on 5/22/22 with increased pain and swelling of right hip and left knee. Left knee x-ray done showed Tricompartmental osteoarthrosis of the left knee with a moderate joint effusion. CT of pelvis done showed Mildly displaced intertrochanteric fracture of the right hip. The fracture is periprosthetic in location with exposure of the proximal intertrochanteric component of the prosthesis. The femoral head prosthesis remains well located within the acetabulum. He was admitted to his PCP Dr. Lindsay Ngo for pain control. Consult for orthopedic surgery. He was seen by Dr. Praneeth Su, who recommended non-op treatment. He will be partial weight bearing <50% on his right lower extremity, ok to ambulate with rolling walker. Dr. Praneeth Su aspirated patient's left knee and gave an injection, it was felt the pain/swelling was d/t overuse. He will be weightbearing as tolerated on his left lower extremity. Switching from Lovenox to Xarelto for DVT prophylaxis. Patient has been hyperglycemic throughout his acute care stay with history of IFG. Has some edema in the feet today. He has is intermittently at home. Declines a higher dose of diuretic at this time.     REVIEW OF SYSTEMS:  Constitutional: No fevers No chills  Neck:No stiffness  Respiratory: No shortness of breath  Cardiovascular: No chest pain No palpitations  Gastrointestinal: No abdominal pain    Genitourinary: No Dysuria  Neurological: No headache, no confusion    Past Medical History:      Diagnosis Date    Allergic rhinitis     Cancer ([address]     Chronic kidney disease     Colonic polyp     DDD (degenerative disc disease)     Hyperlipidemia     Hypertension     Other disorders of kidney and ureter in diseases classified elsewhere        Past Surgical History:      Procedure Laterality Date    BACK SURGERY      COLONOSCOPY  11/3/2004        FRACTURE SURGERY      TOTAL HIP ARTHROPLASTY Bilateral     UPPER GASTROINTESTINAL ENDOSCOPY N/A 9/3/2020    Dr RAMSES Olivarez-w/EUS w/fna-Very difficult to identify the mass in question on CT scan-Benign       Medications in Hospital:      Current Facility-Administered Medications:     apixaban (ELIQUIS) tablet 2.5 mg, 2.5 mg, Oral, BID, Ye[address] MD, 2.5 mg at 06/05/22 2102    docusate sodium (COLACE) capsule 100 mg, 100 mg, Oral, Daily, [address] MD, 100 mg at 06/05/22 0733    allopurinol (ZYLOPRIM) tablet 300 mg, 300 mg, Oral, Daily, [address] MD, 300 mg at 06/05/22 0734    colchicine (COLCRYS) tablet 0.6 mg, 0.6 mg, Oral, BID PRN, [address] MD    diphenhydrAMINE (BENADRYL) tablet 25 mg, 25 mg, Oral, Q6H PRN, [address] MD    HYDROcodone-acetaminophen (NORCO) 5-325 MG per tablet 1 tablet, 1 tablet, Oral, Q6H PRN, [address] MD, 1 tablet at 06/05/22 2102    predniSONE (DELTASONE) tablet 5 mg, 5 mg, Oral, Daily, [address] MD, 5 mg at 06/05/22 0734    [Held by provider] tamsulosin (FLOMAX) capsule 0.4 mg, 0.4 mg, Oral, Daily, [address] MD    torsemide (DEMADEX) tablet 10 mg, 10 mg, Oral, Daily, [address] MD, 10 mg at 06/05/22 0734    acetaminophen (TYLENOL) tablet 650 mg, 650 mg, Oral, Q4H PRN, [address] MD    polyethylene glycol (GLYCOLAX) packet 17 g, 17 g, Oral, Daily PRN, [address] MD, 17 g at 05/27/22 2236    Allergies:  Niacin    Social History:   TOBACCO:   reports that he has never smoked. He has never used smokeless tobacco.  ETOH:   reports no history of alcohol use. Family History:       Problem Relation Age of Onset    Stroke Mother     Deep Vein Thrombosis Mother     Pacemaker Father     Heart Disease Father          PHYSICAL EXAM:  /73   Pulse 74   Temp (!) 96.6 °F (35.9 °C)   Resp 18   Ht 6' 2\" (1.88 m)   Wt 226 lb 0.4 oz (102.5 kg)   SpO2 95%   BMI 29.02 kg/m²     Constitutional  well developed, well nourished. Eyes  conjunctiva normal.   Ear, nose, throat - No scars, masses, or lesions over external nose or ears, no atrophy of tongue  Neck-symmetric, no masses noted, no jugular vein distension  Respiration- chest wall appears symmetric, good expansion,   normal effort without use of accessory muscles  Musculoskeletal  no significant wasting of muscles noted, no bony deformities  Extremities-no clubbing, cyanosis. 2+ edema in the feet  Skin  warm, dry, and intact. No rash, erythema, or pallor. Psychiatric  mood, affect, and behavior appear normal.      Neurological exam  Awake, alert, fluent oriented appropriate affect  Attention and concentration appear appropriate  Recent and remote memory appears unremarkable  Speech normal without dysarthria  No clear issues with language of fund of knowledge     Cranial Nerve Exam     CN III, IV,VI-EOMI, No nystagmus, conjugate eye movements, no ptosis    CN VII-no facial assymetry       Motor Exam  antigravity throughout upper extremities bilaterally      Tremors- no tremors in hands or head noted     Gait  Not tested     Nursing/pcp notes, imaging,labs and vitals reviewed.      PT,OT and/or speech notes reviewed    Lab Results   Component Value Date    WBC 5.1 06/06/2022    HGB 10.6 (L) 06/06/2022    HCT 34.1 (L) 06/06/2022    MCV 88.3 06/06/2022     06/06/2022     Lab Results   Component Value Date     06/06/2022    K 4.4 06/06/2022     06/06/2022    CO2 29 06/06/2022    BUN 27 (H) 06/06/2022    CREATININE 1.0 06/06/2022    GLUCOSE 91 06/06/2022    CALCIUM 7.6 (L) 06/06/2022    PROT 2.9 (L) 06/06/2022    LABALBU 1.7 (L) 06/06/2022    BILITOT 0.3 06/06/2022    ALKPHOS 205 (H) 06/06/2022    AST 16 06/06/2022    ALT 18 06/06/2022    LABGLOM >60 06/06/2022    GFRAA >59 06/06/2022    AGRATIO 1.3 09/02/2020    GLOB 1.9 09/02/2020     Lab Results   Component Value Date    INR 1.12 05/25/2022    PROTIME 14.4 05/25/2022     Occupational Therapy       06/02/22 1300   Restrictions/Precautions   Restrictions/Precautions Weight Bearing; Fall Risk   Lower Extremity Weight Bearing Restrictions   Partial Weight Bearing Percentage Or Pounds WBAT   Functional Mobility   Functional - Mobility Device Wheelchair   Activity To/From therapy gym   Assist Level Supervision   Bed mobility   Sit to Supine Minimal assistance   Bed Mobility Comments on mat table   Transfers   Sit to stand Contact guard assistance   Stand to sit Contact guard assistance   Wheelchair Bed Transfers   Wheelchair/Bed - Technique Stand step   Level of Asssistance Contact guard assistance   Type of ROM/Therapeutic Exercise   Type of ROM/Therapeutic Exercise Cane/Wand   Comment supine- 4 planes, 20 reps   Assessment   Performance deficits / Impairments Decreased functional mobility ; Decreased ADL status; Decreased ROM; Decreased strength;Decreased balance;Decreased high-level IADLs   Assessment Pt encouraged to sleep with bed flatter d/t flexed posture   Treatment Diagnosis Periprosthetic fracture around internal prosthetic right hip joint; non-surgerical   Timed Code Treatment Minutes 45 Minutes   Activity Tolerance   Activity Tolerance Patient Tolerated treatment well                Cosigned by:  Evon Koch OT at 6/4/2022  8:20 AM         06/03/22 1400   Restrictions/Precautions   Restrictions/Precautions Fall Risk;Weight Bearing   Lower Extremity Weight Bearing Restrictions   Right Lower Extremity Weight Bearing Weight Bearing As Tolerated   Partial Weight Bearing Percentage Or Pounds WBAT   Left Lower Extremity Weight Bearing Weight Bearing As Tolerated   General   Chart Reviewed Yes   Patient assessed for rehabilitation services? Yes   Additional Pertinent Hx arthritis, prostate cancer, cellulitis, gout, HTN, IFG, COVID 19, back surgery, B THR,    Diagnosis s/p right periproshetic hip fracture; non-op   General Comment   Comments Wife present initially. Pt continues to present with BLE swelling- decreased slightly vs previous txs. Subjective   Subjective Pt sitting in w/c in room, agrees to participate in therapy. Subjective   Subjective stiff/sore anterior R hip    Pain 0/10   Transfers   Sit to Stand Minimal Assistance;Contact guard assistance   Stand to sit Contact guard assistance;Stand by assistance   Ambulation   WB Status WBAT   Ambulation   Surface level tile   Device Rolling Walker   Assistance Contact guard assistance;Stand by assistance   Quality of Gait 3 pt gait, forward flexed at hips, increased R hip hike to propel RLE- decreased since morning tx    Gait Deviations Slow Amber;Decreased step height   Distance 80'   Comments 2 L turns to sit in w/c    Stairs/Curb   Stairs?  Yes   Stairs   # Steps  18   Stairs Height 6\"  (Also 4\")   Rails Left ascending  (Also B )   Device No Device   Assistance Moderate assistance;Contact guard assistance  (Mod A with L asc handrail, CGA with B handrails )   Comment Intermittent v/c's for correct BLE sequencing, instruction on asc/desc sideways with L asc handrail   Wheelchair Activities   Propulsion Yes   Propulsion 1   Propulsion Manual   Level Level Tile   Method RUE;LUE   Level of Assistance Supervision;Modified independent   Description/ Details Multiple L and R turns    Distance 220'   Activity Tolerance   Activity Tolerance Patient tolerated treatment well;Patient limited by endurance   Assessment   Assessment Pt able to perform stair training today- see above for specifics. Pt presents with decreased compensated R hike during amb. this tx. Discharge Recommendations Continue to assess pending progress;Home with Home health PT;Home with assist PRN   Education   Education Given To Patient   Education Provided Home Exercise Program   Education Method Demonstration;Verbal   Barriers to Learning None   Education Outcome Verbalized understanding   Safety Devices   Type of Devices Patient at risk for falls;Gait belt;Left in chair;Chair alarm in place;Call light within reach            Electronically signed by Juanpablo Palacios PTA on 6/3/2022 at 4:07 PM                        RECORD REVIEW: Previous medical records, medications were reviewed at today's visit    IMPRESSION:   1. Status post right periprosthetic fracture/non surgicalpain control/mobilization  2. History of goutallopurinol/colchicine as needed  3. DVT prophylaxis Xarelto  4. BPH/history of prostate cancer refusing Flomax   5. Hypertensionon medicine monitor-slightly elevated  6. GIbowel regimen  7. Pain controlNorco as needed-comfortable  8. Arthritis monitor   9. Prednisone patient indicates is taken for some connective tissue disorder to avoid his ureter from closing off-has a diagnosis of retroperitoneal fibrosis  10. PT/OT      Continue current care    ELOS middle of this week. Staff in a.m.     Expected duration and frequency therapy: 180 minutes per day, 5 days per week

## 2022-06-06 NOTE — PROGRESS NOTES
Occupational Therapy  Facility/Department: [facility] 8 REHAB UNIT  Occupational Therapy Treatment Note   Name: Jacky Burnett  : 1950  MRN: [id number]  Date of Service: 2022    Discharge Recommendations:  Home with Home health OT          Patient Diagnosis(es): There were no encounter diagnoses. Past Medical History:  has a past medical history of Allergic rhinitis, Cancer ([address] Chronic kidney disease, Colonic polyp, DDD (degenerative disc disease), Hyperlipidemia, Hypertension, and Other disorders of kidney and ureter in diseases classified elsewhere. Past Surgical History:  has a past surgical history that includes Colonoscopy (11/3/2004); back surgery; Upper gastrointestinal endoscopy (N/A, 9/3/2020); Total hip arthroplasty (Bilateral); and fracture surgery. Treatment Diagnosis: Periprosthetic fracture around internal prosthetic right hip joint; non-surgerical      Assessment   Performance deficits / Impairments: Decreased functional mobility ; Decreased ADL status; Decreased ROM; Decreased strength;Decreased balance;Decreased high-level IADLs  Treatment Diagnosis: Periprosthetic fracture around internal prosthetic right hip joint; non-surgerical  Activity Tolerance  Activity Tolerance: Patient Tolerated treatment well        Plan   Plan  Current Treatment Recommendations: Strengthening,Balance training,ROM,Functional mobility training,Wheelchair mobility training,Endurance training,Neuromuscular re-education,Equipment evaluation, education, & procurement,Patient/Caregiver education & training,Safety education & training,Self-Care / ADL,Home management training     Restrictions  Restrictions/Precautions  Restrictions/Precautions: Fall Risk,Weight Bearing  Lower Extremity Weight Bearing Restrictions  Right Lower Extremity Weight Bearing: Weight Bearing As Tolerated  Partial Weight Bearing Percentage Or Pounds: WBAT  Left Lower Extremity Weight Bearing: Weight Bearing As Tolerated    Subjective General  Patient assessed for rehabilitation services?: Yes     Social/Functional History  Social/Functional History  Lives With: Spouse  Type of Home: House  Home Layout: Multi-level  Home Access: Stairs to enter without rails  Bathroom Shower/Tub: Tub/Shower unit,Walk-in shower  Bathroom Equipment: 3-in-1 commode  Bathroom Accessibility: Walker accessible (1/2 bathroom downstairs is walker accessible, w/c accessible for upstairs bathroom)  Home Equipment: Rollator,Walker, rolling,Wheelchair-manual  Has the patient had two or more falls in the past year or any fall with injury in the past year?: No  ADL Assistance: Independent  Homemaking Assistance: Independent  Ambulation Assistance: Independent  Transfer Assistance: Independent  Active : Yes  Mode of Transportation: Truck  Occupation: Full time employment  Type of Occupation: farm store owner  Leisure & Hobbies: Used to play tennis and basketball, mow and bush hog, farming, Go to [employer] every year  IADL Comments: Grilling; wife does all homemaking tasks.        Objective   Heart Rate: 74  BP: 105/73  MAP (Calculated): 83.67  Resp: 18  SpO2: 95 %             Safety Devices  Type of Devices: Left in chair;Call light within reach          06/06/22 1000   Balance   Sitting Balance Independent   Standing Balance Stand by assistance   Standing Balance   Time 10 minutes   Functional Mobility   Functional - Mobility Device Rolling Walker   Activity Other;Retrieve items;Transport items   Assist Level Stand by assistance      Transfers  Sit to stand: Stand by assistance  Stand to sit: Stand by assistance  Transfer Comments: to/from w/c      Exercise Treatment: Pulley: 5 minutes x2, Dion: LUE: 12.5#, RUE: 15#: 4 sets of 15       OutComes Score    Eating  Assistance Needed: Independent  CARE Score: 6  Discharge Goal: Independent    Oral Hygiene  Assistance Needed: Independent  CARE Score: 6  Discharge Goal: [facility] needed: Supervision or touching assistance  Comment: SBA  CARE Score: 4  Discharge Goal: Independent     Shower/Bathe Self  Assistance Needed: Supervision or touching assistance  Comment: Supervision  CARE Score: 4  Discharge Goal: Independent     Upper Body Dressing  Assistance Needed: Setup or clean-up assistance  CARE Score: 5  Discharge Goal: Independent     Lower Body Dressing  Assistance Needed: Supervision or touching assistance  Comment: SBA with AE  CARE Score: 4  Discharge Goal: Independent     Putting On/Taking Off Footwear  Assistance Needed: Partial/moderate assistance  Comment: able to don/doff socks using AE, Min A for R shoe. Attempted to use shoe horn, but unable to. CARE Score: 3  Discharge Goal: Independent     Toilet Transfer  Assistance needed: Supervision or touching assistance  Comment: SBA  CARE Score: 4  Discharge Goal: Independent     Picking Up Object  Reason if not Attempted: Not attempted due to medical condition or safety concerns  CARE Score: 88  Discharge Goal: Supervision or touching assistance       Goals  Short Term Goals  Time Frame for Short term goals: 2 weeks  Short Term Goal 1: MET  Short Term Goal 2: MET  Short Term Goal 3: MET  Short Term Goal 4: MET  Short Term Goal 5: MET  Short Term Goal 6: MET  Long Term Goals  Time Frame for Long term goals : 3-4 weeks  Long Term Goal 1: Modified independent with toileting and toilet transfers. Long Term Goal 2: Modified independent with bathing hygiene. Long Term Goal 3: Modified independent with overall dressing. Long Term Goal 4: MET  Long Term Goal 5: Independent with HEP. Patient Goals   Patient goals : Patient wants to be able to walk up and down his two flights of stairs in his home.        Therapy Time   Individual Concurrent Group Co-treatment   Time In 1000         Time Out 1100         Minutes 60         Timed Code Treatment Minutes: [address], OT

## 2022-06-07 PROCEDURE — 99233 SBSQ HOSP IP/OBS HIGH 50: CPT | Performed by: PSYCHIATRY & NEUROLOGY

## 2022-06-07 PROCEDURE — 97116 GAIT TRAINING THERAPY: CPT

## 2022-06-07 PROCEDURE — 6370000000 HC RX 637 (ALT 250 FOR IP): Performed by: PSYCHIATRY & NEUROLOGY

## 2022-06-07 PROCEDURE — 97110 THERAPEUTIC EXERCISES: CPT

## 2022-06-07 PROCEDURE — 97530 THERAPEUTIC ACTIVITIES: CPT

## 2022-06-07 PROCEDURE — 1180000000 HC REHAB R&B

## 2022-06-07 RX ADMIN — APIXABAN 2.5 MG: 2.5 TABLET, FILM COATED ORAL at 08:50

## 2022-06-07 RX ADMIN — PREDNISONE 5 MG: 5 TABLET ORAL at 08:50

## 2022-06-07 RX ADMIN — ALLOPURINOL 300 MG: 300 TABLET ORAL at 08:51

## 2022-06-07 RX ADMIN — TORSEMIDE 10 MG: 10 TABLET ORAL at 08:50

## 2022-06-07 RX ADMIN — APIXABAN 2.5 MG: 2.5 TABLET, FILM COATED ORAL at 21:32

## 2022-06-07 RX ADMIN — DOCUSATE SODIUM 100 MG: 100 CAPSULE, LIQUID FILLED ORAL at 08:51

## 2022-06-07 NOTE — PROGRESS NOTES
Occupational Therapy     06/06/22 1300   Restrictions/Precautions   Restrictions/Precautions Fall Risk;Weight Bearing   Lower Extremity Weight Bearing Restrictions   Partial Weight Bearing Percentage Or Pounds WBAT   Balance   Sitting Balance Independent   Standing Balance Stand by assistance   Standing Balance   Time 5 mins   Activity 1-2 hand mildly dynamic act    Functional Mobility   Functional - Mobility Device Rolling Walker   Activity Other   Assist Level Contact guard assistance   Functional Mobility Comments short distances in OT tx area   Type of ROM/Therapeutic Exercise   Type of ROM/Therapeutic Exercise Isaiah   Comment 13# L, 15# R   Assessment   Performance deficits / Impairments Decreased functional mobility ; Decreased ADL status; Decreased ROM; Decreased strength;Decreased balance;Decreased high-level IADLs   Treatment Diagnosis Periprosthetic fracture around internal prosthetic right hip joint; non-surgerical   Timed Code Treatment Minutes 38 Minutes   Activity Tolerance   Activity Tolerance Patient Tolerated treatment well

## 2022-06-07 NOTE — CARE COORDINATION
Discharge Plan- anticipating discharge tomorrow, going to his home with wife, they are adapting due to the many steps in the home. Referral initiated to [facility] for continued care. Coupon given to assist with cost of anticoagulant.

## 2022-06-07 NOTE — PROGRESS NOTES
[employer] (spouse) has been cleared to assist Thang Fidel with the following activities:    Bed Transfers:  Yes     Toilet Transfers:  Yes     W/C Transfers:  Yes     Walking:  Yes     Car Transfers:  No     Stairs:  No     Pass Training:  Yes     Electronically signed by Nayeli Parekh OT on 6/7/22 at 11:19 AM CDT

## 2022-06-07 NOTE — PROGRESS NOTES
06/07/22 1100   Restrictions/Precautions   Restrictions/Precautions Fall Risk;Weight Bearing   Lower Extremity Weight Bearing Restrictions   Right Lower Extremity Weight Bearing Weight Bearing As Tolerated   Left Lower Extremity Weight Bearing Weight Bearing As Tolerated   Subjective   Subjective patient sitting in room with wife agreed to therapy   Transfers   Sit to Stand Stand by assistance   Stand to sit Stand by assistance;Supervision   Car Transfer Contact guard assistance;Minimal Assistance  (assisted lifting R led into car and elevaed car to stand up)   Ambulation   WB Status WBAT   Ambulation   Surface level tile   Device Rolling Walker   Other Apparatus Wheelchair follow   Assistance Contact guard assistance   Quality of Gait 3 point gait, forward flexed posture   Gait Deviations Slow Amber;Decreased step length;Decreased step height   Distance 5 FT   Comments from [facility] to car for transfer   Assessment   Assessment patient did well and was eager to understand he next steps after leaving, he had strength deficits that were evident when completing the sit-stand to complete the car-to-wc transfer, h had very good understanding of the HEP   Education   Education Given To Patient; Family   Education Provided Home Exercise Program;Plan of Care;Family Education;DME/Home Modifications; Fall Prevention Strategies   Education Provided Comments reviewed home exercise program and provided education on home modifications and possible progressions with DME and what to expect with home health   Education Method Demonstration;Verbal;Teach Back   Barriers to Learning None   Education Outcome Verbalized understanding;Demonstrated understanding   Safety Devices   Type of Devices Left in chair;Call light within reach   PT Individual Minutes   Time In 1100   Time Out 1145   Minutes 45     Electronically signed by SESAR Gamboa on 6/7/2022 at 12:16 PM

## 2022-06-07 NOTE — PLAN OF CARE
Problem: Discharge Planning  Goal: Discharge to home or other facility with appropriate resources  Outcome: Progressing  Flowsheets (Taken 6/7/2022 0259)  Discharge to home or other facility with appropriate resources: Refer to discharge planning if patient needs post-hospital services based on physician order or complex needs related to functional status, cognitive ability or social support system     Problem: Safety - Adult  Goal: Free from fall injury  Outcome: Progressing  Flowsheets (Taken 6/7/2022 0306)  Free From Fall Injury: Instruct family/caregiver on patient safety     Problem: Pain  Goal: Verbalizes/displays adequate comfort level or baseline comfort level  Outcome: Progressing     Problem: ABCDS Injury Assessment  Goal: Absence of physical injury  Outcome: Progressing  Flowsheets (Taken 6/7/2022 0306)  Absence of Physical Injury: Implement safety measures based on patient assessment     Problem: Nutrition Deficit:  Goal: Optimize nutritional status  Outcome: Progressing     Problem: Skin/Tissue Integrity  Goal: Absence of new skin breakdown  Description: 1. Monitor for areas of redness and/or skin breakdown  2. Assess vascular access sites hourly  3. Every 4-6 hours minimum:  Change oxygen saturation probe site  4. Every 4-6 hours:  If on nasal continuous positive airway pressure, respiratory therapy assess nares and determine need for appliance change or resting period.   Outcome: Progressing

## 2022-06-07 NOTE — PLAN OF CARE
Problem: Discharge Planning  Goal: Discharge to home or other facility with appropriate resources  6/7/2022 1312 by Tiarra Ritter RN  Outcome: Progressing  Flowsheets (Taken 6/7/2022 0591)  Discharge to home or other facility with appropriate resources: Refer to discharge planning if patient needs post-hospital services based on physician order or complex needs related to functional status, cognitive ability or social support system  6/7/2022 0312 by Pepper Campbell LPN  Outcome: Progressing  Flowsheets (Taken 6/7/2022 0256)  Discharge to home or other facility with appropriate resources: Refer to discharge planning if patient needs post-hospital services based on physician order or complex needs related to functional status, cognitive ability or social support system

## 2022-06-07 NOTE — PROGRESS NOTES
Occupational Therapy  Facility/Department: [facility] 8 REHAB UNIT  Occupational Therapy Treatment Note    Name: Jeremie Harvey  : 1950  MRN: [id number]  Date of Service: 2022    Discharge Recommendations:  Home with Home health OT  OT Equipment Recommendations  Equipment Needed: No  Other: Already has RW, w/c and BSC. Gave script for semi-electric hospital bed in case he needs it. Patient Diagnosis(es): There were no encounter diagnoses. Past Medical History:  has a past medical history of Allergic rhinitis, Cancer ([facility] [address] Chronic kidney disease, Colonic polyp, DDD (degenerative disc disease), Hyperlipidemia, Hypertension, and Other disorders of kidney and ureter in diseases classified elsewhere. Past Surgical History:  has a past surgical history that includes Colonoscopy (11/3/2004); back surgery; Upper gastrointestinal endoscopy (N/A, 9/3/2020); Total hip arthroplasty (Bilateral); and fracture surgery. Treatment Diagnosis: Periprosthetic fracture around internal prosthetic right hip joint; non-surgerical      Assessment   Performance deficits / Impairments: Decreased functional mobility ; Decreased ADL status; Decreased ROM; Decreased strength;Decreased balance;Decreased high-level IADLs  Treatment Diagnosis: Periprosthetic fracture around internal prosthetic right hip joint; non-surgerical  Activity Tolerance  Activity Tolerance: Patient Tolerated treatment well        Plan   Plan  Current Treatment Recommendations: Strengthening,Balance training,ROM,Functional mobility training,Wheelchair mobility training,Endurance training,Neuromuscular re-education,Self-Care / ADL,Home management training     Restrictions  Restrictions/Precautions  Restrictions/Precautions: Fall Risk,Weight Bearing  Lower Extremity Weight Bearing Restrictions  Right Lower Extremity Weight Bearing: Weight Bearing As Tolerated  Partial Weight Bearing Percentage Or Pounds: WBAT  Left Lower Extremity Weight Bearing: Weight Bearing As Tolerated    Subjective   General  Patient assessed for rehabilitation services?: Yes     Social/Functional History  Social/Functional History  Lives With: Spouse  Type of Home: House  Home Layout: Multi-level  Home Access: Stairs to enter without rails  Bathroom Shower/Tub: Tub/Shower unit,Walk-in shower  Bathroom Equipment: 3-in-1 commode  Bathroom Accessibility: Walker accessible (1/2 bathroom downstairs is walker accessible, w/c accessible for upstairs bathroom)  Home Equipment: Rollator,Walker, rolling,Wheelchair-manual  Has the patient had two or more falls in the past year or any fall with injury in the past year?: No  ADL Assistance: Independent  Homemaking Assistance: Independent  Ambulation Assistance: Independent  Transfer Assistance: Independent  Active : Yes  Mode of Transportation: Truck  Occupation: Full time employment  Type of Occupation: farm store owner  Leisure & Hobbies: Used to play tennis and basketball, mow and bush hog, farming, Go to [employer] every year  IADL Comments: Grilling; wife does all homemaking tasks. Objective   Heart Rate: 76  Heart Rate Source: Monitor  BP: 128/81  MAP (Calculated): 96.67  Resp: 16  SpO2: 98 %  O2 Device: None (Room air)             Safety Devices  Type of Devices: Left in chair;Call light within reach     Transfers  Sit to stand: Stand by assistance  Stand to sit: Stand by assistance         Exercise Treatment: 2# FW distally and horizontal abduction, self ROM for shoulders due to shoulder limitations; Independent with HEP. Goals  Short Term Goals  Time Frame for Short term goals: 2 weeks  Short Term Goal 1: MET  Short Term Goal 2: MET  Short Term Goal 3: MET  Short Term Goal 4: MET  Short Term Goal 5: MET  Short Term Goal 6: MET  Long Term Goals  Time Frame for Long term goals : 3-4 weeks  Long Term Goal 1: Modified independent with toileting and toilet transfers. Long Term Goal 2: Modified independent with bathing hygiene.   Long Term Goal 3: Modified independent with overall dressing. Long Term Goal 4: MET  Long Term Goal 5: Independent with HEP. Patient Goals   Patient goals : Patient wants to be able to walk up and down his two flights of stairs in his home.        Therapy Time   Individual Concurrent Group Co-treatment   Time In 1300         Time Out 1345         Minutes 45         Timed Code Treatment Minutes: [facility], OT

## 2022-06-07 NOTE — PROGRESS NOTES
Occupational Therapy  Facility/Department: [facility] 8 REHAB UNIT  Occupational Therapy Treatment note     Name: Adenike Wade  : 1950  MRN: [id number]  Date of Service: 2022    Discharge Recommendations:  Home with Home health OT  OT Equipment Recommendations  Equipment Needed: No  Other: Already has RW, w/c and BSC. Gave script for semi-electric hospital bed in case he needs it. Patient Diagnosis(es): There were no encounter diagnoses. Past Medical History:  has a past medical history of Allergic rhinitis, Cancer ([address] Chronic kidney disease, Colonic polyp, DDD (degenerative disc disease), Hyperlipidemia, Hypertension, and Other disorders of kidney and ureter in diseases classified elsewhere. Past Surgical History:  has a past surgical history that includes Colonoscopy (11/3/2004); back surgery; Upper gastrointestinal endoscopy (N/A, 9/3/2020); Total hip arthroplasty (Bilateral); and fracture surgery. Treatment Diagnosis: Periprosthetic fracture around internal prosthetic right hip joint; non-surgerical      Assessment   Performance deficits / Impairments: Decreased functional mobility ; Decreased ADL status; Decreased ROM; Decreased strength;Decreased balance;Decreased high-level IADLs  Treatment Diagnosis: Periprosthetic fracture around internal prosthetic right hip joint; non-surgerical  Activity Tolerance  Activity Tolerance: Patient Tolerated treatment well        Plan   Plan  Current Treatment Recommendations: Strengthening,Balance training,ROM,Functional mobility training,Wheelchair mobility training,Endurance training,Neuromuscular re-education,Equipment evaluation, education, & procurement,Patient/Caregiver education & training,Safety education & training,Self-Care / ADL,Home management training     Restrictions  Restrictions/Precautions  Restrictions/Precautions: Fall Risk,Weight Bearing  Lower Extremity Weight Bearing Restrictions  Right Lower Extremity Weight Bearing: Weight Bearing As Tolerated  Partial Weight Bearing Percentage Or Pounds: WBAT  Left Lower Extremity Weight Bearing: Weight Bearing As Tolerated    Subjective   General  Patient assessed for rehabilitation services?: Yes     Social/Functional History  Social/Functional History  Lives With: Spouse  Type of Home: House  Home Layout: Multi-level  Home Access: Stairs to enter without rails  Bathroom Shower/Tub: Tub/Shower unit,Walk-in shower  Bathroom Equipment: 3-in-1 commode  Bathroom Accessibility: Walker accessible (1/2 bathroom downstairs is walker accessible, w/c accessible for upstairs bathroom)  Home Equipment: Rollator,Walker, rolling,Wheelchair-manual  Has the patient had two or more falls in the past year or any fall with injury in the past year?: No  ADL Assistance: Independent  Homemaking Assistance: Independent  Ambulation Assistance: Independent  Transfer Assistance: Independent  Active : Yes  Mode of Transportation: Truck  Occupation: Full time employment  Type of Occupation: farm store owner  Leisure & Hobbies: Used to play tennis and basketball, mow and bush hog, farming, Go to [place] every year  IADL Comments: Grilling; wife does all homemaking tasks. Objective   Heart Rate: 76  Heart Rate Source: Monitor  BP: 128/81  MAP (Calculated): 96.67  Resp: 16  SpO2: 98 %  O2 Device: None (Room air)  Safety Devices  Type of Devices: Left in chair;Call light within reach         Exercise Treatment: 3#; 3 sets of 10 on mat table; Finger ladder: 31 on LUE, wall and table slides with wash cloth: 3 sets of 10 with BUE; box lindsey: 3 sets of 10         Goals  Short Term Goals  Time Frame for Short term goals: 2 weeks  Short Term Goal 1: MET  Short Term Goal 2: MET  Short Term Goal 3: MET  Short Term Goal 4: MET  Short Term Goal 5: MET  Short Term Goal 6: MET  Long Term Goals  Time Frame for Long term goals : 3-4 weeks  Long Term Goal 1: Modified independent with toileting and toilet transfers.   Long Term Goal 2: Modified independent with bathing hygiene. Long Term Goal 3: Modified independent with overall dressing. Long Term Goal 4: MET  Long Term Goal 5: Independent with HEP. Patient Goals   Patient goals : Patient wants to be able to walk up and down his two flights of stairs in his home.        Therapy Time   Individual Concurrent Group Co-treatment   Time In 0900         Time Out 1000         Minutes 60         Timed Code Treatment Minutes: [address], OT

## 2022-06-07 NOTE — PROGRESS NOTES
Patient:   Doyle Medrano  MR#:    [id number]   Room:    0122/895-12   YOB: 1950  Date of Progress Note: 6/7/2022  Time of Note                           8:11 AM  Consulting Physician:   Jovan Wan M.D. Attending Physician:  Jovan Wna MD     Chief complaint Periprosthetic fracture right hip/nonsurgical    S:This 81 y.o. male  with history of arthritis , prostate cancer, cellulitis, gout, HTN, IFG (impaired fasting glucose), and COVID 19. He presented to [facility] on 5/21/22 after having a fall at home. X-rays done revealed a right periprosthetic hip fracture. He was discharged home with plans for f/u with ortho as outpatient. He return to ER on 5/22/22 with increased pain and swelling of right hip and left knee. Left knee x-ray done showed Tricompartmental osteoarthrosis of the left knee with a moderate joint effusion. CT of pelvis done showed Mildly displaced intertrochanteric fracture of the right hip. The fracture is periprosthetic in location with exposure of the proximal intertrochanteric component of the prosthesis. The femoral head prosthesis remains well located within the acetabulum. He was admitted to his PCP Dr. Nasir Barreto for pain control. Consult for orthopedic surgery. He was seen by Dr. Yari Strickland, who recommended non-op treatment. He will be partial weight bearing <50% on his right lower extremity, ok to ambulate with rolling walker. Dr. Yari Strickland aspirated patient's left knee and gave an injection, it was felt the pain/swelling was d/t overuse. He will be weightbearing as tolerated on his left lower extremity. Switching from Lovenox to Xarelto for DVT prophylaxis. Patient has been hyperglycemic throughout his acute care stay with history of IFG.     No new c/o today    REVIEW OF SYSTEMS:  Constitutional: No fevers No chills  Neck:No stiffness  Respiratory: No shortness of breath  Cardiovascular: No chest pain No palpitations  Gastrointestinal: No abdominal pain    Genitourinary: No Dysuria  Neurological: No headache, no confusion    Past Medical History:      Diagnosis Date    Allergic rhinitis     Cancer ([facility] [address]     Chronic kidney disease     Colonic polyp     DDD (degenerative disc disease)     Hyperlipidemia     Hypertension     Other disorders of kidney and ureter in diseases classified elsewhere        Past Surgical History:      Procedure Laterality Date    BACK SURGERY      COLONOSCOPY  11/3/2004        FRACTURE SURGERY      TOTAL HIP ARTHROPLASTY Bilateral     UPPER GASTROINTESTINAL ENDOSCOPY N/A 9/3/2020    Dr RAMSES Olivarez-w/EUS w/fna-Very difficult to identify the mass in question on CT scan-Benign       Medications in Hospital:      Current Facility-Administered Medications:     apixaban (ELIQUIS) tablet 2.5 mg, 2.5 mg, Oral, BID, Tripp Tyson MD, 2.5 mg at 06/06/22 2148    docusate sodium (COLACE) capsule 100 mg, 100 mg, Oral, Daily, Tripp Tyson MD, 100 mg at 06/06/22 0811    allopurinol (ZYLOPRIM) tablet 300 mg, 300 mg, Oral, Daily, Tripp Tyson MD, 300 mg at 06/06/22 0811    colchicine (COLCRYS) tablet 0.6 mg, 0.6 mg, Oral, BID PRN, Tripp Tyson MD    diphenhydrAMINE (BENADRYL) tablet 25 mg, 25 mg, Oral, Q6H PRN, Tripp Tyson MD    HYDROcodone-acetaminophen (NORCO) 5-325 MG per tablet 1 tablet, 1 tablet, Oral, Q6H PRN, Tripp Tyson MD, 1 tablet at 06/06/22 0810    predniSONE (DELTASONE) tablet 5 mg, 5 mg, Oral, Daily, Tripp Tyson MD, 5 mg at 06/06/22 0811    [Held by provider] tamsulosin (FLOMAX) capsule 0.4 mg, 0.4 mg, Oral, Daily, Tripp Tyson MD    torsemide (DEMADEX) tablet 10 mg, 10 mg, Oral, Daily, Tripp Tyson MD, 10 mg at 06/06/22 9717    acetaminophen (TYLENOL) tablet 650 mg, 650 mg, Oral, Q4H PRN, Tripp Tyson MD    polyethylene glycol (GLYCOLAX) packet 17 g, 17 g, Oral, Daily PRN, Tripp Tyson MD, 17 g at 05/27/22 5476    Allergies:  Niacin    Social History:   TOBACCO:   reports that he has never smoked.  He has never used smokeless tobacco.  ETOH:   reports no history of alcohol use. Family History:       Problem Relation Age of Onset    Stroke Mother     Deep Vein Thrombosis Mother     Pacemaker Father     Heart Disease Father          PHYSICAL EXAM:  /81   Pulse 76   Temp (!) 96.4 °F (35.8 °C) (Temporal)   Resp 16   Ht 6' 2\" (1.88 m)   Wt 226 lb 0.4 oz (102.5 kg)   SpO2 98%   BMI 29.02 kg/m²     Constitutional  well developed, well nourished. Eyes  conjunctiva normal.   Ear, nose, throat - No scars, masses, or lesions over external nose or ears, no atrophy of tongue  Neck-symmetric, no masses noted, no jugular vein distension  Respiration- chest wall appears symmetric, good expansion,   normal effort without use of accessory muscles  Musculoskeletal  no significant wasting of muscles noted, no bony deformities  Extremities-no clubbing, cyanosis. 2+ edema in the feet  Skin  warm, dry, and intact. No rash, erythema, or pallor. Psychiatric  mood, affect, and behavior appear normal.      Neurological exam  Awake, alert, fluent oriented appropriate affect  Attention and concentration appear appropriate  Recent and remote memory appears unremarkable  Speech normal without dysarthria  No clear issues with language of fund of knowledge     Cranial Nerve Exam     CN III, IV,VI-EOMI, No nystagmus, conjugate eye movements, no ptosis    CN VII-no facial assymetry       Motor Exam  antigravity throughout upper extremities bilaterally      Tremors- no tremors in hands or head noted     Gait  Not tested     Nursing/pcp notes, imaging,labs and vitals reviewed.          Lab Results   Component Value Date    WBC 5.1 06/06/2022    HGB 10.6 (L) 06/06/2022    HCT 34.1 (L) 06/06/2022    MCV 88.3 06/06/2022     06/06/2022     Lab Results   Component Value Date     06/06/2022    K 4.4 06/06/2022     06/06/2022    CO2 29 06/06/2022    BUN 27 (H) 06/06/2022    CREATININE 1.0 06/06/2022    GLUCOSE 91 term goal 2: SBA TF surface to surface  Short term goal 3: SBA ambulation with AD 50 FT  Short term goal 4: IND WC propulsion 150 FT  Short term goal 5: SBA car transfer     Long Term Goals  Time Frame for Long term goals : 2 weeks  Long term goal 1: IND bed mobility  Long term goal 2: IND TF surface to surace  Long term goal 3: IND ambulation 150 FT with AD  Long term goal 4: IND car transfer  Long term goal 5: IND 12 steps up/down     ASSESSMENT:  Assessment: improved ability to ascend/descend stairs. he learned a new way to ascend/descend stairs that is needed for his home.        SPEECH THERAPY        OCCUPATIONAL THERAPY     CURRENT IRF-REBEKAH SCORES  Eating: CARE Score: 6     Oral Hygiene: CARE Score: 6         Toileting: CARE Score: 4  Comment: SBA      Shower/Bathe: CARE Score: 4  Comment: Supervision        Upper Body Dressing: CARE Score: 5          Lower Body Dressing: CARE Score: 4  Comment: SBA with AE        Footwear: CARE Score: 3  Comment: able to don/doff socks using AE, Min A for R shoe. Attempted to use shoe horn, but unable to.        Toilet Transfers: CARE Score: 4  Comment: SBA        Picking Up Object:  CARE Score: 88           UE Functioning:  LUE: AROM proximally (0-40), WFL distally   RUE: AROM WFL      Pain Assessment:   4/10 pain in R hip      STGs:  Short Term Goals  Time Frame for Short term goals: 2 weeks  Short Term Goal 1: MET  Short Term Goal 2: MET  Short Term Goal 3: MET  Short Term Goal 4: MET  Short Term Goal 5: MET  Short Term Goal 6: MET     LTGs:  Long Term Goals  Time Frame for Long term goals : 3-4 weeks  Long Term Goal 1: Modified independent with toileting and toilet transfers. Long Term Goal 2: Modified independent with bathing hygiene. Long Term Goal 3: Modified independent with overall dressing. Long Term Goal 4: MET  Long Term Goal 5:  Independent with HEP.     Assessment:  Performance deficits / Impairments: Decreased functional mobility ,Decreased ADL status,Decreased ROM,Decreased strength,Decreased balance,Decreased high-level IADLs                        NUTRITION  Current Wt: Weight: 226 lb 0.4 oz (102.5 kg) / Body mass index is 29.02 kg/m². Admission Wt: Admission Body Weight: 232 lb (105.2 kg) (Bed scale)  Oral Diet Orders: Regular   Oral Nutrition Supplement (ONS) Orders: Ensure Enlive once daily     PO intake >75% and wt stable X 1 week. Continue current POC. Please see nutrition note for details.            RECORD REVIEW: Previous medical records, medications were reviewed at today's visit    IMPRESSION:   1. Status post right periprosthetic fracture/non surgicalpain control/mobilization  2. History of goutallopurinol/colchicine as needed  3. DVT prophylaxis Xarelto  4. BPH/history of prostate cancer refusing Flomax   5. Hypertensionon medicine monitor-slightly elevated  6. GIbowel regimen  7. Pain controlNorco as needed-comfortable  8. Arthritis monitor   9. Prednisone patient indicates is taken for some connective tissue disorder to avoid his ureter from closing off-has a diagnosis of retroperitoneal fibrosis  10. peripheral edema- pt wants to wait until home to increase diuretic. Staffing this date , mutlidisciplinary with entire team with complex decision making and planning for discharge.  More than 35 minutes spent today in total on this patient, with greater than 50% of the time on counseling and coordination of care  ELOS:d/c in am       Expected duration and frequency therapy: 180 minutes per day, 5 days per week

## 2022-06-07 NOTE — PROGRESS NOTES
Name: Damian Caldera  MRN: [id number]  Date of Service:  6/7/2022 06/07/22 0800   Restrictions/Precautions   Restrictions/Precautions Fall Risk;Weight Bearing   Lower Extremity Weight Bearing Restrictions   Right Lower Extremity Weight Bearing Weight Bearing As Tolerated   Partial Weight Bearing Percentage Or Pounds WBAT   Left Lower Extremity Weight Bearing Weight Bearing As Tolerated   General   Chart Reviewed Yes   Patient assessed for rehabilitation services? Yes   Additional Pertinent Hx arthritis, prostate cancer, cellulitis, gout, HTN, IFG, COVID 19, back surgery, B THR,    Diagnosis s/p right periproshetic hip fracture; non-op   General Comment   Comments Wife present. Pt continues to present with BLE swelling- decreased slightly vs previous txs. Subjective   Subjective Pt sitting on toilet, agrees to participate in shower/therapy. Transfers   Sit to Stand Stand by assistance   Stand to sit Stand by assistance   Lateral Transfers Stand by assistance   Ambulation   WB Status WBAT   Ambulation   Surface level tile   Device Rolling Walker   Assistance Contact guard assistance   Quality of Gait 3 pt gait, forward flexed at hips, increased R hip hike to propel RLE- decreased since morning tx    Gait Deviations Slow Amber;Decreased step height   Distance 8'    Comments Wet floor in BR    More Ambulation?  Yes   Ambulation 2   Surface - 2 level tile   Device 2 Rolling Walker   Assistance 2 Stand by assistance   Quality of Gait 2 same as previous    Gait Deviations Slow Amber;Decreased step height   Distance 20'   Comments Dry floor   Wheelchair Activities   Propulsion Yes   Propulsion 1   Propulsion Manual   Level Level Tile   Method RUE;LUE   Level of Assistance Independent   Description/ Details Multiple L and R turns    Distance 200   Activity Tolerance   Activity Tolerance Patient tolerated treatment well   Assessment   Assessment Pt able to amb in BR on wet floor requiring CGA and performed many static/dynamic balance activities while dressing requiring SBA and no LOB.     Discharge Recommendations Continue to assess pending progress;Home with Home health PT;Home with assist PRN   Safety Devices   Type of Devices Patient at risk for falls;Gait belt;Left in chair  (In OT gym )         Electronically signed by Gunnar Bass PTA on 6/7/2022 at 4:18 PM

## 2022-06-07 NOTE — PROGRESS NOTES
Name: Jacky Burnett  MRN: [id number]  Date of Service:  6/7/2022 06/07/22 1500   Restrictions/Precautions   Restrictions/Precautions Fall Risk;Weight Bearing   Lower Extremity Weight Bearing Restrictions   Right Lower Extremity Weight Bearing Weight Bearing As Tolerated   Left Lower Extremity Weight Bearing Weight Bearing As Tolerated   General   Chart Reviewed Yes   Patient assessed for rehabilitation services? Yes   Additional Pertinent Hx arthritis, prostate cancer, cellulitis, gout, HTN, IFG, COVID 19, back surgery, B THR,    Diagnosis s/p right periproshetic hip fracture; non-op   General Comment   Comments Wife present. Pt continues to present with BLE swelling- decreased slightly vs previous txs. Subjective   Subjective Pt sitting in w/c in room, agrees to participate in therapy. Subjective   Subjective stiff/sore anterior R hip    Pain 3/10 with movement    Bed mobility   Rolling to Left Independent   Rolling to Right Independent   Supine to Sit Supervision;Modified independent  (Use of RLE leg  )   Sit to Supine Supervision;Modified independent  (Use of RLE leg )   Scooting Independent  (On EOB )   Bed Mobility Comments On R side of bed- no use of bed rails. Use of RLE leg     Transfers   Sit to Stand Stand by assistance;Supervision   Stand to sit Stand by assistance;Supervision   Lateral Transfers Stand by assistance   Car Transfer Minimal Assistance  (To get RLE in/out of car )   Ambulation   WB Status WBAT   Ambulation   Surface level tile   Device Rolling Walker   Assistance Stand by assistance   Quality of Gait 3 point gait, forward flexed posture   Gait Deviations Slow Amber;Decreased step length;Decreased step height   Distance 5' X 2, 15' X 2    Comments Multiple L and R turns    Activity Tolerance   Activity Tolerance Patient tolerated treatment well   Assessment   Assessment Pt able to tolerate tx with no c/o of increased pain and min fatigue.  Pt able to perform personal car transfer requiring Min A- see above for details. Pt presents with decreased need for assistance for sit<>stands and ambulation, and will continue to benefit from further therapy to address remaining defecits in BLE strength and functional mobility. Discharge Recommendations Continue to assess pending progress;Home with Home health PT;Home with assist PRN   Safety Devices   Type of Devices Patient at risk for falls;Gait belt;Left in chair;Call light within reach; Sitter present  (Wife present )         Electronically signed by Mya Calixto PTA on 6/7/2022 at 4:20 PM

## 2022-06-08 VITALS
TEMPERATURE: 96.4 F | HEART RATE: 78 BPM | BODY MASS INDEX: 29.01 KG/M2 | OXYGEN SATURATION: 97 % | DIASTOLIC BLOOD PRESSURE: 72 MMHG | SYSTOLIC BLOOD PRESSURE: 107 MMHG | WEIGHT: 226.03 LBS | RESPIRATION RATE: 16 BRPM | HEIGHT: 74 IN

## 2022-06-08 PROCEDURE — 99239 HOSP IP/OBS DSCHRG MGMT >30: CPT | Performed by: PSYCHIATRY & NEUROLOGY

## 2022-06-08 PROCEDURE — 6370000000 HC RX 637 (ALT 250 FOR IP): Performed by: PSYCHIATRY & NEUROLOGY

## 2022-06-08 RX ORDER — HYDROCODONE BITARTRATE AND ACETAMINOPHEN 5; 325 MG/1; MG/1
1 TABLET ORAL EVERY 6 HOURS PRN
Qty: 21 TABLET | Refills: 0 | Status: SHIPPED | OUTPATIENT
Start: 2022-06-08 | End: 2022-06-15

## 2022-06-08 RX ADMIN — PREDNISONE 5 MG: 5 TABLET ORAL at 07:36

## 2022-06-08 RX ADMIN — DOCUSATE SODIUM 100 MG: 100 CAPSULE, LIQUID FILLED ORAL at 07:36

## 2022-06-08 RX ADMIN — TORSEMIDE 10 MG: 10 TABLET ORAL at 07:36

## 2022-06-08 RX ADMIN — ALLOPURINOL 300 MG: 300 TABLET ORAL at 07:36

## 2022-06-08 RX ADMIN — APIXABAN 2.5 MG: 2.5 TABLET, FILM COATED ORAL at 07:36

## 2022-06-08 NOTE — PLAN OF CARE
Problem: Discharge Planning  Goal: Discharge to home or other facility with appropriate resources  6/8/2022 0109 by Junior Hobson LPN  Outcome: Progressing  Flowsheets (Taken 6/8/2022 0101)  Discharge to home or other facility with appropriate resources: Identify barriers to discharge with patient and caregiver  6/7/2022 1312 by Susan Jones RN  Outcome: Progressing  Flowsheets (Taken 6/7/2022 4900)  Discharge to home or other facility with appropriate resources: Refer to discharge planning if patient needs post-hospital services based on physician order or complex needs related to functional status, cognitive ability or social support system     Problem: Safety - Adult  Goal: Free from fall injury  6/8/2022 0109 by Junior Hobson LPN  Outcome: Progressing  Flowsheets (Taken 6/8/2022 0105)  Free From Fall Injury: Instruct family/caregiver on patient safety  6/7/2022 1312 by Susan Jones RN  Outcome: Progressing     Problem: Pain  Goal: Verbalizes/displays adequate comfort level or baseline comfort level  6/8/2022 0109 by Junior Hobson LPN  Outcome: Progressing  6/7/2022 1312 by Susan Jones RN  Outcome: Progressing     Problem: ABCDS Injury Assessment  Goal: Absence of physical injury  6/8/2022 0109 by Junior Hobson LPN  Outcome: Progressing  Flowsheets (Taken 6/8/2022 0105)  Absence of Physical Injury: Implement safety measures based on patient assessment  6/7/2022 1312 by Susan Jones RN  Outcome: Progressing     Problem: Nutrition Deficit:  Goal: Optimize nutritional status  6/8/2022 0109 by Junior Hobson LPN  Outcome: Progressing  6/7/2022 1312 by Susan Jones RN  Outcome: Progressing     Problem: Skin/Tissue Integrity  Goal: Absence of new skin breakdown  Description: 1. Monitor for areas of redness and/or skin breakdown  2. Assess vascular access sites hourly  3. Every 4-6 hours minimum:  Change oxygen saturation probe site  4.   Every 4-6 hours:  If on nasal continuous positive airway pressure, respiratory therapy assess nares and determine need for appliance change or resting period.   6/8/2022 0109 by Ren Gomez LPN  Outcome: Progressing  6/7/2022 1312 by Oj Junior RN  Outcome: Progressing

## 2022-06-08 NOTE — DISCHARGE SUMMARY
Neurology Discharge Summary     Patient Identification:  Lucero Gu is a 81 y.o. male. :  1950  Admit Date:  2022  Discharge date : 22   Attending Provider: Shaheed Romero MD     Account Number: [de-identified]                                   Admission Diagnoses:   S/P right hip fracture [Z87.81]    Discharge Diagnoses:  Principal Problem:    S/P right hip fracture  Active Problems:    Hip pain    Gait abnormality    Moderate malnutrition (HCC)    Hypertension    Hyperlipidemia    Benign essential HTN    Gout    Gout, arthropathy    IFG (impaired fasting glucose)    Malignant neoplasm of trigone of urinary bladder (HCC)    Obesity (BMI 30-39. 9)    Primary osteoarthritis of both knees    Prostate cancer ([facility] [address]    Retroperitoneal fibrosis    Ureteral obstruction, left  Resolved Problems:    * No resolved hospital problems. *      Discharge Medications:    Current Discharge Medication List           Details   apixaban (ELIQUIS) 2.5 MG TABS tablet Take 1 tablet by mouth 2 times daily  Qty: 60 tablet, Refills: 0              Details   HYDROcodone-acetaminophen (NORCO) 5-325 MG per tablet Take 1 tablet by mouth every 6 hours as needed for Pain for up to 7 days. Qty: 21 tablet, Refills: 0    Comments: Reduce doses taken as pain becomes manageable  Associated Diagnoses: S/P right hip fracture              Details   diphenhydrAMINE (SOMINEX) 25 MG tablet Take 25 mg by mouth every 6 hours as needed      predniSONE (DELTASONE) 5 MG tablet Take 5 mg by mouth daily       allopurinol (ZYLOPRIM) 300 MG tablet Take 300 mg by mouth daily       torsemide (DEMADEX) 20 MG tablet Take 10 mg by mouth daily       colchicine (COLCRYS) 0.6 MG tablet Take 0.6 mg by mouth 2 times daily as needed            Current Discharge Medication List      STOP taking these medications       tamsulosin (FLOMAX) 0.4 mg capsule Comments:   Reason for Stopping:                 Consults:   none    Hospital Course:   The patient did well during his stay in the rehab unit. Flomax was refused and it will be discontinued upon discharge. He had no trouble with gout. Pain was well controlled. Having some trouble with peripheral edema but preferred to increase his diuretic only once he was home. He had no medical complications. Disposition upon dischargeimproved        Discharge Instructions     Patient Instructions:   Home  Therapy orders: PT and OT   Discharge lab work: none  Code status: Full Code   Activity: activity as tolerated  Diet: ADULT ORAL NUTRITION SUPPLEMENT; Dinner; Standard High Calorie/High Protein Oral Supplement  ADULT DIET;  Regular    Wound Care: as directed  Equipment: as per therapy      Duke Plasencia MD, MD    At least 35 minutes were spent in discharging the patient

## 2022-06-08 NOTE — PLAN OF CARE
Problem: Discharge Planning  Goal: Discharge to home or other facility with appropriate resources  6/8/2022 0917 by Jeanne Means RN  Outcome: Completed  6/8/2022 0109 by Nigel Duran LPN  Outcome: Progressing  Flowsheets (Taken 6/8/2022 0101)  Discharge to home or other facility with appropriate resources: Identify barriers to discharge with patient and caregiver

## 2022-06-09 ENCOUNTER — CARE COORDINATION (OUTPATIENT)
Dept: CASE MANAGEMENT | Age: 72
End: 2022-06-09

## 2022-06-09 NOTE — DISCHARGE SUMMARY
Physical Therapy DISCHARGE Note  DATE:  2022  NAME:  Miguel Walker  :  1950  (81 y.o.,male)  MRN:  [id number]    HEIGHT:  Height: 6' 2\" (188 cm)  WEIGHT:  Weight: 226 lb 0.4 oz (102.5 kg)    PATIENT DIAGNOSIS(ES):    Diagnosis: s/p right periproshetic hip fracture; non-op    Additional Pertinent Hx: arthritis, prostate cancer, cellulitis, gout, HTN, IFG, COVID 19, back surgery, B THR,   RESTRICTIONS/PRECAUTIONS:    Restrictions/Precautions  Restrictions/Precautions: Fall Risk,Weight Bearing     OVERALL  ORIENTATION STATUS:  Overall Orientation Status: Within Normal Limits  PAIN:  Pain Level: 2  Pain Type: Acute pain    Pain Location: Hip     Pain Orientation: Right      GROSS ASSESSMENT        POSTURE/BALANCE  Balance  Sitting - Static: Fair,+  Standing - Static: Good,-       ACTIVITY TOLERANCE  Activity Tolerance  Activity Tolerance: Patient tolerated treatment well      BED MOBILITY  Bed mobility  Rolling to Left: Independent  Rolling to Right: Independent  Supine to Sit: Supervision,Modified independent (Use of RLE leg  )  Sit to Supine: Supervision,Modified independent (Use of RLE leg )  Scooting: Independent (On EOB )  Bed Mobility Comments: On R side of bed- no use of bed rails.  Use of RLE leg          TRANSFERS  Transfers  Sit to Stand: Stand by assistance,Supervision  Stand to sit: Stand by assistance,Supervision  Bed to Chair: Stand by assistance (with rw)  Stand Pivot Transfers: Contact guard assistance (RW)  Lateral Transfers: Stand by assistance  Car Transfer: Minimal Assistance (To get RLE in/out of car )  Comment: multiple STS from R Ashley Wendy 23 to // bars and RW (always pushing up from R Ashley Wendy 23)       AMBULATION 1  Ambulation  Surface: level tile  Device: Rolling Walker  Other Apparatus: Wheelchair follow  Assistance: Stand by assistance  Quality of Gait: 3 point gait, forward flexed posture  Gait Deviations: Slow Amber,Decreased step length,Decreased step height  Distance: 20 ft Strengthening,ROM,Balance training,Functional mobility training,Transfer training,ADL/Self-care training,Endurance training,Wheelchair mobility training,Gait training,Stair training,Home exercise program,Safety education & training,Return to work related activity,Equipment evaluation, education, & procurement,Patient/Caregiver education & training,Therapeutic activities  Discharge Recommendations: Continue to assess pending progress,Home with Home health PT,Home with assist PRN  PATIENT REQUIRES AND IS REASONABLY EXPECTED TO ACTIVELY PARTICIPATE IN AT LEAST 3 HOURS OF INTENSIVE THERAPY PER DAY AT LEAST 5 DAYS PER WEEK, AND BE EXPECTED TO MAKE MEASURABLE IMPROVEMENT THAT WILL BE OF PRACTICAL VALUE TO IMPROVE THE PATIENT'S FUNCTIONAL CAPACITY OR ADAPTATION TO IMPAIRMENTS.    PATIENT GOAL FOR REHAB:  RETURN TO PRIOR LEVEL OF FUNCTION       IRF/REBEKAH  Roll Left and Right  Assistance Needed: Independent  CARE Score: 6  Discharge Goal: Independent    Sit to Lying  Assistance Needed: Supervision or touching assistance  CARE Score: 4  Discharge Goal: Independent    Lying to Sitting on Side of Bed  Assistance Needed: Supervision or touching assistance  CARE Score: 4  Discharge Goal: Independent    Sit to Stand  Assistance Needed: Supervision or touching assistance  CARE Score: 4  Discharge Goal: Independent    Chair/Bed-to-Chair Transfer  Assistance Needed: Supervision or touching assistance  CARE Score: 4  Discharge Goal: Independent    Car Transfer  Assistance Needed: Supervision or touching assistance  Reason if not Attempted: Not attempted due to medical condition or safety concerns  CARE Score: 4  Discharge Goal: Independent    Walk 10 Feet  Assistance Needed: Supervision or touching assistance  CARE Score: 4  Discharge Goal: Independent    Walk 50 Feet with Two Turns  Reason if not Attempted: Not attempted due to medical condition or safety concerns  CARE Score: 88  Discharge Goal: Independent    Walk 150 Feet  Reason if not Attempted: Not attempted due to medical condition or safety concerns  CARE Score: 88  Discharge Goal: Independent    Walking 10 Feet on Uneven Surfaces  Reason if not Attempted: Not attempted due to medical condition or safety concerns  CARE Score: 88  Discharge Goal: Supervision or touching assistance    1 Step (Curb)  Assistance Needed: Partial/moderate assistance  Reason if not Attempted: Not attempted due to medical condition or safety concerns  CARE Score: 3  Discharge Goal: Supervision or touching assistance    4 Steps  Assistance Needed: Partial/moderate assistance  Reason if not Attempted: Not attempted due to medical condition or safety concerns  CARE Score: 3  Discharge Goal: Not Attempted    12 Steps  Assistance Needed: Partial/moderate assistance  Reason if not Attempted: Not attempted due to medical condition or safety concerns  CARE Score: 3  Discharge Goal: Not Attempted    Wheel 50 Feet with Two Turns  Assistance Needed: Independent  CARE Score: 6  Discharge Goal: Independent    Wheel 150 Feet  Assistance Needed: Independent  CARE Score: 6  Discharge Goal: Independent      LAST TREATMENT TIME  PT Individual Minutes  Time In: 1515  Time Out: 1556  Minutes: 41 MODERATE

## 2022-06-09 NOTE — DISCHARGE SUMMARY
Occupational Therapy Discharge Summary         Date: 2022  Patient Name: Adenike Wade        MRN: [id number]    : 1950  (81 y.o.)  Gender: male      Diagnosis: s/p right periproshetic hip fracture; non-op  Restrictions/Precautions  Restrictions/Precautions: Fall Risk,Weight Bearing      Discharge Date: 22      UE Functioning:  LUE: AROM proximally (0-40), WFL distally   RUE: AROM WFL     Home Management:  Functional Mobility  Functional - Mobility Device: Rolling Walker  Activity: Other  Assist Level: Stand by assistance  Functional Mobility Comments: short distances in OT tx area    Adaptive Equipment/DME Status:  Bathroom Equipment: 3-in-1 commode  Home Equipment: Rollator,Walker, rolling,Wheelchair-manual  Script given for semi electric hospital bed in pt requires after d/c. .has all other recommended DME    Pain Assessment:  4/10 pain in R hip  Remaining Problems:  Performance deficits / Impairments: Decreased functional mobility ,Decreased ADL status,Decreased ROM,Decreased strength,Decreased balance,Decreased high-level IADLs        STGs:  Short Term Goals  Time Frame for Short term goals: 2 weeks  Short Term Goal 1: Mod A with clothing management/hygiene for toileting. Short Term Goal 2: CGA with toilet transfers. Short Term Goal 3: Min A with bathing hgyiene. Short Term Goal 4: Min A with LB dressing using AE. Short Term Goal 5: Min A with donning/doffing socks and shoes using AE. Short Term Goal 6: Patient will complete 1-2 handed static standing task for 3 minutes, requiring CGA. LTGs:  Long Term Goals  Time Frame for Long term goals : 3-4 weeks  Long Term Goal 1: Modified independent with toileting and toilet transfers. Long Term Goal 2: Modified independent with bathing hygiene. Long Term Goal 3: Modified independent with overall dressing. Long Term Goal 4: Patient verbalize DME nees. Long Term Goal 5: Independent with HEP.       Discharge Setting and Recommendations:  Home with spouse and home health orders    Discharge Care Scores  Eating: CARE Score: 6  Oral Hygiene: CARE Score: 6  Toileting: CARE Score: 4  Shower/Bathe: CARE Score: 4  Upper Body Dressing: CARE Score: 5  Lower Body Dressing: CARE Score: 4  Footwear: CARE Score: 3  Toilet Transfers: CARE Score: 4  Picking Up Object:  CARE Score: 4    Electronically signed by Ana Novoa OT on 6/9/22 at 8:59 AM CDT

## 2022-06-09 NOTE — CARE COORDINATION
[facility] Transitions Initial Follow Up Call    Call within 2 business days of discharge: Yes    Patient: Coty Alejandro Patient : 1950   MRN: [id number]  Reason for Admission:   Discharge Date: 22 RARS: Readmission Risk Score: 13 ( )      Last Discharge [facility]       Complaint Diagnosis Description Type Department Provider    22  S/P right hip fracture Admission (Discharged) [facility] 8 Katharine Rao MD           Spoke with: N/A    Facility: [place] [id number]    Non-face-to-face services provided:  Reviewed encounter information for continuity of care prior to follow up phone call - chart notes, consults, progress notes, test results, med list, appointments, AVS, other information. Care Transitions 24 Hour Call    Care Transitions Interventions         Follow Up : Attempted to make contact with Carlitos Batista for an initial follow up call post discharge from the hospital without success. Unable to leave a message regarding intent of call and call back information. Will try again my next business day. No future appointments.     Mariama Feldman RN

## 2022-06-10 ENCOUNTER — CARE COORDINATION (OUTPATIENT)
Dept: CASE MANAGEMENT | Age: 72
End: 2022-06-10

## 2022-06-10 DIAGNOSIS — Z87.81 S/P RIGHT HIP FRACTURE: Primary | ICD-10-CM

## 2022-06-10 PROCEDURE — 1111F DSCHRG MED/CURRENT MED MERGE: CPT | Performed by: STUDENT IN AN ORGANIZED HEALTH CARE EDUCATION/TRAINING PROGRAM

## 2022-06-10 NOTE — CARE COORDINATION
[facility] Transitions Initial Follow Up Call    Call within 2 business days of discharge: Yes    Patient: Mariela Da Silva Patient : 1950   MRN: [id number]  Reason for Admission:   Discharge Date: 22 RARS: Readmission Risk Score: 13 ( )      Last Discharge [facility]       Complaint Diagnosis Description Type Department Provider    22  S/P right hip fracture Admission (Discharged) [address], MD           Spoke with: [facility]: [place] [address]    Non-face-to-face services provided:  Obtained and reviewed discharge summary and/or continuity of care documents Reviewed encounter information for continuity of care prior to follow up phone call - chart notes, consults, progress notes, test results, med list, appointments, AVS, other information. Advance Care Planning   Healthcare Decision Maker:    Primary Decision Maker: Ameena Harry - [facility] - [phone]    Transitions of Care Initial Call    Was this an external facility discharge? No Discharge Facility:     Challenges to be reviewed by the provider   Additional needs identified to be addressed with provider: No  none             Method of communication with provider : none    Advance Care Planning:   Does patient have an Advance Directive: not on file; education provided. Care Transition Nurse contacted the patient by telephone to perform post hospital discharge assessment. Verified name and  with patient as identifiers. Provided introduction to self, and explanation of the CTN role. CTN reviewed discharge instructions, medical action plan and red flags with patient who verbalized understanding. Patient given an opportunity to ask questions and does not have any further questions or concerns at this time. Were discharge instructions available to patient?  No. Reviewed appropriate site of care based on symptoms and resources available to patient including: PCP  Specialist  Home health. The patient agrees to contact the PCP office for questions related to their healthcare. Medication reconciliation was performed with patient, who verbalizes understanding of administration of home medications. Advised obtaining a 90-day supply of all daily and as-needed medications. Was patient discharged with a pulse oximeter? no    CTN provided contact information. Plan for follow-up call in 5-7 days based on severity of symptoms and risk factors. Plan for next call: pain assessment, mobility, med changes, appetite, GI assessment, incision assessmetn      Care Transitions 24 Hour Call    Do you have a copy of your discharge instructions?: Yes  Do you have all of your prescriptions and are they filled?: Yes  Have you been contacted by a [facility] Pharmacist?: No  Have you scheduled your follow up appointment?: Yes  How are you going to get to your appointment?: Car - family or friend to transport  Do you have support at home?: Partner/Spouse/SO  Do you feel like you have everything you need to keep you well at home?: Yes  Are you an active caregiver in your home?: No  Care Transitions Interventions         Follow Up : Spoke with patient today for initial CT call after discharge, second attempt. He says he is doing well. Says he has his DME, not gotten hospital bed, says he is doing ok for now without it. He has his medications, did review, 1111F order added. He has his HFU with PCP and ortho scheduled and is aware. He said [facility] has been out to see him. HE is in good spirits, says he has not been taking pain meds, doing ok without. Discussed constipation and loose stools. Eating fresh fruits and vegetables, getting proper nutrition. He is convalescing well, incision with no issues. He has had the Covid vaccine, listed PHCDM and does have LW filled out but not submitted to [facility], [address] [place] [address] Drive counseled regarding this.  He is accepting of calls and follow up, CTN will follow up at a later time. No future appointments.     Cherylene Amend, RN

## 2022-06-15 ENCOUNTER — CARE COORDINATION (OUTPATIENT)
Dept: CASE MANAGEMENT | Age: 72
End: 2022-06-15

## 2022-06-22 ENCOUNTER — CARE COORDINATION (OUTPATIENT)
Dept: CASE MANAGEMENT | Age: 72
End: 2022-06-22

## 2022-06-22 NOTE — CARE COORDINATION
[facility] Transitions Follow Up Call    2022    Patient: Thang Parra  Patient : 1950   MRN: [id number]  Reason for Admission: s/p hip repair r/t fx  Discharge Date: 22 RARS: Readmission Risk Score: 13 ( )         Spoke with: NA    Attempted to reach patient via phone for transition call. VM left stating purpose of call along with my contact information requesting a return call. Shiraz Kiser LPN [address] [place] [address]  [phone]    Care Transitions Subsequent and Final Call    Subsequent and Final Calls  Are you currently active with any services?: Home Health  Care Transitions Interventions  Other Interventions: Follow Up  No future appointments.     Shiraz Kiser LPN

## 2022-06-23 ENCOUNTER — CARE COORDINATION (OUTPATIENT)
Dept: CASE MANAGEMENT | Age: 72
End: 2022-06-23

## 2022-06-23 NOTE — CARE COORDINATION
[facility] Transitions Follow Up Call    2022    Patient: Lucero Gu  Patient : 1950   MRN: [id number]  Reason for Admission: s/p hip repair r/t fx  Discharge Date: 22 RARS: Readmission Risk Score: 13 ( )         Spoke with: [facility] Transitions Follow Up Call    Needs to be reviewed by the provider   Additional needs identified to be addressed with provider: No  none             Method of communication with provider : none      Care Transition Nurse (CTN) contacted the patient by telephone to follow up after admission. Verified name and  with patient as identifiers. Addressed changes since last contact: none  Discussed follow-up appointments. If no appointment was previously scheduled, appointment scheduling offered: Yes. Is follow up appointment scheduled within 7 days of discharge? Yes. Advance Care Planning:   Does patient have an Advance Directive: not on file. Advance Care Planning   Healthcare Decision Maker:    Primary Decision Maker: Ameena Harry [facility] - [phone]    CTN reviewed discharge instructions, medical action plan and red flags with patient and discussed any barriers to care and/or understanding of plan of care after discharge. Discussed appropriate site of care based on symptoms and resources available to patient including: PCP  Specialist  Home health. The patient agrees to contact the PCP office for questions related to their healthcare. Patients top risk factors for readmission: medical condition-s/p hip repair r/t fx    Patient verified  and was pleasant and agreeable to transition calls. States he is good. PT going well. Denies pain. Reports some swelling to BLE that was somewhat of a problem before hip injury. Denies SOB, CP, orthopnea, cough. States he is unable to check his weight at this time as scale is upstairs & he is still unable to climb his stairs. Has been having sponge baths as shower is upstairs as well.   Ambulating with walker very well. /78. Appetite good. Denies problems with bowels or bladder. Denies medication changes. Saw PCP last week. States he is d/t see ortho the first or second week of July. Denies needs. CTN provided contact information for future needs. No further follow-up call indicated based on severity of symptoms and risk factors. Care Transitions Subsequent and Final Call    Subsequent and Final Calls  Do you have any ongoing symptoms?: No  Have your medications changed?: No  Do you have any questions related to your medications?: No  Do you currently have any active services?: No  Are you currently active with any services?: Home Health  Do you have any needs or concerns that I can assist you with?: No  Identified Barriers: None  Care Transitions Interventions  Other Interventions: Follow Up  No future appointments.     Shiraz Kiser LPN

## 2022-06-27 ENCOUNTER — OFFICE VISIT (OUTPATIENT)
Dept: GASTROENTEROLOGY | Facility: CLINIC | Age: 72
End: 2022-06-27

## 2022-06-27 VITALS
OXYGEN SATURATION: 94 % | HEIGHT: 74 IN | BODY MASS INDEX: 30.29 KG/M2 | TEMPERATURE: 96 F | DIASTOLIC BLOOD PRESSURE: 80 MMHG | SYSTOLIC BLOOD PRESSURE: 108 MMHG | HEART RATE: 64 BPM | WEIGHT: 236 LBS

## 2022-06-27 DIAGNOSIS — R63.4 WEIGHT LOSS: ICD-10-CM

## 2022-06-27 DIAGNOSIS — D68.9 COAGULOPATHY: ICD-10-CM

## 2022-06-27 DIAGNOSIS — E46 PROTEIN DEFICIENCY: Primary | ICD-10-CM

## 2022-06-27 PROBLEM — C80.1 CANCER: Status: ACTIVE | Noted: 2022-06-27

## 2022-06-27 PROCEDURE — 99204 OFFICE O/P NEW MOD 45 MIN: CPT | Performed by: NURSE PRACTITIONER

## 2022-06-27 RX ORDER — APIXABAN 2.5 MG/1
2.5 TABLET, FILM COATED ORAL 2 TIMES DAILY
COMMUNITY
Start: 2022-06-09 | End: 2023-03-27

## 2022-06-27 NOTE — PROGRESS NOTES
"[facility] GASTROENTEROLOGY - OFFICE NOTE    6/27/2022    Aleks Monzon   1950    Primary Physician: Tj Hyde MD    Chief Complaint   Patient presents with   • GI Problem     Losing protein          HISTORY OF PRESENT ILLNESS:     Aleks Monzon is a 72 y.o. 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 He has had some diarrhea with eating certain foods since 5/2022 ( after hip fracture). Has also had constipation recently with pain med. He does have history of bright red blood per rectum  intermittent for 5-6 yrs. He has had weight loss that started after being placed on prednisone.  He was placed on prednisone by  about a year ago when diagnosed with \" retroperitoneal fibrosis\".   No black stool.  No aspirin or NSAIDs.  No CHF. No history of crohn's or ulcerative colitis. Denies abdominal pain or fever.         Ultrasound abdomen 5-6-22 noted small and atrophic left kidney and splenomegaly, liver was normal.   Echo done 5-3-22 report reviewed.       He is on Eliquis. He is on immunotherapy for prostate cancer.            ENDOSCOPY, INT (09/22/2020 00:00) EGD with EUS by Dr. AUBREY Hoffman.   Colonoscopy 2012 by Dr. Carrasco, hyperplastic polyp.   History of adenomatous colon polyp 2004.  No family history of colon cancer, polyps, or IBD.       Past Medical History:   Diagnosis Date   • Arthritis    • Cancer (HCC)     PROSTATE AND BLADDER   • Cellulitis    • Gout    • Hypertension    • IFG (impaired fasting glucose)    • Retroperitoneal fibrosis        Past Surgical History:   Procedure Laterality Date   • AXILLARY LYMPH NODE BIOPSY/EXCISION Left 10/01/2020    Procedure: LEFT AXILLARY LYMPH NODE BIOPSY;  Surgeon: Dina To MD;  Location: [facility] OR;  Service: General;  Laterality: Left;   • BACK SURGERY     • COLONOSCOPY      2017?   • JOINT REPLACEMENT      BILATERAL HIP    • SKIN CANCER EXCISION     • TOTAL HIP ARTHROPLASTY     • URETERAL STENT INSERTION   "       Outpatient Medications Marked as Taking for the 6/27/22 encounter (Office Visit) with aMryam Hoffman APRN   Medication Sig Dispense Refill   • allopurinol (ZYLOPRIM) 300 MG tablet Take 300 mg by mouth Daily.     • colchicine 0.6 MG tablet Take 1 tablet by mouth 2 (Two) Times a Day As Needed for Muscle / Joint Pain. (Patient taking differently: Take 0.6 mg by mouth 2 (Two) Times a Day As Needed for Muscle / Joint Pain (rare).) 20 tablet 5   • diphenhydrAMINE (BENADRYL) 25 MG tablet Take 25 mg by mouth Every 6 (Six) Hours As Needed for Allergies.     • Eliquis 2.5 MG tablet tablet Take 2.5 mg by mouth 2 (Two) Times a Day.     • predniSONE (DELTASONE) 5 MG tablet Take 5 mg by mouth Daily.     • tamsulosin (FLOMAX) 0.4 MG capsule 24 hr capsule Take 1 capsule by mouth Daily.     • torsemide (DEMADEX) 20 MG tablet Take 20 mg by mouth Daily. Takes half tablet         Allergies   Allergen Reactions   • Latex Other (See Comments)     unknown   • Niacin Itching     same as of 1/9/2017-itching         Social History     Socioeconomic History   • Marital status:    Tobacco Use   • Smoking status: Never Smoker   • Smokeless tobacco: Never Used   Substance and Sexual Activity   • Alcohol use: Never   • Drug use: Never   • Sexual activity: Not Currently     Partners: Female       Family History   Problem Relation Age of Onset   • Heart disease Father    • Diabetes Maternal Grandmother    • Colon cancer Neg Hx    • Colon polyps Neg Hx        Review of Systems   Constitutional: Positive for appetite change and unexpected weight change. Negative for chills and fever.   Respiratory: Negative for shortness of breath and wheezing.    Cardiovascular: Negative for chest pain and palpitations.   Gastrointestinal: Positive for diarrhea. Negative for abdominal distention, abdominal pain, anal bleeding, blood in stool, constipation, nausea and vomiting.        Vitals:    06/27/22 0854   BP: 108/80   Pulse: 64   Temp: 96 °F (35.6  "°C)   SpO2: 94%   Weight: 107 kg (236 lb)   Height: 188 cm (74\")      Body mass index is 30.3 kg/m².    Physical Exam  Vitals reviewed.   Constitutional:       General: He is not in acute distress.  Cardiovascular:      Rate and Rhythm: Normal rate and regular rhythm.      Heart sounds: Normal heart sounds.   Pulmonary:      Effort: Pulmonary effort is normal.      Breath sounds: Normal breath sounds.   Abdominal:      General: Bowel sounds are normal. There is no distension.      Palpations: Abdomen is soft.      Tenderness: There is no abdominal tenderness.   Musculoskeletal:      Right lower leg: Edema present.      Left lower leg: Edema present.   Skin:     General: Skin is warm and dry.   Neurological:      Mental Status: He is alert.         Results for orders placed or performed during the hospital encounter of 05/22/22   Body Fluid Culture - Aspirate, Knee, Left    Specimen: Knee, Left; Aspirate   Result Value Ref Range    Body Fluid Culture No growth at 5 days     Gram Stain Few (2+) WBCs seen     Gram Stain No organisms seen    Comprehensive Metabolic Panel    Specimen: Blood   Result Value Ref Range    Glucose 122 (H) 65 - 99 mg/dL    BUN 25 (H) 8 - 23 mg/dL    Creatinine 1.31 (H) 0.76 - 1.27 mg/dL    Sodium 138 136 - 145 mmol/L    Potassium 3.6 3.5 - 5.2 mmol/L    Chloride 105 98 - 107 mmol/L    CO2 29.0 22.0 - 29.0 mmol/L    Calcium 7.4 (L) 8.6 - 10.5 mg/dL    Total Protein 4.0 (L) 6.0 - 8.5 g/dL    Albumin 1.90 (L) 3.50 - 5.20 g/dL    ALT (SGPT) 14 1 - 41 U/L    AST (SGOT) 16 1 - 40 U/L    Alkaline Phosphatase 118 (H) 39 - 117 U/L    Total Bilirubin 0.2 0.0 - 1.2 mg/dL    Globulin 2.1 gm/dL    A/G Ratio 0.9 g/dL    BUN/Creatinine Ratio 19.1 7.0 - 25.0    Anion Gap 4.0 (L) 5.0 - 15.0 mmol/L    eGFR 58.6 (L) >60.0 mL/min/1.73   Uric Acid    Specimen: Blood   Result Value Ref Range    Uric Acid 3.2 (L) 3.4 - 7.0 mg/dL   CBC Auto Differential    Specimen: Blood   Result Value Ref Range    WBC 10.36 3.40 - " 10.80 10*3/mm3    RBC 4.21 4.14 - 5.80 10*6/mm3    Hemoglobin 11.6 (L) 13.0 - 17.7 g/dL    Hematocrit 36.1 (L) 37.5 - 51.0 %    MCV 85.7 79.0 - 97.0 fL    MCH 27.6 26.6 - 33.0 pg    MCHC 32.1 31.5 - 35.7 g/dL    RDW 17.5 (H) 12.3 - 15.4 %    RDW-SD 54.8 (H) 37.0 - 54.0 fl    MPV 9.0 6.0 - 12.0 fL    Platelets 245 140 - 450 10*3/mm3    Neutrophil % 85.8 (H) 42.7 - 76.0 %    Lymphocyte % 5.4 (L) 19.6 - 45.3 %    Monocyte % 7.9 5.0 - 12.0 %    Eosinophil % 0.1 (L) 0.3 - 6.2 %    Basophil % 0.3 0.0 - 1.5 %    Immature Grans % 0.5 0.0 - 0.5 %    Neutrophils, Absolute 8.89 (H) 1.70 - 7.00 10*3/mm3    Lymphocytes, Absolute 0.56 (L) 0.70 - 3.10 10*3/mm3    Monocytes, Absolute 0.82 0.10 - 0.90 10*3/mm3    Eosinophils, Absolute 0.01 0.00 - 0.40 10*3/mm3    Basophils, Absolute 0.03 0.00 - 0.20 10*3/mm3    Immature Grans, Absolute 0.05 0.00 - 0.05 10*3/mm3    nRBC 0.0 0.0 - 0.2 /100 WBC   Folate    Specimen: Blood   Result Value Ref Range    Folate 7.68 4.78 - 24.20 ng/mL   Iron Profile    Specimen: Blood   Result Value Ref Range    Iron 23 (L) 59 - 158 mcg/dL    Iron Saturation 10 (L) 20 - 50 %    Transferrin 148 (L) 200 - 360 mg/dL    TIBC 221 (L) 298 - 536 mcg/dL   Vitamin B12    Specimen: Blood   Result Value Ref Range    Vitamin B-12 212 211 - 946 pg/mL   Basic Metabolic Panel    Specimen: Blood   Result Value Ref Range    Glucose 106 (H) 65 - 99 mg/dL    BUN 28 (H) 8 - 23 mg/dL    Creatinine 1.09 0.76 - 1.27 mg/dL    Sodium 140 136 - 145 mmol/L    Potassium 3.8 3.5 - 5.2 mmol/L    Chloride 106 98 - 107 mmol/L    CO2 29.0 22.0 - 29.0 mmol/L    Calcium 7.7 (L) 8.6 - 10.5 mg/dL    BUN/Creatinine Ratio 25.7 (H) 7.0 - 25.0    Anion Gap 5.0 5.0 - 15.0 mmol/L    eGFR 73.0 >60.0 mL/min/1.73   CBC Auto Differential    Specimen: Blood   Result Value Ref Range    WBC 6.17 3.40 - 10.80 10*3/mm3    RBC 3.77 (L) 4.14 - 5.80 10*6/mm3    Hemoglobin 10.3 (L) 13.0 - 17.7 g/dL    Hematocrit 33.4 (L) 37.5 - 51.0 %    MCV 88.6 79.0 - 97.0 fL     MCH 27.3 26.6 - 33.0 pg    MCHC 30.8 (L) 31.5 - 35.7 g/dL    RDW 17.3 (H) 12.3 - 15.4 %    RDW-SD 55.8 (H) 37.0 - 54.0 fl    MPV 9.7 6.0 - 12.0 fL    Platelets 242 140 - 450 10*3/mm3    Neutrophil % 73.9 42.7 - 76.0 %    Lymphocyte % 14.3 (L) 19.6 - 45.3 %    Monocyte % 9.9 5.0 - 12.0 %    Eosinophil % 0.8 0.3 - 6.2 %    Basophil % 0.5 0.0 - 1.5 %    Immature Grans % 0.6 (H) 0.0 - 0.5 %    Neutrophils, Absolute 4.56 1.70 - 7.00 10*3/mm3    Lymphocytes, Absolute 0.88 0.70 - 3.10 10*3/mm3    Monocytes, Absolute 0.61 0.10 - 0.90 10*3/mm3    Eosinophils, Absolute 0.05 0.00 - 0.40 10*3/mm3    Basophils, Absolute 0.03 0.00 - 0.20 10*3/mm3    Immature Grans, Absolute 0.04 0.00 - 0.05 10*3/mm3    nRBC 0.0 0.0 - 0.2 /100 WBC   Body fluid cell count - Synovial Fluid, Knee, Left    Specimen: Knee, Left; Synovial Fluid   Result Value Ref Range    Color, Fluid Light Yellow     Appearance, Fluid Cloudy (A) Clear    WBC, Fluid 1,490 /mm3    RBC, Fluid <1,000 /mm3   Body fluid differential - Synovial Fluid, Knee, Left    Specimen: Knee, Left; Synovial Fluid   Result Value Ref Range    Neutrophils, Fluid 90 %    Lymphocytes, Fluid 10 %   Tissue Pathology Exam    Specimen: Knee, Left; Aspirate   Result Value Ref Range    Note to Patients       This report may contain a detailed description of human tissue sent by a health care provider to the laboratory for pathologic evaluation. The content of this report is essential for diagnosis and may provide important critical findings. This information may be unfamiliar to patients to review without a medical professional present. It is advised that the patient review this report in the presence of a health care provider who can answer questions and explain the results.      Case Report       Surgical Pathology Report                         Case: [id number]                                  Authorizing Provider:  Tj Hyde MD     Collected:           05/25/2022 12:55 PM           Ordering Location:     [facility]  Received:            05/25/2022 03:05 PM          Pathologist:           Deven Crawford MD                                                            Specimen:    Knee, Left                                                                                 Final Diagnosis       Left knee fluid, smear for crystal examination:  No definite crystals seen.      Gross Description       1. Knee, Left.    Received in a container labeled with the patient's name, medical record number and date of birth is 14 mls of pale clear fluid.  1 smear is prepared for examination.          Microscopic Description       Representative material examined under polarized light contains scant amorphous debris but no definite crystals.             ASSESSMENT AND PLAN    Assessment & Plan     Diagnoses and all orders for this visit:    1. Protein deficiency (HCC) (Primary)  Comments:  ? of protein losing enteropathy       2. Weight loss    3. Coagulopathy (HCC)  Comments:  eliquis , started after hip fracture 5/2022.       He has had decrease serum protein for over a year.  He has had ultrasound abdomen and echocardiogram. He is also being followed by Dr. Burrows for retroperitoneal fibrosis. I recommend GI evaluation including egd and colonoscopy at this time and he is agreeable.  EGD will include small bowel biopsies to r/o celiac.  I will contact the patient once I have heard from Dr. Hyde regarding holding eliquis for procedures.       However prior to scheduling I will send a letter to Dr. Hyde in regards to if the patient can safely hold eliquis  48 hours prior to procedure. I will contact patient once he has responded.  I have instructed the patient to contact our office if he has not heard from us within 2 weeks.        The patient was advised on the risks of stopping blood thinners for a procedure.  The risks discussed included the risk of developing myocardial infarction,  CVA, embolus, clogging of the arteries or stents, etc.  We discussed the potential consequences of the risks discussed.  The benefits of stopping as well as the alternatives were discussed as well.   Patient is to hold their anticoagulation medication per the direction of their prescribing physician, Dr. Hyde. A letter will be sent to prescribing This is to prevent any risk or complication from bleeding intra and post procedure. If they develop bleeding post procedure they are to go the emergency department for further evaluation and treatment immediately.           Risk, benefits, and alternatives of endoscopy were explained in full.  They understand that there is a risk of bleeding, perforation, and infection.  The risk of perforation goes up with esophageal dilation.  Other options to evaluate UGI complaints could involve barium swallow or UGI series, but these would be diagnostic tests only.  Patient was given time to ask questions.  I answered them to their satisfaction and they are agreeable to proceeding. All risks, benefits, alternatives, and indications of colonoscopy procedure have been discussed with the patient. Risks to include perforation of the colon requiring possible surgery or colostomy, risk of bleeding from biopsies or removal of colon tissue, possibility of missing a colon polyp or cancer, or adverse drug reaction.  Benefits to include the diagnosis and management of disease of the colon and rectum. Alternatives to include barium enema, radiographic evaluation, lab testing or no intervention. Pt verbalizes understanding and agrees.                      RENE Schulte

## 2022-09-16 ENCOUNTER — TRANSCRIBE ORDERS (OUTPATIENT)
Dept: ADMINISTRATIVE | Facility: HOSPITAL | Age: 72
End: 2022-09-16

## 2022-09-16 ENCOUNTER — HOSPITAL ENCOUNTER (OUTPATIENT)
Dept: GENERAL RADIOLOGY | Facility: HOSPITAL | Age: 72
Discharge: HOME | End: 2022-09-16
Admitting: STUDENT IN AN ORGANIZED HEALTH CARE EDUCATION/TRAINING PROGRAM

## 2022-09-16 DIAGNOSIS — M54.50 LOW BACK PAIN, UNSPECIFIED BACK PAIN LATERALITY, UNSPECIFIED CHRONICITY, UNSPECIFIED WHETHER SCIATICA PRESENT: Primary | ICD-10-CM

## 2022-09-16 PROCEDURE — 72100 X-RAY EXAM L-S SPINE 2/3 VWS: CPT

## 2022-09-21 ENCOUNTER — OFFICE VISIT (OUTPATIENT)
Dept: GASTROENTEROLOGY | Facility: CLINIC | Age: 72
End: 2022-09-21

## 2022-09-21 VITALS
DIASTOLIC BLOOD PRESSURE: 80 MMHG | HEIGHT: 74 IN | SYSTOLIC BLOOD PRESSURE: 114 MMHG | HEART RATE: 90 BPM | BODY MASS INDEX: 28.88 KG/M2 | WEIGHT: 225 LBS | OXYGEN SATURATION: 98 % | TEMPERATURE: 96.2 F

## 2022-09-21 DIAGNOSIS — E46 PROTEIN DEFICIENCY: Primary | ICD-10-CM

## 2022-09-21 DIAGNOSIS — R63.0 DECREASED APPETITE: ICD-10-CM

## 2022-09-21 DIAGNOSIS — K62.5 BRBPR (BRIGHT RED BLOOD PER RECTUM): ICD-10-CM

## 2022-09-21 DIAGNOSIS — R63.4 WEIGHT LOSS: ICD-10-CM

## 2022-09-21 DIAGNOSIS — Z86.010 HISTORY OF ADENOMATOUS POLYP OF COLON: ICD-10-CM

## 2022-09-21 PROCEDURE — 99214 OFFICE O/P EST MOD 30 MIN: CPT | Performed by: NURSE PRACTITIONER

## 2022-09-21 RX ORDER — SOD SULF/POT CHLORIDE/MAG SULF 1.479 G
12 TABLET ORAL TAKE AS DIRECTED
Qty: 24 TABLET | Refills: 0 | Status: SHIPPED | OUTPATIENT
Start: 2022-09-21 | End: 2023-03-27

## 2022-09-21 RX ORDER — ASPIRIN 325 MG
162.5 TABLET ORAL DAILY
COMMUNITY
End: 2023-02-14

## 2022-09-21 NOTE — PROGRESS NOTES
"[facility] GASTROENTEROLOGY - OFFICE NOTE    9/21/2022    Aleks Monzon   1950    Primary Physician: Tj Hyde MD    Chief Complaint   Patient presents with   • Weight Loss     Protein deficiency         HISTORY OF PRESENT ILLNESS:     Aleks Monzon is a 72 y.o. male presents with protein deficiency. Labs in epic reviewed, serum protein has been low over the last 2 years and continues to decrease.   He is referred by his pcp. Weight has increased now. He got down to 215#, now he is 225 # and stabilized. Appetite is decreased due to taste disturbance after covid. No n/v or fever. No abdominal pain.         Has 2 bm's daily. Stools are formed. He has noted some occasional bright red blood per rectum , small amount that he attributes to hemorrhoids. No black stool.         He finished immunotherapy for for prostate cancer 3/2022.         ========================================================================  OV  6-27-22  HPI  Aleks Monzon is a 72 y.o. 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 He has had some diarrhea with eating certain foods since 5/2022 ( after hip fracture). Has also had constipation recently with pain med. He does have history of bright red blood per rectum  intermittent for 5-6 yrs. He has had weight loss that started after being placed on prednisone.  He was placed on prednisone by  about a year ago when diagnosed with \" retroperitoneal fibrosis\".   No black stool.  No aspirin or NSAIDs.  No CHF. No history of crohn's or ulcerative colitis. Denies abdominal pain or fever.            Ultrasound abdomen 5-6-22 noted small and atrophic left kidney and splenomegaly, liver was normal.   Echo done 5-3-22 report reviewed.         He is on Eliquis. He is on immunotherapy for prostate cancer.               ENDOSCOPY, INT (09/22/2020 00:00) EGD with EUS by Dr. AUBREY Hoffman.   Colonoscopy 2012 by Dr. Carrasco, hyperplastic polyp.   History of adenomatous " colon polyp 2004.  No family history of colon cancer, polyps, or IBD.         Past Medical History:   Diagnosis Date   • Arthritis    • Cancer (HCC)     PROSTATE AND BLADDER   • Cellulitis    • Gout    • Hypertension    • IFG (impaired fasting glucose)    • Retroperitoneal fibrosis        Past Surgical History:   Procedure Laterality Date   • AXILLARY LYMPH NODE BIOPSY/EXCISION Left 10/01/2020    Procedure: LEFT AXILLARY LYMPH NODE BIOPSY;  Surgeon: Dina To MD;  Location: [facility];  Service: General;  Laterality: Left;   • BACK SURGERY     • COLONOSCOPY      2017?   • JOINT REPLACEMENT      BILATERAL HIP    • SKIN CANCER EXCISION     • TOTAL HIP ARTHROPLASTY     • URETERAL STENT INSERTION         Outpatient Medications Marked as Taking for the 9/21/22 encounter (Office Visit) with Maryam Hoffman APRN   Medication Sig Dispense Refill   • allopurinol (ZYLOPRIM) 300 MG tablet Take 300 mg by mouth Daily.     • aspirin 325 MG tablet Take 162.5 mg by mouth Daily.     • diphenhydrAMINE (BENADRYL) 25 MG tablet Take 25 mg by mouth Every 6 (Six) Hours As Needed for Allergies.     • predniSONE (DELTASONE) 5 MG tablet Take 5 mg by mouth Daily.     • tamsulosin (FLOMAX) 0.4 MG capsule 24 hr capsule Take 1 capsule by mouth Daily.     • torsemide (DEMADEX) 20 MG tablet Take 20 mg by mouth Daily. Takes half tablet         Allergies   Allergen Reactions   • Latex Other (See Comments)     unknown   • Niacin Itching     same as of 1/9/2017-itching         Social History     Socioeconomic History   • Marital status:    Tobacco Use   • Smoking status: Never Smoker   • Smokeless tobacco: Never Used   Substance and Sexual Activity   • Alcohol use: Never   • Drug use: Never   • Sexual activity: Not Currently     Partners: Female       Family History   Problem Relation Age of Onset   • Heart disease Father    • Diabetes Maternal Grandmother    • Colon cancer Neg Hx    • Colon polyps Neg Hx        Review of Systems  "  Constitutional: Negative for chills and fever.   Respiratory: Negative for shortness of breath and wheezing.    Cardiovascular: Negative for chest pain and palpitations.   Gastrointestinal: Negative for abdominal distention, abdominal pain, constipation, diarrhea, nausea and vomiting.        Vitals:    09/21/22 1254   BP: 114/80   Pulse: 90   Temp: 96.2 °F (35.7 °C)   SpO2: 98%   Weight: 102 kg (225 lb)   Height: 188 cm (74\")      Body mass index is 28.89 kg/m².    Physical Exam  Vitals reviewed.   Constitutional:       General: He is not in acute distress.  Cardiovascular:      Rate and Rhythm: Normal rate and regular rhythm.      Heart sounds: Normal heart sounds.   Pulmonary:      Effort: Pulmonary effort is normal.      Breath sounds: Normal breath sounds.   Abdominal:      General: Bowel sounds are normal. There is no distension.      Palpations: Abdomen is soft.      Tenderness: There is no abdominal tenderness.   Skin:     General: Skin is warm and dry.   Neurological:      Mental Status: He is alert.         Results for orders placed or performed during the hospital encounter of 05/22/22   Body Fluid Culture - Aspirate, Knee, Left    Specimen: Knee, Left; Aspirate   Result Value Ref Range    Body Fluid Culture No growth at 5 days     Gram Stain Few (2+) WBCs seen     Gram Stain No organisms seen    Comprehensive Metabolic Panel    Specimen: Blood   Result Value Ref Range    Glucose 122 (H) 65 - 99 mg/dL    BUN 25 (H) 8 - 23 mg/dL    Creatinine 1.31 (H) 0.76 - 1.27 mg/dL    Sodium 138 136 - 145 mmol/L    Potassium 3.6 3.5 - 5.2 mmol/L    Chloride 105 98 - 107 mmol/L    CO2 29.0 22.0 - 29.0 mmol/L    Calcium 7.4 (L) 8.6 - 10.5 mg/dL    Total Protein 4.0 (L) 6.0 - 8.5 g/dL    Albumin 1.90 (L) 3.50 - 5.20 g/dL    ALT (SGPT) 14 1 - 41 U/L    AST (SGOT) 16 1 - 40 U/L    Alkaline Phosphatase 118 (H) 39 - 117 U/L    Total Bilirubin 0.2 0.0 - 1.2 mg/dL    Globulin 2.1 gm/dL    A/G Ratio 0.9 g/dL    BUN/Creatinine " Ratio 19.1 7.0 - 25.0    Anion Gap 4.0 (L) 5.0 - 15.0 mmol/L    eGFR 58.6 (L) >60.0 mL/min/1.73   Uric Acid    Specimen: Blood   Result Value Ref Range    Uric Acid 3.2 (L) 3.4 - 7.0 mg/dL   CBC Auto Differential    Specimen: Blood   Result Value Ref Range    WBC 10.36 3.40 - 10.80 10*3/mm3    RBC 4.21 4.14 - 5.80 10*6/mm3    Hemoglobin 11.6 (L) 13.0 - 17.7 g/dL    Hematocrit 36.1 (L) 37.5 - 51.0 %    MCV 85.7 79.0 - 97.0 fL    MCH 27.6 26.6 - 33.0 pg    MCHC 32.1 31.5 - 35.7 g/dL    RDW 17.5 (H) 12.3 - 15.4 %    RDW-SD 54.8 (H) 37.0 - 54.0 fl    MPV 9.0 6.0 - 12.0 fL    Platelets 245 140 - 450 10*3/mm3    Neutrophil % 85.8 (H) 42.7 - 76.0 %    Lymphocyte % 5.4 (L) 19.6 - 45.3 %    Monocyte % 7.9 5.0 - 12.0 %    Eosinophil % 0.1 (L) 0.3 - 6.2 %    Basophil % 0.3 0.0 - 1.5 %    Immature Grans % 0.5 0.0 - 0.5 %    Neutrophils, Absolute 8.89 (H) 1.70 - 7.00 10*3/mm3    Lymphocytes, Absolute 0.56 (L) 0.70 - 3.10 10*3/mm3    Monocytes, Absolute 0.82 0.10 - 0.90 10*3/mm3    Eosinophils, Absolute 0.01 0.00 - 0.40 10*3/mm3    Basophils, Absolute 0.03 0.00 - 0.20 10*3/mm3    Immature Grans, Absolute 0.05 0.00 - 0.05 10*3/mm3    nRBC 0.0 0.0 - 0.2 /100 WBC   Folate    Specimen: Blood   Result Value Ref Range    Folate 7.68 4.78 - 24.20 ng/mL   Iron Profile    Specimen: Blood   Result Value Ref Range    Iron 23 (L) 59 - 158 mcg/dL    Iron Saturation 10 (L) 20 - 50 %    Transferrin 148 (L) 200 - 360 mg/dL    TIBC 221 (L) 298 - 536 mcg/dL   Vitamin B12    Specimen: Blood   Result Value Ref Range    Vitamin B-12 212 211 - 946 pg/mL   Basic Metabolic Panel    Specimen: Blood   Result Value Ref Range    Glucose 106 (H) 65 - 99 mg/dL    BUN 28 (H) 8 - 23 mg/dL    Creatinine 1.09 0.76 - 1.27 mg/dL    Sodium 140 136 - 145 mmol/L    Potassium 3.8 3.5 - 5.2 mmol/L    Chloride 106 98 - 107 mmol/L    CO2 29.0 22.0 - 29.0 mmol/L    Calcium 7.7 (L) 8.6 - 10.5 mg/dL    BUN/Creatinine Ratio 25.7 (H) 7.0 - 25.0    Anion Gap 5.0 5.0 - 15.0  mmol/L    eGFR 73.0 >60.0 mL/min/1.73   CBC Auto Differential    Specimen: Blood   Result Value Ref Range    WBC 6.17 3.40 - 10.80 10*3/mm3    RBC 3.77 (L) 4.14 - 5.80 10*6/mm3    Hemoglobin 10.3 (L) 13.0 - 17.7 g/dL    Hematocrit 33.4 (L) 37.5 - 51.0 %    MCV 88.6 79.0 - 97.0 fL    MCH 27.3 26.6 - 33.0 pg    MCHC 30.8 (L) 31.5 - 35.7 g/dL    RDW 17.3 (H) 12.3 - 15.4 %    RDW-SD 55.8 (H) 37.0 - 54.0 fl    MPV 9.7 6.0 - 12.0 fL    Platelets 242 140 - 450 10*3/mm3    Neutrophil % 73.9 42.7 - 76.0 %    Lymphocyte % 14.3 (L) 19.6 - 45.3 %    Monocyte % 9.9 5.0 - 12.0 %    Eosinophil % 0.8 0.3 - 6.2 %    Basophil % 0.5 0.0 - 1.5 %    Immature Grans % 0.6 (H) 0.0 - 0.5 %    Neutrophils, Absolute 4.56 1.70 - 7.00 10*3/mm3    Lymphocytes, Absolute 0.88 0.70 - 3.10 10*3/mm3    Monocytes, Absolute 0.61 0.10 - 0.90 10*3/mm3    Eosinophils, Absolute 0.05 0.00 - 0.40 10*3/mm3    Basophils, Absolute 0.03 0.00 - 0.20 10*3/mm3    Immature Grans, Absolute 0.04 0.00 - 0.05 10*3/mm3    nRBC 0.0 0.0 - 0.2 /100 WBC   Body fluid cell count - Synovial Fluid, Knee, Left    Specimen: Knee, Left; Synovial Fluid   Result Value Ref Range    Color, Fluid Light Yellow     Appearance, Fluid Cloudy (A) Clear    WBC, Fluid 1,490 /mm3    RBC, Fluid <1,000 /mm3   Body fluid differential - Synovial Fluid, Knee, Left    Specimen: Knee, Left; Synovial Fluid   Result Value Ref Range    Neutrophils, Fluid 90 %    Lymphocytes, Fluid 10 %   Tissue Pathology Exam    Specimen: Knee, Left; Aspirate   Result Value Ref Range    Note to Patients       This report may contain a detailed description of human tissue sent by a health care provider to the laboratory for pathologic evaluation. The content of this report is essential for diagnosis and may provide important critical findings. This information may be unfamiliar to patients to review without a medical professional present. It is advised that the patient review this report in the presence of a health care  provider who can answer questions and explain the results.      Case Report       Surgical Pathology Report                         Case: [id number]                                  Authorizing Provider:  Tj Hyde MD     Collected:           05/25/2022 12:55 PM          Ordering Location:     [address]  Received:            05/25/2022 03:05 PM          Pathologist:           Deven Crawford MD                                                            Specimen:    Knee, Left                                                                                 Final Diagnosis       Left knee fluid, smear for crystal examination:  No definite crystals seen.      Gross Description       1. Knee, Left.    Received in a container labeled with the patient's name, medical record number and date of birth is 14 mls of pale clear fluid.  1 smear is prepared for examination.          Microscopic Description       Representative material examined under polarized light contains scant amorphous debris but no definite crystals.             ASSESSMENT AND PLAN    Assessment & Plan     Diagnoses and all orders for this visit:    1. Protein deficiency (HCC) (Primary)  -     Case Request; Standing  -     Case Request    2. Weight loss  -     Case Request; Standing  -     Case Request    3. Decreased appetite  -     Case Request; Standing  -     Case Request    4. BRBPR (bright red blood per rectum)  -     Case Request; Standing  -     Case Request    5. History of adenomatous polyp of colon  -     Case Request; Standing  -     Case Request    Other orders  -     Sodium Sulfate-Mag Sulfate-KCl (Sutab) 4158-840-019 MG tablet; Take 12 tablets by mouth Take As Directed.  Dispense: 24 tablet; Refill: 0        He has had decreased appetite with weight loss. I recommend egd for further eval and he is agreeable.       He also has intermittent bright red blood per rectum and history of adenomatous colon polyp. I recommend  colonoscopy as well and he is agreeable.                       ESOPHAGOGASTRODUODENOSCOPY WITH ANESTHESIA (N/A), COLONOSCOPY WITH ANESTHESIA (N/A)  Risk, benefits, and alternatives of endoscopy were explained in full.  They understand that there is a risk of bleeding, perforation, and infection.  The risk of perforation goes up with esophageal dilation.  Other options to evaluate UGI complaints could involve barium swallow or UGI series, but these would be diagnostic tests only.  Patient was given time to ask questions.  I answered them to their satisfaction and they are agreeable to proceeding.  All risks, benefits, alternatives, and indications of colonoscopy procedure have been discussed with the patient. Risks to include perforation of the colon requiring possible surgery or colostomy, risk of bleeding from biopsies or removal of colon tissue, possibility of missing a colon polyp or cancer, or adverse drug reaction.  Benefits to include the diagnosis and management of disease of the colon and rectum. Alternatives to include barium enema, radiographic evaluation, lab testing or no intervention. Pt verbalizes understanding and agrees.                  RENE Schulte

## 2022-10-11 ENCOUNTER — ANESTHESIA EVENT (OUTPATIENT)
Dept: GASTROENTEROLOGY | Facility: HOSPITAL | Age: 72
End: 2022-10-11

## 2022-10-11 ENCOUNTER — ANESTHESIA (OUTPATIENT)
Dept: GASTROENTEROLOGY | Facility: HOSPITAL | Age: 72
End: 2022-10-11

## 2022-10-11 ENCOUNTER — HOSPITAL ENCOUNTER (OUTPATIENT)
Facility: HOSPITAL | Age: 72
Setting detail: HOSPITAL OUTPATIENT SURGERY
Discharge: HOME | End: 2022-10-11
Attending: INTERNAL MEDICINE | Admitting: INTERNAL MEDICINE

## 2022-10-11 VITALS
TEMPERATURE: 96.5 F | RESPIRATION RATE: 16 BRPM | WEIGHT: 212 LBS | HEART RATE: 85 BPM | HEIGHT: 74 IN | BODY MASS INDEX: 27.21 KG/M2 | OXYGEN SATURATION: 98 % | DIASTOLIC BLOOD PRESSURE: 84 MMHG | SYSTOLIC BLOOD PRESSURE: 125 MMHG

## 2022-10-11 DIAGNOSIS — R63.4 WEIGHT LOSS: ICD-10-CM

## 2022-10-11 DIAGNOSIS — Z86.010 HISTORY OF ADENOMATOUS POLYP OF COLON: ICD-10-CM

## 2022-10-11 DIAGNOSIS — K62.5 BRBPR (BRIGHT RED BLOOD PER RECTUM): ICD-10-CM

## 2022-10-11 DIAGNOSIS — E46 PROTEIN DEFICIENCY: ICD-10-CM

## 2022-10-11 DIAGNOSIS — R63.0 DECREASED APPETITE: ICD-10-CM

## 2022-10-11 PROCEDURE — 43239 EGD BIOPSY SINGLE/MULTIPLE: CPT | Performed by: INTERNAL MEDICINE

## 2022-10-11 PROCEDURE — 25010000002 PROPOFOL 10 MG/ML EMULSION: Performed by: NURSE ANESTHETIST, CERTIFIED REGISTERED

## 2022-10-11 PROCEDURE — 88305 TISSUE EXAM BY PATHOLOGIST: CPT | Performed by: INTERNAL MEDICINE

## 2022-10-11 PROCEDURE — 45385 COLONOSCOPY W/LESION REMOVAL: CPT | Performed by: INTERNAL MEDICINE

## 2022-10-11 RX ORDER — SODIUM CHLORIDE 9 MG/ML
500 INJECTION, SOLUTION INTRAVENOUS CONTINUOUS PRN
Status: DISCONTINUED | OUTPATIENT
Start: 2022-10-11 | End: 2022-10-11 | Stop reason: HOSPADM

## 2022-10-11 RX ORDER — SODIUM CHLORIDE 0.9 % (FLUSH) 0.9 %
10 SYRINGE (ML) INJECTION AS NEEDED
Status: DISCONTINUED | OUTPATIENT
Start: 2022-10-11 | End: 2022-10-11 | Stop reason: HOSPADM

## 2022-10-11 RX ORDER — PROPOFOL 10 MG/ML
VIAL (ML) INTRAVENOUS AS NEEDED
Status: DISCONTINUED | OUTPATIENT
Start: 2022-10-11 | End: 2022-10-11 | Stop reason: SURG

## 2022-10-11 RX ORDER — LIDOCAINE HYDROCHLORIDE 20 MG/ML
INJECTION, SOLUTION EPIDURAL; INFILTRATION; INTRACAUDAL; PERINEURAL AS NEEDED
Status: DISCONTINUED | OUTPATIENT
Start: 2022-10-11 | End: 2022-10-11 | Stop reason: SURG

## 2022-10-11 RX ORDER — ONDANSETRON 2 MG/ML
4 INJECTION INTRAMUSCULAR; INTRAVENOUS ONCE AS NEEDED
Status: DISCONTINUED | OUTPATIENT
Start: 2022-10-11 | End: 2022-10-11 | Stop reason: HOSPADM

## 2022-10-11 RX ORDER — BUPIVACAINE HCL/0.9 % NACL/PF 0.125 %
PLASTIC BAG, INJECTION (ML) EPIDURAL AS NEEDED
Status: DISCONTINUED | OUTPATIENT
Start: 2022-10-11 | End: 2022-10-11 | Stop reason: SURG

## 2022-10-11 RX ADMIN — PROPOFOL 50 MG: 10 INJECTION, EMULSION INTRAVENOUS at 11:02

## 2022-10-11 RX ADMIN — Medication 150 MCG: at 10:47

## 2022-10-11 RX ADMIN — SODIUM CHLORIDE 500 ML: 9 INJECTION, SOLUTION INTRAVENOUS at 09:36

## 2022-10-11 RX ADMIN — Medication 150 MCG: at 10:56

## 2022-10-11 RX ADMIN — LIDOCAINE HYDROCHLORIDE 100 MG: 20 INJECTION, SOLUTION EPIDURAL; INFILTRATION; INTRACAUDAL; PERINEURAL at 10:35

## 2022-10-11 RX ADMIN — PROPOFOL 50 MG: 10 INJECTION, EMULSION INTRAVENOUS at 10:56

## 2022-10-11 RX ADMIN — SODIUM CHLORIDE: 9 INJECTION, SOLUTION INTRAVENOUS at 10:55

## 2022-10-11 RX ADMIN — PROPOFOL 400 MG: 10 INJECTION, EMULSION INTRAVENOUS at 10:33

## 2022-10-11 RX ADMIN — LIDOCAINE HYDROCHLORIDE 100 MG: 20 INJECTION, SOLUTION EPIDURAL; INFILTRATION; INTRACAUDAL; PERINEURAL at 10:33

## 2022-10-11 NOTE — ANESTHESIA POSTPROCEDURE EVALUATION
Patient: Aleks Monzon    Procedure Summary     Date: 10/11/22 Room / Location: [facility] ENDOSCOPY 2 / [facility] END[facility]    Anesthesia Start: 1027 Anesthesia Stop: 1115    Procedures:       ESOPHAGOGASTRODUODENOSCOPY WITH ANESTHESIA      COLONOSCOPY WITH ANESTHESIA Diagnosis:       Weight loss      Protein deficiency (HCC)      Decreased appetite      BRBPR (bright red blood per rectum)      History of adenomatous polyp of colon      (Weight loss [R63.4])      (Protein deficiency (HCC) [E46])      (Decreased appetite [R63.0])      (BRBPR (bright red blood per rectum) [K62.5])      (History of adenomatous polyp of colon [Z86.010])    Surgeons: Burton Chaudhari MD Provider: Kathleen Carias CRNA    Anesthesia Type: MAC ASA Status: 3          Anesthesia Type: MAC    Vitals  Vitals Value Taken Time   /96 10/11/22 1136   Temp     Pulse 70 10/11/22 1137   Resp 16 10/11/22 1130   SpO2 87 % 10/11/22 1137   Vitals shown include unvalidated device data.        Post Anesthesia Care and Evaluation    Patient location during evaluation: PHASE II  Patient participation: complete - patient participated  Level of consciousness: awake and alert  Pain management: adequate    Airway patency: patent  Anesthetic complications: No anesthetic complications  PONV Status: none  Cardiovascular status: acceptable  Respiratory status: acceptable  Hydration status: acceptable

## 2022-10-11 NOTE — ANESTHESIA PREPROCEDURE EVALUATION
Anesthesia Evaluation     Patient summary reviewed   no history of anesthetic complications:  NPO Solid Status: > 8 hours             Airway   Mallampati: II  Dental      Pulmonary - negative pulmonary ROS   Cardiovascular - negative cardio ROS  Exercise tolerance: good (4-7 METS)        Neuro/Psych- negative ROS  GI/Hepatic/Renal/Endo    (+)   renal disease CRI,     Musculoskeletal     Abdominal    Substance History      OB/GYN          Other                        Anesthesia Plan    ASA 3     MAC       Anesthetic plan, risks, benefits, and alternatives have been provided, discussed and informed consent has been obtained with: patient.        CODE STATUS:

## 2022-10-12 LAB
CYTO UR: NORMAL
LAB AP CASE REPORT: NORMAL
Lab: NORMAL
PATH REPORT.FINAL DX SPEC: NORMAL
PATH REPORT.GROSS SPEC: NORMAL

## 2023-01-01 ENCOUNTER — TELEPHONE (OUTPATIENT)
Dept: GASTROENTEROLOGY | Facility: CLINIC | Age: 73
End: 2023-01-01

## 2023-02-14 ENCOUNTER — OFFICE VISIT (OUTPATIENT)
Dept: NEUROSURGERY | Facility: CLINIC | Age: 73
End: 2023-02-14
Payer: MEDICARE

## 2023-02-14 ENCOUNTER — HOSPITAL ENCOUNTER (OUTPATIENT)
Dept: GENERAL RADIOLOGY | Facility: HOSPITAL | Age: 73
Discharge: HOME | End: 2023-02-14
Admitting: NURSE PRACTITIONER
Payer: MEDICARE

## 2023-02-14 VITALS — BODY MASS INDEX: 27.21 KG/M2 | WEIGHT: 212 LBS | HEIGHT: 74 IN

## 2023-02-14 DIAGNOSIS — E66.3 OVERWEIGHT (BMI 25.0-29.9): ICD-10-CM

## 2023-02-14 DIAGNOSIS — M41.50 DEGENERATIVE SCOLIOSIS: Primary | ICD-10-CM

## 2023-02-14 DIAGNOSIS — M41.50 DEGENERATIVE SCOLIOSIS: ICD-10-CM

## 2023-02-14 PROCEDURE — 72082 X-RAY EXAM ENTIRE SPI 2/3 VW: CPT

## 2023-02-14 PROCEDURE — 99214 OFFICE O/P EST MOD 30 MIN: CPT | Performed by: NURSE PRACTITIONER

## 2023-02-14 RX ORDER — DOXYCYCLINE 100 MG/1
CAPSULE ORAL
COMMUNITY
Start: 2023-01-30

## 2023-02-14 RX ORDER — OXYBUTYNIN CHLORIDE 5 MG/1
5 TABLET ORAL
COMMUNITY
Start: 2022-10-28 | End: 2023-10-29

## 2023-02-14 RX ORDER — CALCIUM CARBONATE/VITAMIN D3 600 MG-10
1 TABLET ORAL DAILY
COMMUNITY
Start: 2023-02-03

## 2023-02-14 RX ORDER — MIRTAZAPINE 7.5 MG/1
7.5 TABLET, FILM COATED ORAL NIGHTLY
COMMUNITY
Start: 2023-02-06

## 2023-02-14 NOTE — PROGRESS NOTES
"Primary Care Provider: Tj Hyde MD  Requesting Provider: Tj Hyde MD    Chief Complaint:   Chief Complaint   Patient presents with   • Back Pain     PT is here with complaints of back pain, swelling on the spine around old incision.        History of Present Illness  Consultation at the request of Tj Hyde MD.    HPI:  Aleks Monzon is a 73 y.o. male who presents today with mild back pain.  History of a prior lumbar laminectomy in 2007 by Dr. Bernardo Yepez for lumbar back and right leg pain that resolved postoperatively.  No known injury.    No significant back pain at present or lower extremity radicular pain, numbness, or tingling.  His primary complaint is a 3-year onset of \"swelling of the spine\", generalized weakness, and the inability to stand up straight.  His symptoms worsen with prolonged standing and walking and decreases some extent with PT stretches.  He currently denies need for assistance while ambulating or recent falls.  He did fall approximately 1 year ago resulting in a right hip fracture which necessitated surgical fixation.  He denies fevers, chills, night sweats, unexplained weight loss, saddle anesthesia, or bowel or bladder dysfunction.  He currently rates the severity of his symptoms 1/10.  No additional concerns at this time.    Mr. Monzon recently completed 12 sessions of physical therapy with less with some improvement in his symptoms.  He initially undergoes monthly chiropractic care.  He does not participate in pain management.    Oswestry Disability Index = 24%   Score   Pain Intensity No pain-0   Personal Care Look after myself without pain-0   Lifting Only very light weights-4   Walking Pain prevents > 1 mile-1   Sitting Sit in \"favorite\" chair as long as I like-1   Standing Pain limits standing to < 1hr-2   Sleeping Occasionally disturbed-1   Sex Life (if applicable) Not applicable-0   Social Life Pain limits more energetic activities-2   Traveling " Travel gives me extra pain-1   ([place] et al, 1980)    SCORE INTERPRETATION OF THE OSWESTRY LBP DISABILITY QUESTIONNAIRE     20-40% Moderate disability.  This group experiences more pain and problems with sitting, lifting, and standing.  Travel and social life are more difficult and they may well be off work. Personal care, sexual activity, and sleeping are not grossly affected, and the back condition can usually be managed by conservative means.      ROS  Review of Systems   Constitutional: Positive for appetite change and unexpected weight change.   HENT: Negative.    Eyes: Negative.    Respiratory: Negative.    Cardiovascular: Positive for leg swelling.   Gastrointestinal: Negative.    Endocrine: Positive for cold intolerance.   Genitourinary: Negative.    Musculoskeletal: Positive for neck pain.   Skin: Negative.    Allergic/Immunologic: Negative.    Neurological: Negative.    Hematological: Bruises/bleeds easily.   Psychiatric/Behavioral: Negative.    All other systems reviewed and are negative.    Past Medical History:   Diagnosis Date   • Arthritis    • Cancer (HCC)     PROSTATE AND BLADDER   • Cellulitis    • Gout    • Hypertension    • IFG (impaired fasting glucose)    • Low back pain    • Retroperitoneal fibrosis      Past Surgical History:   Procedure Laterality Date   • AXILLARY LYMPH NODE BIOPSY/EXCISION Left 10/01/2020    Procedure: LEFT AXILLARY LYMPH NODE BIOPSY;  Surgeon: Dina To MD;  Location: [facility] OR;  Service: General;  Laterality: Left;   • BACK SURGERY     • COLONOSCOPY      2017?   • COLONOSCOPY N/A 10/11/2022    Procedure: COLONOSCOPY WITH ANESTHESIA;  Surgeon: Burton Chaudhari MD;  Location: [facility] ENDOSCOPY;  Service: Gastroenterology;  Laterality: N/A;  preop; abdominal pain  postop; polyps   PCP Tj Hyde MD   • ENDOSCOPY N/A 10/11/2022    Procedure: ESOPHAGOGASTRODUODENOSCOPY WITH ANESTHESIA;  Surgeon: Burton Chaudhari MD;  Location: [facility] ENDOSCOPY;   Service: Gastroenterology;  Laterality: N/A;  preop; abdominal pain  postop; misshape stomach  PCP Tj Hyde MD   • JOINT REPLACEMENT      BILATERAL HIP    • SKIN CANCER EXCISION     • TOTAL HIP ARTHROPLASTY     • URETERAL STENT INSERTION       Family History: family history includes Diabetes in his maternal grandmother; Heart disease in his father.    Social History:  reports that he has never smoked. He has never used smokeless tobacco. He reports that he does not drink alcohol and does not use drugs.    Medications:    Current Outpatient Medications:   •  allopurinol (ZYLOPRIM) 300 MG tablet, Take 300 mg by mouth Daily., Disp: , Rfl:   •  colchicine 0.6 MG tablet, Take 1 tablet by mouth 2 (Two) Times a Day As Needed for Muscle / Joint Pain. (Patient taking differently: Take 0.6 mg by mouth 2 (Two) Times a Day As Needed for Muscle / Joint Pain (rare).), Disp: 20 tablet, Rfl: 5  •  diphenhydrAMINE (BENADRYL) 25 MG tablet, Take 25 mg by mouth Every 6 (Six) Hours As Needed for Allergies., Disp: , Rfl:   •  oxybutynin (DITROPAN) 5 MG tablet, Take 5 mg by mouth., Disp: , Rfl:   •  predniSONE (DELTASONE) 5 MG tablet, Take 5 mg by mouth Daily., Disp: , Rfl:   •  tamsulosin (FLOMAX) 0.4 MG capsule 24 hr capsule, Take 1 capsule by mouth Daily., Disp: , Rfl:   •  torsemide (DEMADEX) 20 MG tablet, Take 20 mg by mouth Daily. Takes half tablet, Disp: , Rfl:   •  Calcium Carb-Cholecalciferol 600-10 MG-MCG tablet, Take 1 tablet by mouth Daily., Disp: , Rfl:   •  Diclofenac Sodium (VOLTAREN) 1 % gel gel, Apply 2 g topically to the appropriate area as directed 4 (Four) Times a Day., Disp: 100 g, Rfl: 3  •  doxycycline (MONODOX) 100 MG capsule, TAKE 1 CAPSULE TWICE DAILY FOR 7 DAYS., Disp: , Rfl:   •  Eliquis 2.5 MG tablet tablet, Take 2.5 mg by mouth 2 (Two) Times a Day., Disp: , Rfl:   •  mirtazapine (REMERON) 7.5 MG tablet, Take 7.5 mg by mouth Every Night., Disp: , Rfl:   •  rivaroxaban (XARELTO) 10 MG tablet, Take 1  "tablet by mouth Daily With Dinner for 42 doses. Indications: Prevention of Unwanted Clot in Veins, Disp: 30 tablet, Rfl: 1  •  Sodium Sulfate-Mag Sulfate-KCl (Sutab) 9349-076-540 MG tablet, Take 12 tablets by mouth Take As Directed., Disp: 24 tablet, Rfl: 0    Allergies:  Niacin    Objective   Ht 188 cm (74\")   Wt 96.2 kg (212 lb)   BMI 27.22 kg/m²   Physical Exam  Vitals and nursing note reviewed.   Constitutional:       General: He is not in acute distress.     Appearance: Normal appearance. He is well-developed, well-groomed and overweight. He is not ill-appearing, toxic-appearing or diaphoretic.      Comments: BMI 27.22   HENT:      Head: Normocephalic and atraumatic.      Right Ear: Hearing normal.      Left Ear: Hearing normal.   Eyes:      Extraocular Movements: EOM normal.      Conjunctiva/sclera: Conjunctivae normal.      Pupils: Pupils are equal, round, and reactive to light.   Neck:      Trachea: Trachea normal.   Cardiovascular:      Rate and Rhythm: Normal rate and regular rhythm.   Pulmonary:      Effort: Pulmonary effort is normal. No tachypnea, bradypnea, accessory muscle usage or respiratory distress.   Abdominal:      Palpations: Abdomen is soft.   Musculoskeletal:      Cervical back: Full passive range of motion without pain and neck supple.      Right lower leg: Edema present.      Left lower leg: Edema present.   Skin:     General: Skin is warm and dry.   Neurological:      Mental Status: He is alert and oriented to person, place, and time.      GCS: GCS eye subscore is 4. GCS verbal subscore is 5. GCS motor subscore is 6.      Gait: Gait is intact.      Deep Tendon Reflexes:      Reflex Scores:       Tricep reflexes are 0 on the right side and 0 on the left side.       Bicep reflexes are 0 on the right side and 0 on the left side.       Brachioradialis reflexes are 0 on the right side and 0 on the left side.       Patellar reflexes are 0 on the right side and 0 on the left side.       " Achilles reflexes are 0 on the right side and 0 on the left side.  Psychiatric:         Speech: Speech normal.         Behavior: Behavior normal. Behavior is cooperative.       Neurologic Exam     Mental Status   Oriented to person, place, and time.   Attention: normal. Concentration: normal.   Speech: speech is normal   Level of consciousness: alert    Cranial Nerves     CN II   Visual fields full to confrontation.     CN III, IV, VI   Pupils are equal, round, and reactive to light.  Extraocular motions are normal.     CN V   Facial sensation intact.     CN VII   Facial expression full, symmetric.     CN VIII   CN VIII normal.     CN IX, X   CN IX normal.     CN XI   CN XI normal.     Motor Exam   Right arm tone: normal  Left arm tone: normal  Right leg tone: normal  Left leg tone: normal    Strength   Right deltoid: 4/5  Left deltoid: 4/5  Right biceps: 5/5  Left biceps: 5/5  Right triceps: 5/5  Left triceps: 5/5  Right wrist extension: 5/5  Left wrist extension: 5/5  Right iliopsoas: 4/5  Left iliopsoas: 4/5  Right quadriceps: 5/5  Left quadriceps: 5/5  Right anterior tibial: 5/5  Left anterior tibial: 5/5  Right gastroc: 5/5  Left gastroc: 5/5  Right EHL 5/5  Left EHL 5/5       Sensory Exam   Right arm light touch: normal  Left arm light touch: normal  Right leg light touch: normal  Left leg light touch: normal    Gait, Coordination, and Reflexes     Gait  Gait: normal    Tremor   Resting tremor: absent  Intention tremor: absent  Action tremor: absent    Reflexes   Right brachioradialis: 0  Left brachioradialis: 0  Right biceps: 0  Left biceps: 0  Right triceps: 0  Left triceps: 0  Right patellar: 0  Left patellar: 0  Right achilles: 0  Left achilles: 0  Right plantar: equivocal  Left plantar: equivocal  Right Monroe: absent  Left Monroe: absent  Right ankle clonus: absent  Left ankle clonus: absent  Right pendular knee jerk: absent  Left pendular knee jerk: absent  WOUND IMAGE - Pressure wound over spinous  process (02/14/2023)    Imaging: (independent review and interpretation)  9/16/2022       ASSESSMENT and PLAN  Aleks Monzon is a 73 y.o. male with a significant medical history of prostate and bladder cancer, hypertension, retroperitoneal fibrosis, gout, and he is overweight.  He presents with a new problem of minimal lumbar back pain, primarily generalized weakness and the inability to stand upright. ALEJANDRA: 24.  Physical exam findings of erythema overlying thoracolumbar spinous process, bilateral deltoid weakness secondary to shoulder pathology, +4/5 bilateral IP weakness, globally muted reflexes, and a kyphotic gait.  His imaging shows no evidence of acute fractures, degenerative scoliosis, and reversal of the normal lumbar lordosis.    RECOMMENDATIONS ...  Degenerative scoliosis and kyphosis  For further evaluation we will proceed today by obtaining AP and lateral scoliosis imaging.  We will also like to send him for a noncontrasted MRI of the lumbar spine to rule out lumbar stenosis and need for surgical intervention.  We will have him return for reassessment with Dr. De La Vega after all studies been completed.  I advised the patient to call to return sooner for new or worsening complaints of weakness, paresthesias, gait disturbances, or any additional concerns.  Treatment options discussed in detail with Aleks and he voiced understanding.  Mr. Monzon agrees with this plan of care.    Overweight (BMI 25.0-29.9)  Body mass index is 27.22 kg/m²..  Information on the DASH diet provided in the AVS.  We will continue to provided diet and exercise information with the goal of weight loss at each scheduled appointment.     STEADI Fall Risk Assessment was completed, and patient is at LOW risk for falls.Assessment completed on:2/15/2023    Diagnoses and all orders for this visit:    1. Degenerative scoliosis (Primary)  -     MRI Lumbar Spine Without Contrast; Future  -     XR Spine Scoliosis 2 or 3 Views; Future    2.  Overweight (BMI 25.0-29.9)      Return for Keep scheduled appt with .    Thank you for this Consultation and the opportunity to participate in Aleks's care.    Sincerely,  RENE Dang    Medical Decision Making (2/3)  Problem Points (2,3,4 or more)  New Problem, additional workup = 4x1  Data Points (2,3,4 or more)  Ordered Imaging (X-ray,CT, MRI) = 1x2.  Independent Review of Imaging or specimen = 2x1  Risk (Low, Mod, High)  Undiagnosed New problem (Moderate)    E/M = MDM 3 out of 3   or   TIME  New Level 4 - 68154 = Mod (3, 3, Mod)   or   45-59 minutes

## 2023-02-14 NOTE — PATIENT INSTRUCTIONS
"DASH Eating Plan  DASH stands for Dietary Approaches to Stop Hypertension. The DASH eating plan is a healthy eating plan that has been shown to:  Reduce high blood pressure (hypertension).  Reduce your risk for type 2 diabetes, heart disease, and stroke.  Help with weight loss.  What are tips for following this plan?  Reading food labels  Check food labels for the amount of salt (sodium) per serving. Choose foods with less than 5 percent of the Daily Value of sodium. Generally, foods with less than 300 milligrams (mg) of sodium per serving fit into this eating plan.  To find whole grains, look for the word \"whole\" as the first word in the ingredient list.  Shopping  Buy products labeled as \"low-sodium\" or \"no salt added.\"  Buy fresh foods. Avoid canned foods and pre-made or frozen meals.  Cooking  Avoid adding salt when cooking. Use salt-free seasonings or herbs instead of table salt or sea salt. Check with your health care provider or pharmacist before using salt substitutes.  Do not augustine foods. Cook foods using healthy methods such as baking, boiling, grilling, roasting, and broiling instead.  Cook with heart-healthy oils, such as olive, canola, avocado, soybean, or sunflower oil.  Meal planning    Eat a balanced diet that includes:  4 or more servings of fruits and 4 or more servings of vegetables each day. Try to fill one-half of your plate with fruits and vegetables.  6-8 servings of whole grains each day.  Less than 6 oz (170 g) of lean meat, poultry, or fish each day. A 3-oz (85-g) serving of meat is about the same size as a deck of cards. One egg equals 1 oz (28 g).  2-3 servings of low-fat dairy each day. One serving is 1 cup (237 mL).  1 serving of nuts, seeds, or beans 5 times each week.  2-3 servings of heart-healthy fats. Healthy fats called omega-3 fatty acids are found in foods such as walnuts, flaxseeds, fortified milks, and eggs. These fats are also found in cold-water fish, such as sardines, salmon, " and mackerel.  Limit how much you eat of:  Canned or prepackaged foods.  Food that is high in trans fat, such as some fried foods.  Food that is high in saturated fat, such as fatty meat.  Desserts and other sweets, sugary drinks, and other foods with added sugar.  Full-fat dairy products.  Do not salt foods before eating.  Do not eat more than 4 egg yolks a week.  Try to eat at least 2 vegetarian meals a week.  Eat more home-cooked food and less restaurant, buffet, and fast food.  Lifestyle  When eating at a restaurant, ask that your food be prepared with less salt or no salt, if possible.  If you drink alcohol:  Limit how much you use to:  0-1 drink a day for women who are not pregnant.  0-2 drinks a day for men.  Be aware of how much alcohol is in your drink. In the [place] one drink equals one 12 oz bottle of beer (355 mL), one 5 oz glass of wine (148 mL), or one 1½ oz glass of hard liquor (44 mL).  General information  Avoid eating more than 2,300 mg of salt a day. If you have hypertension, you may need to reduce your sodium intake to 1,500 mg a day.  Work with your health care provider to maintain a healthy body weight or to lose weight. Ask what an ideal weight is for you.  Get at least 30 minutes of exercise that causes your heart to beat faster (aerobic exercise) most days of the week. Activities may include walking, swimming, or biking.  Work with your health care provider or dietitian to adjust your eating plan to your individual calorie needs.  What foods should I eat?  Fruits  All fresh, dried, or frozen fruit. Canned fruit in natural juice (without added sugar).  Vegetables  Fresh or frozen vegetables (raw, steamed, roasted, or grilled). Low-sodium or reduced-sodium tomato and vegetable juice. Low-sodium or reduced-sodium tomato sauce and tomato paste. Low-sodium or reduced-sodium canned vegetables.  Grains  Whole-grain or whole-wheat bread. Whole-grain or whole-wheat pasta. Brown rice. Oatmeal. Quinoa.  Bulgur. Whole-grain and low-sodium cereals. [place] bread. Low-fat, low-sodium crackers. Whole-wheat flour tortillas.  Meats and other proteins  Skinless chicken or turkey. Ground chicken or turkey. Pork with fat trimmed off. Fish and seafood. Egg whites. Dried beans, peas, or lentils. Unsalted nuts, nut butters, and seeds. Unsalted canned beans. Lean cuts of beef with fat trimmed off. Low-sodium, lean precooked or cured meat, such as sausages or meat loaves.  Dairy  Low-fat (1%) or fat-free (skim) milk. Reduced-fat, low-fat, or fat-free cheeses. Nonfat, low-sodium ricotta or cottage cheese. Low-fat or nonfat yogurt. Low-fat, low-sodium cheese.  Fats and oils  Soft margarine without trans fats. Vegetable oil. Reduced-fat, low-fat, or light mayonnaise and salad dressings (reduced-sodium). Canola, safflower, olive, avocado, soybean, and sunflower oils. Avocado.  Seasonings and condiments  Herbs. Spices. Seasoning mixes without salt.  Other foods  Unsalted popcorn and pretzels. Fat-free sweets.  The items listed above may not be a complete list of foods and beverages you can eat. Contact a dietitian for more information.  What foods should I avoid?  Fruits  Canned fruit in a light or heavy syrup. Fried fruit. Fruit in cream or butter sauce.  Vegetables  Creamed or fried vegetables. Vegetables in a cheese sauce. Regular canned vegetables (not low-sodium or reduced-sodium). Regular canned tomato sauce and paste (not low-sodium or reduced-sodium). Regular tomato and vegetable juice (not low-sodium or reduced-sodium). Pickles. Olives.  Grains  Baked goods made with fat, such as croissants, muffins, or some breads. Dry pasta or rice meal packs.  Meats and other proteins  Fatty cuts of meat. Ribs. Fried meat. [place]ur. Bologna, salami, and other precooked or cured meats, such as sausages or meat loaves. Fat from the back of a pig (fatback). Bratwurst. Salted nuts and seeds. Canned beans with added salt. Canned or smoked fish.  Whole eggs or egg yolks. Chicken or turkey with skin.  Dairy  Whole or 2% milk, cream, and half-and-half. Whole or full-fat cream cheese. Whole-fat or sweetened yogurt. Full-fat cheese. Nondairy creamers. Whipped toppings. Processed cheese and cheese spreads.  Fats and oils  Butter. Stick margarine. Lard. Shortening. Ghee. [place]ur fat. Tropical oils, such as coconut, palm kernel, or palm oil.  Seasonings and condiments  Onion salt, garlic salt, seasoned salt, table salt, and sea salt. Worcestershire sauce. Tartar sauce. Barbecue sauce. Teriyaki sauce. Soy sauce, including reduced-sodium. Steak sauce. Canned and packaged gravies. Fish sauce. Oyster sauce. Cocktail sauce. Store-bought horseradish. Ketchup. Mustard. Meat flavorings and tenderizers. Bouillon cubes. Hot sauces. Pre-made or packaged marinades. Pre-made or packaged taco seasonings. Relishes. Regular salad dressings.  Other foods  Salted popcorn and pretzels.  The items listed above may not be a complete list of foods and beverages you should avoid. Contact a dietitian for more information.  Where to find more information  National Heart, Lung, and Blood [place]: www.nhlbi.nih.gov  American Heart Association: www.heart.org  Academy of Nutrition and Dietetics: www.eatright.org  National Kidney Foundation: www.kidney.org  Summary  The DASH eating plan is a healthy eating plan that has been shown to reduce high blood pressure (hypertension). It may also reduce your risk for type 2 diabetes, heart disease, and stroke.  When on the DASH eating plan, aim to eat more fresh fruits and vegetables, whole grains, lean proteins, low-fat dairy, and heart-healthy fats.  With the DASH eating plan, you should limit salt (sodium) intake to 2,300 mg a day. If you have hypertension, you may need to reduce your sodium intake to 1,500 mg a day.  Work with your health care provider or dietitian to adjust your eating plan to your individual calorie needs.  This information is not  intended to replace advice given to you by your health care provider. Make sure you discuss any questions you have with your health care provider.  Document Revised: 11/20/2020 Document Reviewed: 11/20/2020  Elsevier Patient Education © 2022 [employer]    Advance Care Planning and Advance Directives     You make decisions on a daily basis - decisions about where you want to live, your career, your home, your life. Perhaps one of the most important decisions you face is your choice for future medical care. Take time to talk with your family and your healthcare team and start planning today.  Advance Care Planning is a process that can help you:  Understand possible future healthcare decisions in light of your own experiences  Reflect on those decision in light of your goals and values  Discuss your decisions with those closest to you and the healthcare professionals that care for you  Make a plan by creating a document that reflects your wishes    Surrogate Decision Maker  In the event of a medical emergency, which has left you unable to communicate or to make your own decisions, you would need someone to make decisions for you.  It is important to discuss your preferences for medical treatment with this person while you are in good health.     Qualities of a surrogate decision maker:  Willing to take on this role and responsibility  Knows what you want for future medical care  Willing to follow your wishes even if they don't agree with them  Able to make difficult medical decisions under stressful circumstances    Advance Directives  These are legal documents you can create that will guide your healthcare team and decision maker(s) when needed. These documents can be stored in the electronic medical record.    Living Will - a legal document to guide your care if you have a terminal condition or a serious illness and are unable to communicate. States vary by statute in document names/types, but most forms may  include one or more of the following:        -  Directions regarding life-prolonging treatments        -  Directions regarding artificially provided nutrition/hydration        -  Choosing a healthcare decision maker        -  Direction regarding organ/tissue donation    Durable Power of  for Healthcare - this document names an -in-fact to make medical decisions for you, but it may also allow this person to make personal and financial decisions for you. Please seek the advice of an  if you need this type of document.    **Advance Directives are not required and no one may discriminate against you if you do not sign one.    Medical Orders  Many states allow specific forms/orders signed by your physician to record your wishes for medical treatment in your current state of health. This form, signed in personal communication with your physician, addresses resuscitation and other medical interventions that you may or may not want.      For more information or to schedule a time with a [facility] Advance Care Planning Facilitator contact: [facility] or call [phone] and someone will contact you directly.Fall Prevention in the Home, Adult  Falls can cause injuries and can happen to people of all ages. There are many things you can do to make your home safe and to help prevent falls. Ask for help when making these changes.  What actions can I take to prevent falls?  General Instructions  Use good lighting in all rooms. Replace any light bulbs that burn out.  Turn on the lights in dark areas. Use night-lights.  Keep items that you use often in easy-to-reach places. Lower the shelves around your home if needed.  Set up your furniture so you have a clear path. Avoid moving your furniture around.  Do not have throw rugs or other things on the floor that can make you trip.  Avoid walking on wet floors.  If any of your floors are uneven, fix them.  Add color or contrast paint or tape to  clearly bridget and help you see:  Grab bars or handrails.  First and last steps of staircases.  Where the edge of each step is.  If you use a stepladder:  Make sure that it is fully opened. Do not climb a closed stepladder.  Make sure the sides of the stepladder are locked in place.  Ask someone to hold the stepladder while you use it.  Know where your pets are when moving through your home.  What can I do in the bathroom?     Keep the floor dry. Clean up any water on the floor right away.  Remove soap buildup in the tub or shower.  Use nonskid mats or decals on the floor of the tub or shower.  Attach bath mats securely with double-sided, nonslip rug tape.  If you need to sit down in the shower, use a plastic, nonslip stool.  Install grab bars by the toilet and in the tub and shower. Do not use towel bars as grab bars.  What can I do in the bedroom?  Make sure that you have a light by your bed that is easy to reach.  Do not use any sheets or blankets for your bed that hang to the floor.  Have a firm chair with side arms that you can use for support when you get dressed.  What can I do in the kitchen?  Clean up any spills right away.  If you need to reach something above you, use a step stool with a grab bar.  Keep electrical cords out of the way.  Do not use floor polish or wax that makes floors slippery.  What can I do with my stairs?  Do not leave any items on the stairs.  Make sure that you have a light switch at the top and the bottom of the stairs.  Make sure that there are handrails on both sides of the stairs. Fix handrails that are broken or loose.  Install nonslip stair treads on all your stairs.  Avoid having throw rugs at the top or bottom of the stairs.  Choose a carpet that does not hide the edge of the steps on the stairs.  Check carpeting to make sure that it is firmly attached to the stairs. Fix carpet that is loose or worn.  What can I do on the outside of my home?  Use bright outdoor lighting.  Fix  the edges of walkways and driveways and fix any cracks.  Remove anything that might make you trip as you walk through a door, such as a raised step or threshold.  Trim any bushes or trees on paths to your home.  Check to see if handrails are loose or broken and that both sides of all steps have handrails.  Install guardrails along the edges of any raised decks and porches.  Clear paths of anything that can make you trip, such as tools or rocks.  Have leaves, snow, or ice cleared regularly.  Use sand or salt on paths during winter.  Clean up any spills in your garage right away. This includes grease or oil spills.  What other actions can I take?  Wear shoes that:  Have a low heel. Do not wear high heels.  Have rubber bottoms.  Feel good on your feet and fit well.  Are closed at the toe. Do not wear open-toe sandals.  Use tools that help you move around if needed. These include:  Canes.  Walkers.  Scooters.  Crutches.  Review your medicines with your doctor. Some medicines can make you feel dizzy. This can increase your chance of falling.  Ask your doctor what else you can do to help prevent falls.  Where to find more information  Centers for Disease Control and Prevention, [place]: www.cdc.gov  National [place] on Aging: www.bandar.nih.gov  Contact a doctor if:  You are afraid of falling at home.  You feel weak, drowsy, or dizzy at home.  You fall at home.  Summary  There are many simple things that you can do to make your home safe and to help prevent falls.  Ways to make your home safe include removing things that can make you trip and installing grab bars in the bathroom.  Ask for help when making these changes in your home.  This information is not intended to replace advice given to you by your health care provider. Make sure you discuss any questions you have with your health care provider.  Document Revised: 09/19/2022 Document Reviewed: 07/21/2021  Elsevier Patient Education © 2022 [employer].

## 2023-02-16 ENCOUNTER — PATIENT ROUNDING (BHMG ONLY) (OUTPATIENT)
Dept: NEUROSURGERY | Facility: CLINIC | Age: 73
End: 2023-02-16
Payer: MEDICARE

## 2023-02-16 NOTE — PROGRESS NOTES
A My-Chart message has been sent to the patient for PATIENT ROUNDING with [facility].

## 2023-02-28 ENCOUNTER — HOSPITAL ENCOUNTER (OUTPATIENT)
Dept: MRI IMAGING | Facility: HOSPITAL | Age: 73
Discharge: HOME | End: 2023-02-28
Admitting: NURSE PRACTITIONER
Payer: MEDICARE

## 2023-02-28 DIAGNOSIS — M41.50 DEGENERATIVE SCOLIOSIS: ICD-10-CM

## 2023-02-28 PROCEDURE — 72148 MRI LUMBAR SPINE W/O DYE: CPT

## 2023-03-01 ENCOUNTER — DOCUMENTATION (OUTPATIENT)
Dept: NEUROSURGERY | Facility: CLINIC | Age: 73
End: 2023-03-01
Payer: MEDICARE

## 2023-03-27 ENCOUNTER — OFFICE VISIT (OUTPATIENT)
Dept: NEUROSURGERY | Facility: CLINIC | Age: 73
End: 2023-03-27
Payer: MEDICARE

## 2023-03-27 VITALS — BODY MASS INDEX: 27.21 KG/M2 | WEIGHT: 212 LBS | HEIGHT: 74 IN

## 2023-03-27 DIAGNOSIS — E66.3 OVERWEIGHT WITH BODY MASS INDEX (BMI) OF 27 TO 27.9 IN ADULT: ICD-10-CM

## 2023-03-27 DIAGNOSIS — Z78.9 NON-SMOKER: ICD-10-CM

## 2023-03-27 DIAGNOSIS — M41.50 DEGENERATIVE SCOLIOSIS: Primary | ICD-10-CM

## 2023-03-27 PROCEDURE — 99214 OFFICE O/P EST MOD 30 MIN: CPT | Performed by: NEUROLOGICAL SURGERY

## 2023-03-27 NOTE — PROGRESS NOTES
Chief complaint:   Chief Complaint   Patient presents with   • Back Pain     Patient here for a follow up with MRI for review.       Subjective     HPI:   Interval History: Aleks returns today for surgical evaluation.    Patient previously underwent multilevel lumbar laminectomy and fusion by Dr. Yanez in 2007.  He did well afterwards with resolution of his back and leg pain.     He has had a complicated medical history since then.  He was diagnosed with retroperitoneal fibrosis and was originally started on prednisone 80 mg daily.  This is since been dropped to 20 mg and subsequently 5 mg.  He is followed by Dr. Burrows.  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    His wife states that over the last 2 years he has had progressive for kyphosis.  He does not walk with a cane or walker currently.    Currently complains of relatively little back pain.  He states that he is stiff mostly.  He does have some difficulty with ambulation but also has pretty significant edema in his bilateral lower extremities.  He has previously undergone physical therapy and Occupational Therapy for both low back and hip.  And returns today with an MRI as well as scoliosis x-rays.    Oswestry Disability Index Lumbar = 34%  SCORE INTERPRETATION OF THE OSWESTRY LBP DISABILITY QUESTIONNAIRE: 20-40% Moderate disability This group experiences more pain and problems with sitting, lifting, and standing. Travel and social life are more difficult and they may well be off work. Personal care, sexual activity, and sleeping are not grossly affected, and the back condition can usually be managed by conservative means.      Score   Pain Intensity Very mild pain-1   Personal Care Look after myself without pain-0   Lifting Only very light weights-4   Walking Pain prevents > 100 yards-3   Sitting Pain prevents sitting > 1 hr-2   Standing Pain  limits standing to < 1/2 hr-3   Sleeping Occasionally disturbed-1   Sex Life (if applicable) Not applicable-0   Social Life Pain limits more energetic activities-2   Traveling Travel gives me extra pain-1   (Lenora et al, 1980)      No data recorded     No data recorded     Review of Systems   Constitutional: Negative.    HENT: Negative.    Eyes: Negative.    Respiratory: Negative.    Cardiovascular: Negative.    Gastrointestinal: Negative.    Endocrine: Negative.    Genitourinary: Negative.    Musculoskeletal: Positive for back pain.   Skin: Negative.    Allergic/Immunologic: Negative.    Neurological: Negative.    Hematological: Negative.    Psychiatric/Behavioral: Negative.        PFSH:  Past Medical History:   Diagnosis Date   • Arthritis    • Cancer (HCC)     PROSTATE AND BLADDER   • Cellulitis    • Gout    • Hypertension    • IFG (impaired fasting glucose)    • Low back pain    • Retroperitoneal fibrosis        Past Surgical History:   Procedure Laterality Date   • AXILLARY LYMPH NODE BIOPSY/EXCISION Left 10/01/2020    Procedure: LEFT AXILLARY LYMPH NODE BIOPSY;  Surgeon: Dina To MD;  Location: [facility] OR;  Service: General;  Laterality: Left;   • BACK SURGERY     • COLONOSCOPY      2017?   • COLONOSCOPY N/A 10/11/2022    Procedure: COLONOSCOPY WITH ANESTHESIA;  Surgeon: Burton Chaudhari MD;  Location: [facility] ENDOSCOPY;  Service: Gastroenterology;  Laterality: N/A;  preop; abdominal pain  postop; polyps   PCP Tj Hyde MD   • ENDOSCOPY N/A 10/11/2022    Procedure: ESOPHAGOGASTRODUODENOSCOPY WITH ANESTHESIA;  Surgeon: Burton Chaudhari MD;  Location: [facility] ENDOSCOPY;  Service: Gastroenterology;  Laterality: N/A;  preop; abdominal pain  postop; misshape stomach  PCP Tj Hyde MD   • JOINT REPLACEMENT      BILATERAL HIP    • SKIN CANCER EXCISION     • TOTAL HIP ARTHROPLASTY     • URETERAL STENT INSERTION         Objective      Current Outpatient Medications   Medication Sig  "Dispense Refill   • allopurinol (ZYLOPRIM) 300 MG tablet Take 300 mg by mouth Daily.     • Calcium Carb-Cholecalciferol 600-10 MG-MCG tablet Take 1 tablet by mouth Daily.     • colchicine 0.6 MG tablet Take 1 tablet by mouth 2 (Two) Times a Day As Needed for Muscle / Joint Pain. (Patient taking differently: Take 0.6 mg by mouth 2 (Two) Times a Day As Needed for Muscle / Joint Pain (rare).) 20 tablet 5   • diphenhydrAMINE (BENADRYL) 25 MG tablet Take 25 mg by mouth Every 6 (Six) Hours As Needed for Allergies.     • doxycycline (MONODOX) 100 MG capsule TAKE 1 CAPSULE TWICE DAILY FOR 7 DAYS.     • mirtazapine (REMERON) 7.5 MG tablet Take 7.5 mg by mouth Every Night.     • oxybutynin (DITROPAN) 5 MG tablet Take 5 mg by mouth.     • predniSONE (DELTASONE) 5 MG tablet Take 5 mg by mouth Daily.     • tamsulosin (FLOMAX) 0.4 MG capsule 24 hr capsule Take 1 capsule by mouth Daily.     • torsemide (DEMADEX) 20 MG tablet Take 20 mg by mouth Daily. Takes half tablet       No current facility-administered medications for this visit.       Vital Signs  Ht 188 cm (74\")   Wt 96.2 kg (212 lb)   BMI 27.22 kg/m²   Physical Exam  Eyes:      Extraocular Movements: EOM normal.      Pupils: Pupils are equal, round, and reactive to light.   Neurological:      Mental Status: He is oriented to person, place, and time.      Deep Tendon Reflexes:      Reflex Scores:       Tricep reflexes are 0 on the right side and 0 on the left side.       Bicep reflexes are 0 on the right side and 0 on the left side.       Brachioradialis reflexes are 0 on the right side and 0 on the left side.       Patellar reflexes are 0 on the right side and 0 on the left side.       Achilles reflexes are 0 on the right side and 0 on the left side.  Psychiatric:         Speech: Speech normal.       Neurologic Exam     Mental Status   Oriented to person, place, and time.   Attention: normal. Concentration: normal.   Speech: speech is normal   Level of consciousness: " alert    Cranial Nerves     CN II   Visual fields full to confrontation.     CN III, IV, VI   Pupils are equal, round, and reactive to light.  Extraocular motions are normal.     CN V   Facial sensation intact.     CN VII   Facial expression full, symmetric.     CN VIII   CN VIII normal.     CN IX, X   CN IX normal.     CN XI   CN XI normal.     Motor Exam   Right arm tone: normal  Left arm tone: normal  Right leg tone: normal  Left leg tone: normal    Strength   Right deltoid: 4/5  Left deltoid: 4/5  Right biceps: 5/5  Left biceps: 4/5  Right triceps: 5/5  Left triceps: 5/5  Right wrist extension: 5/5  Left wrist extension: 5/5  Right iliopsoas: 4/5  Left iliopsoas: 4/5  Right quadriceps: 5/5  Left quadriceps: 5/5  Right anterior tibial: 5/5  Left anterior tibial: 5/5  Right gastroc: 5/5  Left gastroc: 5/5  Right EHL 5/5  Left EHL 5/5       Sensory Exam   Right arm light touch: normal  Left arm light touch: normal  Right leg light touch: normal  Left leg light touch: normal    Gait, Coordination, and Reflexes     Gait  Gait: wide-based    Tremor   Resting tremor: absent  Intention tremor: absent  Action tremor: absent    Reflexes   Right brachioradialis: 0  Left brachioradialis: 0  Right biceps: 0  Left biceps: 0  Right triceps: 0  Left triceps: 0  Right patellar: 0  Left patellar: 0  Right achilles: 0  Left achilles: 0  Right plantar: equivocal  Left plantar: equivocal  Right Monroe: absent  Left Monroe: absent  Right ankle clonus: absent  Left ankle clonus: absent  Right pendular knee jerk: absent  Left pendular knee jerk: absentkyphotic     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 Dextroscoliotic curvature of the degenerative lumbar spine laminectomy changes suspected at L2-L5. Advanced facet arthropathy with moderate degenerative disc change. Mild spinal canal stenosis described above with mild effacement the right  lateral recess of the canal at L5-S1 with no definite compression of the descending right S1 nerve root. There is severe right L5-S1 with moderate to severe bilateral L4/L5 foraminal stenosis. Moderate left L3/L4 foramen stenosis also noted. 2. Advanced left renal atrophy. Chronic stranding of the left retroperitoneal noted on the CT abdomen pelvis 03/24/2021 This report was finalized on 02/28/2023 14:36 by Dr. Siri Whelan MD.      9/16/2022      MRI Lumbar           31 degrees of kyphosis in the lumbar spine.  Pelvic incidence of 15 degrees resulting in a 45 degree mismatch.     ASSESSMENT and PLAN  Aleks Monzon is a 73 y.o. male with a significant medical history of prostate and bladder cancer, hypertension, retroperitoneal fibrosis, gout, and he is overweight.  He presents with a new problem of minimal lumbar back pain, primarily generalized weakness and the inability to stand upright. ALEJANDRA: 24,36.  Physical exam findings of erythema overlying thoracolumbar spinous process, bilateral deltoid weakness secondary to shoulder pathology, +4/5 bilateral IP weakness, globally muted reflexes, and a kyphotic gait.  His imaging shows no evidence of acute fractures, degenerative scoliosis, and reversal of the normal lumbar lordosis.     RECOMMENDATIONS ...  Degenerative scoliosis and kyphosis  Gibus Formation  Status post lumbar fusion ([facility]rendal 2007)  Aleks, his wife, and I discussed his history of presenting illness, physical exam and imaging findings.  Aleks appears to have had a solid fusion in the lumbar spine unfortunately this left him and flatback.  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  This leaves him in severe kyphosis when he walks.  We discussed that any surgical correction for this particular problem would require a thoracal pelvic fusion.  He has a 45 degree pelvic incidence and lumbar mismatch in the setting of a prior lumbar  fusion.  Therefore the only possible correction would require vertebral column resection.  Given his retroperitoneal fibrosis his risk of great vessel injury during the approach would be almost 100%.  Given his current use of steroids his likelihood of postoperative pseudoarthrosis or instrumentation failure would again be almost 100%.  Based on these facts I would not consider him to be a surgical candidate and would recommend conservative therapy.  We did discuss the addition of a cane to both prevent falls and to attempt to slow his progressive kyphosis.  All questions were answered.     Overweight (BMI 25.0-29.9)  Body mass index is 27.22 kg/m²..  Information on the DASH diet provided in the AVS.  We will continue to provided diet and exercise information with the goal of weight loss at each scheduled appointment.       1. Degenerative scoliosis    2. Overweight with body mass index (BMI) of 27 to 27.9 in adult    3. Non-smoker        Recommendations:  Diagnoses and all orders for this visit:    1. Degenerative scoliosis (Primary)    2. Overweight with body mass index (BMI) of 27 to 27.9 in adult    3. Non-smoker        Return if symptoms worsen or fail to improve.    I spent 35 minutes caring for Aleks on this date of service. This time includes time spent by me in the following activities: preparing for the visit, reviewing tests, obtaining and/or reviewing a separately obtained history, performing a medically appropriate examination and/or evaluation, counseling and educating the patient/family/caregiver, ordering medications, tests, or procedures, referring and communicating with other health care professionals, documenting information in the medical record, independently interpreting results and communicating that information with the patient/family/caregiver, and/or care coordination.         Thank you, for allowing me to continue to participate in the care of this patient.    Sincerely,  Pradip Beltre  MD Ceferino

## 2023-05-01 ENCOUNTER — TRANSCRIBE ORDERS (OUTPATIENT)
Dept: ADMINISTRATIVE | Facility: HOSPITAL | Age: 73
End: 2023-05-01
Payer: MEDICARE

## 2023-05-01 DIAGNOSIS — R30.0 DYSURIA: Primary | ICD-10-CM

## 2023-05-02 ENCOUNTER — HOSPITAL ENCOUNTER (OUTPATIENT)
Dept: ULTRASOUND IMAGING | Facility: HOSPITAL | Age: 73
Discharge: HOME | End: 2023-05-02
Admitting: NURSE PRACTITIONER
Payer: MEDICARE

## 2023-05-02 DIAGNOSIS — R30.0 DYSURIA: ICD-10-CM

## 2023-05-02 PROCEDURE — 76775 US EXAM ABDO BACK WALL LIM: CPT

## 2023-05-17 ENCOUNTER — TRANSCRIBE ORDERS (OUTPATIENT)
Dept: PHYSICAL THERAPY | Facility: CLINIC | Age: 73
End: 2023-05-17
Payer: MEDICARE

## 2023-05-17 DIAGNOSIS — R60.9 EDEMA, UNSPECIFIED TYPE: Primary | ICD-10-CM

## 2023-05-26 ENCOUNTER — HOSPITAL ENCOUNTER (OUTPATIENT)
Age: 73
Setting detail: OBSERVATION
Discharge: HOME | End: 2023-05-28
Attending: FAMILY MEDICINE | Admitting: FAMILY MEDICINE
Payer: MEDICARE

## 2023-05-26 PROBLEM — E83.51 HYPOCALCEMIA: Status: ACTIVE | Noted: 2023-05-26

## 2023-05-26 PROCEDURE — 93005 ELECTROCARDIOGRAM TRACING: CPT

## 2023-05-26 PROCEDURE — G0378 HOSPITAL OBSERVATION PER HR: HCPCS

## 2023-05-26 PROCEDURE — 96365 THER/PROPH/DIAG IV INF INIT: CPT

## 2023-05-26 PROCEDURE — 96366 THER/PROPH/DIAG IV INF ADDON: CPT

## 2023-05-26 PROCEDURE — 2500000003 HC RX 250 WO HCPCS: Performed by: PHYSICIAN ASSISTANT

## 2023-05-26 PROCEDURE — 6370000000 HC RX 637 (ALT 250 FOR IP): Performed by: PHYSICIAN ASSISTANT

## 2023-05-26 PROCEDURE — 2580000003 HC RX 258: Performed by: PHYSICIAN ASSISTANT

## 2023-05-26 PROCEDURE — G0379 DIRECT REFER HOSPITAL OBSERV: HCPCS

## 2023-05-26 RX ORDER — TORSEMIDE 10 MG/1
10 TABLET ORAL DAILY
Status: DISCONTINUED | OUTPATIENT
Start: 2023-05-27 | End: 2023-05-27

## 2023-05-26 RX ORDER — ALLOPURINOL 100 MG/1
300 TABLET ORAL DAILY
Status: DISCONTINUED | OUTPATIENT
Start: 2023-05-26 | End: 2023-05-28 | Stop reason: HOSPADM

## 2023-05-26 RX ORDER — MIRTAZAPINE 15 MG/1
7.5 TABLET, FILM COATED ORAL NIGHTLY
COMMUNITY

## 2023-05-26 RX ORDER — TAMSULOSIN HYDROCHLORIDE 0.4 MG/1
0.4 CAPSULE ORAL DAILY
Status: DISCONTINUED | OUTPATIENT
Start: 2023-05-27 | End: 2023-05-28 | Stop reason: HOSPADM

## 2023-05-26 RX ORDER — PREDNISONE 5 MG/1
5 TABLET ORAL DAILY
Status: DISCONTINUED | OUTPATIENT
Start: 2023-05-26 | End: 2023-05-28 | Stop reason: HOSPADM

## 2023-05-26 RX ORDER — MIRTAZAPINE 7.5 MG/1
7.5 TABLET, FILM COATED ORAL NIGHTLY
Status: DISCONTINUED | OUTPATIENT
Start: 2023-05-26 | End: 2023-05-28 | Stop reason: HOSPADM

## 2023-05-26 RX ORDER — CALCIUM GLUCONATE 20 MG/ML
1000 INJECTION, SOLUTION INTRAVENOUS ONCE
Status: COMPLETED | OUTPATIENT
Start: 2023-05-26 | End: 2023-05-26

## 2023-05-26 RX ORDER — TAMSULOSIN HYDROCHLORIDE 0.4 MG/1
0.4 CAPSULE ORAL DAILY
COMMUNITY

## 2023-05-26 RX ORDER — SODIUM CHLORIDE 9 MG/ML
INJECTION, SOLUTION INTRAVENOUS CONTINUOUS
Status: DISCONTINUED | OUTPATIENT
Start: 2023-05-26 | End: 2023-05-28 | Stop reason: HOSPADM

## 2023-05-26 RX ADMIN — SODIUM CHLORIDE: 9 INJECTION, SOLUTION INTRAVENOUS at 17:05

## 2023-05-26 RX ADMIN — ALLOPURINOL 300 MG: 100 TABLET ORAL at 20:41

## 2023-05-26 RX ADMIN — MIRTAZAPINE 7.5 MG: 7.5 TABLET ORAL at 20:31

## 2023-05-26 RX ADMIN — CALCIUM GLUCONATE 1000 MG: 20 INJECTION, SOLUTION INTRAVENOUS at 17:06

## 2023-05-26 SDOH — ECONOMIC STABILITY: HOUSING INSECURITY
IN THE LAST 12 MONTHS, WAS THERE A TIME WHEN YOU DID NOT HAVE A STEADY PLACE TO SLEEP OR SLEPT IN A SHELTER (INCLUDING NOW)?: NO

## 2023-05-26 SDOH — ECONOMIC STABILITY: TRANSPORTATION INSECURITY
IN THE PAST 12 MONTHS, HAS THE LACK OF TRANSPORTATION KEPT YOU FROM MEDICAL APPOINTMENTS OR FROM GETTING MEDICATIONS?: NO

## 2023-05-26 SDOH — ECONOMIC STABILITY: INCOME INSECURITY: IN THE LAST 12 MONTHS, WAS THERE A TIME WHEN YOU WERE NOT ABLE TO PAY THE MORTGAGE OR RENT ON TIME?: NO

## 2023-05-26 SDOH — ECONOMIC STABILITY: TRANSPORTATION INSECURITY
IN THE PAST 12 MONTHS, HAS LACK OF TRANSPORTATION KEPT YOU FROM MEETINGS, WORK, OR FROM GETTING THINGS NEEDED FOR DAILY LIVING?: NO

## 2023-05-26 SDOH — HEALTH STABILITY: PHYSICAL HEALTH: ON AVERAGE, HOW MANY DAYS PER WEEK DO YOU ENGAGE IN MODERATE TO STRENUOUS EXERCISE (LIKE A BRISK WALK)?: 0 DAYS

## 2023-05-26 SDOH — HEALTH STABILITY: PHYSICAL HEALTH: ON AVERAGE, HOW MANY MINUTES DO YOU ENGAGE IN EXERCISE AT THIS LEVEL?: 0 MIN

## 2023-05-26 SDOH — ECONOMIC STABILITY: FOOD INSECURITY: WITHIN THE PAST 12 MONTHS, YOU WORRIED THAT YOUR FOOD WOULD RUN OUT BEFORE YOU GOT MONEY TO BUY MORE.: NEVER TRUE

## 2023-05-26 SDOH — ECONOMIC STABILITY: FOOD INSECURITY: WITHIN THE PAST 12 MONTHS, THE FOOD YOU BOUGHT JUST DIDN'T LAST AND YOU DIDN'T HAVE MONEY TO GET MORE.: NEVER TRUE

## 2023-05-26 SDOH — ECONOMIC STABILITY: HOUSING INSECURITY: IN THE LAST 12 MONTHS, HOW MANY PLACES HAVE YOU LIVED?: 0

## 2023-05-26 ASSESSMENT — SOCIAL DETERMINANTS OF HEALTH (SDOH)
HOW OFTEN DO YOU ATTENT MEETINGS OF THE CLUB OR ORGANIZATION YOU BELONG TO?: 1 TO 4 TIMES PER YEAR
WITHIN THE LAST YEAR, HAVE YOU BEEN KICKED, HIT, SLAPPED, OR OTHERWISE PHYSICALLY HURT BY YOUR PARTNER OR EX-PARTNER?: NO
HOW HARD IS IT FOR YOU TO PAY FOR THE VERY BASICS LIKE FOOD, HOUSING, MEDICAL CARE, AND HEATING?: NOT HARD AT ALL
WITHIN THE LAST YEAR, HAVE YOU BEEN AFRAID OF YOUR PARTNER OR EX-PARTNER?: NO
HOW OFTEN DO YOU ATTEND CHURCH OR RELIGIOUS SERVICES?: 1 TO 4 TIMES PER YEAR
WITHIN THE LAST YEAR, HAVE TO BEEN RAPED OR FORCED TO HAVE ANY KIND OF SEXUAL ACTIVITY BY YOUR PARTNER OR EX-PARTNER?: NO
WITHIN THE LAST YEAR, HAVE YOU BEEN HUMILIATED OR EMOTIONALLY ABUSED IN OTHER WAYS BY YOUR PARTNER OR EX-PARTNER?: NO
DO YOU BELONG TO ANY CLUBS OR ORGANIZATIONS SUCH AS CHURCH GROUPS UNIONS, FRATERNAL OR ATHLETIC GROUPS, OR SCHOOL GROUPS?: YES
HOW OFTEN DO YOU GET TOGETHER WITH FRIENDS OR RELATIVES?: MORE THAN THREE TIMES A WEEK
IN A TYPICAL WEEK, HOW MANY TIMES DO YOU TALK ON THE PHONE WITH FAMILY, FRIENDS, OR NEIGHBORS?: MORE THAN THREE TIMES A WEEK

## 2023-05-26 ASSESSMENT — PATIENT HEALTH QUESTIONNAIRE - PHQ9
1. LITTLE INTEREST OR PLEASURE IN DOING THINGS: NOT AT ALL
2. FEELING DOWN, DEPRESSED OR HOPELESS: NOT AT ALL
SUM OF ALL RESPONSES TO PHQ9 QUESTIONS 1 & 2: 0

## 2023-05-26 ASSESSMENT — LIFESTYLE VARIABLES
HOW OFTEN DO YOU HAVE A DRINK CONTAINING ALCOHOL: NEVER
HOW MANY STANDARD DRINKS CONTAINING ALCOHOL DO YOU HAVE ON A TYPICAL DAY: PATIENT DOES NOT DRINK

## 2023-05-27 LAB
ALBUMIN SERPL-MCNC: 1.7 G/DL (ref 3.5–5.2)
ALP SERPL-CCNC: 118 U/L (ref 40–130)
ALT SERPL-CCNC: 56 U/L (ref 5–41)
ANION GAP SERPL CALCULATED.3IONS-SCNC: 7 MMOL/L (ref 7–19)
AST SERPL-CCNC: 26 U/L (ref 5–40)
BILIRUB SERPL-MCNC: <0.2 MG/DL (ref 0.2–1.2)
BUN SERPL-MCNC: 40 MG/DL (ref 8–23)
CALCIUM SERPL-MCNC: 5.4 MG/DL (ref 8.8–10.2)
CHLORIDE SERPL-SCNC: 111 MMOL/L (ref 98–111)
CO2 SERPL-SCNC: 22 MMOL/L (ref 22–29)
CREAT SERPL-MCNC: 1 MG/DL (ref 0.5–1.2)
GLUCOSE SERPL-MCNC: 112 MG/DL (ref 74–109)
POTASSIUM SERPL-SCNC: 4.2 MMOL/L (ref 3.5–5)
PROT SERPL-MCNC: 3 G/DL (ref 6.6–8.7)
SODIUM SERPL-SCNC: 140 MMOL/L (ref 136–145)

## 2023-05-27 PROCEDURE — 96367 TX/PROPH/DG ADDL SEQ IV INF: CPT

## 2023-05-27 PROCEDURE — 6360000002 HC RX W HCPCS: Performed by: FAMILY MEDICINE

## 2023-05-27 PROCEDURE — 80053 COMPREHEN METABOLIC PANEL: CPT

## 2023-05-27 PROCEDURE — 6370000000 HC RX 637 (ALT 250 FOR IP): Performed by: PHYSICIAN ASSISTANT

## 2023-05-27 PROCEDURE — 36415 COLL VENOUS BLD VENIPUNCTURE: CPT

## 2023-05-27 PROCEDURE — G0378 HOSPITAL OBSERVATION PER HR: HCPCS

## 2023-05-27 PROCEDURE — 94760 N-INVAS EAR/PLS OXIMETRY 1: CPT

## 2023-05-27 PROCEDURE — 2580000003 HC RX 258: Performed by: FAMILY MEDICINE

## 2023-05-27 PROCEDURE — P9047 ALBUMIN (HUMAN), 25%, 50ML: HCPCS | Performed by: FAMILY MEDICINE

## 2023-05-27 RX ORDER — CALCIUM GLUCONATE 20 MG/ML
2000 INJECTION, SOLUTION INTRAVENOUS ONCE
Status: COMPLETED | OUTPATIENT
Start: 2023-05-27 | End: 2023-05-27

## 2023-05-27 RX ORDER — ALBUMIN (HUMAN) 12.5 G/50ML
25 SOLUTION INTRAVENOUS EVERY 6 HOURS
Status: COMPLETED | OUTPATIENT
Start: 2023-05-27 | End: 2023-05-28

## 2023-05-27 RX ORDER — FUROSEMIDE 10 MG/ML
40 INJECTION INTRAMUSCULAR; INTRAVENOUS 2 TIMES DAILY
Status: COMPLETED | OUTPATIENT
Start: 2023-05-27 | End: 2023-05-27

## 2023-05-27 RX ORDER — SPIRONOLACTONE 25 MG/1
25 TABLET ORAL DAILY
COMMUNITY

## 2023-05-27 RX ADMIN — APIXABAN 2.5 MG: 2.5 TABLET, FILM COATED ORAL at 21:52

## 2023-05-27 RX ADMIN — ALBUMIN (HUMAN) 25 G: 0.25 INJECTION, SOLUTION INTRAVENOUS at 08:43

## 2023-05-27 RX ADMIN — FUROSEMIDE 40 MG: 10 INJECTION, SOLUTION INTRAMUSCULAR; INTRAVENOUS at 17:22

## 2023-05-27 RX ADMIN — ALBUMIN (HUMAN) 25 G: 0.25 INJECTION, SOLUTION INTRAVENOUS at 14:52

## 2023-05-27 RX ADMIN — MIRTAZAPINE 7.5 MG: 7.5 TABLET ORAL at 21:52

## 2023-05-27 RX ADMIN — ALBUMIN (HUMAN) 25 G: 0.25 INJECTION, SOLUTION INTRAVENOUS at 21:48

## 2023-05-27 RX ADMIN — PREDNISONE 5 MG: 5 TABLET ORAL at 08:34

## 2023-05-27 RX ADMIN — TORSEMIDE 10 MG: 10 TABLET ORAL at 08:33

## 2023-05-27 RX ADMIN — SODIUM CHLORIDE: 9 INJECTION, SOLUTION INTRAVENOUS at 14:57

## 2023-05-27 RX ADMIN — ALLOPURINOL 300 MG: 100 TABLET ORAL at 08:34

## 2023-05-27 RX ADMIN — FUROSEMIDE 40 MG: 10 INJECTION, SOLUTION INTRAMUSCULAR; INTRAVENOUS at 08:43

## 2023-05-27 RX ADMIN — CALCIUM GLUCONATE 2000 MG: 20 INJECTION, SOLUTION INTRAVENOUS at 03:19

## 2023-05-27 RX ADMIN — TAMSULOSIN HYDROCHLORIDE 0.4 MG: 0.4 CAPSULE ORAL at 08:33

## 2023-05-28 VITALS
TEMPERATURE: 98.4 F | DIASTOLIC BLOOD PRESSURE: 59 MMHG | HEART RATE: 74 BPM | SYSTOLIC BLOOD PRESSURE: 91 MMHG | RESPIRATION RATE: 20 BRPM | HEIGHT: 74 IN | OXYGEN SATURATION: 95 % | BODY MASS INDEX: 29.02 KG/M2

## 2023-05-28 LAB
ALBUMIN SERPL-MCNC: 2.3 G/DL (ref 3.5–5.2)
ALP SERPL-CCNC: 97 U/L (ref 40–130)
ALT SERPL-CCNC: 39 U/L (ref 5–41)
ANION GAP SERPL CALCULATED.3IONS-SCNC: 8 MMOL/L (ref 7–19)
AST SERPL-CCNC: 22 U/L (ref 5–40)
BILIRUB SERPL-MCNC: 0.6 MG/DL (ref 0.2–1.2)
BUN SERPL-MCNC: 39 MG/DL (ref 8–23)
CALCIUM SERPL-MCNC: 5.6 MG/DL (ref 8.8–10.2)
CHLORIDE SERPL-SCNC: 110 MMOL/L (ref 98–111)
CO2 SERPL-SCNC: 25 MMOL/L (ref 22–29)
CREAT SERPL-MCNC: 1 MG/DL (ref 0.5–1.2)
GLUCOSE SERPL-MCNC: 84 MG/DL (ref 74–109)
POTASSIUM SERPL-SCNC: 3.9 MMOL/L (ref 3.5–5)
PROT SERPL-MCNC: 3.3 G/DL (ref 6.6–8.7)
SODIUM SERPL-SCNC: 143 MMOL/L (ref 136–145)

## 2023-05-28 PROCEDURE — 80053 COMPREHEN METABOLIC PANEL: CPT

## 2023-05-28 PROCEDURE — P9047 ALBUMIN (HUMAN), 25%, 50ML: HCPCS | Performed by: PHYSICIAN ASSISTANT

## 2023-05-28 PROCEDURE — 6360000002 HC RX W HCPCS: Performed by: FAMILY MEDICINE

## 2023-05-28 PROCEDURE — P9047 ALBUMIN (HUMAN), 25%, 50ML: HCPCS | Performed by: FAMILY MEDICINE

## 2023-05-28 PROCEDURE — 96366 THER/PROPH/DIAG IV INF ADDON: CPT

## 2023-05-28 PROCEDURE — 6360000002 HC RX W HCPCS: Performed by: PHYSICIAN ASSISTANT

## 2023-05-28 PROCEDURE — 6370000000 HC RX 637 (ALT 250 FOR IP): Performed by: PHYSICIAN ASSISTANT

## 2023-05-28 PROCEDURE — G0378 HOSPITAL OBSERVATION PER HR: HCPCS

## 2023-05-28 PROCEDURE — 36415 COLL VENOUS BLD VENIPUNCTURE: CPT

## 2023-05-28 RX ORDER — SPIRONOLACTONE 25 MG/1
25 TABLET ORAL DAILY
Status: DISCONTINUED | OUTPATIENT
Start: 2023-05-28 | End: 2023-05-28 | Stop reason: HOSPADM

## 2023-05-28 RX ORDER — CALCIUM CARBONATE 500 MG/1
500 TABLET, CHEWABLE ORAL 3 TIMES DAILY PRN
Qty: 90 TABLET | Refills: 0 | Status: SHIPPED | OUTPATIENT
Start: 2023-05-28 | End: 2023-06-27

## 2023-05-28 RX ORDER — CALCIUM CARBONATE 500 MG/1
500 TABLET, CHEWABLE ORAL 3 TIMES DAILY PRN
Status: DISCONTINUED | OUTPATIENT
Start: 2023-05-28 | End: 2023-05-28 | Stop reason: HOSPADM

## 2023-05-28 RX ORDER — FUROSEMIDE 40 MG/1
40 TABLET ORAL DAILY
Status: DISCONTINUED | OUTPATIENT
Start: 2023-05-28 | End: 2023-05-28 | Stop reason: HOSPADM

## 2023-05-28 RX ORDER — ALBUMIN (HUMAN) 12.5 G/50ML
25 SOLUTION INTRAVENOUS EVERY 6 HOURS
Status: DISCONTINUED | OUTPATIENT
Start: 2023-05-28 | End: 2023-05-28 | Stop reason: HOSPADM

## 2023-05-28 RX ADMIN — TAMSULOSIN HYDROCHLORIDE 0.4 MG: 0.4 CAPSULE ORAL at 08:26

## 2023-05-28 RX ADMIN — SPIRONOLACTONE 25 MG: 25 TABLET ORAL at 08:26

## 2023-05-28 RX ADMIN — ALBUMIN (HUMAN) 25 G: 0.25 INJECTION, SOLUTION INTRAVENOUS at 10:03

## 2023-05-28 RX ADMIN — ALLOPURINOL 300 MG: 100 TABLET ORAL at 08:23

## 2023-05-28 RX ADMIN — FUROSEMIDE 40 MG: 40 TABLET ORAL at 08:26

## 2023-05-28 RX ADMIN — ALBUMIN (HUMAN) 25 G: 0.25 INJECTION, SOLUTION INTRAVENOUS at 04:27

## 2023-05-28 RX ADMIN — PREDNISONE 5 MG: 5 TABLET ORAL at 08:26

## 2023-05-30 ENCOUNTER — CARE COORDINATION (OUTPATIENT)
Dept: CASE MANAGEMENT | Age: 73
End: 2023-05-30

## 2023-05-30 DIAGNOSIS — E83.51 HYPOCALCEMIA: Primary | ICD-10-CM

## 2023-05-30 LAB
EKG P AXIS: 45 DEGREES
EKG P-R INTERVAL: 164 MS
EKG Q-T INTERVAL: 390 MS
EKG QRS DURATION: 92 MS
EKG QTC CALCULATION (BAZETT): 425 MS
EKG T AXIS: 37 DEGREES

## 2023-05-30 PROCEDURE — 1111F DSCHRG MED/CURRENT MED MERGE: CPT | Performed by: STUDENT IN AN ORGANIZED HEALTH CARE EDUCATION/TRAINING PROGRAM

## 2023-05-30 RX ORDER — ASPIRIN 325 MG
325 TABLET ORAL DAILY
COMMUNITY

## 2023-05-30 NOTE — CARE COORDINATION
[facility] Care Transitions Initial Follow Up Call    Call within 2 business days of discharge: Yes    Patient Current Location:  [facility] Transition Nurse contacted the patient by telephone to perform post hospital discharge assessment. Verified name and  with patient as identifiers. Provided introduction to self, and explanation of the Care Transition Nurse role. Patient: Anibal Martins Patient : 1950   MRN: [id number]  Reason for Admission:   Discharge Date: 23 RARS: Readmission Risk Score: 13 ( )      Last Discharge [address]       Date Complaint Diagnosis Description Type Department Provider    23   Admission (Discharged) Shira Rodrigues MD            Challenges to be reviewed by the provider   Additional needs identified to be addressed with provider: No  none               Method of communication with provider: none. Spoke with: [address] [place] [address] Transition Nurse reviewed discharge instructions with patient who verbalized understanding. The patient was given an opportunity to ask questions and does not have any further questions or concerns at this time. Were discharge instructions available to patient? Yes. Reviewed appropriate site of care based on symptoms and resources available to patient including: PCP  Specialist. The patient agrees to contact the PCP office for questions related to their healthcare. Advance Care Planning:   Does patient have an Advance Directive: not on file. Has one, will send in    Medication reconciliation was performed with patient, who verbalizes understanding of administration of home medications.  Medications reviewed, 1111F entered: yes    Was patient discharged with a pulse oximeter? no    Non-face-to-face services provided:  Obtained and reviewed discharge summary and/or continuity of care documents    Offered patient enrollment in the Remote Patient Monitoring (RPM) program for in-home monitoring: NA.    Care Transitions 24 Hour

## 2023-06-06 ENCOUNTER — CARE COORDINATION (OUTPATIENT)
Dept: CASE MANAGEMENT | Age: 73
End: 2023-06-06

## 2023-06-06 NOTE — CARE COORDINATION
Care Transitions Outreach Attempt    Call within 2 business days of discharge: No   Attempted to make contact with Shannan Cain for CT f/u call without success. Unable to leave a message regarding intent of call and call back information. Will try again at a later time. Patient: Meri Quiñones Patient : 1950 MRN: [id number]    Last Discharge [address]       Date Complaint Diagnosis Description Type Department Provider    23   Admission (Discharged) Ava Nyhan, MD            Noted following upcoming appointments from discharge chart review:   [address] follow up appointment(s): No future appointments.

## 2023-06-20 ENCOUNTER — CARE COORDINATION (OUTPATIENT)
Dept: CARE COORDINATION | Age: 73
End: 2023-06-20

## 2023-06-20 NOTE — CARE COORDINATION
follow up appointment(s): patient is seeing wound care office at [facility]- goes tomorrow     Care Transition Nurse reviewed discharge instructions with patient and discussed any barriers to care and/or understanding of plan of care after discharge. Discussed appropriate site of care based on symptoms and resources available to patient including: PCP  Specialist  When to call [address] The patient agrees to contact the PCP office for questions related to their healthcare. Advance Care Planning:   reviewed and current. Patients top risk factors for readmission: medical condition-history of abnormal labs; wound   Interventions to address risk factors: Reviewed and followed up on pending diagnostic tests and treatments-patient's calcium supplement was increased thinks he will have repeat labs tomorrow , Communication with specialists who will assume or re-assume care of the patient's system-specific problems-patient going to see [facility] wound care doctor tomorrow, and Education of patient/family/caregiver/guardian to support self-management-discussion on call today     Offered patient enrollment in the Remote Patient Monitoring (RPM) program for in-home monitoring: Patient is not eligible for RPM program.     Care Transitions Subsequent and Final Call    Subsequent and Final Calls  Care Transitions Interventions  Other Interventions:             Care Transition Nurse provided contact information for future needs. Plan for follow-up call in 5-7 days based on severity of symptoms and risk factors.   Plan for next call: symptom management-was calcium level rechecked this week, wound care orders   follow-up appointment-review changes from appt 06/21   medication management-review for compliance and changes from appt 06/21    Jovana Lopez

## 2023-06-26 ENCOUNTER — CARE COORDINATION (OUTPATIENT)
Dept: CASE MANAGEMENT | Age: 73
End: 2023-06-26

## 2023-07-25 ENCOUNTER — TREATMENT (OUTPATIENT)
Dept: PHYSICAL THERAPY | Facility: CLINIC | Age: 73
End: 2023-07-25
Payer: MEDICARE

## 2023-07-25 DIAGNOSIS — I89.0 LYMPHEDEMA OF BOTH LOWER EXTREMITIES: Primary | ICD-10-CM

## 2023-07-25 PROCEDURE — 97140 MANUAL THERAPY 1/> REGIONS: CPT | Performed by: PHYSICAL THERAPIST

## 2023-07-25 NOTE — PROGRESS NOTES
Physical Therapy Treatment Note  [address] [place] [address]    Patient: Aleks Monzon                                                 Visit Date: 2023  :     1950    Referring practitioner:    Tj Hyde MD  Date of Initial Visit:          Type: THERAPY  Noted: 2023    Patient seen for 6 sessions    Visit Diagnoses:    ICD-10-CM ICD-9-CM   1. Lymphedema of both lower extremities  I89.0 457.1     SUBJECTIVE     Subjective:  He is really weak and having trouble today. His wife reports he has not been eating and is having diarrhea that is black.           OBJECTIVE     Objective    Lymphedema       Row Name 23 1000             Subjective Pain    Able to rate subjective pain? yes  -HR      Pre-Treatment Pain Level 0  -HR         Manual Lymphatic Drainage    Manual Lymphatic Drainage initial sequence;opened regional lymph nodes;opened anastamoses;extremity treatment  -HR      Initial Sequence short neck;abdomen;diaphragmatic breathing  -HR      Abdomen superficial  -HR      Diaphragmatic Breathing x 9 with diaphragmatic breathing  -HR      Opened Regional Lymph Nodes axillary;inguinal  -HR      Inguinal right;left  -HR      Opened Anastamoses inguino-axillary  -HR      Inguino-Axillary right;left  -HR      Extremity Treatment MLD to full limb  -HR      MLD to Full Limb BLE  -HR      Manual Lymphatic Drainage Comments extra time to the lateral hip/trunk and the posterior thighs. Also the feet and ankles.  -HR      Manual Therapy 75  -HR         Compression/Skin Care    Compression/Skin Care skin care;compression garment  -HR      Skin Care moisturizing lotion applied  -HR      Compression Garment Comments Used a 8cm short stretch bandage with artiflex to each foot and ankle and then donned the hybrid sock over this and the farrow garments to the calves.  -HR                User Key  (r) = Recorded By, (t) = Taken By,  (c) = Cosigned By      Initials Name Provider Type    HR Sheyla Ruvalcaba, PT, DPT, CLT-VIKTOR Physical Therapist                    Therapy Education/Self Care [id number]   Education offered today Educated on the different types of compression   Medbridge Code    Ongoing HEP   Work on CDT- Call MD about not eating and diarrhea   Timed Minutes        Total Timed Treatment:     75   mins  Total Time of Visit:             75   mins         ASSESSMENT/PLAN     GOALS        Goals                                          Progress Note due by 8/17/23                                                      Recert due by 9/18/23   STG by: 6 weeks Comments Date Status   Patient will have a good basic understanding of lymphedema and its suggested risk reduction practices  Continue to educate 7/19 Ongoing   Patient will be independent with remedial HEP for lymphedema He needs to work on the HEP more often. 7/19 Ongoing   Patient will be independent with self MLD for lymphedema He and his wife are working on the MLD.  7/19 Ongoing             LTG by: 12 weeks         Patient will have appropriate compression garments for lymphedema maintenance His wife got some leggings 7/25 Ongoing   Patient will have no sign or symptoms of infection He has two areas of drainage today. 7/25 Ongoing   Patient will be independent with comprehensive maintenance program for lymphedema He is working towards his home maintenance program. 7/19 Ongoing                                       Assessment/Plan     ASSESSMENT:   He is not eating much and is very weak. Nothing sounds good and he has issues with diarrhea nearly every time he eats. His wife is going to contact the doctor. I spoke to them about the Flexitouch pump, and they are interested in pursuing this. With his complicated medical presentation including retroperitoneal fibrosis, it would not be indicated for him to try the basic pump which only applies distal to proximal pressure from the foot  to the groin.He and his wife are working on the MLD and he is wearing the compression to his lower legs daily. He is using an abdominal binder and his wife has purchased some compression leggings to try to contain and support his upper legs as well.     PLAN:   Continue with CDT, assess any new compression.     SIGNATURE: Sheyla Ruvalcaba, PT, DPT, MEMO-LATOYA HOANG License #: [id number]  Electronically Signed on 7/25/2023        [address] [place] [address]. [id number]  [phone]

## 2023-07-27 ENCOUNTER — TREATMENT (OUTPATIENT)
Dept: PHYSICAL THERAPY | Facility: CLINIC | Age: 73
End: 2023-07-27
Payer: MEDICARE

## 2023-07-27 DIAGNOSIS — I89.0 LYMPHEDEMA OF BOTH LOWER EXTREMITIES: Primary | ICD-10-CM

## 2023-07-27 PROCEDURE — 97140 MANUAL THERAPY 1/> REGIONS: CPT | Performed by: PHYSICAL THERAPIST

## 2023-07-27 NOTE — PROGRESS NOTES
Physical Therapy Treatment Note  [address], [place] [address]    Patient: Aleks Monzon                                                 Visit Date: 2023  :     1950    Referring practitioner:    Tj Hyde MD  Date of Initial Visit:          Type: THERAPY  Noted: 2023    Patient seen for 7 sessions    Visit Diagnoses:    ICD-10-CM ICD-9-CM   1. Lymphedema of both lower extremities  I89.0 457.1     SUBJECTIVE     Subjective:  He is still really weak, he went back to see Dr. Hyde yesterday.  He will see Dr. Chaudhari soon, he is not sure when. He feels weak going up the steps, his wife has had to help him more when getting up. They forgot the wraps today.           OBJECTIVE     Objective    Lymphedema       Row Name 23 1415             Subjective Pain    Able to rate subjective pain? yes  -AL      Pre-Treatment Pain Level 0  -AL         Lymphedema Measurements    Measurement Type(s) Circumferential;Quick Girth  -AL      Circumferential Areas Lower extremities  -AL         BLE Circumferential (cm)    Measurement Location 1 BOT  -AL      Left 1 26 cm  -AL      Right 1 24.5 cm  -AL      Measurement Location 2 +14  -AL      Left 2 30.9 cm  -AL      Right 2 30.1 cm  -AL      Measurement Location 3 +10  -AL      Left 3 26 cm  -AL      Right 3 26.5 cm  -AL      Measurement Location 4 +10  -AL      Left 4 32.5 cm  -AL      Right 4 34.6 cm  -AL      Measurement Location 5 +10  -AL      Left 5 38 cm  -AL      Right 5 40.9 cm  -AL      Measurement Location 6 +10  -AL      Left 6 40 cm  -AL      Right 6 43.4 cm  -AL      Measurement Location 7 +10  -AL      Left 7 45.8 cm  -AL      Right 7 49.2 cm  -AL      Measurement Location 8 +10  -AL      Left 8 48.2 cm  -AL      Right 8 51.1 cm  -AL      Measurement Location 9 +10  -AL      Left 9 48.5 cm  -AL      Right 9 53.4 cm  -AL      LLE Circumferential Total 335.9 cm  -AL       RLE Circumferential Total 353.7 cm  -AL         Manual Lymphatic Drainage    Manual Lymphatic Drainage initial sequence;opened regional lymph nodes;opened anastamoses;extremity treatment  -AL      Initial Sequence short neck;abdomen;diaphragmatic breathing  -AL      Abdomen superficial  -AL      Opened Regional Lymph Nodes axillary;inguinal  -AL      Inguinal right;left  -AL      Opened Anastamoses inguino-axillary  -AL      Inguino-Axillary right;left  -AL      Extremity Treatment MLD to full limb  -AL      MLD to Full Limb BLE  -AL      Manual Lymphatic Drainage Comments IASTM using the massaging roller to B lateral trunk and B LE's.  -AL      Manual Therapy 70  -AL         Compression/Skin Care    Compression/Skin Care skin care;compression garment  -AL      Skin Care moisturizing lotion applied  -AL      Compression Garment Comments Donned the hybrid socks and Farrow Basic garments. Also donned an abdominal binder around the trunk.  -AL                User Key  (r) = Recorded By, (t) = Taken By, (c) = Cosigned By      Initials Name Provider Type    AL Makayla Whitlock PTA, MASTER Physical Therapist Assistant                    Therapy Education/Self Care [id number]   Education offered today    MedParametric Code    Ongoing HEP   Work on CDT   Timed Minutes        Total Timed Treatment:     70   mins  Total Time of Visit:             70   mins         ASSESSMENT/PLAN     GOALS        Goals                                          Progress Note due by 8/17/23                                                      Recert due by 9/18/23   STG by: 6 weeks Comments Date Status   Patient will have a good basic understanding of lymphedema and its suggested risk reduction practices  Continue to educate 7/19 Ongoing   Patient will be independent with remedial HEP for lymphedema He needs to work on the HEP more often. 7/19 Ongoing   Patient will be independent with self MLD for lymphedema He and his wife are working on the MLD.   7/19 Ongoing             LTG by: 12 weeks         Patient will have appropriate compression garments for lymphedema maintenance His wife got some leggings 7/25 Ongoing   Patient will have no sign or symptoms of infection He has several areas of draining today. 7/2/ Ongoing   Patient will be independent with comprehensive maintenance program for lymphedema He is working towards his home maintenance program. 7/19 Ongoing                                       Assessment/Plan     ASSESSMENT:   Patient is still not eating much and when he does eat he has diarrhea. He is more weak today and requires min assist x 2 to go from sit to  the lobby.  We discussed if patient continues to become weaker, he needs to go to the ER.  He will see Dr. Chaudhari soon but he has not been scheduled yet.  Patient has lost a few more pounds, there has been no significant change in the size of either leg.  Slight increase in size of the R leg. He has not been able to wear the compression capris as he has been having diarrhea and the capris would be harder for him to pull down.     PLAN:   Continue with CDT, assess any new compression.     SIGNATURE: Makayla Whitlock PTA, [employer] [employer] License #: [id number]  Electronically Signed on 7/27/2023        [address] [place] [address]. [id number]  [phone]

## 2023-08-02 ENCOUNTER — OFFICE VISIT (OUTPATIENT)
Dept: GASTROENTEROLOGY | Facility: CLINIC | Age: 73
End: 2023-08-02
Payer: MEDICARE

## 2023-08-02 VITALS
HEART RATE: 88 BPM | HEIGHT: 74 IN | TEMPERATURE: 96.6 F | BODY MASS INDEX: 26.56 KG/M2 | OXYGEN SATURATION: 97 % | SYSTOLIC BLOOD PRESSURE: 100 MMHG | WEIGHT: 207 LBS | DIASTOLIC BLOOD PRESSURE: 60 MMHG

## 2023-08-02 DIAGNOSIS — R19.7 DIARRHEA, UNSPECIFIED TYPE: Primary | ICD-10-CM

## 2023-08-02 PROCEDURE — 3078F DIAST BP <80 MM HG: CPT | Performed by: NURSE PRACTITIONER

## 2023-08-02 PROCEDURE — 3074F SYST BP LT 130 MM HG: CPT | Performed by: NURSE PRACTITIONER

## 2023-08-02 PROCEDURE — 1159F MED LIST DOCD IN RCRD: CPT | Performed by: NURSE PRACTITIONER

## 2023-08-02 PROCEDURE — 99213 OFFICE O/P EST LOW 20 MIN: CPT | Performed by: NURSE PRACTITIONER

## 2023-08-02 PROCEDURE — 1160F RVW MEDS BY RX/DR IN RCRD: CPT | Performed by: NURSE PRACTITIONER

## 2023-08-02 RX ORDER — MONTELUKAST SODIUM 4 MG/1
1 TABLET, CHEWABLE ORAL DAILY
Qty: 30 TABLET | Refills: 2 | Status: ON HOLD | OUTPATIENT
Start: 2023-08-02

## 2023-08-03 ENCOUNTER — LAB (OUTPATIENT)
Dept: LAB | Facility: HOSPITAL | Age: 73
End: 2023-08-03
Payer: MEDICARE

## 2023-08-03 DIAGNOSIS — R19.7 DIARRHEA, UNSPECIFIED TYPE: ICD-10-CM

## 2023-08-03 LAB
ADV 40+41 DNA STL QL NAA+NON-PROBE: NOT DETECTED
ASTRO TYP 1-8 RNA STL QL NAA+NON-PROBE: NOT DETECTED
C CAYETANENSIS DNA STL QL NAA+NON-PROBE: NOT DETECTED
C COLI+JEJ+UPSA DNA STL QL NAA+NON-PROBE: NOT DETECTED
C DIFF TOX GENS STL QL NAA+PROBE: NEGATIVE
CRYPTOSP DNA STL QL NAA+NON-PROBE: NOT DETECTED
E HISTOLYT DNA STL QL NAA+NON-PROBE: NOT DETECTED
EAEC PAA PLAS AGGR+AATA ST NAA+NON-PRB: NOT DETECTED
EC STX1+STX2 GENES STL QL NAA+NON-PROBE: NOT DETECTED
EPEC EAE GENE STL QL NAA+NON-PROBE: NOT DETECTED
ETEC LTA+ST1A+ST1B TOX ST NAA+NON-PROBE: NOT DETECTED
G LAMBLIA DNA STL QL NAA+NON-PROBE: NOT DETECTED
NOROVIRUS GI+II RNA STL QL NAA+NON-PROBE: NOT DETECTED
P SHIGELLOIDES DNA STL QL NAA+NON-PROBE: NOT DETECTED
RVA RNA STL QL NAA+NON-PROBE: NOT DETECTED
S ENT+BONG DNA STL QL NAA+NON-PROBE: NOT DETECTED
SAPO I+II+IV+V RNA STL QL NAA+NON-PROBE: NOT DETECTED
SHIGELLA SP+EIEC IPAH ST NAA+NON-PROBE: NOT DETECTED
V CHOL+PARA+VUL DNA STL QL NAA+NON-PROBE: NOT DETECTED
V CHOLERAE DNA STL QL NAA+NON-PROBE: NOT DETECTED
Y ENTEROCOL DNA STL QL NAA+NON-PROBE: NOT DETECTED

## 2023-08-03 PROCEDURE — 87507 IADNA-DNA/RNA PROBE TQ 12-25: CPT

## 2023-08-03 PROCEDURE — 87177 OVA AND PARASITES SMEARS: CPT

## 2023-08-03 PROCEDURE — 87493 C DIFF AMPLIFIED PROBE: CPT

## 2023-08-03 PROCEDURE — 87209 SMEAR COMPLEX STAIN: CPT

## 2023-08-04 ENCOUNTER — APPOINTMENT (OUTPATIENT)
Dept: GENERAL RADIOLOGY | Facility: HOSPITAL | Age: 73
DRG: 871 | End: 2023-08-04
Payer: MEDICARE

## 2023-08-04 ENCOUNTER — TELEPHONE (OUTPATIENT)
Dept: GASTROENTEROLOGY | Facility: CLINIC | Age: 73
End: 2023-08-04
Payer: MEDICARE

## 2023-08-04 ENCOUNTER — TELEPHONE (OUTPATIENT)
Dept: PHYSICAL THERAPY | Facility: CLINIC | Age: 73
End: 2023-08-04

## 2023-08-04 ENCOUNTER — HOSPITAL ENCOUNTER (INPATIENT)
Facility: HOSPITAL | Age: 73
LOS: 6 days | Discharge: NURSING, REHAB OR LONG-TERM CARE | DRG: 871 | End: 2023-08-10
Attending: STUDENT IN AN ORGANIZED HEALTH CARE EDUCATION/TRAINING PROGRAM | Admitting: STUDENT IN AN ORGANIZED HEALTH CARE EDUCATION/TRAINING PROGRAM
Payer: MEDICARE

## 2023-08-04 ENCOUNTER — APPOINTMENT (OUTPATIENT)
Dept: ULTRASOUND IMAGING | Facility: HOSPITAL | Age: 73
DRG: 871 | End: 2023-08-04
Payer: MEDICARE

## 2023-08-04 DIAGNOSIS — S72.001A CLOSED FRACTURE OF RIGHT HIP, INITIAL ENCOUNTER: ICD-10-CM

## 2023-08-04 DIAGNOSIS — Z78.9 DECREASED ACTIVITIES OF DAILY LIVING (ADL): ICD-10-CM

## 2023-08-04 DIAGNOSIS — E83.42 HYPOMAGNESEMIA: ICD-10-CM

## 2023-08-04 DIAGNOSIS — L89.152 PRESSURE INJURY OF SACRAL REGION, STAGE 2: ICD-10-CM

## 2023-08-04 DIAGNOSIS — D72.829 LEUKOCYTOSIS, UNSPECIFIED TYPE: ICD-10-CM

## 2023-08-04 DIAGNOSIS — Z74.09 IMPAIRED MOBILITY: ICD-10-CM

## 2023-08-04 DIAGNOSIS — A41.50 SEPSIS DUE TO GRAM NEGATIVE BACTERIA: ICD-10-CM

## 2023-08-04 DIAGNOSIS — Z78.9 IMPAIRED MOBILITY AND ADLS: ICD-10-CM

## 2023-08-04 DIAGNOSIS — Z74.09 IMPAIRED MOBILITY AND ADLS: ICD-10-CM

## 2023-08-04 DIAGNOSIS — L89.313 PRESSURE INJURY OF RIGHT BUTTOCK, STAGE 3: Primary | ICD-10-CM

## 2023-08-04 DIAGNOSIS — S51.811A SKIN TEAR OF RIGHT FOREARM WITHOUT COMPLICATION, INITIAL ENCOUNTER: ICD-10-CM

## 2023-08-04 DIAGNOSIS — C67.0 MALIGNANT NEOPLASM OF TRIGONE OF URINARY BLADDER: ICD-10-CM

## 2023-08-04 DIAGNOSIS — R53.1 WEAKNESS: ICD-10-CM

## 2023-08-04 DIAGNOSIS — L03.113 CELLULITIS OF RIGHT UPPER EXTREMITY: ICD-10-CM

## 2023-08-04 DIAGNOSIS — E66.3 OVERWEIGHT WITH BODY MASS INDEX (BMI) OF 27 TO 27.9 IN ADULT: ICD-10-CM

## 2023-08-04 LAB
ALBUMIN SERPL-MCNC: 1.6 G/DL (ref 3.5–5.2)
ALBUMIN/GLOB SERPL: 0.8 G/DL
ALP SERPL-CCNC: 208 U/L (ref 39–117)
ALT SERPL W P-5'-P-CCNC: 30 U/L (ref 1–41)
ANION GAP SERPL CALCULATED.3IONS-SCNC: 11 MMOL/L (ref 5–15)
ANION GAP SERPL CALCULATED.3IONS-SCNC: 11 MMOL/L (ref 5–15)
AST SERPL-CCNC: 42 U/L (ref 1–40)
BASOPHILS # BLD AUTO: 0.04 10*3/MM3 (ref 0–0.2)
BASOPHILS NFR BLD AUTO: 0.2 % (ref 0–1.5)
BILIRUB SERPL-MCNC: 0.4 MG/DL (ref 0–1.2)
BUN SERPL-MCNC: 49 MG/DL (ref 8–23)
BUN SERPL-MCNC: 52 MG/DL (ref 8–23)
BUN/CREAT SERPL: 32 (ref 7–25)
BUN/CREAT SERPL: 32.3 (ref 7–25)
CALCIUM SPEC-SCNC: 6.3 MG/DL (ref 8.6–10.5)
CALCIUM SPEC-SCNC: 6.5 MG/DL (ref 8.6–10.5)
CHLORIDE SERPL-SCNC: 101 MMOL/L (ref 98–107)
CHLORIDE SERPL-SCNC: 106 MMOL/L (ref 98–107)
CO2 SERPL-SCNC: 17 MMOL/L (ref 22–29)
CO2 SERPL-SCNC: 20 MMOL/L (ref 22–29)
CREAT SERPL-MCNC: 1.53 MG/DL (ref 0.76–1.27)
CREAT SERPL-MCNC: 1.61 MG/DL (ref 0.76–1.27)
CRP SERPL-MCNC: 15.71 MG/DL (ref 0–0.5)
D-LACTATE SERPL-SCNC: 0.7 MMOL/L (ref 0.5–2)
D-LACTATE SERPL-SCNC: 2.1 MMOL/L (ref 0.5–2)
DEPRECATED RDW RBC AUTO: 51.4 FL (ref 37–54)
EGFRCR SERPLBLD CKD-EPI 2021: 45.4 ML/MIN/1.73
EGFRCR SERPLBLD CKD-EPI 2021: 48.3 ML/MIN/1.73
EOSINOPHIL # BLD AUTO: 0.03 10*3/MM3 (ref 0–0.4)
EOSINOPHIL NFR BLD AUTO: 0.2 % (ref 0.3–6.2)
ERYTHROCYTE [DISTWIDTH] IN BLOOD BY AUTOMATED COUNT: 16.2 % (ref 12.3–15.4)
GLOBULIN UR ELPH-MCNC: 2 GM/DL
GLUCOSE SERPL-MCNC: 88 MG/DL (ref 65–99)
GLUCOSE SERPL-MCNC: 99 MG/DL (ref 65–99)
HCT VFR BLD AUTO: 37 % (ref 37.5–51)
HGB BLD-MCNC: 12.1 G/DL (ref 13–17.7)
IMM GRANULOCYTES # BLD AUTO: 0.42 10*3/MM3 (ref 0–0.05)
IMM GRANULOCYTES NFR BLD AUTO: 2.4 % (ref 0–0.5)
LYMPHOCYTES # BLD AUTO: 0.83 10*3/MM3 (ref 0.7–3.1)
LYMPHOCYTES NFR BLD AUTO: 4.8 % (ref 19.6–45.3)
MAGNESIUM SERPL-MCNC: 0.9 MG/DL (ref 1.6–2.4)
MAGNESIUM SERPL-MCNC: 1 MG/DL (ref 1.6–2.4)
MCH RBC QN AUTO: 28.5 PG (ref 26.6–33)
MCHC RBC AUTO-ENTMCNC: 32.7 G/DL (ref 31.5–35.7)
MCV RBC AUTO: 87.1 FL (ref 79–97)
MONOCYTES # BLD AUTO: 0.57 10*3/MM3 (ref 0.1–0.9)
MONOCYTES NFR BLD AUTO: 3.3 % (ref 5–12)
NEUTROPHILS NFR BLD AUTO: 15.32 10*3/MM3 (ref 1.7–7)
NEUTROPHILS NFR BLD AUTO: 89.1 % (ref 42.7–76)
NRBC BLD AUTO-RTO: 0 /100 WBC (ref 0–0.2)
PHOSPHATE SERPL-MCNC: 3.1 MG/DL (ref 2.5–4.5)
PLATELET # BLD AUTO: 185 10*3/MM3 (ref 140–450)
PMV BLD AUTO: 9.8 FL (ref 6–12)
POTASSIUM SERPL-SCNC: 3.8 MMOL/L (ref 3.5–5.2)
POTASSIUM SERPL-SCNC: 3.9 MMOL/L (ref 3.5–5.2)
PROCALCITONIN SERPL-MCNC: 0.38 NG/ML (ref 0–0.25)
PROT SERPL-MCNC: 3.6 G/DL (ref 6–8.5)
RBC # BLD AUTO: 4.25 10*6/MM3 (ref 4.14–5.8)
SODIUM SERPL-SCNC: 132 MMOL/L (ref 136–145)
SODIUM SERPL-SCNC: 134 MMOL/L (ref 136–145)
URATE SERPL-MCNC: 2.8 MG/DL (ref 3.4–7)
WBC NRBC COR # BLD: 17.21 10*3/MM3 (ref 3.4–10.8)

## 2023-08-04 PROCEDURE — 83605 ASSAY OF LACTIC ACID: CPT

## 2023-08-04 PROCEDURE — 25010000002 CLINDAMYCIN PER 300 MG

## 2023-08-04 PROCEDURE — 73080 X-RAY EXAM OF ELBOW: CPT

## 2023-08-04 PROCEDURE — 25010000002 CALCIUM GLUCONATE PER 10 ML: Performed by: STUDENT IN AN ORGANIZED HEALTH CARE EDUCATION/TRAINING PROGRAM

## 2023-08-04 PROCEDURE — 99285 EMERGENCY DEPT VISIT HI MDM: CPT

## 2023-08-04 PROCEDURE — 87077 CULTURE AEROBIC IDENTIFY: CPT

## 2023-08-04 PROCEDURE — 87150 DNA/RNA AMPLIFIED PROBE: CPT

## 2023-08-04 PROCEDURE — 84100 ASSAY OF PHOSPHORUS: CPT

## 2023-08-04 PROCEDURE — 93971 EXTREMITY STUDY: CPT

## 2023-08-04 PROCEDURE — 93971 EXTREMITY STUDY: CPT | Performed by: SURGERY

## 2023-08-04 PROCEDURE — 73110 X-RAY EXAM OF WRIST: CPT

## 2023-08-04 PROCEDURE — 25010000002 MAGNESIUM SULFATE 2 GM/50ML SOLUTION: Performed by: STUDENT IN AN ORGANIZED HEALTH CARE EDUCATION/TRAINING PROGRAM

## 2023-08-04 PROCEDURE — 83735 ASSAY OF MAGNESIUM: CPT | Performed by: FAMILY MEDICINE

## 2023-08-04 PROCEDURE — 85025 COMPLETE CBC W/AUTO DIFF WBC: CPT

## 2023-08-04 PROCEDURE — 87186 SC STD MICRODIL/AGAR DIL: CPT

## 2023-08-04 PROCEDURE — 86140 C-REACTIVE PROTEIN: CPT

## 2023-08-04 PROCEDURE — 80053 COMPREHEN METABOLIC PANEL: CPT

## 2023-08-04 PROCEDURE — 73090 X-RAY EXAM OF FOREARM: CPT

## 2023-08-04 PROCEDURE — 36415 COLL VENOUS BLD VENIPUNCTURE: CPT

## 2023-08-04 PROCEDURE — 73130 X-RAY EXAM OF HAND: CPT

## 2023-08-04 PROCEDURE — 87205 SMEAR GRAM STAIN: CPT

## 2023-08-04 PROCEDURE — 87147 CULTURE TYPE IMMUNOLOGIC: CPT

## 2023-08-04 PROCEDURE — 25010000002 VANCOMYCIN 10 G RECONSTITUTED SOLUTION

## 2023-08-04 PROCEDURE — 87040 BLOOD CULTURE FOR BACTERIA: CPT

## 2023-08-04 PROCEDURE — 84145 PROCALCITONIN (PCT): CPT

## 2023-08-04 PROCEDURE — 83735 ASSAY OF MAGNESIUM: CPT

## 2023-08-04 PROCEDURE — 84550 ASSAY OF BLOOD/URIC ACID: CPT

## 2023-08-04 PROCEDURE — 87070 CULTURE OTHR SPECIMN AEROBIC: CPT

## 2023-08-04 RX ORDER — MAGNESIUM SULFATE HEPTAHYDRATE 40 MG/ML
2 INJECTION, SOLUTION INTRAVENOUS
Status: COMPLETED | OUTPATIENT
Start: 2023-08-05 | End: 2023-08-05

## 2023-08-04 RX ORDER — SODIUM CHLORIDE 9 MG/ML
40 INJECTION, SOLUTION INTRAVENOUS AS NEEDED
Status: DISCONTINUED | OUTPATIENT
Start: 2023-08-04 | End: 2023-08-10 | Stop reason: HOSPADM

## 2023-08-04 RX ORDER — CLINDAMYCIN PHOSPHATE 900 MG/50ML
900 INJECTION, SOLUTION INTRAVENOUS ONCE
Status: COMPLETED | OUTPATIENT
Start: 2023-08-04 | End: 2023-08-04

## 2023-08-04 RX ORDER — VANCOMYCIN 2 GRAM/500 ML IN 0.9 % SODIUM CHLORIDE INTRAVENOUS
2000 ONCE
Status: COMPLETED | OUTPATIENT
Start: 2023-08-04 | End: 2023-08-04

## 2023-08-04 RX ORDER — BISACODYL 10 MG
10 SUPPOSITORY, RECTAL RECTAL DAILY PRN
Status: DISCONTINUED | OUTPATIENT
Start: 2023-08-04 | End: 2023-08-07

## 2023-08-04 RX ORDER — SODIUM CHLORIDE 0.9 % (FLUSH) 0.9 %
10 SYRINGE (ML) INJECTION EVERY 12 HOURS SCHEDULED
Status: DISCONTINUED | OUTPATIENT
Start: 2023-08-04 | End: 2023-08-10 | Stop reason: HOSPADM

## 2023-08-04 RX ORDER — SODIUM CHLORIDE 0.9 % (FLUSH) 0.9 %
10 SYRINGE (ML) INJECTION AS NEEDED
Status: DISCONTINUED | OUTPATIENT
Start: 2023-08-04 | End: 2023-08-10 | Stop reason: HOSPADM

## 2023-08-04 RX ORDER — AMOXICILLIN 250 MG
2 CAPSULE ORAL 2 TIMES DAILY
Status: DISCONTINUED | OUTPATIENT
Start: 2023-08-04 | End: 2023-08-07

## 2023-08-04 RX ORDER — ACETAMINOPHEN 325 MG/1
650 TABLET ORAL EVERY 4 HOURS PRN
Status: DISCONTINUED | OUTPATIENT
Start: 2023-08-04 | End: 2023-08-10 | Stop reason: HOSPADM

## 2023-08-04 RX ORDER — MAGNESIUM SULFATE HEPTAHYDRATE 40 MG/ML
2 INJECTION, SOLUTION INTRAVENOUS ONCE
Status: COMPLETED | OUTPATIENT
Start: 2023-08-04 | End: 2023-08-04

## 2023-08-04 RX ORDER — TRAMADOL HYDROCHLORIDE 50 MG/1
50 TABLET ORAL EVERY 6 HOURS PRN
Status: DISCONTINUED | OUTPATIENT
Start: 2023-08-04 | End: 2023-08-10 | Stop reason: HOSPADM

## 2023-08-04 RX ORDER — CHOLECALCIFEROL (VITAMIN D3) 125 MCG
5 CAPSULE ORAL NIGHTLY PRN
Status: DISCONTINUED | OUTPATIENT
Start: 2023-08-04 | End: 2023-08-10 | Stop reason: HOSPADM

## 2023-08-04 RX ORDER — BISACODYL 5 MG/1
5 TABLET, DELAYED RELEASE ORAL DAILY PRN
Status: DISCONTINUED | OUTPATIENT
Start: 2023-08-04 | End: 2023-08-07

## 2023-08-04 RX ORDER — POLYETHYLENE GLYCOL 3350 17 G/17G
17 POWDER, FOR SOLUTION ORAL DAILY PRN
Status: DISCONTINUED | OUTPATIENT
Start: 2023-08-04 | End: 2023-08-07

## 2023-08-04 RX ADMIN — CALCIUM GLUCONATE 2000 MG: 98 INJECTION, SOLUTION INTRAVENOUS at 22:27

## 2023-08-04 RX ADMIN — VANCOMYCIN HYDROCHLORIDE 2000 MG: 10 INJECTION, POWDER, LYOPHILIZED, FOR SOLUTION INTRAVENOUS at 16:57

## 2023-08-04 RX ADMIN — SODIUM CHLORIDE 900 MG: 9 INJECTION, SOLUTION INTRAVENOUS at 15:48

## 2023-08-04 RX ADMIN — Medication 10 ML: at 20:30

## 2023-08-04 RX ADMIN — MAGNESIUM SULFATE HEPTAHYDRATE 2 G: 2 INJECTION, SOLUTION INTRAVENOUS at 18:01

## 2023-08-04 RX ADMIN — CALCIUM GLUCONATE 1000 MG: 98 INJECTION, SOLUTION INTRAVENOUS at 20:29

## 2023-08-04 RX ADMIN — SODIUM CHLORIDE 1000 ML: 9 INJECTION, SOLUTION INTRAVENOUS at 15:48

## 2023-08-04 NOTE — PLAN OF CARE
Goal Outcome Evaluation:  Plan of Care Reviewed With: patient, family        Progress: no change     Pt is A&Ox4. VSS. No c/o pain. Edema and rednes to RUE, skin hot. Small skin tear to R hand. Pressure Injury on buttocks, mepelex for protection. RA. SCDs for VTE. Tolerating regular diet. Voiding via BSC. Family at bedside. Call ligth in reach. Safety maintained.

## 2023-08-04 NOTE — ED NOTES
"Report given to Heather LEOS RN    Nursing report ED to floor  Aleks Monzon  73 y.o.  male    HPI:   Chief Complaint   Patient presents with    Arm Swelling       Admitting doctor:   Tj Hyde MD    Consulting provider(s):  Consults       No orders found from 7/6/2023 to 8/5/2023.             Admitting diagnosis:   The primary encounter diagnosis was Cellulitis of right upper extremity. Diagnoses of Hypomagnesemia and Leukocytosis, unspecified type were also pertinent to this visit.    Code status:   Current Code Status       Date Active Code Status Order ID Comments User Context       Prior            Allergies:   Niacin    Intake and Output  No intake or output data in the 24 hours ending 08/04/23 1637    Weight:       08/04/23  1312   Weight: 93.4 kg (206 lb)       Most recent vitals:   Vitals:    08/04/23 1312 08/04/23 1459   BP: 101/77 105/85   Pulse: 95 85   Resp: 20    Temp: 97.4 øF (36.3 øC)    SpO2: 99% 97%   Weight: 93.4 kg (206 lb)    Height: 188 cm (74\")      Oxygen Therapy: .    Active LDAs/IV Access:   Lines, Drains & Airways       Active LDAs       Name Placement date Placement time Site Days    Peripheral IV 05/21/22 0900 Anterior;Left Forearm 05/21/22  0900  Forearm  440    Peripheral IV 08/04/23 1400 Left;Posterior Hand 08/04/23  1400  Hand  less than 1                    Labs (abnormal labs have a star):   Labs Reviewed   COMPREHENSIVE METABOLIC PANEL - Abnormal; Notable for the following components:       Result Value    BUN 52 (*)     Creatinine 1.61 (*)     Sodium 132 (*)     CO2 20.0 (*)     Calcium 6.5 (*)     Total Protein 3.6 (*)     Albumin 1.6 (*)     AST (SGOT) 42 (*)     Alkaline Phosphatase 208 (*)     BUN/Creatinine Ratio 32.3 (*)     eGFR 45.4 (*)     All other components within normal limits    Narrative:     GFR Normal >60  Chronic Kidney Disease <60  Kidney Failure <15    The GFR formula is only valid for adults with stable renal function between ages 18 and 70. " "  LACTIC ACID, PLASMA - Abnormal; Notable for the following components:    Lactate 2.1 (*)     All other components within normal limits   PROCALCITONIN - Abnormal; Notable for the following components:    Procalcitonin 0.38 (*)     All other components within normal limits    Narrative:     As a Marker for Sepsis (Non-Neonates):    1. <0.5 ng/mL represents a low risk of severe sepsis and/or septic shock.  2. >2 ng/mL represents a high risk of severe sepsis and/or septic shock.    As a Marker for Lower Respiratory Tract Infections that require antibiotic therapy:    PCT on Admission    Antibiotic Therapy       6-12 Hrs later    >0.5                Strongly Recommended  >0.25 - <0.5        Recommended   0.1 - 0.25          Discouraged              Remeasure/reassess PCT  <0.1                Strongly Discouraged     Remeasure/reassess PCT    As 28 day mortality risk marker: \"Change in Procalcitonin Result\" (>80% or <=80%) if Day 0 (or Day 1) and Day 4 values are available. Refer to [email or web address]    Change in PCT <=80%  A decrease of PCT levels below or equal to 80% defines a positive change in PCT test result representing a higher risk for 28-day all-cause mortality of patients diagnosed with severe sepsis for septic shock.    Change in PCT >80%  A decrease of PCT levels of more than 80% defines a negative change in PCT result representing a lower risk for 28-day all-cause mortality of patients diagnosed with severe sepsis or septic shock.      C-REACTIVE PROTEIN - Abnormal; Notable for the following components:    C-Reactive Protein 15.71 (*)     All other components within normal limits   MAGNESIUM - Abnormal; Notable for the following components:    Magnesium 0.9 (*)     All other components within normal limits   CBC WITH AUTO DIFFERENTIAL - Abnormal; Notable for the following components:    WBC 17.21 (*)     Hemoglobin 12.1 (*)     Hematocrit 37.0 (*)     RDW 16.2 (*)     Neutrophil % 89.1 " (*)     Lymphocyte % 4.8 (*)     Monocyte % 3.3 (*)     Eosinophil % 0.2 (*)     Immature Grans % 2.4 (*)     Neutrophils, Absolute 15.32 (*)     Immature Grans, Absolute 0.42 (*)     All other components within normal limits   URIC ACID - Abnormal; Notable for the following components:    Uric Acid 2.8 (*)     All other components within normal limits   PHOSPHORUS - Normal   BLOOD CULTURE   BLOOD CULTURE   WOUND CULTURE   LACTIC ACID, REFLEX   CBC AND DIFFERENTIAL    Narrative:     The following orders were created for panel order CBC & Differential.  Procedure                               Abnormality         Status                     ---------                               -----------         ------                     CBC Auto Differential[[id number]]        Abnormal            Final result                 Please view results for these tests on the individual orders.       Meds given in ED:   Medications   sodium chloride 0.9 % flush 10 mL (has no administration in time range)   magnesium sulfate 2g/50 mL (PREMIX) infusion (has no administration in time range)   clindamycin (CLEOCIN) 900 mg in sodium chloride 0.9% 50 mL IVPB (premix) (900 mg Intravenous New Bag 8/4/23 1548)   Pharmacy to dose vancomycin (has no administration in time range)   piperacillin-tazobactam (ZOSYN) IVPB 4.5 g in 100 mL NS (CD) (has no administration in time range)   vancomycin IVPB 2000 mg in 0.9% Sodium Chloride 500 mL (has no administration in time range)   sodium chloride 0.9 % bolus 1,000 mL (1,000 mL Intravenous New Bag 8/4/23 1548)     Pharmacy to dose vancomycin,          NIH Stroke Scale:       Isolation/Infection(s):  No active isolations   COVID (History), C.difficile (rule out)     COVID Testing  Collected .  Resulted .    Nursing report ED to floor:  Mental status: .  Ambulatory status: .  Precautions: .    ED nurse phone extentsion- ..

## 2023-08-04 NOTE — ED PROVIDER NOTES
Subjective   History of Present Illness  Patient is a 73-year-old male that presents emergency department for right arm redness, swelling and increased pain.  Patient reports that the redness and swelling did start upon awakening this morning.  Patient reports originally he thought that this was lymphedema as he does have a history noted to bilateral lower extremities.  He states that as the morning progressed, arm became more tender with increased swelling and erythema noted.  Patient reports that he has an abrasion on the dorsal aspect of his right hand that resulted from a skin tear approximately 3 weeks ago that has not healed appropriately.  Patient denies any falls or injuries to the affected extremity.  Family reports that over the last 3 months patient has had a significant decline in health and mobility.  Patient followed up with his primary care provider regarding chronic diarrhea and had a stool specimen that was negative.  Patient is also seen Dr. Chaudhari for chronic diarrhea and had a normal colonoscopy as well as endoscopy last October.  Patient denies any other issues at this time.  Denies any chest pain, shortness of breath, abdominal pain, nausea, vomiting.  He denies any fevers, body aches, chills.  Patient does have a past medical history of lymphedema, arthritis, prostate and bladder cancer, cellulitis, gout, hypertension and retroperitoneal fibrosis.  Patient denies any blood thinning medications.  Denies any history of DVT or PE.    Review of Systems   Constitutional:  Positive for fatigue.   Cardiovascular:  Positive for leg swelling.   Gastrointestinal:  Positive for diarrhea. Negative for constipation, nausea and vomiting.   Musculoskeletal:  Positive for arthralgias, joint swelling and myalgias.   Skin:  Positive for color change and wound.   Neurological:  Positive for weakness.   All other systems reviewed and are negative.    Past Medical History:   Diagnosis Date    Arthritis     Cancer      PROSTATE AND BLADDER    Cellulitis     Gout     Hypertension     IFG (impaired fasting glucose)     Low back pain     Lymphedema     Retroperitoneal fibrosis        Allergies   Allergen Reactions    Niacin Itching     same as of 1/9/2017-itching         Past Surgical History:   Procedure Laterality Date    AXILLARY LYMPH NODE BIOPSY/EXCISION Left 10/01/2020    Procedure: LEFT AXILLARY LYMPH NODE BIOPSY;  Surgeon: Dina To MD;  Location: [facility] OR;  Service: General;  Laterality: Left;    BACK SURGERY      COLONOSCOPY      2017?    COLONOSCOPY N/A 10/11/2022    Procedure: COLONOSCOPY WITH ANESTHESIA;  Surgeon: Burton Chaudhari MD;  Location: [facility] ENDOSCOPY;  Service: Gastroenterology;  Laterality: N/A;  preop; abdominal pain  postop; polyps   PCP Tj Hyde MD    ENDOSCOPY N/A 10/11/2022    Procedure: ESOPHAGOGASTRODUODENOSCOPY WITH ANESTHESIA;  Surgeon: Burton Chaudhari MD;  Location: [facility] ENDOSCOPY;  Service: Gastroenterology;  Laterality: N/A;  preop; abdominal pain  postop; misshape stomach  PCP Tj Hyde MD    JOINT REPLACEMENT      BILATERAL HIP     SKIN CANCER EXCISION      TOTAL HIP ARTHROPLASTY      URETERAL STENT INSERTION         Family History   Problem Relation Age of Onset    Heart disease Father     Diabetes Maternal Grandmother     Colon cancer Neg Hx     Colon polyps Neg Hx        Social History     Socioeconomic History    Marital status:    Tobacco Use    Smoking status: Never    Smokeless tobacco: Never   Substance and Sexual Activity    Alcohol use: Never    Drug use: Never    Sexual activity: Not Currently     Partners: Female           Objective   Physical Exam  Vitals and nursing note reviewed.   Constitutional:       Appearance: Normal appearance.      Comments: Nontoxic appearing. In no acute distress.    HENT:      Head: Normocephalic and atraumatic.      Right Ear: External ear normal.      Left Ear: External ear normal.      Nose: Nose normal.       Mouth/Throat:      Mouth: Mucous membranes are moist.      Pharynx: Oropharynx is clear.   Eyes:      Extraocular Movements: Extraocular movements intact.      Conjunctiva/sclera: Conjunctivae normal.      Pupils: Pupils are equal, round, and reactive to light.   Cardiovascular:      Rate and Rhythm: Normal rate and regular rhythm.      Pulses: Normal pulses.      Heart sounds: Normal heart sounds.   Pulmonary:      Effort: Pulmonary effort is normal. No respiratory distress.      Breath sounds: Normal breath sounds. No wheezing.   Chest:      Chest wall: No tenderness.   Abdominal:      General: Abdomen is flat. Bowel sounds are normal. There is no distension.      Palpations: Abdomen is soft.      Tenderness: There is no abdominal tenderness.   Musculoskeletal:         General: Swelling and tenderness present. Normal range of motion.      Cervical back: Normal range of motion and neck supple.      Right lower leg: Edema present.      Left lower leg: Edema present.      Comments: Chronic bilateral lower extremity edema due to diagnosis of lymphedema.  Right upper extremity does appear to be significantly swollen from mid humerus spanning down to right hand.  1+ nonpitting edema noted to the right upper extremity.  Right upper extremity is warm to touch and erythema is present from right elbow down to fingers.  Patient is able to move all 5 digits to the affected hand without difficulty.  Full sensation noted to right upper extremity.  He is able to perform full range of motion to the right upper extremity.   Skin:     General: Skin is warm and dry.      Capillary Refill: Capillary refill takes less than 2 seconds.      Findings: Erythema present.      Comments: Right upper extremity does appear to be erythematous from mid humerus down to right hand.  Extremity is warm and tender to touch.  1+ pitting edema noted to extremity.   Neurological:      General: No focal deficit present.      Mental Status: He is  "alert and oriented to person, place, and time.   Psychiatric:         Mood and Affect: Mood normal.         Behavior: Behavior normal.         Thought Content: Thought content normal.         Judgment: Judgment normal.     Labs Reviewed   COMPREHENSIVE METABOLIC PANEL - Abnormal; Notable for the following components:       Result Value    BUN 52 (*)     Creatinine 1.61 (*)     Sodium 132 (*)     CO2 20.0 (*)     Calcium 6.5 (*)     Total Protein 3.6 (*)     Albumin 1.6 (*)     AST (SGOT) 42 (*)     Alkaline Phosphatase 208 (*)     BUN/Creatinine Ratio 32.3 (*)     eGFR 45.4 (*)     All other components within normal limits    Narrative:     GFR Normal >60  Chronic Kidney Disease <60  Kidney Failure <15    The GFR formula is only valid for adults with stable renal function between ages 18 and 70.   LACTIC ACID, PLASMA - Abnormal; Notable for the following components:    Lactate 2.1 (*)     All other components within normal limits   PROCALCITONIN - Abnormal; Notable for the following components:    Procalcitonin 0.38 (*)     All other components within normal limits    Narrative:     As a Marker for Sepsis (Non-Neonates):    1. <0.5 ng/mL represents a low risk of severe sepsis and/or septic shock.  2. >2 ng/mL represents a high risk of severe sepsis and/or septic shock.    As a Marker for Lower Respiratory Tract Infections that require antibiotic therapy:    PCT on Admission    Antibiotic Therapy       6-12 Hrs later    >0.5                Strongly Recommended  >0.25 - <0.5        Recommended   0.1 - 0.25          Discouraged              Remeasure/reassess PCT  <0.1                Strongly Discouraged     Remeasure/reassess PCT    As 28 day mortality risk marker: \"Change in Procalcitonin Result\" (>80% or <=80%) if Day 0 (or Day 1) and Day 4 values are available. Refer to http://www.[employer]-pct-calculator.com    Change in PCT <=80%  A decrease of PCT levels below or equal to 80% defines a positive change in PCT test " result representing a higher risk for 28-day all-cause mortality of patients diagnosed with severe sepsis for septic shock.    Change in PCT >80%  A decrease of PCT levels of more than 80% defines a negative change in PCT result representing a lower risk for 28-day all-cause mortality of patients diagnosed with severe sepsis or septic shock.      C-REACTIVE PROTEIN - Abnormal; Notable for the following components:    C-Reactive Protein 15.71 (*)     All other components within normal limits   MAGNESIUM - Abnormal; Notable for the following components:    Magnesium 0.9 (*)     All other components within normal limits   CBC WITH AUTO DIFFERENTIAL - Abnormal; Notable for the following components:    WBC 17.21 (*)     Hemoglobin 12.1 (*)     Hematocrit 37.0 (*)     RDW 16.2 (*)     Neutrophil % 89.1 (*)     Lymphocyte % 4.8 (*)     Monocyte % 3.3 (*)     Eosinophil % 0.2 (*)     Immature Grans % 2.4 (*)     Neutrophils, Absolute 15.32 (*)     Immature Grans, Absolute 0.42 (*)     All other components within normal limits   URIC ACID - Abnormal; Notable for the following components:    Uric Acid 2.8 (*)     All other components within normal limits   PHOSPHORUS - Normal   BLOOD CULTURE   BLOOD CULTURE   WOUND CULTURE   LACTIC ACID, REFLEX   CBC AND DIFFERENTIAL    Narrative:     The following orders were created for panel order CBC & Differential.  Procedure                               Abnormality         Status                     ---------                               -----------         ------                     CBC Auto Differential[[id number]]        Abnormal            Final result                 Please view results for these tests on the individual orders.      US Venous Doppler Upper Extremity Right (duplex)   Final Result   Impression: There is no evidence of deep venous thrombosis or   superficial thrombophlebitis of the right upper extremity.       Comments: Right upper extremity venous duplex exam was  performed using   color Doppler flow, Doppler wave form analysis, and grayscale imaging,   with and without compression. There is no evidence of deep venous   thrombosis of the internal jugular, subclavian, axillary, brachial,   radial, and ulnar veins. There is no evidence of superficial   thrombophlebitis involving the cephalic or basilic veins.        This report was finalized on 08/04/2023 16:05 by Dr. Fransico Lundy MD.      XR Hand 3+ View Right   Final Result   1. No visualized acute fracture.   2. Multifocal degenerative change, mostly appearing as osteoarthritis.   However, there is periarticular swelling with soft tissue calcification,   bone erosion and ulnar deviation at the third digit PIP joint which may   reflect a crystalline arthropathy such as gout, pseudogout etc.   This report was finalized on 08/04/2023 14:36 by Dr Wyatt Bone, .      XR Wrist 3+ View Right   Final Result   1. Diffuse soft tissue swelling with possible erosion at the posterior   carpal on lateral view. Differential would include artifact, crystal   arthropathy or osteomyelitis. Correlate with patient presentation.   2. Degenerative changes with possible subluxation of the first   metacarpal phalangeal joint. Correlate with range of motion.   3. If there is continued concern for occult fracture, particularly if   there is snuff box tenderness, follow-up radiographs in 10-14 days   following immobilization might be considered.       This report was finalized on 08/04/2023 14:42 by Dr Heather Barros MD.      XR Forearm 2 View Right   Final Result   1. No visualized fracture or malalignment.   2. Elbow and radiocarpal joint osteoarthritis changes which are mild.   3. Nonspecific subcutaneous edema which appears fairly diffuse along the   included portion of the upper arm as well as the forearm.       This report was finalized on 08/04/2023 14:28 by Dr Wyatt Bone, .      XR Elbow 3+ View Right   Final Result   Impression:     1. Nonspecific diffuse subcutaneous edema.   2. No visualized fracture, malalignment or elbow joint capsular   distention.       This report was finalized on 08/04/2023 14:11 by Dr Wyatt Bone, .          Procedures           ED Course  ED Course as of 08/04/23 1655   Fri Aug 04, 2023   1506 Patient has LRINEC score of 10 with diabetic increased risk of fasciitis will give clindamycin to prevent ribosomal degranulation followed by Vanco and Zosyn.  And admitted to the hospital service. [TS]   1508 Dr. Triado paged at this time for admission to the hospital.  [KF]      ED Course User Index  [KF] Ethan Delacruz, APRN  [TS] Pablo Mullins MD                                           Medical Decision Making  Aleks Monzon is a 73 y.o. male who presents to the ED for right arm redness, swelling and increased pain.  Patient reports that the redness and swelling did start upon awakening this morning.  Patient reports originally he thought that this was lymphedema as he does have a history noted to bilateral lower extremities.  He states that as the morning progressed, arm became more tender with increased swelling and erythema noted.  Patient reports that he has an abrasion on the dorsal aspect of his right hand that resulted from a skin tear approximately 3 weeks ago that has not healed appropriately.  Patient denies any falls or injuries to the affected extremity.  Family reports that over the last 3 months patient has had a significant decline in health and mobility.  Patient followed up with his primary care provider regarding chronic diarrhea and had a stool specimen that was negative.  Patient is also seen Dr. Chaudhari for chronic diarrhea and had a normal colonoscopy as well as endoscopy last October.  Patient denies any other issues at this time.  Denies any chest pain, shortness of breath, abdominal pain, nausea, vomiting.  He denies any fevers, body aches, chills.  Patient does have a past medical  history of lymphedema, arthritis, prostate and bladder cancer, cellulitis, gout, hypertension and retroperitoneal fibrosis.  Patient denies any blood thinning medications.  Denies any history of DVT or PE.    Patient was non-toxic appearing on arrival. No acute distress was noted. Past medical history, surgical history, and medication regimen reviewed.     Vital signs stable.  Patient is afebrile.  No tachycardia noted.  Oxygen saturation 98% on room air with no respiratory distress or shortness of breath noted throughout ED encounter.    Progress notes, labs, imaging and more reviewed.    Patient's presentation raises suspicion for differentials including, but not limited to, DVT, cellulitis, lymphedema, gout, osteomyelitis.    Given this, Aleks was placed on the monitor and IV access was obtained as needed. Laboratory studies and imaging studies were ordered.     Medications administered during ED encounter include the following,   magnesium sulfate 2g/50 mL (PREMIX) infusion   sodium chloride 0.9 % bolus 1,000 mL   clindamycin (CLEOCIN) 900 mg in sodium chloride 0.9% 50 mL IVPB (premix)  piperacillin-tazobactam (ZOSYN) IVPB 4.5 g in 100 mL NS (CD)  vancomycin IVPB 2000 mg in 0.9% Sodium Chloride 500 mL    On re-evaluation, patient remained hemodynamically stable.     Wells score for DVT: 1 point, moderate risk.  Venous Doppler of the right upper extremity was obtained and no thrombus was visualized.  LRINEC score: 10 points, high risk    Given findings described above, patient's presentation is likely consistent with cellulitis of the right upper extremity, hypomagnesemia, leukocytosis, electrolyte imbalance. I have a low suspicion for DVT at this point in their ED course.      Patient's presentation, labs, imaging and more was reviewed with attending physician, Dr. Mullins who is in agreement with antibiotic treatment and admission to the hospital.  Dr. Tj Hyde was consulted regarding admission to the  hospital for cellulitis and further evaluation and treatment.  Dr. Benavides accepted patient for admission at this time.    I had an in-depth discussion with the patient as well as family members that are present at bedside regarding ED work-up thus far.  We discussed that vascular study did not reveal any DVTs to the right upper extremity but patient does have some lab abnormalities and electrolyte imbalances.  Due to physical exam as well as negative vascular study and current lab results we are going to treat patient for cellulitis of the right upper extremity.  I said that the next step in treatment would be admission to the hospital for further workup and care. I also said that there is always some diagnostic uncertainty in the ER, that symptoms may change, and new things may be found after being admitted.  Dr. Benavides assumed primary care of the patient and the patient was admitted to their service in stable condition.       Problems Addressed:  Cellulitis of right upper extremity: complicated acute illness or injury  Hypomagnesemia: complicated acute illness or injury  Leukocytosis, unspecified type: complicated acute illness or injury    Amount and/or Complexity of Data Reviewed  Labs: ordered.  Radiology: ordered.    Risk  OTC drugs.  Prescription drug management.  Decision regarding hospitalization.        Final diagnoses:   Cellulitis of right upper extremity   Hypomagnesemia   Leukocytosis, unspecified type       ED Disposition  ED Disposition       ED Disposition   Decision to Admit    Condition   --    Comment   Level of Care: Med/Surg [1]   Diagnosis: Cellulitis of right upper extremity [693306]   Admitting Physician: CYNTHIA BENAVIDES [[id number]]   Attending Physician: CYNTHIA BENAVIDES [[id number]]   Certification: I Certify That Inpatient Hospital Services Are Medically Necessary For Greater Than 2 Midnights                 No follow-up provider specified.       Medication List      No changes were  made to your prescriptions during this visit.            Ethan Delacruz, APRN  08/04/23 5316

## 2023-08-05 PROBLEM — A41.50 SEPSIS DUE TO GRAM NEGATIVE BACTERIA: Status: ACTIVE | Noted: 2023-08-05

## 2023-08-05 LAB
ANION GAP SERPL CALCULATED.3IONS-SCNC: 10 MMOL/L (ref 5–15)
BACTERIA BLD CULT: ABNORMAL
BOTTLE TYPE: ABNORMAL
BUN SERPL-MCNC: 49 MG/DL (ref 8–23)
BUN/CREAT SERPL: 34.3 (ref 7–25)
CALCIUM SPEC-SCNC: 6.9 MG/DL (ref 8.6–10.5)
CHLORIDE SERPL-SCNC: 105 MMOL/L (ref 98–107)
CO2 SERPL-SCNC: 18 MMOL/L (ref 22–29)
CREAT SERPL-MCNC: 1.43 MG/DL (ref 0.76–1.27)
EGFRCR SERPLBLD CKD-EPI 2021: 52.4 ML/MIN/1.73
GLUCOSE SERPL-MCNC: 96 MG/DL (ref 65–99)
MAGNESIUM SERPL-MCNC: 1.7 MG/DL (ref 1.6–2.4)
POTASSIUM SERPL-SCNC: 3.4 MMOL/L (ref 3.5–5.2)
POTASSIUM SERPL-SCNC: 4.2 MMOL/L (ref 3.5–5.2)
SODIUM SERPL-SCNC: 133 MMOL/L (ref 136–145)

## 2023-08-05 PROCEDURE — 83735 ASSAY OF MAGNESIUM: CPT | Performed by: FAMILY MEDICINE

## 2023-08-05 PROCEDURE — 25010000002 CEFTRIAXONE PER 250 MG: Performed by: FAMILY MEDICINE

## 2023-08-05 PROCEDURE — 25010000002 CALCIUM GLUCONATE PER 10 ML: Performed by: FAMILY MEDICINE

## 2023-08-05 PROCEDURE — 25010000002 CALCIUM GLUCONATE PER 10 ML: Performed by: STUDENT IN AN ORGANIZED HEALTH CARE EDUCATION/TRAINING PROGRAM

## 2023-08-05 PROCEDURE — 84132 ASSAY OF SERUM POTASSIUM: CPT | Performed by: STUDENT IN AN ORGANIZED HEALTH CARE EDUCATION/TRAINING PROGRAM

## 2023-08-05 PROCEDURE — 25010000002 MAGNESIUM SULFATE 2 GM/50ML SOLUTION: Performed by: STUDENT IN AN ORGANIZED HEALTH CARE EDUCATION/TRAINING PROGRAM

## 2023-08-05 PROCEDURE — 97165 OT EVAL LOW COMPLEX 30 MIN: CPT | Performed by: OCCUPATIONAL THERAPIST

## 2023-08-05 PROCEDURE — 80048 BASIC METABOLIC PNL TOTAL CA: CPT | Performed by: FAMILY MEDICINE

## 2023-08-05 PROCEDURE — 25010000002 VANCOMYCIN 10 G RECONSTITUTED SOLUTION: Performed by: STUDENT IN AN ORGANIZED HEALTH CARE EDUCATION/TRAINING PROGRAM

## 2023-08-05 RX ORDER — VANCOMYCIN/0.9 % SOD CHLORIDE 1.5G/250ML
15 PLASTIC BAG, INJECTION (ML) INTRAVENOUS EVERY 24 HOURS
Status: DISCONTINUED | OUTPATIENT
Start: 2023-08-05 | End: 2023-08-07

## 2023-08-05 RX ORDER — POTASSIUM CHLORIDE 750 MG/1
40 CAPSULE, EXTENDED RELEASE ORAL EVERY 4 HOURS
Status: COMPLETED | OUTPATIENT
Start: 2023-08-05 | End: 2023-08-05

## 2023-08-05 RX ORDER — PANTOPRAZOLE SODIUM 40 MG/1
40 TABLET, DELAYED RELEASE ORAL DAILY PRN
COMMUNITY

## 2023-08-05 RX ORDER — MAGNESIUM SULFATE HEPTAHYDRATE 40 MG/ML
INJECTION, SOLUTION INTRAVENOUS ONCE
Status: CANCELLED | OUTPATIENT
Start: 2023-08-05 | End: 2023-08-05

## 2023-08-05 RX ORDER — METOCLOPRAMIDE 5 MG/1
5 TABLET ORAL 3 TIMES DAILY PRN
COMMUNITY

## 2023-08-05 RX ORDER — ZINC OXIDE
1 OINTMENT (GRAM) TOPICAL 2 TIMES DAILY
COMMUNITY
End: 2023-08-10 | Stop reason: HOSPADM

## 2023-08-05 RX ADMIN — VANCOMYCIN HYDROCHLORIDE 1500 MG: 10 INJECTION, POWDER, LYOPHILIZED, FOR SOLUTION INTRAVENOUS at 16:20

## 2023-08-05 RX ADMIN — POTASSIUM CHLORIDE 40 MEQ: 10 CAPSULE, COATED, EXTENDED RELEASE ORAL at 08:48

## 2023-08-05 RX ADMIN — CEFTRIAXONE 1000 MG: 1 INJECTION, POWDER, FOR SOLUTION INTRAMUSCULAR; INTRAVENOUS at 08:48

## 2023-08-05 RX ADMIN — MAGNESIUM SULFATE HEPTAHYDRATE 2 G: 2 INJECTION, SOLUTION INTRAVENOUS at 00:18

## 2023-08-05 RX ADMIN — MAGNESIUM SULFATE HEPTAHYDRATE 2 G: 2 INJECTION, SOLUTION INTRAVENOUS at 07:23

## 2023-08-05 RX ADMIN — MAGNESIUM SULFATE HEPTAHYDRATE 2 G: 2 INJECTION, SOLUTION INTRAVENOUS at 04:49

## 2023-08-05 RX ADMIN — CALCIUM GLUCONATE 2000 MG: 98 INJECTION, SOLUTION INTRAVENOUS at 10:42

## 2023-08-05 RX ADMIN — MAGNESIUM SULFATE HEPTAHYDRATE 2 G: 2 INJECTION, SOLUTION INTRAVENOUS at 02:36

## 2023-08-05 RX ADMIN — CALCIUM GLUCONATE 2000 MG: 98 INJECTION, SOLUTION INTRAVENOUS at 03:22

## 2023-08-05 RX ADMIN — CALCIUM GLUCONATE 2000 MG: 98 INJECTION, SOLUTION INTRAVENOUS at 01:10

## 2023-08-05 RX ADMIN — POTASSIUM CHLORIDE 40 MEQ: 10 CAPSULE, COATED, EXTENDED RELEASE ORAL at 10:42

## 2023-08-05 RX ADMIN — Medication 10 ML: at 20:30

## 2023-08-05 RX ADMIN — CALCIUM GLUCONATE 2000 MG: 98 INJECTION, SOLUTION INTRAVENOUS at 11:47

## 2023-08-05 RX ADMIN — CALCIUM GLUCONATE 2000 MG: 98 INJECTION, SOLUTION INTRAVENOUS at 08:40

## 2023-08-05 NOTE — H&P
Patient Care Team:  Tj Hyde MD as PCP - General (Internal Medicine)  Siddhartha Epps MD as Consulting Physician (Urology)  Burton Chaudhari MD as Consulting Physician (Gastroenterology)  Pradip De La Vega MD as Surgeon (Neurosurgery)    Chief complaint right arm pain and swelling    Subjective     Patient is a 73 y.o. male presents with abrupt swelling of his right hand and arm. Onset of symptoms was abrupt starting 1 day ago.  Symptoms are associated with a prior injury to the right hand and arm with skin tear.  Symptoms are aggravated by nothing really other than some tingling he noted upon awakening yesterday morning when he moved his arm.   Symptoms improve with elevating it. Severity moderate.  Context again possibly related to a skin tear he received on that arm.  Quality described as some tingly pain.    Review of Systems   Pertinent items are noted in HPI    History  Past Medical History:   Diagnosis Date    Arthritis     Cancer     PROSTATE AND BLADDER    Cellulitis     Gout     Hypertension     IFG (impaired fasting glucose)     Low back pain     Lymphedema     Retroperitoneal fibrosis      Past Surgical History:   Procedure Laterality Date    AXILLARY LYMPH NODE BIOPSY/EXCISION Left 10/01/2020    Procedure: LEFT AXILLARY LYMPH NODE BIOPSY;  Surgeon: Dina To MD;  Location: [facility] OR;  Service: General;  Laterality: Left;    BACK SURGERY      COLONOSCOPY      2017?    COLONOSCOPY N/A 10/11/2022    Procedure: COLONOSCOPY WITH ANESTHESIA;  Surgeon: Burton Chaudhari MD;  Location: [facility] ENDOSCOPY;  Service: Gastroenterology;  Laterality: N/A;  preop; abdominal pain  postop; polyps   PCP Tj Hyde MD    ENDOSCOPY N/A 10/11/2022    Procedure: ESOPHAGOGASTRODUODENOSCOPY WITH ANESTHESIA;  Surgeon: Burton Chaudhari MD;  Location: [facility] ENDOSCOPY;  Service: Gastroenterology;  Laterality: N/A;  preop; abdominal pain  postop; misshape stomach  PCP Tj Hyde  MD    JOINT REPLACEMENT      BILATERAL HIP     SKIN CANCER EXCISION      TOTAL HIP ARTHROPLASTY      URETERAL STENT INSERTION       Family History   Problem Relation Age of Onset    Heart disease Father     Diabetes Maternal Grandmother     Colon cancer Neg Hx     Colon polyps Neg Hx      Social History     Tobacco Use    Smoking status: Never    Smokeless tobacco: Never   Vaping Use    Vaping Use: Never used   Substance Use Topics    Alcohol use: Never    Drug use: Never     E-cigarette/Vaping    E-cigarette/Vaping Use Never User      E-cigarette/Vaping Substances    Nicotine No     THC No     CBD No     Flavoring No      Medications Prior to Admission   Medication Sig Dispense Refill Last Dose    allopurinol (ZYLOPRIM) 300 MG tablet Take 1 tablet by mouth Daily.   8/3/2023    Calcium Carb-Cholecalciferol 600-10 MG-MCG tablet Take 1 tablet by mouth Daily.   8/3/2023    colestipol (COLESTID) 1 g tablet Take 1 tablet by mouth Daily. 30 tablet 2 8/3/2023    mirtazapine (REMERON) 7.5 MG tablet Take 1 tablet by mouth Every Night.   8/3/2023    predniSONE (DELTASONE) 10 MG tablet Take 1 tablet by mouth Daily.   Patient Taking Differently    tamsulosin (FLOMAX) 0.4 MG capsule 24 hr capsule Take 1 capsule by mouth Daily.   8/3/2023    torsemide (DEMADEX) 20 MG tablet Take 1 tablet by mouth Daily.   Patient Taking Differently    colchicine 0.6 MG tablet Take 1 tablet by mouth 2 (Two) Times a Day As Needed for Muscle / Joint Pain. 20 tablet 5 Unknown    diphenhydrAMINE (BENADRYL) 25 MG tablet Take 1 tablet by mouth Every 6 (Six) Hours As Needed for Allergies (rare).   More than a month    liver oil-zinc oxide (DESITIN) 40 % ointment Apply 1 application  topically to the appropriate area as directed 2 (Two) Times a Day.       metoclopramide (REGLAN) 5 MG tablet Take 1 tablet by mouth 3 (Three) Times a Day As Needed.       pantoprazole (PROTONIX) 40 MG EC tablet Take 1 tablet by mouth Daily As Needed.        Allergies:   Niacin    Objective     Vital Signs  Temp:  [97.7 øF (36.5 øC)-98.4 øF (36.9 øC)] 97.7 øF (36.5 øC)  Heart Rate:  [80-94] 87  Resp:  [18] 18  BP: ()/(60-82) 107/82    Physical Exam:    General Appearance alert, appears stated age, cooperative, overweight, and frail appearance  Head normocephalic, without obvious abnormality and atraumatic  Eyes lids and lashes normal, conjunctivae and sclerae normal, no icterus, and no pallor  Neck no adenopathy, supple, and trachea midline  Lungs clear to auscultation, respirations regular, respirations even, and respirations unlabored  Heart regular rhythm & normal rate, normal S1, S2, and no murmur, no gallop, no rub  Abdomen normal bowel sounds, no masses, soft non-tender, no guarding, and no rebound tenderness  Extremities no cyanosis and no redness, edema 2+ lower bilateral, redness right forearm extending slightly past the elbow which by demarcation with marker looks like it has declined from admission, and tenderness of the right hand and arm which is currently dressed in a Kerlix dressing  Skin right arm with skin changes noted on pictures in the chart.  Currently dressing in place and dressing not removed during my exam  Neurologic Mental Status orientated to person, place, time and situation    Results Review:    I reviewed the patient's new clinical results.    Assessment & Plan       Cellulitis of right upper extremity    Prostate cancer    Gout, arthropathy    Hypertension    Malignant neoplasm of trigone of urinary bladder    Stage 3a chronic kidney disease    Protein deficiency    Sepsis due to Gram negative bacteria      Abrupt onset cellulitis now with positive blood cultures.  Initially received vancomycin but blood culture showing gram-negative bacilli.  Appropriate treatment would be third or fourth generation cephalosporin given no concern for healthcare associated infection.  I have started him on Rocephin 2 g every 24 hours.  Multiple electrolyte  abnormalities noted and currently on electrolyte replacement protocols.  Seems generally weak as would benefit from physical therapy and Occupational Therapy evaluation.   following to work on disposition planning.    I discussed the patients findings and my recommendations with patient and family.     Eugene Stock MD  08/05/23  15:26 CDT    Time:  More than 40 minutes

## 2023-08-05 NOTE — THERAPY EVALUATION
Patient Name: Aleks Monzon  : 1950    MRN: [id number]                              Today's Date: 2023       Admit Date: 2023    Visit Dx:     ICD-10-CM ICD-9-CM   1. Cellulitis of right upper extremity  L03.113 682.3   2. Hypomagnesemia  E83.42 275.2   3. Leukocytosis, unspecified type  D72.829 288.60   4. Decreased activities of daily living (ADL) [Z78.9 (ICD-10-CM)]  Z78.9 V49.89     Patient Active Problem List   Diagnosis    IFG (impaired fasting glucose)    Benign essential HTN    High cholesterol    Prostate cancer    Elevated PSA    Elevated serum creatinine    Gout, arthropathy    Obesity (BMI 30-39.9)    Primary osteoarthritis of both knees    Retroperitoneal mass    Edema    Abnormal CT of the abdomen    Axillary mass, left    Retroperitoneal fibrosis    Gout    Ureteral obstruction, left    Hypertension    Malignant neoplasm of trigone of urinary bladder    COVID-19    Closed fracture of right hip    Closed fracture of right hip, initial encounter    Stage 3a chronic kidney disease    Cancer    Weight loss    Protein deficiency    Decreased appetite    BRBPR (bright red blood per rectum)    History of adenomatous polyp of colon    Overweight with body mass index (BMI) of 27 to 27.9 in adult    Non-smoker    Cellulitis of right upper extremity     Past Medical History:   Diagnosis Date    Arthritis     Cancer     PROSTATE AND BLADDER    Cellulitis     Gout     Hypertension     IFG (impaired fasting glucose)     Low back pain     Lymphedema     Retroperitoneal fibrosis      Past Surgical History:   Procedure Laterality Date    AXILLARY LYMPH NODE BIOPSY/EXCISION Left 10/01/2020    Procedure: LEFT AXILLARY LYMPH NODE BIOPSY;  Surgeon: Dina To MD;  Location:  [facility] OR;  Service: General;  Laterality: Left;    BACK SURGERY      COLONOSCOPY      2017?    COLONOSCOPY N/A 10/11/2022    Procedure: COLONOSCOPY WITH ANESTHESIA;  Surgeon: Burton Chaudhari MD;  Location:  [facility]  ENDOSCOPY;  Service: Gastroenterology;  Laterality: N/A;  preop; abdominal pain  postop; polyps   PCP Tj Hyde MD    ENDOSCOPY N/A 10/11/2022    Procedure: ESOPHAGOGASTRODUODENOSCOPY WITH ANESTHESIA;  Surgeon: Burton Chaudhari MD;  Location: [facility] ENDOSCOPY;  Service: Gastroenterology;  Laterality: N/A;  preop; abdominal pain  postop; misshape stomach  PCP Tj Hyde MD    JOINT REPLACEMENT      BILATERAL HIP     SKIN CANCER EXCISION      TOTAL HIP ARTHROPLASTY      URETERAL STENT INSERTION        General Information       Row Name 08/05/23 0850          OT Time and Intention    Document Type evaluation  PMH: lymphedema, arthritis, prostate and bladder cancer, cellulitis, gout, hypertension and retroperitoneal fibrosis. Dx: RUE cellulitis, Hypomagnesemia, Leukocytosis.  -JJ (r) RH (t) JJ (c)     Mode of Treatment occupational therapy  -JJ (r) RH (t) JJ (c)       Row Name 08/05/23 0850          General Information    Patient Profile Reviewed yes  -JJ (r) RH (t) JJ (c)     Prior Level of Function min assist:;dressing;bathing;independent:;feeding;grooming;all household mobility;using stairs  -JJ (r) RH (t) JJ (c)     Existing Precautions/Restrictions fall  -JJ (r) RH (t) JJ (c)       Row Name 08/05/23 0850          Occupational Profile    Environmental Supports and Barriers (Occupational Profile) tub shower; elevated toilet seat; lift chair  -JJ (r) RH (t) JJ (c)       Row Name 08/05/23 0850          Living Environment    People in Home spouse  -JJ (r) RH (t) JJ (c)     Name(s) of People in Home Violet Monzon  -JJ (r) RH (t) JJ (c)       Row Name 08/05/23 0850          Home Main Entrance    Number of Stairs, Main Entrance none  -JJ (r) RH (t) JJ (c)     Stair Railings, Main Entrance none  -JJ (r) RH (t) JJ (c)       Row Name 08/05/23 0850          Stairs Within Home, Primary    Number of Stairs, Within Home, Primary seven  -JJ (r) RH (t) JJ (c)     Stair Railings, Within Home, Primary railings on  both sides of stairs  -JJ (r) RH (t) JJ (c)       Row Name 08/05/23 0850          Cognition    Orientation Status (Cognition) oriented x 4  -JJ (r) RH (t) JJ (c)       Row Name 08/05/23 0850          Safety Issues, Functional Mobility    Impairments Affecting Function (Mobility) balance;endurance/activity tolerance;postural/trunk control;range of motion (ROM);shortness of breath;sensation/sensory awareness;strength  -JJ (r) RH (t) JJ (c)               User Key  (r) = Recorded By, (t) = Taken By, (c) = Cosigned By      Initials Name Provider Type    Siri Isaac, OTR/L, CSRS Occupational Therapist    Rae Myers, OT Student OT Student                     Mobility/ADL's       Row Name 08/05/23 0850          Bed Mobility    Bed Mobility supine-sit;sit-supine;scooting/bridging  -JJ (r) RH (t) JJ (c)     Scooting/Bridging Merkel (Bed Mobility) verbal cues;maximum assist (25% patient effort);2 person assist  -JJ (r) RH (t) JJ (c)     Supine-Sit Merkel (Bed Mobility) minimum assist (75% patient effort);verbal cues  -JJ (r) RH (t) JJ (c)     Sit-Supine Merkel (Bed Mobility) verbal cues;moderate assist (50% patient effort)  -JJ (r) RH (t) JJ (c)     Bed Mobility, Safety Issues decreased use of arms for pushing/pulling;decreased use of legs for bridging/pushing;impaired trunk control for bed mobility  -JJ (r) RH (t) JJ (c)     Assistive Device (Bed Mobility) bed rails;head of bed elevated;draw sheet  -JJ (r) RH (t) JJ (c)       Row Name 08/05/23 0850          Transfers    Transfers sit-stand transfer;stand-sit transfer  -JJ (r) RH (t) JJ (c)       Row Name 08/05/23 0850          Sit-Stand Transfer    Sit-Stand Merkel (Transfers) maximum assist (25% patient effort);verbal cues  -JJ (r) RH (t) JJ (c)     Comment, (Sit-Stand Transfer) elevated bed height; unable to achieve full stand  -JJ (r) RH (t) JJ (c)       Row Name 08/05/23 0875          Stand-Sit Transfer    Stand-Sit Merkel  (Transfers) maximum assist (25% patient effort);verbal cues  -JJ (r) RH (t) JJ (c)       Row Name 08/05/23 0850          Toilet Transfer    Type (Toilet Transfer) --  -JJ (r) RH (t) JJ (c)     Assistive Device (Toilet Transfer) --  -JJ (r) RH (t) JJ (c)       Row Name 08/05/23 0850          Activities of Daily Living    BADL Assessment/Intervention lower body dressing;toileting  -JJ (r) RH (t) JJ (c)       Row Name 08/05/23 0850          Lower Body Dressing Assessment/Training    [place] Level (Lower Body Dressing) doff;socks;dependent (less than 25% patient effort)  -JJ (r) RH (t) JJ (c)     Position (Lower Body Dressing) supine  -JJ (r) RH (t) JJ (c)       Row Name 08/05/23 0850          Toileting Assessment/Training    [place] Level (Toileting) maximum assist (25% patient effort)  -JJ (r) RH (t) JJ (c)     Assistive Devices (Toileting) urinal  -JJ (r) RH (t) JJ (c)     Position (Toileting) edge of bed sitting  -JJ (r) RH (t) JJ (c)     Comment, (Toileting) Performed by pt's wife at bedside  -JJ (r) RH (t) JJ (c)               User Key  (r) = Recorded By, (t) = Taken By, (c) = Cosigned By      Initials Name Provider Type    Siri Isaac, OTR/L, CSRS Occupational Therapist    RH Rae Watkins, OT Student OT Student                   Obj/Interventions       Row Name 08/05/23 0850          Sensory Assessment (Somatosensory)    Sensory Assessment (Somatosensory) right UE;left UE;LE sensation intact  -JJ (r) RH (t) JJ (c)     Left UE Sensory Assessment intact  -JJ (r) RH (t) JJ (c)     Right UE Sensory Assessment general sensation  -JJ (r) RH (t) JJ (c)     Sensory Subjective Reports numbness  -JJ (r) RH (t) JJ (c)     Sensory Assessment R hand  -JJ (r) RH (t) JJ (c)       Row Name 08/05/23 0850          Vision Assessment/Intervention    Visual Impairment/Limitations corrective lenses for reading;WFL  -EHSANJ (r) JESSIE (t) JEHSAN (c)       Row Name 08/05/23 0850          Range of Motion Comprehensive     General Range of Motion upper extremity range of motion deficits identified  -JJ (r) RH (t) JJ (c)     Comment, General Range of Motion B shoulders AROM impaired by 60%, PROM impaired by 30%; Remaining UE joints WNL  -JJ (r) RH (t) JJ (c)       Row Name 08/05/23 0850          Strength Comprehensive (MMT)    General Manual Muscle Testing (MMT) Assessment upper extremity strength deficits identified  -JJ (r) RH (t) JJ (c)     Comment, General Manual Muscle Testing (MMT) Assessment B bicep/triceps 3+/5; B  strength 4-/5  -JJ (r) RH (t) JJ (c)       Row Name 08/05/23 0850          Balance    Balance Assessment sitting static balance;sitting dynamic balance;sit to stand dynamic balance  -JJ (r) RH (t) JJ (c)     Static Sitting Balance standby assist  -JJ (r) RH (t) JJ (c)     Dynamic Sitting Balance supervision  -JJ (r) RH (t) JJ (c)     Position, Sitting Balance sitting edge of bed  -JJ (r) RH (t) JJ (c)     Sit to Stand Dynamic Balance maximum assist  -JJ (r) RH (t) JJ (c)               User Key  (r) = Recorded By, (t) = Taken By, (c) = Cosigned By      Initials Name Provider Type    Siri Isaac, OTR/L, CSRS Occupational Therapist    Rae Myers, OT Student OT Student                   Goals/Plan       Row Name 08/05/23 0850          Transfer Goal 1 (OT)    Activity/Assistive Device (Transfer Goal 1, OT) toilet;commode, bedside without drop arms  -JJ (r) RH (t) JJ (c)     [place] Level/Cues Needed (Transfer Goal 1, OT) moderate assist (50-74% patient effort)  -JJ (r) RH (t) JJ (c)     Time Frame (Transfer Goal 1, OT) long term goal (LTG);10 days  -JJ (r) RH (t) JJ (c)     Progress/Outcome (Transfer Goal 1, OT) new goal  -JJ (r) RH (t) JJ (c)       Row Name 08/05/23 0850          Bathing Goal 1 (OT)    Activity/Device (Bathing Goal 1, OT) bathing skills, all  -KEITH (r) JESSIE (t) KEITH (c)     [place] Level/Cues Needed (Bathing Goal 1, OT) moderate assist (50-74% patient effort)  -KEITH (r) JESSIE (t) EHSANJ  (c)     Time Frame (Bathing Goal 1, OT) long term goal (LTG);10 days  -JJ (r) RH (t) JJ (c)     Progress/Outcomes (Bathing Goal 1, OT) new goal  -JJ (r) RH (t) JJ (c)       Row Name 08/05/23 0850          Problem Specific Goal 1 (OT)    Problem Specific Goal 1 (OT) Pt will tolerate sitting EOB with no LOB for 15 min to participate in ADLs and improve fxl activity tolerance, sitting balance, and trunk control.  -JJ (r) RH (t) JJ (c)     Time Frame (Problem Specific Goal 1, OT) long term goal (LTG);10 days  -JJ (r) RH (t) JJ (c)     Progress/Outcome (Problem Specific Goal 1, OT) new goal  -JJ (r) RH (t) JJ (c)       Row Name 08/05/23 0850          Therapy Assessment/Plan (OT)    Planned Therapy Interventions (OT) activity tolerance training;adaptive equipment training;BADL retraining;functional balance retraining;occupation/activity based interventions;patient/caregiver education/training;ROM/therapeutic exercise;strengthening exercise;transfer/mobility retraining;passive ROM/stretching  -JJ (r) RH (t) JJ (c)               User Key  (r) = Recorded By, (t) = Taken By, (c) = Cosigned By      Initials Name Provider Type    Siri Isaac, OTR/L, CSRS Occupational Therapist    Rae Myers, OT Student OT Student                   Clinical Impression       Row Name 08/05/23 0850          Pain Assessment    Pretreatment Pain Rating 2/10  -JJ (r) RH (t) JJ (c)     Pain Location - Side/Orientation Right  -JJ (r) RH (t) JJ (c)     Pain Location upper  -JJ (r) RH (t) JJ (c)     Pain Location - extremity;back  -JJ (r) RH (t) JJ (c)     Pain Intervention(s) Repositioned;Ambulation/increased activity  -JJ (r) RH (t) JJ (c)       Row Name 08/05/23 0850          Plan of Care Review    Plan of Care Reviewed With patient;spouse  -JJ (r) RH (t) JJ (c)     Progress no change  -JJ (r) RH (t) EHSANJ (c)     Outcome Evaluation OT eval completed. Pt A&Ox4, reports 2/10 pain. Pt reports PLOF as of a week ago independent for feeding,  grooming, all household mobility with use of rollator and cane, and using stairs; Linda bathing and dressing. Pt and wife reports he has been getting weaker and weaker in the past week. Today pt completed bed mobility with Linda for supine-sit, modA for sit-supine, maxA x2 with use of draw sheet for scooting up in bed. Pt c/o of dizziness upon sitting EOB, required 3 min for dizziness to subside. While sittine EOB pt requuired multiple VC to self-correct posterior lean. Pt attempted sit-stand with maxA and VC, but was unable to achieve full stand. Pt assisted by wife toileted sitting EOB with use of urinal and maxA. Pt dependent to doff B socks. Pt demo'd deficits in strength, fxl activity tolerance, balance, ROM, sensation, and trunk control. Pt would benefit from skilled OT services to address these deficits. Recommend d/c to SNF pending pt's progress with therapy.  -JJ (r) RH (t) JJ (c)       Row Name 08/05/23 0850          Therapy Assessment/Plan (OT)    Rehab Potential (OT) good, to achieve stated therapy goals  -JJ (r) RH (t) JJ (c)     Criteria for Skilled Therapeutic Interventions Met (OT) yes;meets criteria;skilled treatment is necessary  -JJ (r) RH (t) JJ (c)     Therapy Frequency (OT) 5 times/wk  -JJ (r) RH (t) JJ (c)       Row Name 08/05/23 0850          Therapy Plan Review/Discharge Plan (OT)    Anticipated Discharge Disposition (OT) skilled nursing facility  -JJ (r) RH (t) JJ (c)       Row Name 08/05/23 0850          Vital Signs    O2 Delivery Pre Treatment room air  -JJ (r) RH (t) JJ (c)     O2 Delivery Intra Treatment room air  -JJ (r) RH (t) JJ (c)     O2 Delivery Post Treatment room air  -JJ (r) RH (t) JJ (c)     Pre Patient Position Supine  -JJ (r) RH (t) JJ (c)     Intra Patient Position Sitting  -JJ (r) RH (t) JJ (c)     Post Patient Position Supine  -JJ (r) RH (t) JJ (c)       Row Name 08/05/23 0850          Positioning and Restraints    Pre-Treatment Position in bed  -KEITH (r) JESSIE (t) KEITH (hi)      Post Treatment Position bed  -JJ (r) RH (t) JJ (c)     In Bed notified nsg;fowlers;call light within reach;encouraged to call for assist;side rails up x2;with family/caregiver;SCD pump applied  -JJ (r) RH (t) JJ (c)               User Key  (r) = Recorded By, (t) = Taken By, (c) = Cosigned By      Initials Name Provider Type    Siri Isaac, OTR/L, CSRS Occupational Therapist    RH Rae Watkins, OT Student OT Student                   Outcome Measures       Row Name 08/05/23 0850          How much help from another is currently needed...    Putting on and taking off regular lower body clothing? 1  -JJ (r) RH (t) JJ (c)     Bathing (including washing, rinsing, and drying) 2  -JJ (r) RH (t) JJ (c)     Toileting (which includes using toilet bed pan or urinal) 2  -JJ (r) RH (t) JJ (c)     Putting on and taking off regular upper body clothing 2  -JJ (r) RH (t) JJ (c)     Taking care of personal grooming (such as brushing teeth) 3  -JJ (r) RH (t) JJ (c)     Eating meals 3  -JJ (r) RH (t) JJ (c)     AM-PAC 6 Clicks Score (OT) 13  -JJ (r) RH (t)       Row Name 08/05/23 0800          How much help from another person do you currently need...    Turning from your back to your side while in flat bed without using bedrails? 3  -MB     Moving from lying on back to sitting on the side of a flat bed without bedrails? 3  -MB     Moving to and from a bed to a chair (including a wheelchair)? 3  -MB     Standing up from a chair using your arms (e.g., wheelchair, bedside chair)? 3  -MB     Climbing 3-5 steps with a railing? 3  -MB     To walk in hospital room? 3  -MB     AM-PAC 6 Clicks Score (PT) 18  -MB       Row Name 08/05/23 0850          Functional Assessment    Outcome Measure Options AM-PAC 6 Clicks Daily Activity (OT)  -JJ (r) RH (t) JJ (c)               User Key  (r) = Recorded By, (t) = Taken By, (c) = Cosigned By      Initials Name Provider Type    Siri Isaac, JULISSAR/L, CSRS Occupational Therapist    MB  Sandra Baldwin, RN Registered Nurse     Rae Watkins, OT Student OT Student                    Occupational Therapy Education       Title: PT OT SLP Therapies (In Progress)       Topic: Occupational Therapy (In Progress)       Point: ADL training (Done)       Description:   Instruct learner(s) on proper safety adaptation and remediation techniques during self care or transfers.   Instruct in proper use of assistive devices.                  Learning Progress Summary             Patient Acceptance, E, VU by  at 8/5/2023 0850   Significant Other Acceptance, E, VU by  at 8/5/2023 0850                         Point: Home exercise program (Not Started)       Description:   Instruct learner(s) on appropriate technique for monitoring, assisting and/or progressing therapeutic exercises/activities.                  Learner Progress:  Not documented in this visit.              Point: Precautions (Not Started)       Description:   Instruct learner(s) on prescribed precautions during self-care and functional transfers.                  Learner Progress:  Not documented in this visit.              Point: Body mechanics (Not Started)       Description:   Instruct learner(s) on proper positioning and spine alignment during self-care, functional mobility activities and/or exercises.                  Learner Progress:  Not documented in this visit.                              User Key       Initials Effective Dates Name Provider Type Discipline     05/03/23 -  Rae Watkins, OT Student OT Student OT                  OT Recommendation and Plan  Planned Therapy Interventions (OT): activity tolerance training, adaptive equipment training, BADL retraining, functional balance retraining, occupation/activity based interventions, patient/caregiver education/training, ROM/therapeutic exercise, strengthening exercise, transfer/mobility retraining, passive ROM/stretching  Therapy Frequency (OT): 5 times/wk  Plan of Care  Review  Plan of Care Reviewed With: patient, spouse  Progress: no change  Outcome Evaluation: OT eval completed. Pt A&Ox4, reports 2/10 pain. Pt reports PLOF as of a week ago independent for feeding, grooming, all household mobility with use of rollator and cane, and using stairs; Linda bathing and dressing. Pt and wife reports he has been getting weaker and weaker in the past week. Today pt completed bed mobility with Linda for supine-sit, modA for sit-supine, maxA x2 with use of draw sheet for scooting up in bed. Pt c/o of dizziness upon sitting EOB, required 3 min for dizziness to subside. While sittine EOB pt requuired multiple VC to self-correct posterior lean. Pt attempted sit-stand with maxA and VC, but was unable to achieve full stand. Pt assisted by wife toileted sitting EOB with use of urinal and maxA. Pt dependent to doff B socks. Pt demo'd deficits in strength, fxl activity tolerance, balance, ROM, sensation, and trunk control. Pt would benefit from skilled OT services to address these deficits. Recommend d/c to SNF pending pt's progress with therapy.     Time Calculation:         Time Calculation- OT       Row Name 08/05/23 0850             Time Calculation- OT    OT Start Time 0910  10 min CR  -JJ (r) RH (t) JJ (c)      OT Stop Time 0953  -JJ (r) RH (t) JJ (c)      OT Time Calculation (min) 43 min  -JJ (r) RH (t)      Total Timed Code Minutes- OT 53 minute(s)  -JJ (r) RH (t) JJ (c)      OT Received On 08/05/23  -JJ (r) RH (t) JJ (c)      OT Goal Re-Cert Due Date 08/15/23  -JJ (r) RH (t) JJ (c)                User Key  (r) = Recorded By, (t) = Taken By, (c) = Cosigned By      Initials Name Provider Type    Siri Isaac, VELMA/L, CSRS Occupational Therapist    Rae Myers, OT Student OT Student                           Rae Watkins, OT Student  8/5/2023   Rhofade Counseling: Rhofade is a topical medication which can decrease superficial blood flow where applied. Side effects are uncommon and include stinging, redness and allergic reactions.

## 2023-08-05 NOTE — PLAN OF CARE
Goal Outcome Evaluation:              Outcome Evaluation: pt A&Ox4. VSS. RA. up x2 to BSC. voided per urinal. dressing changed to RUE d/t weeping. electrolye replacement protocol completed this shift. denies any pain.

## 2023-08-05 NOTE — PLAN OF CARE
Goal Outcome Evaluation:  Plan of Care Reviewed With: patient, spouse        Progress: no change  Outcome Evaluation: OT eval completed. Pt A&Ox4, reports 2/10 pain. Pt reports PLOF as of a week ago independent for feeding, grooming, all household mobility with use of rollator and cane, and using stairs; Linda bathing and dressing. Pt and wife reports he has been getting weaker and weaker in the past week. Today pt completed bed mobility with Linda for supine-sit, modA for sit-supine, maxA x2 with use of draw sheet for scooting up in bed. Pt c/o of dizziness upon sitting EOB, required 3 min for dizziness to subside. While sittine EOB pt requuired multiple VC to self-correct posterior lean. Pt attempted sit-stand with maxA and VC, but was unable to achieve full stand. Pt assisted by wife toileted sitting EOB with use of urinal and maxA. Pt dependent to doff B socks. Pt demo'd deficits in strength, fxl activity tolerance, balance, ROM, sensation, and trunk control. Pt would benefit from skilled OT services to address these deficits. Recommend d/c to SNF pending pt's progress with therapy.      Anticipated Discharge Disposition (OT): skilled nursing facility

## 2023-08-05 NOTE — CASE MANAGEMENT/SOCIAL WORK
"Discharge Planning Assessment  [facility]     Patient Name: Aleks Monzon  MRN: [id number]  Today's Date: 8/5/2023    Admit Date: 8/4/2023        Discharge Needs Assessment       Row Name 08/05/23 1433       Living Environment    People in Home spouse    Current Living Arrangements home    Potentially Unsafe Housing Conditions none    Primary Care Provided by self    Provides Primary Care For no one    Family Caregiver if Needed spouse    Quality of Family Relationships helpful;involved;supportive    Able to Return to Prior Arrangements no       Resource/Environmental Concerns    Resource/Environmental Concerns none       Transition Planning    Patient/Family Anticipated Services at Transition skilled nursing    Transportation Anticipated family or friend will provide       Discharge Needs Assessment    Readmission Within the Last 30 Days no previous admission in last 30 days    Equipment Currently Used at Home walker, rolling    Concerns to be Addressed care coordination/care conferences;discharge planning    Anticipated Changes Related to Illness inability to care for self    Outpatient/Agency/Support Group Needs skilled nursing facility    Discharge Facility/Level of Care Needs nursing facility, skilled    Discharge Coordination/Progress Spoke with pt to determine d/c needs. Pt lives at home with his spouse. He has been to [facility] in the past. Therapy has recommended snf placement at this time. Discussed options with him and he is going to \"do some research\" first. Will check back with pt Sunday or Monday.                   Discharge Plan    No documentation.                 Continued Care and Services - Admitted Since 8/4/2023    Coordination has not been started for this encounter.          Demographic Summary    No documentation.                  Functional Status    No documentation.                  Psychosocial    No documentation.                  Abuse/Neglect    No documentation.             "      Legal    No documentation.                  Substance Abuse    No documentation.                  Patient Forms    No documentation.                     REBECCA Reeves

## 2023-08-05 NOTE — PLAN OF CARE
Goal Outcome Evaluation:  Plan of Care Reviewed With: patient, spouse        Progress: no change  Outcome Evaluation: A&Ox4.  RUE red, swollen, hot, elevated, vaseline gauze, kerlix. IV calcium and magnesium replaced x4.  Turning. RA. SCD. Feet elevated. No c/o pain.  Up to bsc. BM x1. Appears to be sleeping well.

## 2023-08-05 NOTE — PROGRESS NOTES
"Pharmacy Dosing Service  Pharmacokinetics  Vancomycin Initial Evaluation  Assessment/Action/Plan:  Loading dose?: 2000mg  Current Order: Vancomycin 1500 mg IVPB every 24 hours  Current end date:8/10/23  Levels: None  Additional antimicrobial agent(s): Ceftriaxone    Vancomycin initiated last PM for SSTI. Blood cultures noted below. Will continue Vancomycin at 15mg/kg IV Q24h for now. No drug levels ordered at this time. Pharmacy will continue to follow daily and adjust dose accordingly.     Subjective:  Aleks Monzon is a 73 y.o. male with a Vancomycin \"Pharmacy to Dose\" consult for the treatment of SSTI , day 2 of 7 of treatment.    AUC Model Data:  Loading dose: 2000mg  Regimen: 1500 mg IV every 24 hours  Exposure target: AUC24 (range) 400-600 mg/L.hr   AUC24,ss: 546 mg/L.hr  PAUC*: 82 %  Ctrough,ss: 16.7 mg/L  Pconc*: 34 %  Tox.: 12 %    Objective:  Ht: 188 cm (74\"); Wt: 94.2 kg (207 lb 10.8 oz)  Estimated Creatinine Clearance: 63.1 mL/min (A) (by C-G formula based on SCr of 1.43 mg/dL (H)).     Creatinine   Date Value Ref Range Status   08/05/2023 1.43 (H) 0.76 - 1.27 mg/dL Final   08/04/2023 1.53 (H) 0.76 - 1.27 mg/dL Final   08/04/2023 1.61 (H) 0.76 - 1.27 mg/dL Final   03/24/2021 1.60 (H) 0.60 - 1.30 mg/dL Final     Comment:     Serial Number: [id number]:  [id number]   12/03/2020 1.80 (H) 0.60 - 1.30 mg/dL Final     Comment:     Serial Number: [id number]:  [id number]   09/02/2020 1.8 (H) 0.6 - 1.2 mg/dL Final   08/19/2020 1.80 (H) 0.60 - 1.30 mg/dL Final     Comment:     Serial Number: [id number]:  [id number]      Lab Results   Component Value Date    WBC 17.21 (H) 08/04/2023    WBC 5.1 06/06/2022    WBC 6.5 06/02/2022      Baseline culture results:  Microbiology Results (last 10 days)       Procedure Component Value - Date/Time    Wound Culture - Wound, Arm, Right [[id number]]  (Abnormal) Collected: 08/04/23 1612    Lab Status: Preliminary result Specimen: Wound from Arm, Right Updated: 08/05/23 1241 "     Wound Culture Light growth (2+) Staphylococcus aureus      Rare Gram Negative Bacilli     Gram Stain Few (2+) Gram positive cocci      No WBCs seen    Blood Culture - Blood, Arm, Right [[id number]]  (Abnormal) Collected: 08/04/23 1419    Lab Status: Preliminary result Specimen: Blood from Arm, Right Updated: 08/05/23 0636     Blood Culture Abnormal Stain     Gram Stain Anaerobic Bottle Gram negative bacilli    Blood Culture - Blood, Arm, Left [[id number]]  (Abnormal) Collected: 08/04/23 1413    Lab Status: Preliminary result Specimen: Blood from Arm, Left Updated: 08/05/23 0729     Blood Culture Abnormal Stain     Gram Stain Anaerobic Bottle Gram negative bacilli      Aerobic Bottle Gram negative bacilli    Blood Culture ID, PCR - Blood, Arm, Left [[id number]]  (Abnormal) Collected: 08/04/23 1413    Lab Status: Final result Specimen: Blood from Arm, Left Updated: 08/05/23 0741     BCID, PCR Enterobacterales spp. Identification by BCID2 PCR.     BOTTLE TYPE Anaerobic Bottle    Narrative:      No resistance genes detected.    Clostridioides difficile Toxin - Stool, Per Rectum [[id number]]  (Normal) Collected: 08/03/23 1514    Lab Status: Final result Specimen: Stool from Per Rectum Updated: 08/03/23 1700    Narrative:      The following orders were created for panel order Clostridioides difficile Toxin - Stool, Per Rectum.  Procedure                               Abnormality         Status                     ---------                               -----------         ------                     Clostridioides difficile...[[id number]]  Normal              Final result                 Please view results for these tests on the individual orders.    Gastrointestinal Panel, PCR - Stool, Per Rectum [[id number]]  (Normal) Collected: 08/03/23 1514    Lab Status: Final result Specimen: Stool from Per Rectum Updated: 08/03/23 1728     Campylobacter Not Detected     Plesiomonas shigelloides Not Detected     Salmonella Not  Detected     Vibrio Not Detected     Vibrio cholerae Not Detected     Yersinia enterocolitica Not Detected     Enteroaggregative E. coli (EAEC) Not Detected     Enteropathogenic E. coli (EPEC) Not Detected     Enterotoxigenic E. coli (ETEC) lt/st Not Detected     Shiga-like toxin-producing E. coli (STEC) stx1/stx2 Not Detected     Shigella/Enteroinvasive E. coli (EIEC) Not Detected     Cryptosporidium Not Detected     Cyclospora cayetanensis Not Detected     Entamoeba histolytica Not Detected     Giardia lamblia Not Detected     Adenovirus F40/41 Not Detected     Astrovirus Not Detected     Norovirus GI/GII Not Detected     Rotavirus A Not Detected     Sapovirus (I, II, IV or V) Not Detected    Narrative:      If Aeromonas, Staphylococcus aureus or Bacillus cereus are suspected, please order [id number]: Stool Culture, Aeromonas, S aureus, B Cereus.    Clostridioides difficile Toxin, PCR - Stool, Per Rectum [[id number]]  (Normal) Collected: 08/03/23 1514    Lab Status: Final result Specimen: Stool from Per Rectum Updated: 08/03/23 1700     Toxigenic C. difficile by PCR Negative    Narrative:      The result indicates the absence of toxigenic C. difficile from stool specimen.             Brendan Casarez, PharmDAR  08/05/23 12:43 CDT

## 2023-08-06 LAB
ALBUMIN SERPL-MCNC: 1.3 G/DL (ref 3.5–5.2)
ALBUMIN/GLOB SERPL: 0.8 G/DL
ALP SERPL-CCNC: 193 U/L (ref 39–117)
ALT SERPL W P-5'-P-CCNC: 26 U/L (ref 1–41)
ANION GAP SERPL CALCULATED.3IONS-SCNC: 9 MMOL/L (ref 5–15)
AST SERPL-CCNC: 33 U/L (ref 1–40)
BACTERIA SPEC AEROBE CULT: ABNORMAL
BASOPHILS # BLD AUTO: 0.03 10*3/MM3 (ref 0–0.2)
BASOPHILS NFR BLD AUTO: 0.3 % (ref 0–1.5)
BILIRUB SERPL-MCNC: 0.2 MG/DL (ref 0–1.2)
BUN SERPL-MCNC: 51 MG/DL (ref 8–23)
BUN/CREAT SERPL: 33.8 (ref 7–25)
CA-I BLD-MCNC: 4.36 MG/DL (ref 4.6–5.4)
CA-I BLD-MCNC: 4.68 MG/DL (ref 4.6–5.4)
CALCIUM SPEC-SCNC: 7.2 MG/DL (ref 8.6–10.5)
CHLORIDE SERPL-SCNC: 104 MMOL/L (ref 98–107)
CO2 SERPL-SCNC: 18 MMOL/L (ref 22–29)
CREAT SERPL-MCNC: 1.51 MG/DL (ref 0.76–1.27)
DEPRECATED RDW RBC AUTO: 50.1 FL (ref 37–54)
EGFRCR SERPLBLD CKD-EPI 2021: 49.1 ML/MIN/1.73
EOSINOPHIL # BLD AUTO: 0.05 10*3/MM3 (ref 0–0.4)
EOSINOPHIL NFR BLD AUTO: 0.5 % (ref 0.3–6.2)
ERYTHROCYTE [DISTWIDTH] IN BLOOD BY AUTOMATED COUNT: 15.9 % (ref 12.3–15.4)
GLOBULIN UR ELPH-MCNC: 1.7 GM/DL
GLUCOSE SERPL-MCNC: 102 MG/DL (ref 65–99)
GRAM STN SPEC: ABNORMAL
GRAM STN SPEC: ABNORMAL
HCT VFR BLD AUTO: 33 % (ref 37.5–51)
HGB BLD-MCNC: 10.6 G/DL (ref 13–17.7)
IMM GRANULOCYTES # BLD AUTO: 0.23 10*3/MM3 (ref 0–0.05)
IMM GRANULOCYTES NFR BLD AUTO: 2.4 % (ref 0–0.5)
LYMPHOCYTES # BLD AUTO: 0.53 10*3/MM3 (ref 0.7–3.1)
LYMPHOCYTES NFR BLD AUTO: 5.4 % (ref 19.6–45.3)
Lab: ABNORMAL
Lab: NORMAL
MAGNESIUM SERPL-MCNC: 2 MG/DL (ref 1.6–2.4)
MCH RBC QN AUTO: 28 PG (ref 26.6–33)
MCHC RBC AUTO-ENTMCNC: 32.1 G/DL (ref 31.5–35.7)
MCV RBC AUTO: 87.1 FL (ref 79–97)
MONOCYTES # BLD AUTO: 0.49 10*3/MM3 (ref 0.1–0.9)
MONOCYTES NFR BLD AUTO: 5 % (ref 5–12)
NEUTROPHILS NFR BLD AUTO: 8.4 10*3/MM3 (ref 1.7–7)
NEUTROPHILS NFR BLD AUTO: 86.4 % (ref 42.7–76)
NRBC BLD AUTO-RTO: 0 /100 WBC (ref 0–0.2)
PLATELET # BLD AUTO: 182 10*3/MM3 (ref 140–450)
PMV BLD AUTO: 9.5 FL (ref 6–12)
POTASSIUM SERPL-SCNC: 4.1 MMOL/L (ref 3.5–5.2)
PROT SERPL-MCNC: 3 G/DL (ref 6–8.5)
RBC # BLD AUTO: 3.79 10*6/MM3 (ref 4.14–5.8)
SODIUM SERPL-SCNC: 131 MMOL/L (ref 136–145)
VANCOMYCIN TROUGH SERPL-MCNC: 14.1 MCG/ML (ref 5–20)
WBC NRBC COR # BLD: 9.73 10*3/MM3 (ref 3.4–10.8)

## 2023-08-06 PROCEDURE — 82330 ASSAY OF CALCIUM: CPT

## 2023-08-06 PROCEDURE — 25010000002 VANCOMYCIN 10 G RECONSTITUTED SOLUTION: Performed by: STUDENT IN AN ORGANIZED HEALTH CARE EDUCATION/TRAINING PROGRAM

## 2023-08-06 PROCEDURE — 80053 COMPREHEN METABOLIC PANEL: CPT | Performed by: FAMILY MEDICINE

## 2023-08-06 PROCEDURE — 97530 THERAPEUTIC ACTIVITIES: CPT

## 2023-08-06 PROCEDURE — 25010000002 CALCIUM GLUCONATE PER 10 ML: Performed by: STUDENT IN AN ORGANIZED HEALTH CARE EDUCATION/TRAINING PROGRAM

## 2023-08-06 PROCEDURE — 25010000002 CEFTRIAXONE PER 250 MG: Performed by: FAMILY MEDICINE

## 2023-08-06 PROCEDURE — 83735 ASSAY OF MAGNESIUM: CPT | Performed by: FAMILY MEDICINE

## 2023-08-06 PROCEDURE — 85025 COMPLETE CBC W/AUTO DIFF WBC: CPT | Performed by: FAMILY MEDICINE

## 2023-08-06 PROCEDURE — 80202 ASSAY OF VANCOMYCIN: CPT | Performed by: STUDENT IN AN ORGANIZED HEALTH CARE EDUCATION/TRAINING PROGRAM

## 2023-08-06 RX ADMIN — CALCIUM GLUCONATE 2000 MG: 98 INJECTION, SOLUTION INTRAVENOUS at 12:17

## 2023-08-06 RX ADMIN — CALCIUM GLUCONATE 2000 MG: 98 INJECTION, SOLUTION INTRAVENOUS at 09:11

## 2023-08-06 RX ADMIN — Medication 10 ML: at 09:12

## 2023-08-06 RX ADMIN — VANCOMYCIN HYDROCHLORIDE 1500 MG: 10 INJECTION, POWDER, LYOPHILIZED, FOR SOLUTION INTRAVENOUS at 17:11

## 2023-08-06 RX ADMIN — Medication 10 ML: at 20:28

## 2023-08-06 RX ADMIN — CEFTRIAXONE SODIUM 2000 MG: 2 INJECTION, POWDER, FOR SOLUTION INTRAMUSCULAR; INTRAVENOUS at 09:11

## 2023-08-06 NOTE — PLAN OF CARE
Goal Outcome Evaluation:  Plan of Care Reviewed With: patient           Outcome Evaluation: He is orientedx4. He is up to C x2 assistance. Dressing dry and intact to right arm. No weeping noted this shift. He denies any pain this shift.

## 2023-08-06 NOTE — CASE MANAGEMENT/SOCIAL WORK
Continued Stay Note   [facility]     Patient Name: Aleks Monzon  MRN: [id number]  Today's Date: 8/6/2023    Admit Date: 8/4/2023    Plan: SNF vs Acute Rehab   Discharge Plan       Row Name 08/06/23 1405       Plan    Plan SNF vs Acute Rehab    Patient/Family in Agreement with Plan yes    Plan Comments Met with pt and family in the room. They are [facility]. OT yesterday recommended snf but PT has not been able to do their evaluation yet. Family says they feel pt is stronger today than he was yesterday. They are hoping to avoid snf if at all possible (family had a bad experience at [facility] before) and are hoping for acute rehab. Not sure if they will be able to take pt with his medical dx. Will follow for PT eval to see if pt is appropriate.                   Discharge Codes    No documentation.                       REBECCA Reeves

## 2023-08-06 NOTE — THERAPY TREATMENT NOTE
Patient Name: Aleks Monzon  : 1950    MRN: [id number]                              Today's Date: 2023       Admit Date: 2023    Visit Dx: Therapist utilized gait belt, applied non-slipped socks, provided fall risk education/prevention, & facilitated muscle strengthening PRN to reduce patient falls risk during this session.      ICD-10-CM ICD-9-CM   1. Cellulitis of right upper extremity  L03.113 682.3   2. Hypomagnesemia  E83.42 275.2   3. Leukocytosis, unspecified type  D72.829 288.60   4. Decreased activities of daily living (ADL) [Z78.9 (ICD-10-CM)]  Z78.9 V49.89     Patient Active Problem List   Diagnosis    IFG (impaired fasting glucose)    High cholesterol    Prostate cancer    Elevated PSA    Elevated serum creatinine    Gout, arthropathy    Obesity (BMI 30-39.9)    Primary osteoarthritis of both knees    Retroperitoneal mass    Edema    Abnormal CT of the abdomen    Axillary mass, left    Retroperitoneal fibrosis    Gout    Ureteral obstruction, left    Hypertension    Malignant neoplasm of trigone of urinary bladder    COVID-19    Closed fracture of right hip    Closed fracture of right hip, initial encounter    Stage 3a chronic kidney disease    Cancer    Weight loss    Protein deficiency    Decreased appetite    BRBPR (bright red blood per rectum)    History of adenomatous polyp of colon    Overweight with body mass index (BMI) of 27 to 27.9 in adult    Non-smoker    Cellulitis of right upper extremity    Sepsis due to Gram negative bacteria     Past Medical History:   Diagnosis Date    Arthritis     Cancer     PROSTATE AND BLADDER    Cellulitis     Gout     Hypertension     IFG (impaired fasting glucose)     Low back pain     Lymphedema     Retroperitoneal fibrosis      Past Surgical History:   Procedure Laterality Date    AXILLARY LYMPH NODE BIOPSY/EXCISION Left 10/01/2020    Procedure: LEFT AXILLARY LYMPH NODE BIOPSY;  Surgeon: Dina To MD;  Location: [facility] OR;   Service: General;  Laterality: Left;    BACK SURGERY      COLONOSCOPY      2017?    COLONOSCOPY N/A 10/11/2022    Procedure: COLONOSCOPY WITH ANESTHESIA;  Surgeon: Burton Chaudhari MD;  Location: [facility] ENDOSCOPY;  Service: Gastroenterology;  Laterality: N/A;  preop; abdominal pain  postop; polyps   PCP Tj Hyde MD    ENDOSCOPY N/A 10/11/2022    Procedure: ESOPHAGOGASTRODUODENOSCOPY WITH ANESTHESIA;  Surgeon: Burton Chaudhari MD;  Location: [facility] ENDOSCOPY;  Service: Gastroenterology;  Laterality: N/A;  preop; abdominal pain  postop; misshape stomach  PCP Tj Hyde MD    JOINT REPLACEMENT      BILATERAL HIP     SKIN CANCER EXCISION      TOTAL HIP ARTHROPLASTY      URETERAL STENT INSERTION        General Information       Row Name 08/06/23 1435          OT Time and Intention    Document Type therapy note (daily note)  -MT     Mode of Treatment occupational therapy  -MT       Row Name 08/06/23 1435          General Information    Patient Profile Reviewed yes  -MT     Existing Precautions/Restrictions fall  -MT       Row Name 08/06/23 1435          Cognition    Orientation Status (Cognition) oriented x 4  -MT       Row Name 08/06/23 1435          Safety Issues, Functional Mobility    Impairments Affecting Function (Mobility) balance;endurance/activity tolerance;postural/trunk control;range of motion (ROM);shortness of breath;sensation/sensory awareness;strength  -MT               User Key  (r) = Recorded By, (t) = Taken By, (c) = Cosigned By      Initials Name Provider Type    MT Genet Grullon COTA Occupational Therapist Assistant                     Mobility/ADL's       Row Name 08/06/23 1435          Bed Mobility    Supine-Sit [place] (Bed Mobility) minimum assist (75% patient effort);verbal cues  -MT     Sit-Supine [place] (Bed Mobility) verbal cues;moderate assist (50% patient effort)  -MT     Bed Mobility, Safety Issues decreased use of arms for pushing/pulling;decreased use  of legs for bridging/pushing;impaired trunk control for bed mobility  -MT     Assistive Device (Bed Mobility) bed rails;head of bed elevated;draw sheet  -MT       Row Name 08/06/23 1435          Transfers    Transfers sit-stand transfer;stand-sit transfer  -MT       Row Name 08/06/23 1435          Sit-Stand Transfer    Sit-Stand [place] (Transfers) moderate assist (50% patient effort);maximum assist (25% patient effort)  -MT     Comment, (Sit-Stand Transfer) elevated bed height to allow 90 degree bend of LE. Pt requested bed elevated higher, education on need to attempt standing from standard height to carryover for chair/commode t/f. Performed 2 stands of 5 trials. Pt needing mod-maxA and increased time to transition hands to rollator. Pt forward flexed posture and only able to maintain stand for x15 seconds. Pt fatigues quickly and was unable to take steps with rollator, would benefit from RW use for increased safety d/t pushing through walker for completion of stand.  -MT       Row Name 08/06/23 1435          Stand-Sit Transfer    Stand-Sit [place] (Transfers) maximum assist (25% patient effort);verbal cues  -MT       Row Name 08/06/23 1435          Lower Body Dressing Assessment/Training    [place] Level (Lower Body Dressing) doff;socks;dependent (less than 25% patient effort)  -MT     Position (Lower Body Dressing) supine  -MT               User Key  (r) = Recorded By, (t) = Taken By, (c) = Cosigned By      Initials Name Provider Type    MT Genet Grullon COTA Occupational Therapist Assistant                   Obj/Interventions       Row Name 08/06/23 1518          Motor Skills    Motor Skills --  view OT POC  -MT               User Key  (r) = Recorded By, (t) = Taken By, (c) = Cosigned By      Initials Name Provider Type    Genet Hu COTA Occupational Therapist Assistant                   Goals/Plan    No documentation.                  Clinical Impression       Row Name 08/06/23  "1445          Pain Assessment    Pre/Posttreatment Pain Comment \"it's not bad, I'm just stiff\"  -MT       Row Name 08/06/23 1445          Positioning and Restraints    Pre-Treatment Position in bed  -MT     Post Treatment Position bed  -MT     In Bed with nsg;fowlers;call light within reach;encouraged to call for assist;exit alarm on;side rails up x2;with family/caregiver  -MT               User Key  (r) = Recorded By, (t) = Taken By, (c) = Cosigned By      Initials Name Provider Type    Genet Hu COTA Occupational Therapist Assistant                   Outcome Measures       Row Name 08/06/23 1435          How much help from another is currently needed...    Putting on and taking off regular lower body clothing? 1  -MT     Bathing (including washing, rinsing, and drying) 2  -MT     Toileting (which includes using toilet bed pan or urinal) 2  -MT     Putting on and taking off regular upper body clothing 2  -MT     Taking care of personal grooming (such as brushing teeth) 3  -MT     Eating meals 3  -MT     AM-PAC 6 Clicks Score (OT) 13  -MT       Row Name 08/06/23 0910          How much help from another person do you currently need...    Turning from your back to your side while in flat bed without using bedrails? 3  -CP     Moving from lying on back to sitting on the side of a flat bed without bedrails? 3  -CP     Moving to and from a bed to a chair (including a wheelchair)? 3  -CP     Standing up from a chair using your arms (e.g., wheelchair, bedside chair)? 3  -CP     Climbing 3-5 steps with a railing? 2  -CP     To walk in hospital room? 3  -CP     AM-PAC 6 Clicks Score (PT) 17  -CP     Highest level of mobility 5 --> Static standing  -CP               User Key  (r) = Recorded By, (t) = Taken By, (c) = Cosigned By      Initials Name Provider Type    Genet Hu COTA Occupational Therapist Assistant    Kourtney Sherwood RN Registered Nurse                    Occupational Therapy Education  "      Title: PT OT SLP Therapies (In Progress)       Topic: Occupational Therapy (In Progress)       Point: ADL training (Done)       Description:   Instruct learner(s) on proper safety adaptation and remediation techniques during self care or transfers.   Instruct in proper use of assistive devices.                  Learning Progress Summary             Patient Acceptance, E, VU by  at 8/5/2023 0850   Significant Other Acceptance, E, VU by  at 8/5/2023 0850                         Point: Home exercise program (Not Started)       Description:   Instruct learner(s) on appropriate technique for monitoring, assisting and/or progressing therapeutic exercises/activities.                  Learner Progress:  Not documented in this visit.              Point: Precautions (Not Started)       Description:   Instruct learner(s) on prescribed precautions during self-care and functional transfers.                  Learner Progress:  Not documented in this visit.              Point: Body mechanics (Not Started)       Description:   Instruct learner(s) on proper positioning and spine alignment during self-care, functional mobility activities and/or exercises.                  Learner Progress:  Not documented in this visit.                              User Key       Initials Effective Dates Name Provider Type Discipline     05/03/23 -  Rae Watkins OT Student OT Student OT                  OT Recommendation and Plan     Plan of Care Review  Plan of Care Reviewed With: patient  Progress: improving  Outcome Evaluation: Elevated bed height to allow 90 degree bend of LE. Pt requested bed elevated higher, education on need to attempt standing from standard height to carryover for chair/commode t/f. Performed 2 stands of 5 trials. Pt needing mod-maxA and increased time to transition hands to rollator. Pt forward flexed posture and only able to maintain stand for x15 seconds. Pt fatigues quickly and was unable to take steps with  rollator, would benefit from RW use for increased safety d/t pushing through walker for completion of stand. Pt depA socks/LB dress. Pt performed BUE ther ex of scapular retraction, protraction, cervical extension and trunk extension d/t forward flexion x5 reps holding x10 seconds. BTB MaxA. Pt improving and would benefit from continued OT POC and rehab at d/c.     Time Calculation:         Time Calculation- OT       Row Name 08/06/23 1435             Time Calculation- OT    OT Start Time 1435  -MT      OT Stop Time 1515  -MT      OT Time Calculation (min) 40 min  -MT      Total Timed Code Minutes- OT 40 minute(s)  -MT      OT Received On 08/06/23  -MT         Timed Charges    79735 - OT Therapeutic Activity Minutes 40  -MT         Total Minutes    Timed Charges Total Minutes 40  -MT       Total Minutes 40  -MT                User Key  (r) = Recorded By, (t) = Taken By, (c) = Cosigned By      Initials Name Provider Type    MT Genet Grullon COTA Occupational Therapist Assistant                  Therapy Charges for Today       Code Description Service Date Service Provider Modifiers Qty    [id number]  OT THERAPEUTIC ACT EA 15 MIN 8/6/2023 Genet Grullon COTA GO 3                 RACQUEL Mcarthur  8/6/2023

## 2023-08-06 NOTE — PLAN OF CARE
Goal Outcome Evaluation:  Plan of Care Reviewed With: patient        Progress: no change  Outcome Evaluation: Pt A&Ox4. Up x1 ast to BR. Dsg to R arm CDI. PPP. Denies n/t. No c/o of pain. Swelling improving per pt. Wounds to bottom noted w/ dsgs CDI. Electrolight protocal followed and IV calcium given. Pt has had several liquid BMs throughout the day. Spouse at BS. Call light in reach. Safety maintained. Will cont to monitor.

## 2023-08-06 NOTE — PROGRESS NOTES
"Pharmacy Dosing Service  Pharmacokinetics  Vancomycin Follow-up Evaluation    Assessment/Action/Plan:  Current Order: Vancomycin 1500 mg IVPB every 24 hours  Current end date:8/10/2023  Levels: 8/6/2023 1514 Vancomycin trough = 14.1 (23 hours post 1500 mg dose)  Additional antimicrobial agent(s): Ceftriaxone    Continue Vancomycin 1500 mg iv Q24H. Pharmacy will continue to follow daily and adjust dose accordingly.     Subjective:  Aleks Monzon is a 73 y.o. male currently on Vancomycin for the treatment of SSTI, day 3 of 7 of treatment.    AUC Model Data:  Regimen: 1500 mg IV every 24 hours.  Exposure target: AUC24 (range)400-600 mg/L.hr   AUC24,ss: 577 mg/L.hr  PAUC*: 98 %  Ctrough,ss: 17.9 mg/L  Pconc*: 34 %  Tox.: 14 %    Objective:  Ht: 188 cm (74\"); Wt: 94.2 kg (207 lb 10.8 oz)  Estimated Creatinine Clearance: 59.8 mL/min (A) (by C-G formula based on SCr of 1.51 mg/dL (H)).   Creatinine   Date Value Ref Range Status   08/06/2023 1.51 (H) 0.76 - 1.27 mg/dL Final   08/05/2023 1.43 (H) 0.76 - 1.27 mg/dL Final   08/04/2023 1.53 (H) 0.76 - 1.27 mg/dL Final   03/24/2021 1.60 (H) 0.60 - 1.30 mg/dL Final     Comment:     Serial Number: [id number]:  [id number]   12/03/2020 1.80 (H) 0.60 - 1.30 mg/dL Final     Comment:     Serial Number: [id number]:  [id number]   09/02/2020 1.8 (H) 0.6 - 1.2 mg/dL Final   08/19/2020 1.80 (H) 0.60 - 1.30 mg/dL Final     Comment:     Serial Number: [id number]:  [id number]      Lab Results   Component Value Date    WBC 9.73 08/06/2023    WBC 17.21 (H) 08/04/2023    WBC 5.1 06/06/2022         Lab Results   Component Value Date    VANCOTROUGH 14.10 08/06/2023       Culture Results:  Microbiology Results (last 10 days)       Procedure Component Value - Date/Time    Wound Culture - Wound, Arm, Right [[id number]]  (Abnormal)  (Susceptibility) Collected: 08/04/23 1612    Lab Status: Final result Specimen: Wound from Arm, Right Updated: 08/06/23 0956     Wound Culture Light growth (2+) " Staphylococcus aureus      Rare Providencia rettgeri      Light growth (2+) Normal Skin Leticia     Gram Stain Few (2+) Gram positive cocci      No WBCs seen    Susceptibility        Staphylococcus aureus      DICKSON      Clindamycin Susceptible      Erythromycin Susceptible      Inducible Clindamycin Resistance Negative      Oxacillin Susceptible      Rifampin Susceptible      Tetracycline Susceptible      Trimethoprim + Sulfamethoxazole Susceptible      Vancomycin Susceptible                       Susceptibility        Providencia rettgeri      DICKSON      Ampicillin Resistant      Ampicillin + Sulbactam Susceptible      Cefepime Susceptible      Ceftazidime Susceptible      Ceftriaxone Susceptible      Gentamicin Susceptible      Levofloxacin Susceptible      Piperacillin + Tazobactam Susceptible      Tetracycline Resistant      Trimethoprim + Sulfamethoxazole Susceptible                       Susceptibility Comments       Staphylococcus aureus    This isolate does not demonstrate inducible clindamycin resistance in vitro.        Providencia rettgeri    Cefazolin sensitivity will not be reported for Enterobacteriaceae in non-urine isolates. If cefazolin is preferred, please call the microbiology lab to request an E-test.  With the exception of urinary-sourced infections, aminoglycosides should not be used as monotherapy.               Blood Culture - Blood, Arm, Right [[id number]]  (Abnormal) Collected: 08/04/23 1419    Lab Status: Preliminary result Specimen: Blood from Arm, Right Updated: 08/06/23 0609     Blood Culture Gram Negative Bacilli     Isolated from Anaerobic Bottle     Gram Stain Anaerobic Bottle Gram negative bacilli    Blood Culture - Blood, Arm, Left [[id number]]  (Abnormal) Collected: 08/04/23 1413    Lab Status: Preliminary result Specimen: Blood from Arm, Left Updated: 08/06/23 0607     Blood Culture Gram Negative Bacilli     Isolated from Aerobic and Anaerobic Bottles     Gram Stain Anaerobic Bottle  Gram negative bacilli      Aerobic Bottle Gram negative bacilli    Blood Culture ID, PCR - Blood, Arm, Left [[id number]]  (Abnormal) Collected: 08/04/23 1413    Lab Status: Final result Specimen: Blood from Arm, Left Updated: 08/05/23 0741     BCID, PCR Enterobacterales spp. Identification by BCID2 PCR.     BOTTLE TYPE Anaerobic Bottle    Narrative:      No resistance genes detected.    Clostridioides difficile Toxin - Stool, Per Rectum [[id number]]  (Normal) Collected: 08/03/23 1514    Lab Status: Final result Specimen: Stool from Per Rectum Updated: 08/03/23 1700    Narrative:      The following orders were created for panel order Clostridioides difficile Toxin - Stool, Per Rectum.  Procedure                               Abnormality         Status                     ---------                               -----------         ------                     Clostridioides difficile...[[id number]]  Normal              Final result                 Please view results for these tests on the individual orders.    Gastrointestinal Panel, PCR - Stool, Per Rectum [[id number]]  (Normal) Collected: 08/03/23 1514    Lab Status: Final result Specimen: Stool from Per Rectum Updated: 08/03/23 1728     Campylobacter Not Detected     Plesiomonas shigelloides Not Detected     Salmonella Not Detected     Vibrio Not Detected     Vibrio cholerae Not Detected     Yersinia enterocolitica Not Detected     Enteroaggregative E. coli (EAEC) Not Detected     Enteropathogenic E. coli (EPEC) Not Detected     Enterotoxigenic E. coli (ETEC) lt/st Not Detected     Shiga-like toxin-producing E. coli (STEC) stx1/stx2 Not Detected     Shigella/Enteroinvasive E. coli (EIEC) Not Detected     Cryptosporidium Not Detected     Cyclospora cayetanensis Not Detected     Entamoeba histolytica Not Detected     Giardia lamblia Not Detected     Adenovirus F40/41 Not Detected     Astrovirus Not Detected     Norovirus GI/GII Not Detected     Rotavirus A Not Detected      Sapovirus (I, II, IV or V) Not Detected    Narrative:      If Aeromonas, Staphylococcus aureus or Bacillus cereus are suspected, please order [id number]: Stool Culture, Aeromonas, S aureus, B Cereus.    Clostridioides difficile Toxin, PCR - Stool, Per Rectum [[id number]]  (Normal) Collected: 08/03/23 1514    Lab Status: Final result Specimen: Stool from Per Rectum Updated: 08/03/23 1700     Toxigenic C. difficile by PCR Negative    Narrative:      The result indicates the absence of toxigenic C. difficile from stool specimen.             Jenaro Kinney, PharmD   08/06/23 16:58 CDT

## 2023-08-06 NOTE — PLAN OF CARE
Goal Outcome Evaluation:  Plan of Care Reviewed With: patient        Progress: improving  Outcome Evaluation: Elevated bed height to allow 90 degree bend of LE. Pt requested bed elevated higher, education on need to attempt standing from standard height to carryover for chair/commode t/f. Performed 2 stands of 5 trials. Pt needing mod-maxA and increased time to transition hands to rollator. Pt forward flexed posture and only able to maintain stand for x15 seconds. Pt fatigues quickly and was unable to take steps with rollator, would benefit from RW use for increased safety d/t pushing through walker for completion of stand. Pt depA socks/LB dress. Pt performed BUE ther ex of scapular retraction, protraction, cervical extension and trunk extension d/t forward flexion x5 reps holding x10 seconds. BTB MaxA. Pt improving and would benefit from continued OT POC and rehab at d/c.

## 2023-08-06 NOTE — PROGRESS NOTES
Family Medicine Progress Note    Patient:  Aleks Monzon  YOB: 1950    MRN: [id number]     Acct: [id number]     Admit date: 8/4/2023    Patient Seen, Chart, Consults notes, Labs, Radiology studies reviewed.    Subjective: Day 2 of stay with cellulitis of the right upper extremity found to have gram-negative bacillus bacteremia and most recent (in last 24 hours) has had significant improvement in his hand and arm.  Notices less swelling and pain.  Had a reasonably restful night and overall feels better.    Past, Family, Social History unchanged from admission.    Diet: Diet: Regular/House Diet; Texture: Regular Texture (IDDSI 7); Fluid Consistency: Thin (IDDSI 0)    Medications:  Scheduled Meds:calcium gluconate, 1,000 mg, Intravenous, Once  calcium gluconate, 2,000 mg, Intravenous, Q2H  cefTRIAXone, 2,000 mg, Intravenous, Q24H  senna-docusate sodium, 2 tablet, Oral, BID  sodium chloride, 10 mL, Intravenous, Q12H  vancomycin, 15 mg/kg, Intravenous, Q24H      Continuous Infusions:   PRN Meds:  acetaminophen    senna-docusate sodium **AND** polyethylene glycol **AND** bisacodyl **AND** bisacodyl    Calcium Replacement - Follow Nurse / BPA Driven Protocol    Magnesium Standard Dose Replacement - Follow Nurse / BPA Driven Protocol    melatonin    Phosphorus Replacement - Follow Nurse / BPA Driven Protocol    Potassium Replacement - Follow Nurse / BPA Driven Protocol    [COMPLETED] Insert Peripheral IV **AND** sodium chloride    sodium chloride    sodium chloride    traMADol    Objective:    Lab Results (last 24 hours)       Procedure Component Value Units Date/Time    Blood Culture - Blood, Arm, Right [[id number]]  (Abnormal) Collected: 08/04/23 1419    Specimen: Blood from Arm, Right Updated: 08/06/23 0609     Blood Culture Gram Negative Bacilli     Isolated from Anaerobic Bottle     Gram Stain Anaerobic Bottle Gram negative bacilli    Blood Culture - Blood, Arm, Left [[id number]]  (Abnormal)  Collected: 08/04/23 1413    Specimen: Blood from Arm, Left Updated: 08/06/23 0607     Blood Culture Gram Negative Bacilli     Isolated from Aerobic and Anaerobic Bottles     Gram Stain Anaerobic Bottle Gram negative bacilli      Aerobic Bottle Gram negative bacilli    Magnesium [[id number]]  (Normal) Collected: 08/06/23 0402    Specimen: Blood Updated: 08/06/23 0501     Magnesium 2.0 mg/dL     Comprehensive Metabolic Panel [[id number]]  (Abnormal) Collected: 08/06/23 0402    Specimen: Blood Updated: 08/06/23 0501     Glucose 102 mg/dL      BUN 51 mg/dL      Creatinine 1.51 mg/dL      Sodium 131 mmol/L      Potassium 4.1 mmol/L      Chloride 104 mmol/L      CO2 18.0 mmol/L      Calcium 7.2 mg/dL      Total Protein 3.0 g/dL      Albumin 1.3 g/dL      ALT (SGPT) 26 U/L      AST (SGOT) 33 U/L      Alkaline Phosphatase 193 U/L      Total Bilirubin 0.2 mg/dL      Globulin 1.7 gm/dL      A/G Ratio 0.8 g/dL      BUN/Creatinine Ratio 33.8     Anion Gap 9.0 mmol/L      eGFR 49.1 mL/min/1.73     Narrative:      GFR Normal >60  Chronic Kidney Disease <60  Kidney Failure <15    The GFR formula is only valid for adults with stable renal function between ages 18 and 70.    CBC & Differential [[id number]]  (Abnormal) Collected: 08/06/23 0402    Specimen: Blood Updated: 08/06/23 0437    Narrative:      The following orders were created for panel order CBC & Differential.  Procedure                               Abnormality         Status                     ---------                               -----------         ------                     CBC Auto Differential[[id number]]        Abnormal            Final result                 Please view results for these tests on the individual orders.    CBC Auto Differential [[id number]]  (Abnormal) Collected: 08/06/23 0402    Specimen: Blood Updated: 08/06/23 0437     WBC 9.73 10*3/mm3      RBC 3.79 10*6/mm3      Hemoglobin 10.6 g/dL      Hematocrit 33.0 %      MCV 87.1 fL      MCH 28.0 pg       MCHC 32.1 g/dL      RDW 15.9 %      RDW-SD 50.1 fl      MPV 9.5 fL      Platelets 182 10*3/mm3      Neutrophil % 86.4 %      Lymphocyte % 5.4 %      Monocyte % 5.0 %      Eosinophil % 0.5 %      Basophil % 0.3 %      Immature Grans % 2.4 %      Neutrophils, Absolute 8.40 10*3/mm3      Lymphocytes, Absolute 0.53 10*3/mm3      Monocytes, Absolute 0.49 10*3/mm3      Eosinophils, Absolute 0.05 10*3/mm3      Basophils, Absolute 0.03 10*3/mm3      Immature Grans, Absolute 0.23 10*3/mm3      nRBC 0.0 /100 WBC     Potassium [[id number]]  (Normal) Collected: 08/05/23 1505    Specimen: Blood Updated: 08/05/23 1523     Potassium 4.2 mmol/L     Wound Culture - Wound, Arm, Right [[id number]]  (Abnormal) Collected: 08/04/23 1612    Specimen: Wound from Arm, Right Updated: 08/05/23 1241     Wound Culture Light growth (2+) Staphylococcus aureus      Rare Gram Negative Bacilli     Gram Stain Few (2+) Gram positive cocci      No WBCs seen             Imaging Results (Last 72 Hours)       Procedure Component Value Units Date/Time    US Venous Doppler Upper Extremity Right (duplex) [[id number]] Collected: 08/04/23 1605     Updated: 08/04/23 1608    Narrative:      History: Pain and swelling       Impression:      Impression: There is no evidence of deep venous thrombosis or  superficial thrombophlebitis of the right upper extremity.     Comments: Right upper extremity venous duplex exam was performed using  color Doppler flow, Doppler wave form analysis, and grayscale imaging,  with and without compression. There is no evidence of deep venous  thrombosis of the internal jugular, subclavian, axillary, brachial,  radial, and ulnar veins. There is no evidence of superficial  thrombophlebitis involving the cephalic or basilic veins.      This report was finalized on 08/04/2023 16:05 by Dr. Fransico Lundy MD.    XR Wrist 3+ View Right [[id number]] Collected: 08/04/23 1437     Updated: 08/04/23 1445    Narrative:      EXAM: XR WRIST 3+ VW  RIGHT-      DATE: 8/4/2023 2:07 PM CDT     HISTORY: RUE redness, swelling, pain       COMPARISON: No existing relevant imaging studies available.      TECHNIQUE:  3 views. PA, oblique, lateral. 3 images.     FINDINGS:    No acute displaced fracture or aggressive bony finding. Mild diffuse  joint space narrowing. Degenerative changes at the first carpometacarpal  joint as well as first and second metacarpophalangeal joint. There may  be subluxation at the first metacarpophalangeal joint. Possible erosion  at the posterior carpals on lateral view. Diffuse soft tissue swelling.          Impression:      1. Diffuse soft tissue swelling with possible erosion at the posterior  carpal on lateral view. Differential would include artifact, crystal  arthropathy or osteomyelitis. Correlate with patient presentation.  2. Degenerative changes with possible subluxation of the first  metacarpal phalangeal joint. Correlate with range of motion.  3. If there is continued concern for occult fracture, particularly if  there is snuff box tenderness, follow-up radiographs in 10-14 days  following immobilization might be considered.     This report was finalized on 08/04/2023 14:42 by Dr Heather Barros MD.    XR Hand 3+ View Right [[id number]] Collected: 08/04/23 1430     Updated: 08/04/23 1439    Narrative:      XR HAND 3+ VW RIGHT- 8/4/2023 2:07 PM CDT     HISTORY: RUE redness, swelling, pain     COMPARISON: None      FINDINGS:   Frontal, lateral and oblique radiographs of the right hand were provided  for review.      No visualized acute fracture. Multifocal osteoarthritis change,  especially first metacarpophalangeal joint and fifth PIP joint. Notable  swelling and a soft tissue calcification around the third PIP joint and  there is a small erosion identified along the radial aspect of the head  of the proximal phalanx. There is also a slight ulnar deviation at this  third PIP joint. Advanced first CMC joint osteoarthritis.        Impression:      1. No visualized acute fracture.  2. Multifocal degenerative change, mostly appearing as osteoarthritis.  However, there is periarticular swelling with soft tissue calcification,  bone erosion and ulnar deviation at the third digit PIP joint which may  reflect a crystalline arthropathy such as gout, pseudogout etc.  This report was finalized on 08/04/2023 14:36 by Dr Wyatt Bone, .    XR Forearm 2 View Right [[id number]] Collected: 08/04/23 1411     Updated: 08/04/23 1431    Narrative:      Right forearm, 2 views 8/4/2023 2:06 PM CDT     HISTORY: RUE redness, swelling, pain     COMPARISON: NONE     FINDINGS:  Frontal and lateral radiographs of the right forearm were obtained.     No visualized acute fracture or malalignment. Mild osteoarthritis change  at the radiocarpal joint. Alignment is anatomic. Similarly, there is  mild osteoarthritis change at the elbow without fracture, malalignment  or capsular distention. Nonspecific subcutaneous edema in the upper arm  and forearm.       Impression:      1. No visualized fracture or malalignment.  2. Elbow and radiocarpal joint osteoarthritis changes which are mild.  3. Nonspecific subcutaneous edema which appears fairly diffuse along the  included portion of the upper arm as well as the forearm.     This report was finalized on 08/04/2023 14:28 by Dr Wyatt Bone, .    XR Elbow 3+ View Right [[id number]] Collected: 08/04/23 1409     Updated: 08/04/23 1414    Narrative:      Right elbow, 3 views 8/4/2023 2:06 PM CDT     History: RUE redness, swelling, pain     Comparison: None      Findings:   Frontal, lateral and oblique views of the right elbow were obtained.      There is no fracture or malalignment. Mild osteoarthritis changes at the  radiocapitellar and ulnohumeral joint. No elbow joint capsular  distention. Notable subcutaneous edema in the upper and lower arm,  essentially diffuse. No soft tissue gas identified.       Impression:      Impression:  "  1. Nonspecific diffuse subcutaneous edema.  2. No visualized fracture, malalignment or elbow joint capsular  distention.     This report was finalized on 08/04/2023 14:11 by Dr Wyatt Bone, .             Physical Exam:    Vitals: /69 (BP Location: Right arm, Patient Position: Lying)   Pulse 78   Temp 97.9 øF (36.6 øC) (Oral)   Resp 17   Ht 188 cm (74\")   Wt 94.2 kg (207 lb 10.8 oz)   SpO2 97%   BMI 26.66 kg/mý   24 hour intake/output:  Intake/Output Summary (Last 24 hours) at 8/6/2023 0844  Last data filed at 8/5/2023 1737  Gross per 24 hour   Intake 920 ml   Output --   Net 920 ml     Last 3 weights:  Wt Readings from Last 3 Encounters:   08/04/23 94.2 kg (207 lb 10.8 oz)   08/02/23 93.9 kg (207 lb)   03/27/23 96.2 kg (212 lb)       General Appearance alert, appears stated age, and cooperative  Head normocephalic, without obvious abnormality and atraumatic  Eyes lids and lashes normal, conjunctivae and sclerae normal, no icterus, and no pallor  Neck no adenopathy, supple, and trachea midline  Lungs clear to auscultation, respirations regular, respirations even, and respirations unlabored  Heart regular rhythm & normal rate and normal S1, S2  Abdomen normal bowel sounds, soft non-tender, and no guarding  Extremities no cyanosis and no redness, edema 1+ upper right and lower bilateral  Skin  erythema appears decreased right upper extremity based on skin markings from the emergency room.  Hand significantly improved with forearm having new dressing in place  Neurologic Mental Status orientated to person, place, time and situation        Assessment:      Cellulitis of right upper extremity    Prostate cancer    Gout, arthropathy    Hypertension    Malignant neoplasm of trigone of urinary bladder    Stage 3a chronic kidney disease    Protein deficiency    Sepsis due to Gram negative bacteria          Plan:  Clinically is improved.  Vitals remain stable with slightly improved blood pressure.  Improvement " in the look of his right upper extremity.  Given his positive blood cultures I have placed an order requesting infectious disease consultation to assist with proper antibiotics and length of treatment.  Currently on Rocephin based on findings from blood culture.  Wound culture does show some Staph aureus and was initially given vancomycin which have not given additional based on blood culture.    Greater than 25 minutes spent in coordination of care this morning.      Electronically signed by Eugene Stock MD on 8/6/2023 at 08:44 CDT

## 2023-08-06 NOTE — NURSING NOTE
Pt daughter expressed concerns about patients condition over the last month. She stated that pt has lost around 100lbs, has had excessive diarrhea and ongoing weakness. Dr. Stock was called and informed of family's concerns and stated the information would be passed along to pt primary MD- Dr. Hyde.

## 2023-08-07 LAB
ALBUMIN SERPL-MCNC: 1.2 G/DL (ref 3.5–5.2)
ALBUMIN/GLOB SERPL: 0.6 G/DL
ALP SERPL-CCNC: 214 U/L (ref 39–117)
ALT SERPL W P-5'-P-CCNC: 27 U/L (ref 1–41)
ANION GAP SERPL CALCULATED.3IONS-SCNC: 6 MMOL/L (ref 5–15)
AST SERPL-CCNC: 39 U/L (ref 1–40)
BACTERIA UR QL AUTO: ABNORMAL /HPF
BASOPHILS # BLD AUTO: 0.03 10*3/MM3 (ref 0–0.2)
BASOPHILS NFR BLD AUTO: 0.4 % (ref 0–1.5)
BILIRUB SERPL-MCNC: 0.2 MG/DL (ref 0–1.2)
BILIRUB UR QL STRIP: NEGATIVE
BUN SERPL-MCNC: 51 MG/DL (ref 8–23)
BUN/CREAT SERPL: 34 (ref 7–25)
CALCIUM SPEC-SCNC: 7.1 MG/DL (ref 8.6–10.5)
CHLORIDE SERPL-SCNC: 108 MMOL/L (ref 98–107)
CLARITY UR: CLEAR
CO2 SERPL-SCNC: 18 MMOL/L (ref 22–29)
COLOR UR: YELLOW
CREAT SERPL-MCNC: 1.5 MG/DL (ref 0.76–1.27)
DEPRECATED RDW RBC AUTO: 50.4 FL (ref 37–54)
EGFRCR SERPLBLD CKD-EPI 2021: 49.5 ML/MIN/1.73
EOSINOPHIL # BLD AUTO: 0.1 10*3/MM3 (ref 0–0.4)
EOSINOPHIL NFR BLD AUTO: 1.3 % (ref 0.3–6.2)
ERYTHROCYTE [DISTWIDTH] IN BLOOD BY AUTOMATED COUNT: 16 % (ref 12.3–15.4)
GLOBULIN UR ELPH-MCNC: 2 GM/DL
GLUCOSE SERPL-MCNC: 100 MG/DL (ref 65–99)
GLUCOSE UR STRIP-MCNC: NEGATIVE MG/DL
HCT VFR BLD AUTO: 32.5 % (ref 37.5–51)
HGB BLD-MCNC: 10.7 G/DL (ref 13–17.7)
HGB UR QL STRIP.AUTO: NEGATIVE
HYALINE CASTS UR QL AUTO: ABNORMAL /LPF
IMM GRANULOCYTES # BLD AUTO: 0.32 10*3/MM3 (ref 0–0.05)
IMM GRANULOCYTES NFR BLD AUTO: 4.2 % (ref 0–0.5)
KETONES UR QL STRIP: NEGATIVE
LEUKOCYTE ESTERASE UR QL STRIP.AUTO: ABNORMAL
LYMPHOCYTES # BLD AUTO: 0.68 10*3/MM3 (ref 0.7–3.1)
LYMPHOCYTES NFR BLD AUTO: 8.9 % (ref 19.6–45.3)
MCH RBC QN AUTO: 28.6 PG (ref 26.6–33)
MCHC RBC AUTO-ENTMCNC: 32.9 G/DL (ref 31.5–35.7)
MCV RBC AUTO: 86.9 FL (ref 79–97)
MONOCYTES # BLD AUTO: 0.43 10*3/MM3 (ref 0.1–0.9)
MONOCYTES NFR BLD AUTO: 5.6 % (ref 5–12)
NEUTROPHILS NFR BLD AUTO: 6.06 10*3/MM3 (ref 1.7–7)
NEUTROPHILS NFR BLD AUTO: 79.6 % (ref 42.7–76)
NITRITE UR QL STRIP: NEGATIVE
NRBC BLD AUTO-RTO: 0 /100 WBC (ref 0–0.2)
PH UR STRIP.AUTO: 5.5 [PH] (ref 5–8)
PLATELET # BLD AUTO: 159 10*3/MM3 (ref 140–450)
PMV BLD AUTO: 9.8 FL (ref 6–12)
POTASSIUM SERPL-SCNC: 3.9 MMOL/L (ref 3.5–5.2)
PROT SERPL-MCNC: 3.2 G/DL (ref 6–8.5)
PROT UR QL STRIP: ABNORMAL
RBC # BLD AUTO: 3.74 10*6/MM3 (ref 4.14–5.8)
RBC # UR STRIP: ABNORMAL /HPF
REF LAB TEST METHOD: ABNORMAL
SODIUM SERPL-SCNC: 132 MMOL/L (ref 136–145)
SP GR UR STRIP: 1.02 (ref 1–1.03)
SQUAMOUS #/AREA URNS HPF: ABNORMAL /HPF
UROBILINOGEN UR QL STRIP: ABNORMAL
WBC # UR STRIP: ABNORMAL /HPF
WBC NRBC COR # BLD: 7.62 10*3/MM3 (ref 3.4–10.8)

## 2023-08-07 PROCEDURE — 81001 URINALYSIS AUTO W/SCOPE: CPT | Performed by: STUDENT IN AN ORGANIZED HEALTH CARE EDUCATION/TRAINING PROGRAM

## 2023-08-07 PROCEDURE — 99222 1ST HOSP IP/OBS MODERATE 55: CPT | Performed by: NURSE PRACTITIONER

## 2023-08-07 PROCEDURE — 80053 COMPREHEN METABOLIC PANEL: CPT | Performed by: FAMILY MEDICINE

## 2023-08-07 PROCEDURE — 99223 1ST HOSP IP/OBS HIGH 75: CPT | Performed by: INTERNAL MEDICINE

## 2023-08-07 PROCEDURE — 97530 THERAPEUTIC ACTIVITIES: CPT

## 2023-08-07 PROCEDURE — 25010000002 CEFTRIAXONE PER 250 MG: Performed by: FAMILY MEDICINE

## 2023-08-07 PROCEDURE — 87086 URINE CULTURE/COLONY COUNT: CPT | Performed by: STUDENT IN AN ORGANIZED HEALTH CARE EDUCATION/TRAINING PROGRAM

## 2023-08-07 PROCEDURE — 97162 PT EVAL MOD COMPLEX 30 MIN: CPT | Performed by: PHYSICAL THERAPIST

## 2023-08-07 PROCEDURE — 97535 SELF CARE MNGMENT TRAINING: CPT

## 2023-08-07 PROCEDURE — 85025 COMPLETE CBC W/AUTO DIFF WBC: CPT | Performed by: FAMILY MEDICINE

## 2023-08-07 RX ADMIN — Medication 10 ML: at 08:32

## 2023-08-07 RX ADMIN — Medication 10 ML: at 20:37

## 2023-08-07 RX ADMIN — CEFTRIAXONE SODIUM 2000 MG: 2 INJECTION, POWDER, FOR SOLUTION INTRAMUSCULAR; INTRAVENOUS at 08:32

## 2023-08-07 NOTE — PLAN OF CARE
Goal Outcome Evaluation:  Plan of Care Reviewed With: patient, spouse        Progress: no change  Outcome Evaluation: Pt A&Ox4. Up x2 to BSC. Dsg to R arm changed per orders. PPP. Denies n/t. No c/o of pain. Dolphin mattress. Spouse at BS. Call light in reach. Safety maintained. Will cont to monitor.

## 2023-08-07 NOTE — CASE MANAGEMENT/SOCIAL WORK
Continued Stay Note   Jovanny     Patient Name: Aleks Monzon  MRN: [id number]  Today's Date: 8/7/2023    Admit Date: 8/4/2023    Plan: SNF vs Acute Rehab   Discharge Plan       Row Name 08/07/23 1005       Plan    Plan Comments Will make referral to [facility] acute rehab after physical therapy completes evaluation. Family is wanting to avoid Snf if at all possible.                   Discharge Codes    No documentation.                       BRETT Umaña

## 2023-08-07 NOTE — CASE MANAGEMENT/SOCIAL WORK
Continued Stay Note   Remlap     Patient Name: Aleks Monzon  MRN: [id number]  Today's Date: 8/7/2023    Admit Date: 8/4/2023    Plan: SNF vs Acute Rehab   Discharge Plan       Row Name 08/07/23 5057       Plan    Plan Comments SW went ahead and made referral to [facility] rehab. Will follow up with [facility] tomorrow.                   Discharge Codes    No documentation.                 Expected Discharge Date and Time       Expected Discharge Date Expected Discharge Time    Aug 10, 2023               BRETT Umaña

## 2023-08-07 NOTE — THERAPY EVALUATION
Patient Name: Aleks Monzon  : 1950    MRN: [id number]                              Today's Date: 2023       Admit Date: 2023    Visit Dx:     ICD-10-CM ICD-9-CM   1. Pressure injury of right buttock, stage 3  L89.313 707.05     707.23   2. Cellulitis of right upper extremity  L03.113 682.3   3. Hypomagnesemia  E83.42 275.2   4. Leukocytosis, unspecified type  D72.829 288.60   5. Decreased activities of daily living (ADL) [Z78.9 (ICD-10-CM)]  Z78.9 V49.89   6. Pressure injury of sacral region, stage 2  L89.152 707.03     707.22   7. Impaired mobility and ADLs  Z74.09 V49.89    Z78.9    8. Weakness  R53.1 780.79   9. Skin tear of right forearm without complication, initial encounter  S51.811A 881.00   10. Sepsis due to Gram negative bacteria  A41.50 038.40     995.91   11. Impaired mobility [Z74.09 (ICD-10-CM)]  Z74.09 799.89     Patient Active Problem List   Diagnosis    IFG (impaired fasting glucose)    High cholesterol    Prostate cancer    Elevated PSA    Elevated serum creatinine    Gout, arthropathy    Obesity (BMI 30-39.9)    Primary osteoarthritis of both knees    Retroperitoneal mass    Edema    Abnormal CT of the abdomen    Axillary mass, left    Retroperitoneal fibrosis    Gout    Ureteral obstruction, left    Hypertension    Malignant neoplasm of trigone of urinary bladder    COVID-19    Closed fracture of right hip    Closed fracture of right hip, initial encounter    Stage 3a chronic kidney disease    Cancer    Weight loss    Protein deficiency    Decreased appetite    BRBPR (bright red blood per rectum)    History of adenomatous polyp of colon    Overweight with body mass index (BMI) of 27 to 27.9 in adult    Non-smoker    Cellulitis of right upper extremity    Sepsis due to Gram negative bacteria     Past Medical History:   Diagnosis Date    Arthritis     Cancer     PROSTATE AND BLADDER    Cellulitis     Gout     Hypertension     IFG (impaired fasting glucose)     Low back pain      Lymphedema     Retroperitoneal fibrosis      Past Surgical History:   Procedure Laterality Date    AXILLARY LYMPH NODE BIOPSY/EXCISION Left 10/01/2020    Procedure: LEFT AXILLARY LYMPH NODE BIOPSY;  Surgeon: Dina To MD;  Location: [facility] OR;  Service: General;  Laterality: Left;    BACK SURGERY      COLONOSCOPY      2017?    COLONOSCOPY N/A 10/11/2022    Procedure: COLONOSCOPY WITH ANESTHESIA;  Surgeon: Burton Chaudhari MD;  Location: [facility] ENDOSCOPY;  Service: Gastroenterology;  Laterality: N/A;  preop; abdominal pain  postop; polyps   PCP Tj Hyde MD    ENDOSCOPY N/A 10/11/2022    Procedure: ESOPHAGOGASTRODUODENOSCOPY WITH ANESTHESIA;  Surgeon: Burton Chaudhari MD;  Location: [facility] ENDOSCOPY;  Service: Gastroenterology;  Laterality: N/A;  preop; abdominal pain  postop; misshape stomach  PCP Tj Hyde MD    JOINT REPLACEMENT      BILATERAL HIP     SKIN CANCER EXCISION      TOTAL HIP ARTHROPLASTY      URETERAL STENT INSERTION        General Information       Row Name 08/07/23 1301          Physical Therapy Time and Intention    Document Type evaluation  CC right arm redness, swelling and increased pain; PMH includes but isnt limited to lymphedema, arthritis, prostate and bladder cancer, cellulitis, gout, hypertension and retroperitoneal fibrosis  -MS (r) CG (t) MS (c)     Mode of Treatment physical therapy  tub shower; hand held shower head; elevated toliet seat; bedside commode; tri-pod cane; rollator walker  -MS (r) CG (t) MS (c)       Row Name 08/07/23 1301          General Information    Patient Profile Reviewed yes  -MS (r) CG (t) MS (c)     Prior Level of Function min assist:;dressing;bathing;independent:;feeding;grooming;all household mobility  Prior to 2 week decline leading up to hospital admission  -MS (r) CG (t) MS (c)     Existing Precautions/Restrictions fall  -MS (r) CG (t) MS (c)     Barriers to Rehab previous functional deficit  -MS (r) CG (t) MS (c)       Row  Name 08/07/23 1301          Living Environment    People in Home spouse  -MS (r) CG (t) MS (c)       Row Name 08/07/23 1301          Home Main Entrance    Number of Stairs, Main Entrance none  -MS (r) CG (t) MS (c)     Stair Railings, Main Entrance none  -MS (r) CG (t) MS (c)       Row Name 08/07/23 1301          Stairs Within Home, Primary    Number of Stairs, Within Home, Primary seven  -MS (r) CG (t) MS (c)     Stair Railings, Within Home, Primary railings on both sides of stairs  -MS (r) CG (t) MS (c)       Row Name 08/07/23 1301          Cognition    Orientation Status (Cognition) oriented x 4  -MS (r) CG (t) MS (c)       Row Name 08/07/23 1301          Safety Issues, Functional Mobility    Safety Issues Affecting Function (Mobility) friction/shear risk  -MS (r) CG (t) MS (c)     Impairments Affecting Function (Mobility) balance;endurance/activity tolerance;postural/trunk control;shortness of breath;strength  -MS (r) CG (t) MS (c)               User Key  (r) = Recorded By, (t) = Taken By, (c) = Cosigned By      Initials Name Provider Type    Love Fletcher, PT, DPT, NCS Physical Therapist    CG Joss Modi, PT Student PT Student                   Mobility       Row Name 08/07/23 1301          Bed Mobility    Bed Mobility sit-supine;scooting/bridging  -MS (r) CG (t) MS (c)     Scooting/Bridging [place] (Bed Mobility) verbal cues;2 person assist;dependent (less than 25% patient effort)  -MS (r) CG (t) MS (c)     Sit-Supine [place] (Bed Mobility) verbal cues;maximum assist (25% patient effort);1 person assist  -MS (r) CG (t) MS (c)     Assistive Device (Bed Mobility) bed rails;head of bed elevated;draw sheet  -MS (r) CG (t) MS (c)       Row Name 08/07/23 1301          Bed-Chair Transfer    Bed-Chair [place] (Transfers) moderate assist (50% patient effort);2 person assist  -MS (r) CG (t) MS (c)     Assistive Device (Bed-Chair Transfers) walker, rolling platform  -MS (r) CG (t) MS (c)      Comment, (Bed-Chair Transfer) utilizing stand pivot t/f; increase thoracic kyphosis posture, decrease balance during t/f  -MS (r) CG (t) MS (c)       Row Name 08/07/23 1301          Sit-Stand Transfer    Sit-Stand [place] (Transfers) moderate assist (50% patient effort);maximum assist (25% patient effort);2 person assist  -MS (r) CG (t) MS (c)     Assistive Device (Sit-Stand Transfers) walker, rolling platform  -MS (r) CG (t) MS (c)               User Key  (r) = Recorded By, (t) = Taken By, (c) = Cosigned By      Initials Name Provider Type    Love Fletcher, PT, DPT, NCS Physical Therapist    CG Joss Modi, MASOOD Student PT Student                   Obj/Interventions       Row Name 08/07/23 1301          Range of Motion Comprehensive    General Range of Motion bilateral lower extremity ROM WFL  -MS (r) CG (t) MS (c)       Row Name 08/07/23 1301          Strength Comprehensive (MMT)    General Manual Muscle Testing (MMT) Assessment lower extremity strength deficits identified  -MS (r) CG (t) MS (c)     Comment, General Manual Muscle Testing (MMT) Assessment B LE assesed in supine due to pt fatigue and request, all movements at least 3/5  -MS (r) CG (t) MS (c)       Row Name 08/07/23 1301          Balance    Balance Assessment sitting static balance;sitting dynamic balance;sit to stand dynamic balance;standing dynamic balance;standing static balance  -MS (r) CG (t) MS (c)     Static Sitting Balance standby assist;1-person assist  -MS (r) CG (t) MS (c)     Dynamic Sitting Balance contact guard;1-person assist  -MS (r) CG (t) MS (c)     Position, Sitting Balance unsupported;sitting edge of bed  -MS (r) CG (t) MS (c)     Sit to Stand Dynamic Balance maximum assist;1-person assist  -MS (r) CG (t) MS (c)     Static Standing Balance minimal assist;1-person assist  -MS (r) CG (t) MS (c)     Dynamic Standing Balance minimal assist;1-person assist  -MS (r) CG (t) MS (c)     Position/Device Used, Standing Balance  walker, front-wheeled  -MS (r) CG (t) MS (c)       Row Name 08/07/23 1301          Sensory Assessment (Somatosensory)    Sensory Assessment (Somatosensory) LE sensation intact  -MS (r) CG (t) MS (c)               User Key  (r) = Recorded By, (t) = Taken By, (c) = Cosigned By      Initials Name Provider Type    MS Love Eason ENRIQUE, PT, DPT, NCS Physical Therapist    Joss Cali, PT Student PT Student                   Goals/Plan       Row Name 08/07/23 1301          Bed Mobility Goal 1 (PT)    Activity/Assistive Device (Bed Mobility Goal 1, PT) bed mobility activities, all  -MS (r) CG (t) MS (c)     [place] Level/Cues Needed (Bed Mobility Goal 1, PT) minimum assist (75% or more patient effort)  -MS (r) CG (t) MS (c)     Time Frame (Bed Mobility Goal 1, PT) long term goal (LTG)  -MS (r) CG (t) MS (c)     Progress/Outcomes (Bed Mobility Goal 1, PT) new goal  -MS (r) CG (t) MS (c)       Row Name 08/07/23 1301          Transfer Goal 1 (PT)    Activity/Assistive Device (Transfer Goal 1, PT) sit-to-stand/stand-to-sit;bed-to-chair/chair-to-bed  -MS (r) CG (t) MS (c)     [place] Level/Cues Needed (Transfer Goal 1, PT) minimum assist (75% or more patient effort)  -MS (r) CG (t) MS (c)     Time Frame (Transfer Goal 1, PT) long term goal (LTG)  -MS (r) CG (t) MS (c)     Progress/Outcome (Transfer Goal 1, PT) new goal  -MS (r) CG (t) MS (c)       Row Name 08/07/23 1301          Gait Training Goal 1 (PT)    Activity/Assistive Device (Gait Training Goal 1, PT) gait (walking locomotion);assistive device use;improve balance and speed;increase endurance/gait distance;decrease fall risk;increase energy conservation  -MS (r) CG (t) MS (c)     [place] Level (Gait Training Goal 1, PT) contact guard required  -MS (r) CG (t) MS (c)     Distance (Gait Training Goal 1, PT) 40 feet w/ AD and no rest breaks  -MS (r) CG (t) MS (c)     Time Frame (Gait Training Goal 1, PT) long term goal (LTG)  -MS (r) CG (t) MS (c)      "Progress/Outcome (Gait Training Goal 1, PT) new goal  -MS (r) CG (t) MS (c)       Row Name 08/07/23 1301          Therapy Assessment/Plan (PT)    Planned Therapy Interventions (PT) balance training;bed mobility training;gait training;home exercise program;patient/family education;transfer training;strengthening  -MS (r) CG (t) MS (c)               User Key  (r) = Recorded By, (t) = Taken By, (c) = Cosigned By      Initials Name Provider Type    MS Eason Love ENRIQUE, PT, DPT, NCS Physical Therapist    Joss Cali, PT Student PT Student                   Clinical Impression       Row Name 08/07/23 1301          Pain    Pretreatment Pain Rating 0/10 - no pain  -MS (r) CG (t) MS (c)     Posttreatment Pain Rating 0/10 - no pain  -MS (r) CG (t) MS (c)     Pain Location lower  -MS (r) CG (t) MS (c)     Pain Location - back  -MS (r) CG (t) MS (c)     Pre/Posttreatment Pain Comment \"sore and stiff\"  -MS (r) CG (t) MS (c)     Pain Intervention(s) Repositioned;Ambulation/increased activity  -MS (r) CG (t) MS (c)     Additional Documentation Pain Scale: Numbers Pre/Post-Treatment (Group)  -MS (r) CG (t) MS (c)       Row Name 08/07/23 1301          Plan of Care Review    Plan of Care Reviewed With patient;spouse  -MS (r) CG (t) MS (c)     Progress no change  -MS (r) CG (t) MS (c)     Outcome Evaluation PT eval complete. Pt was A&Ox4 sitting EOB w/ nsg staff attempting to do bed <> BSC t/f upon SPT and CLEMENT entering. Pt completed bed <> BSC t/f w/ mod A x2. Sit <> stand required mod-max Ax2. Pt utilized a stand-pivot t/f. Pt demonstrated increase thoracic kyphosis and decrease balance. Pt noted feeling lightheaded upon sitting on BSC. PLB was implemented and BP was taken. 89/66. Pt completed sit <> stand requiring mod-maxA x2 and another BP was taken. 143/92 w/ 152 bpm. Pt completed BSC <> bed t/f w/ mod A x2. Sensation was assesed EOB and LE was intact. Strength was attempted to be assesed EOB but pt noted he needed to lay " down. Pt complete sit <> supine w/ max Ax2. Vitals were taken again in supine. 103/73 w/ 115 bpm. Pt was able to completed AROM throughout B LE. B LE were assesed at at least 3/5. Pt and spouse were educated on repositioning, safety w/ moving from bed, bedside activites, POC, and d/c. Skilled therapy is required to address strength, activity endurance, and balance. D/c recommendation to inpatient rehab for further rehab.  -MS (r) CG (t) MS (c)       Row Name 08/07/23 1301          Therapy Assessment/Plan (PT)    Patient/Family Therapy Goals Statement (PT) None stated  -MS (r) CG (t) MS (c)     Rehab Potential (PT) good, to achieve stated therapy goals  -MS (r) CG (t) MS (c)     Criteria for Skilled Interventions Met (PT) yes;meets criteria;skilled treatment is necessary  -MS (r) CG (t) MS (c)     Therapy Frequency (PT) 2 times/day  -MS (r) CG (t) MS (c)     Predicted Duration of Therapy Intervention (PT) until d/c or goals met  -MS (r) CG (t) MS (c)       Row Name 08/07/23 1301          Vital Signs    Pre Systolic BP Rehab 89  -MS (r) CG (t) MS (c)     Pre Treatment Diastolic BP 66  -MS (r) CG (t) MS (c)     Intra Systolic BP Rehab 143  -MS (r) CG (t) MS (c)     Intra Treatment Diastolic BP 92  -MS (r) CG (t) MS (c)     Post Systolic BP Rehab 103  -MS (r) CG (t) MS (c)     Post Treatment Diastolic BP 73  -MS (r) CG (t) MS (c)     Intratreatment Heart Rate (beats/min) 152  -MS (r) CG (t) MS (c)     Posttreatment Heart Rate (beats/min) 115  -MS (r) CG (t) MS (c)     O2 Delivery Pre Treatment room air  -MS (r) CG (t) MS (c)     O2 Delivery Intra Treatment room air  -MS (r) CG (t) MS (c)     O2 Delivery Post Treatment room air  -MS (r) CG (t) MS (c)     Pre Patient Position Sitting  -MS (r) CG (t) MS (c)     Intra Patient Position Sitting  -MS (r) CG (t) MS (c)     Post Patient Position Supine  -MS (r) CG (t) MS (c)       Row Name 08/07/23 1301          Positioning and Restraints    Pre-Treatment Position in bed  -MS  (r) CG (t) MS (c)     Post Treatment Position bed  -MS (r) CG (t) MS (c)     In Bed fowlers;encouraged to call for assist;call light within reach;side rails up x3;legs elevated  -MS (r) CG (t) MS (c)               User Key  (r) = Recorded By, (t) = Taken By, (c) = Cosigned By      Initials Name Provider Type    Love Fletcher, PT, DPT, NCS Physical Therapist    CG Joss Modi, PT Student PT Student                   Outcome Measures       Row Name 08/07/23 1301 08/07/23 0832       How much help from another person do you currently need...    Turning from your back to your side while in flat bed without using bedrails? 3  -MS (r) CG (t) MS (c) 3  -CP    Moving from lying on back to sitting on the side of a flat bed without bedrails? 3  -MS (r) CG (t) MS (c) 3  -CP    Moving to and from a bed to a chair (including a wheelchair)? 3  -MS (r) CG (t) MS (c) 3  -CP    Standing up from a chair using your arms (e.g., wheelchair, bedside chair)? 2  -MS (r) CG (t) MS (c) 3  -CP    Climbing 3-5 steps with a railing? 1  -MS (r) CG (t) MS (c) 2  -CP    To walk in hospital room? 2  -MS (r) CG (t) MS (c) 2  -CP    AM-PAC 6 Clicks Score (PT) 14  -MS (r) CG (t) 16  -CP    Highest level of mobility 4 --> Transferred to chair/commode  -MS (r) CG (t) 5 --> Static standing  -CP      Row Name 08/07/23 1301 08/07/23 0800       Functional Assessment    Outcome Measure Options AM-PAC 6 Clicks Basic Mobility (PT)  -MS (r) CG (t) MS (c) AM-PAC 6 Clicks Daily Activity (OT)  -AC (r) WB (t) AC (c)              User Key  (r) = Recorded By, (t) = Taken By, (c) = Cosigned By      Initials Name Provider Type    Venkat Yu, OTR/L, CNT Occupational Therapist    MS Love Eason R, PT, DPT, NCS Physical Therapist    CP Kourtney Tripp, RN Registered Nurse    Agustin Lopez, OT Student OT Student    Joss Cali, PT Student PT Student                                 Physical Therapy Education       Title: PT OT SLP Therapies (In  Progress)       Topic: Physical Therapy (Done)       Point: Mobility training (Done)       Learning Progress Summary             Patient Acceptance, E, VU by CG at 8/7/2023 1434    Comment: Pt and spouse were educated on repositioning, safety w/ moving from bed, bedside activites, POC, and d/c                         Point: Body mechanics (Done)       Learning Progress Summary             Patient Acceptance, E, VU by CG at 8/7/2023 1434    Comment: Pt and spouse were educated on repositioning, safety w/ moving from bed, bedside activites, POC, and d/c                         Point: Precautions (Done)       Learning Progress Summary             Patient Acceptance, E, VU by CG at 8/7/2023 1434    Comment: Pt and spouse were educated on repositioning, safety w/ moving from bed, bedside activites, POC, and d/c                                         User Key       Initials Effective Dates Name Provider Type Discipline    CG 04/27/23 -  Joss Modi, PT Student PT Student PT                  PT Recommendation and Plan  Planned Therapy Interventions (PT): balance training, bed mobility training, gait training, home exercise program, patient/family education, transfer training, strengthening  Plan of Care Reviewed With: patient, spouse  Progress: no change  Outcome Evaluation: PT eval complete. Pt was A&Ox4 sitting EOB w/ nsg staff attempting to do bed <> BSC t/f upon SPT and CLEMENT entering. Pt completed bed <> BSC t/f w/ mod A x2. Sit <> stand required mod-max Ax2. Pt utilized a stand-pivot t/f. Pt demonstrated increase thoracic kyphosis and decrease balance. Pt noted feeling lightheaded upon sitting on BSC. PLB was implemented and BP was taken. 89/66. Pt completed sit <> stand requiring mod-maxA x2 and another BP was taken. 143/92 w/ 152 bpm. Pt completed BSC <> bed t/f w/ mod A x2. Sensation was assesed EOB and LE was intact. Strength was attempted to be assesed EOB but pt noted he needed to lay down. Pt complete sit  <> supine w/ max Ax2. Vitals were taken again in supine. 103/73 w/ 115 bpm. Pt was able to completed AROM throughout B LE. B LE were assesed at at least 3/5. Pt and spouse were educated on repositioning, safety w/ moving from bed, bedside activites, POC, and d/c. Skilled therapy is required to address strength, activity endurance, and balance. D/c recommendation to inpatient rehab for further rehab.     Time Calculation:         PT Charges       Row Name 08/07/23 1435             Time Calculation    Start Time 1324  -MS (r) CG (t) MS (c)      Stop Time 1411  -MS (r) CG (t) MS (c)      Time Calculation (min) 47 min  -MS (r) CG (t)      PT Received On 08/07/23  -MS (r) CG (t) MS (c)      PT Goal Re-Cert Due Date 08/17/23  -MS (r) CG (t) MS (c)         Timed Charges    92832 - PT Therapeutic Activity Minutes --  -MS (r) CG (t) MS (c)         Untimed Charges    PT Eval/Re-eval Minutes 63  16 min chart review  -MS (r) CG (t) MS (c)         Total Minutes    Timed Charges Total Minutes -- (P)   -CG      Untimed Charges Total Minutes 63  -MS (r) CG (t)       Total Minutes 63  -MS (r) CG (t)                User Key  (r) = Recorded By, (t) = Taken By, (c) = Cosigned By      Initials Name Provider Type    MS Jenaro Love R, PT, DPT, NCS Physical Therapist    Joss Cali, PT Student PT Student                      PT G-Codes  Outcome Measure Options: AM-PAC 6 Clicks Basic Mobility (PT)  AM-PAC 6 Clicks Score (PT): 14  AM-PAC 6 Clicks Score (OT): 13  PT Discharge Summary  Anticipated Discharge Disposition (PT): inpatient rehabilitation facility    Joss Modi PT Student  8/7/2023

## 2023-08-07 NOTE — CONSULTS
"INFECTIOUS DISEASES CONSULT NOTE    Patient:  Aleks Monzon 73 y.o. male  ROOM # 328/1  YOB: 1950  MRN: [id number]  [facility]:  [id number]  Admit date: 8/4/2023   Admitting Physician: Tj Hyde MD  Primary Care Physician: Tj Hyde MD  REFERRING PROVIDER: Provider, No Known    Inpatient Infectious Diseases Consult  Consult performed by: Cassy Khalil MD  Consult ordered by: Eugene Stock MD        REASON FOR CONSULTATION : Blood culture positive for gram negative bacilli       HISTORY OF PRESENT ILLNESS: Patient is a pleasant 73-year-old gentleman who was admitted August 4 with right upper extremity cellulitis.  Indicated that he woke up that morning and his hand felt somewhat \"tingly\".  He thought he had slept on it wrong.  He then noted it was swollen and erythematous.    Since admission he had a culture obtained on the area of a skin tear which was positive for methicillin susceptible Staphylococcus aureus and procidentia.  He has been on ceftriaxone and vancomycin.      Blood cultures obtained on admission were positive for gram-negative bacilli subsequently identified as enterobacterales spp    The patient indicates that he had a colonoscopy within the last year.  He has recently seen by GI as he had developed diarrhea in early July and that has not resolved.  He noted he had a coworker with diarrhea and he thought they likely had the same illness however his coworker has recovered.  He indicates he is had some stool work-up with GI that was negative    Past Medical History:   Diagnosis Date    Arthritis     Cancer     PROSTATE AND BLADDER    Cellulitis     Gout     Hypertension     IFG (impaired fasting glucose)     Low back pain     Lymphedema     Retroperitoneal fibrosis      Past Surgical History:   Procedure Laterality Date    AXILLARY LYMPH NODE BIOPSY/EXCISION Left 10/01/2020    Procedure: LEFT AXILLARY LYMPH NODE BIOPSY;  Surgeon: Dina To, " MD;  Location: [facility] OR;  Service: General;  Laterality: Left;    BACK SURGERY      COLONOSCOPY      2017?    COLONOSCOPY N/A 10/11/2022    Procedure: COLONOSCOPY WITH ANESTHESIA;  Surgeon: Burton Chaudhari MD;  Location: [facility] ENDOSCOPY;  Service: Gastroenterology;  Laterality: N/A;  preop; abdominal pain  postop; polyps   PCP Tj Hyde MD    ENDOSCOPY N/A 10/11/2022    Procedure: ESOPHAGOGASTRODUODENOSCOPY WITH ANESTHESIA;  Surgeon: Burton Chaudhari MD;  Location: [facility] ENDOSCOPY;  Service: Gastroenterology;  Laterality: N/A;  preop; abdominal pain  postop; misshape stomach  PCP Tj Hyde MD    JOINT REPLACEMENT      BILATERAL HIP     SKIN CANCER EXCISION      TOTAL HIP ARTHROPLASTY      URETERAL STENT INSERTION       Family History   Problem Relation Age of Onset    Heart disease Father     Diabetes Maternal Grandmother     Colon cancer Neg Hx     Colon polyps Neg Hx      Social History     Socioeconomic History    Marital status:    Tobacco Use    Smoking status: Never    Smokeless tobacco: Never   Vaping Use    Vaping Use: Never used   Substance and Sexual Activity    Alcohol use: Never    Drug use: Never    Sexual activity: Not Currently     Partners: Female       Current Scheduled Medications:   cefTRIAXone, 2,000 mg, Intravenous, Q24H  senna-docusate sodium, 2 tablet, Oral, BID  sodium chloride, 10 mL, Intravenous, Q12H  vancomycin, 15 mg/kg, Intravenous, Q24H      Current PRN Medications:    acetaminophen    senna-docusate sodium **AND** polyethylene glycol **AND** bisacodyl **AND** bisacodyl    Calcium Replacement - Follow Nurse / BPA Driven Protocol    Magnesium Standard Dose Replacement - Follow Nurse / BPA Driven Protocol    melatonin    Phosphorus Replacement - Follow Nurse / BPA Driven Protocol    Potassium Replacement - Follow Nurse / BPA Driven Protocol    [COMPLETED] Insert Peripheral IV **AND** sodium chloride    sodium chloride    sodium chloride    traMADol      "  Allergies   Allergen Reactions    Niacin Itching     same as of 2017-itching             Vital Signs:  /87 (BP Location: Left arm, Patient Position: Lying)   Pulse 86   Temp 98.3 øF (36.8 øC) (Oral)   Resp 17   Ht 188 cm (74\")   Wt 94.2 kg (207 lb 10.8 oz)   SpO2 97%   BMI 26.66 kg/mý   Temp (24hrs), Av.1 øF (36.7 øC), Min:97.5 øF (36.4 øC), Max:98.4 øF (36.9 øC)      Physical Exam    General: Patient is a fatigued appearing 73-year-old male sitting up in bed in no acute distress  HEENT: Sclera anicteric  Respiratory: Effort even and unlabored, he was not conversationally dyspneic  Right upper extremity dressing was removed.  Patient had significant improvement in edema in the area where dressing was in place.  Proximal to that patient still had quite a bit of edema.  Erythema was quite pale  Buttock wound not evaluated.  See photo below                Results Review:    I reviewed the patient's new clinical results.    Lab Results:    CBC:   Lab Results   Lab 23  0341   WBC 17.21* 9.73 7.62   HEMOGLOBIN 12.1* 10.6* 10.7*   HEMATOCRIT 37.0* 33.0* 32.5*   PLATELETS 185 182 159        AutoDiff:   Lab Results   Lab 23  0341   NEUTROPHIL % 89.1* 86.4* 79.6*   LYMPHOCYTE % 4.8* 5.4* 8.9*   MONOCYTES % 3.3* 5.0 5.6   EOSINOPHIL % 0.2* 0.5 1.3   BASOPHIL % 0.2 0.3 0.4   NEUTROS ABS 15.32* 8.40* 6.06   LYMPHS ABS 0.83 0.53* 0.68*   MONOS ABS 0.57 0.49 0.43   EOS ABS 0.03 0.05 0.10   BASOS ABS 0.04 0.03 0.03        Manual Diff:    Lab Results   Lab 23  0402 23  0341   NEUTROS ABS 15.32* 8.40* 6.06        CMP:   Lab Results   Lab 23  1413 23  2122 23  0523 23  1505 23  0402 23  0341   SODIUM 132*   < > 133*  --  131* 132*   POTASSIUM 3.8   < > 3.4* 4.2 4.1 3.9   CHLORIDE 101   < > 105  --  104 108*   CO2 20.0*   < > 18.0*  --  18.0* 18.0*   BUN 52*   < > 49*  --  " "51* 51*   CREATININE 1.61*   < > 1.43*  --  1.51* 1.50*   CALCIUM 6.5*   < > 6.9*  --  7.2* 7.1*   BILIRUBIN 0.4  --   --   --  0.2 0.2   ALK PHOS 208*  --   --   --  193* 214*   ALT (SGPT) 30  --   --   --  26 27   AST (SGOT) 42*  --   --   --  33 39   GLUCOSE 88   < > 96  --  102* 100*    < > = values in this interval not displayed.           Component  Ref Range & Units    Campylobacter  Not Detected Not Detected   Plesiomonas shigelloides  Not Detected Not Detected   Salmonella  Not Detected Not Detected   Vibrio  Not Detected Not Detected   Vibrio cholerae  Not Detected Not Detected   Yersinia enterocolitica  Not Detected Not Detected   Enteroaggregative E. coli (EAEC)  Not Detected Not Detected   Enteropathogenic E. coli (EPEC)  Not Detected Not Detected   Enterotoxigenic E. coli (ETEC) lt/st  Not Detected Not Detected   Shiga-like toxin-producing E. coli (STEC) stx1/stx2  Not Detected Not Detected   Shigella/Enteroinvasive E. coli (EIEC)  Not Detected Not Detected   Cryptosporidium  Not Detected Not Detected   Cyclospora cayetanensis  Not Detected Not Detected   Entamoeba histolytica  Not Detected Not Detected   Giardia lamblia  Not Detected Not Detected   Adenovirus F40/41  Not Detected Not Detected   Astrovirus  Not Detected Not Detected   Norovirus GI/GII  Not Detected Not Detected   Rotavirus A  Not Detected Not Detected   Sapovirus (I, II, IV or V)  Not Detected Not Detected   Resulting Agency  [facility]           Narrative  Performed by:  [facility]  If Aeromonas, Staphylococcus aureus or Bacillus cereus are suspected, please order [id number]: Stool Culture, Aeromonas, S aureus, B Cereus.      Specimen Collected: 08/03/23 15:14 CDT Last Resulted: 08/03/23 17:28 CDT                   Toxigenic C. difficile by PCR  Negative Negative   Resulting Agency  [facility]        Specimen Collected: 08/03/23 15:14 CDT Last Resulted: 08/03/23 17:00 CDT             >>> Ova and parasites collected and state \"active in " "process\"      Lab Results (last 72 hours)       Procedure Component Value Units Date/Time    Blood Culture - Blood, Arm, Right [[id number]]  (Abnormal) Collected: 08/04/23 1419    Specimen: Blood from Arm, Right Updated: 08/07/23 0601     Blood Culture Gram Negative Bacilli     Isolated from Anaerobic Bottle     Gram Stain Anaerobic Bottle Gram negative bacilli    Narrative:      ID/MICs to follow    Blood Culture - Blood, Arm, Left [[id number]]  (Abnormal) Collected: 08/04/23 1413    Specimen: Blood from Arm, Left Updated: 08/07/23 0601     Blood Culture Gram Negative Bacilli     Comment: ID/MICs to follow        Isolated from Aerobic and Anaerobic Bottles     Gram Stain Anaerobic Bottle Gram negative bacilli      Aerobic Bottle Gram negative bacilli    Comprehensive Metabolic Panel [[id number]]  (Abnormal) Collected: 08/07/23 0341    Specimen: Blood Updated: 08/07/23 0430     Glucose 100 mg/dL      BUN 51 mg/dL      Creatinine 1.50 mg/dL      Sodium 132 mmol/L      Potassium 3.9 mmol/L      Chloride 108 mmol/L      CO2 18.0 mmol/L      Calcium 7.1 mg/dL      Total Protein 3.2 g/dL      Albumin 1.2 g/dL      ALT (SGPT) 27 U/L      AST (SGOT) 39 U/L      Alkaline Phosphatase 214 U/L      Total Bilirubin 0.2 mg/dL      Globulin 2.0 gm/dL      A/G Ratio 0.6 g/dL      BUN/Creatinine Ratio 34.0     Anion Gap 6.0 mmol/L      eGFR 49.5 mL/min/1.73     Narrative:      GFR Normal >60  Chronic Kidney Disease <60  Kidney Failure <15    The GFR formula is only valid for adults with stable renal function between ages 18 and 70.    CBC & Differential [[id number]]  (Abnormal) Collected: 08/07/23 0341    Specimen: Blood Updated: 08/07/23 0409    Narrative:      The following orders were created for panel order CBC & Differential.  Procedure                               Abnormality         Status                     ---------                               -----------         ------                     CBC Auto Differential[[id number]]  "       Abnormal            Final result                 Please view results for these tests on the individual orders.    CBC Auto Differential [[id number]]  (Abnormal) Collected: 08/07/23 0341    Specimen: Blood Updated: 08/07/23 0409     WBC 7.62 10*3/mm3      RBC 3.74 10*6/mm3      Hemoglobin 10.7 g/dL      Hematocrit 32.5 %      MCV 86.9 fL      MCH 28.6 pg      MCHC 32.9 g/dL      RDW 16.0 %      RDW-SD 50.4 fl      MPV 9.8 fL      Platelets 159 10*3/mm3      Neutrophil % 79.6 %      Lymphocyte % 8.9 %      Monocyte % 5.6 %      Eosinophil % 1.3 %      Basophil % 0.4 %      Immature Grans % 4.2 %      Neutrophils, Absolute 6.06 10*3/mm3      Lymphocytes, Absolute 0.68 10*3/mm3      Monocytes, Absolute 0.43 10*3/mm3      Eosinophils, Absolute 0.10 10*3/mm3      Basophils, Absolute 0.03 10*3/mm3      Immature Grans, Absolute 0.32 10*3/mm3      nRBC 0.0 /100 WBC     Vancomycin, Trough [[id number]]  (Normal) Collected: 08/06/23 1514    Specimen: Blood Updated: 08/06/23 1548     Vancomycin Trough 14.10 mcg/mL     Calcium, Ionized [[id number]] Collected: 08/06/23 1526    Specimen: Blood Updated: 08/06/23 1522     Ionized Calcium 4.68 mg/dL      Collected by [id number]     Comment: Meter: [id number]     :  [id number]       Wound Culture - Wound, Arm, Right [[id number]]  (Abnormal)  (Susceptibility) Collected: 08/04/23 1612    Specimen: Wound from Arm, Right Updated: 08/06/23 0956     Wound Culture Light growth (2+) Staphylococcus aureus      Rare Providencia rettgeri      Light growth (2+) Normal Skin Leticia     Gram Stain Few (2+) Gram positive cocci      No WBCs seen    Susceptibility        Staphylococcus aureus      DICKSON      Clindamycin Susceptible      Erythromycin Susceptible      Inducible Clindamycin Resistance Negative      Oxacillin Susceptible      Rifampin Susceptible      Tetracycline Susceptible      Trimethoprim + Sulfamethoxazole Susceptible      Vancomycin Susceptible                        Susceptibility        Providencia rettgeri      DICKSON      Ampicillin Resistant      Ampicillin + Sulbactam Susceptible      Cefepime Susceptible      Ceftazidime Susceptible      Ceftriaxone Susceptible      Gentamicin Susceptible      Levofloxacin Susceptible      Piperacillin + Tazobactam Susceptible      Tetracycline Resistant      Trimethoprim + Sulfamethoxazole Susceptible                       Susceptibility Comments       Staphylococcus aureus    This isolate does not demonstrate inducible clindamycin resistance in vitro.        Providencia rettgeri    Cefazolin sensitivity will not be reported for Enterobacteriaceae in non-urine isolates. If cefazolin is preferred, please call the microbiology lab to request an E-test.  With the exception of urinary-sourced infections, aminoglycosides should not be used as monotherapy.               Calcium, Ionized [[id number]]  (Abnormal) Collected: 08/06/23 0932    Specimen: Blood Updated: 08/06/23 0927     Ionized Calcium 4.36 mg/dL      Comment: 84 Value below reference range        Collected by [id number]     Comment: Meter: [id number]     :  [id number]       Magnesium [[id number]]  (Normal) Collected: 08/06/23 0402    Specimen: Blood Updated: 08/06/23 0501     Magnesium 2.0 mg/dL     Comprehensive Metabolic Panel [[id number]]  (Abnormal) Collected: 08/06/23 0402    Specimen: Blood Updated: 08/06/23 0501     Glucose 102 mg/dL      BUN 51 mg/dL      Creatinine 1.51 mg/dL      Sodium 131 mmol/L      Potassium 4.1 mmol/L      Chloride 104 mmol/L      CO2 18.0 mmol/L      Calcium 7.2 mg/dL      Total Protein 3.0 g/dL      Albumin 1.3 g/dL      ALT (SGPT) 26 U/L      AST (SGOT) 33 U/L      Alkaline Phosphatase 193 U/L      Total Bilirubin 0.2 mg/dL      Globulin 1.7 gm/dL      A/G Ratio 0.8 g/dL      BUN/Creatinine Ratio 33.8     Anion Gap 9.0 mmol/L      eGFR 49.1 mL/min/1.73     Narrative:      GFR Normal >60  Chronic Kidney Disease <60  Kidney Failure <15    The  GFR formula is only valid for adults with stable renal function between ages 18 and 70.    CBC & Differential [[id number]]  (Abnormal) Collected: 08/06/23 0402    Specimen: Blood Updated: 08/06/23 0437    Narrative:      The following orders were created for panel order CBC & Differential.  Procedure                               Abnormality         Status                     ---------                               -----------         ------                     CBC Auto Differential[[id number]]        Abnormal            Final result                 Please view results for these tests on the individual orders.    CBC Auto Differential [[id number]]  (Abnormal) Collected: 08/06/23 0402    Specimen: Blood Updated: 08/06/23 0437     WBC 9.73 10*3/mm3      RBC 3.79 10*6/mm3      Hemoglobin 10.6 g/dL      Hematocrit 33.0 %      MCV 87.1 fL      MCH 28.0 pg      MCHC 32.1 g/dL      RDW 15.9 %      RDW-SD 50.1 fl      MPV 9.5 fL      Platelets 182 10*3/mm3      Neutrophil % 86.4 %      Lymphocyte % 5.4 %      Monocyte % 5.0 %      Eosinophil % 0.5 %      Basophil % 0.3 %      Immature Grans % 2.4 %      Neutrophils, Absolute 8.40 10*3/mm3      Lymphocytes, Absolute 0.53 10*3/mm3      Monocytes, Absolute 0.49 10*3/mm3      Eosinophils, Absolute 0.05 10*3/mm3      Basophils, Absolute 0.03 10*3/mm3      Immature Grans, Absolute 0.23 10*3/mm3      nRBC 0.0 /100 WBC     Potassium [[id number]]  (Normal) Collected: 08/05/23 1505    Specimen: Blood Updated: 08/05/23 1523     Potassium 4.2 mmol/L     Blood Culture ID, PCR - Blood, Arm, Left [[id number]]  (Abnormal) Collected: 08/04/23 1413    Specimen: Blood from Arm, Left Updated: 08/05/23 0741     BCID, PCR Enterobacterales spp. Identification by BCID2 PCR.     BOTTLE TYPE Anaerobic Bottle    Narrative:      No resistance genes detected.    Magnesium [[id number]]  (Normal) Collected: 08/05/23 0523    Specimen: Blood Updated: 08/05/23 0617     Magnesium 1.7 mg/dL     Basic Metabolic  Panel [[id number]]  (Abnormal) Collected: 08/05/23 0523    Specimen: Blood Updated: 08/05/23 0617     Glucose 96 mg/dL      BUN 49 mg/dL      Creatinine 1.43 mg/dL      Sodium 133 mmol/L      Potassium 3.4 mmol/L      Chloride 105 mmol/L      CO2 18.0 mmol/L      Calcium 6.9 mg/dL      BUN/Creatinine Ratio 34.3     Anion Gap 10.0 mmol/L      eGFR 52.4 mL/min/1.73     Narrative:      GFR Normal >60  Chronic Kidney Disease <60  Kidney Failure <15    The GFR formula is only valid for adults with stable renal function between ages 18 and 70.    Magnesium [[id number]]  (Abnormal) Collected: 08/04/23 2122    Specimen: Blood Updated: 08/04/23 2147     Magnesium 1.0 mg/dL     Basic Metabolic Panel [[id number]]  (Abnormal) Collected: 08/04/23 2122    Specimen: Blood Updated: 08/04/23 2147     Glucose 99 mg/dL      BUN 49 mg/dL      Creatinine 1.53 mg/dL      Sodium 134 mmol/L      Potassium 3.9 mmol/L      Comment: Slight hemolysis detected by analyzer. Results may be affected.        Chloride 106 mmol/L      CO2 17.0 mmol/L      Calcium 6.3 mg/dL      BUN/Creatinine Ratio 32.0     Anion Gap 11.0 mmol/L      eGFR 48.3 mL/min/1.73     Narrative:      GFR Normal >60  Chronic Kidney Disease <60  Kidney Failure <15    The GFR formula is only valid for adults with stable renal function between ages 18 and 70.    STAT Lactic Acid, Reflex [[id number]]  (Normal) Collected: 08/04/23 1731    Specimen: Blood Updated: 08/04/23 1807     Lactate 0.7 mmol/L     Uric Acid [[id number]]  (Abnormal) Collected: 08/04/23 1413    Specimen: Blood Updated: 08/04/23 1516     Uric Acid 2.8 mg/dL     Phosphorus [[id number]]  (Normal) Collected: 08/04/23 1413    Specimen: Blood Updated: 08/04/23 1516     Phosphorus 3.1 mg/dL     Magnesium [[id number]]  (Abnormal) Collected: 08/04/23 1413    Specimen: Blood Updated: 08/04/23 1458     Magnesium 0.9 mg/dL     Lactic Acid, Plasma [[id number]]  (Abnormal) Collected: 08/04/23 1413    Specimen: Blood Updated:  "08/04/23 1458     Lactate 2.1 mmol/L     Procalcitonin [[id number]]  (Abnormal) Collected: 08/04/23 1413    Specimen: Blood Updated: 08/04/23 1453     Procalcitonin 0.38 ng/mL     Narrative:      As a Marker for Sepsis (Non-Neonates):    1. <0.5 ng/mL represents a low risk of severe sepsis and/or septic shock.  2. >2 ng/mL represents a high risk of severe sepsis and/or septic shock.    As a Marker for Lower Respiratory Tract Infections that require antibiotic therapy:    PCT on Admission    Antibiotic Therapy       6-12 Hrs later    >0.5                Strongly Recommended  >0.25 - <0.5        Recommended   0.1 - 0.25          Discouraged              Remeasure/reassess PCT  <0.1                Strongly Discouraged     Remeasure/reassess PCT    As 28 day mortality risk marker: \"Change in Procalcitonin Result\" (>80% or <=80%) if Day 0 (or Day 1) and Day 4 values are available. Refer to http://[email or web address]-pct-[email or web address]    Change in PCT <=80%  A decrease of PCT levels below or equal to 80% defines a positive change in PCT test result representing a higher risk for 28-day all-cause mortality of patients diagnosed with severe sepsis for septic shock.    Change in PCT >80%  A decrease of PCT levels of more than 80% defines a negative change in PCT result representing a lower risk for 28-day all-cause mortality of patients diagnosed with severe sepsis or septic shock.       Comprehensive Metabolic Panel [[id number]]  (Abnormal) Collected: 08/04/23 1413    Specimen: Blood Updated: 08/04/23 1449     Glucose 88 mg/dL      BUN 52 mg/dL      Creatinine 1.61 mg/dL      Sodium 132 mmol/L      Potassium 3.8 mmol/L      Comment: Slight hemolysis detected by analyzer. Results may be affected.        Chloride 101 mmol/L      CO2 20.0 mmol/L      Calcium 6.5 mg/dL      Total Protein 3.6 g/dL      Albumin 1.6 g/dL      ALT (SGPT) 30 U/L      AST (SGOT) 42 U/L      Alkaline Phosphatase 208 U/L      Total Bilirubin 0.4 mg/dL      " Globulin 2.0 gm/dL      A/G Ratio 0.8 g/dL      BUN/Creatinine Ratio 32.3     Anion Gap 11.0 mmol/L      eGFR 45.4 mL/min/1.73     Narrative:      GFR Normal >60  Chronic Kidney Disease <60  Kidney Failure <15    The GFR formula is only valid for adults with stable renal function between ages 18 and 70.    C-reactive Protein [[id number]]  (Abnormal) Collected: 08/04/23 1413    Specimen: Blood Updated: 08/04/23 1447     C-Reactive Protein 15.71 mg/dL     CBC & Differential [[id number]]  (Abnormal) Collected: 08/04/23 1413    Specimen: Blood Updated: 08/04/23 1426    Narrative:      The following orders were created for panel order CBC & Differential.  Procedure                               Abnormality         Status                     ---------                               -----------         ------                     CBC Auto Differential[[id number]]        Abnormal            Final result                 Please view results for these tests on the individual orders.    CBC Auto Differential [[id number]]  (Abnormal) Collected: 08/04/23 1413    Specimen: Blood Updated: 08/04/23 1426     WBC 17.21 10*3/mm3      RBC 4.25 10*6/mm3      Hemoglobin 12.1 g/dL      Hematocrit 37.0 %      MCV 87.1 fL      MCH 28.5 pg      MCHC 32.7 g/dL      RDW 16.2 %      RDW-SD 51.4 fl      MPV 9.8 fL      Platelets 185 10*3/mm3      Neutrophil % 89.1 %      Lymphocyte % 4.8 %      Monocyte % 3.3 %      Eosinophil % 0.2 %      Basophil % 0.2 %      Immature Grans % 2.4 %      Neutrophils, Absolute 15.32 10*3/mm3      Lymphocytes, Absolute 0.83 10*3/mm3      Monocytes, Absolute 0.57 10*3/mm3      Eosinophils, Absolute 0.03 10*3/mm3      Basophils, Absolute 0.04 10*3/mm3      Immature Grans, Absolute 0.42 10*3/mm3      nRBC 0.0 /100 WBC             Estimated Creatinine Clearance: 60.2 mL/min (A) (by C-G formula based on SCr of 1.5 mg/dL (H)).    Culture Results:    Microbiology Results (last 10 days)       Procedure Component Value -  Date/Time    Wound Culture - Wound, Arm, Right [[id number]]  (Abnormal)  (Susceptibility) Collected: 08/04/23 1612    Lab Status: Final result Specimen: Wound from Arm, Right Updated: 08/06/23 0956     Wound Culture Light growth (2+) Staphylococcus aureus      Rare Providencia rettgeri      Light growth (2+) Normal Skin Leticia     Gram Stain Few (2+) Gram positive cocci      No WBCs seen    Susceptibility        Staphylococcus aureus      DICKSON      Clindamycin Susceptible      Erythromycin Susceptible      Inducible Clindamycin Resistance Negative      Oxacillin Susceptible      Rifampin Susceptible      Tetracycline Susceptible      Trimethoprim + Sulfamethoxazole Susceptible      Vancomycin Susceptible                       Susceptibility        Providencia rettgeri      DICKSON      Ampicillin Resistant      Ampicillin + Sulbactam Susceptible      Cefepime Susceptible      Ceftazidime Susceptible      Ceftriaxone Susceptible      Gentamicin Susceptible      Levofloxacin Susceptible      Piperacillin + Tazobactam Susceptible      Tetracycline Resistant      Trimethoprim + Sulfamethoxazole Susceptible                       Susceptibility Comments       Staphylococcus aureus    This isolate does not demonstrate inducible clindamycin resistance in vitro.        Providencia rettgeri    Cefazolin sensitivity will not be reported for Enterobacteriaceae in non-urine isolates. If cefazolin is preferred, please call the microbiology lab to request an E-test.  With the exception of urinary-sourced infections, aminoglycosides should not be used as monotherapy.               Blood Culture - Blood, Arm, Right [[id number]]  (Abnormal) Collected: 08/04/23 1419    Lab Status: Preliminary result Specimen: Blood from Arm, Right Updated: 08/07/23 0601     Blood Culture Gram Negative Bacilli     Isolated from Anaerobic Bottle     Gram Stain Anaerobic Bottle Gram negative bacilli    Narrative:      ID/MICs to follow    Blood Culture - Blood,  Arm, Left [[id number]]  (Abnormal) Collected: 08/04/23 1413    Lab Status: Preliminary result Specimen: Blood from Arm, Left Updated: 08/07/23 0601     Blood Culture Gram Negative Bacilli     Comment: ID/MICs to follow        Isolated from Aerobic and Anaerobic Bottles     Gram Stain Anaerobic Bottle Gram negative bacilli      Aerobic Bottle Gram negative bacilli    Blood Culture ID, PCR - Blood, Arm, Left [[id number]]  (Abnormal) Collected: 08/04/23 1413    Lab Status: Final result Specimen: Blood from Arm, Left Updated: 08/05/23 0741     BCID, PCR Enterobacterales spp. Identification by BCID2 PCR.     BOTTLE TYPE Anaerobic Bottle    Narrative:      No resistance genes detected.    Clostridioides difficile Toxin - Stool, Per Rectum [[id number]]  (Normal) Collected: 08/03/23 1514    Lab Status: Final result Specimen: Stool from Per Rectum Updated: 08/03/23 1700    Narrative:      The following orders were created for panel order Clostridioides difficile Toxin - Stool, Per Rectum.  Procedure                               Abnormality         Status                     ---------                               -----------         ------                     Clostridioides difficile...[[id number]]  Normal              Final result                 Please view results for these tests on the individual orders.    Gastrointestinal Panel, PCR - Stool, Per Rectum [[id number]]  (Normal) Collected: 08/03/23 1514    Lab Status: Final result Specimen: Stool from Per Rectum Updated: 08/03/23 1728     Campylobacter Not Detected     Plesiomonas shigelloides Not Detected     Salmonella Not Detected     Vibrio Not Detected     Vibrio cholerae Not Detected     Yersinia enterocolitica Not Detected     Enteroaggregative E. coli (EAEC) Not Detected     Enteropathogenic E. coli (EPEC) Not Detected     Enterotoxigenic E. coli (ETEC) lt/st Not Detected     Shiga-like toxin-producing E. coli (STEC) stx1/stx2 Not Detected     Shigella/Enteroinvasive  E. coli (EIEC) Not Detected     Cryptosporidium Not Detected     Cyclospora cayetanensis Not Detected     Entamoeba histolytica Not Detected     Giardia lamblia Not Detected     Adenovirus F40/41 Not Detected     Astrovirus Not Detected     Norovirus GI/GII Not Detected     Rotavirus A Not Detected     Sapovirus (I, II, IV or V) Not Detected    Narrative:      If Aeromonas, Staphylococcus aureus or Bacillus cereus are suspected, please order [id number]: Stool Culture, Aeromonas, S aureus, B Cereus.    Clostridioides difficile Toxin, PCR - Stool, Per Rectum [[id number]]  (Normal) Collected: 08/03/23 1514    Lab Status: Final result Specimen: Stool from Per Rectum Updated: 08/03/23 1700     Toxigenic C. difficile by PCR Negative    Narrative:      The result indicates the absence of toxigenic C. difficile from stool specimen.                Radiology:   Imaging Results (Last 72 Hours)       Procedure Component Value Units Date/Time    US Venous Doppler Upper Extremity Right (duplex) [[id number]] Collected: 08/04/23 1605     Updated: 08/04/23 1608    Narrative:      History: Pain and swelling       Impression:      Impression: There is no evidence of deep venous thrombosis or  superficial thrombophlebitis of the right upper extremity.     Comments: Right upper extremity venous duplex exam was performed using  color Doppler flow, Doppler wave form analysis, and grayscale imaging,  with and without compression. There is no evidence of deep venous  thrombosis of the internal jugular, subclavian, axillary, brachial,  radial, and ulnar veins. There is no evidence of superficial  thrombophlebitis involving the cephalic or basilic veins.      This report was finalized on 08/04/2023 16:05 by Dr. Fransico Lundy MD.    XR Wrist 3+ View Right [[id number]] Collected: 08/04/23 1437     Updated: 08/04/23 1445    Narrative:      EXAM: XR WRIST 3+ VW RIGHT-      DATE: 8/4/2023 2:07 PM CDT     HISTORY: RUE redness, swelling, pain        COMPARISON: No existing relevant imaging studies available.      TECHNIQUE:  3 views. PA, oblique, lateral. 3 images.     FINDINGS:    No acute displaced fracture or aggressive bony finding. Mild diffuse  joint space narrowing. Degenerative changes at the first carpometacarpal  joint as well as first and second metacarpophalangeal joint. There may  be subluxation at the first metacarpophalangeal joint. Possible erosion  at the posterior carpals on lateral view. Diffuse soft tissue swelling.          Impression:      1. Diffuse soft tissue swelling with possible erosion at the posterior  carpal on lateral view. Differential would include artifact, crystal  arthropathy or osteomyelitis. Correlate with patient presentation.  2. Degenerative changes with possible subluxation of the first  metacarpal phalangeal joint. Correlate with range of motion.  3. If there is continued concern for occult fracture, particularly if  there is snuff box tenderness, follow-up radiographs in 10-14 days  following immobilization might be considered.     This report was finalized on 08/04/2023 14:42 by Dr Heather Barros MD.    XR Hand 3+ View Right [[id number]] Collected: 08/04/23 1430     Updated: 08/04/23 1439    Narrative:      XR HAND 3+ VW RIGHT- 8/4/2023 2:07 PM CDT     HISTORY: RUE redness, swelling, pain     COMPARISON: None      FINDINGS:   Frontal, lateral and oblique radiographs of the right hand were provided  for review.      No visualized acute fracture. Multifocal osteoarthritis change,  especially first metacarpophalangeal joint and fifth PIP joint. Notable  swelling and a soft tissue calcification around the third PIP joint and  there is a small erosion identified along the radial aspect of the head  of the proximal phalanx. There is also a slight ulnar deviation at this  third PIP joint. Advanced first CMC joint osteoarthritis.       Impression:      1. No visualized acute fracture.  2. Multifocal degenerative change,  mostly appearing as osteoarthritis.  However, there is periarticular swelling with soft tissue calcification,  bone erosion and ulnar deviation at the third digit PIP joint which may  reflect a crystalline arthropathy such as gout, pseudogout etc.  This report was finalized on 08/04/2023 14:36 by Dr Wyatt Bone, .    XR Forearm 2 View Right [[id number]] Collected: 08/04/23 1411     Updated: 08/04/23 1431    Narrative:      Right forearm, 2 views 8/4/2023 2:06 PM CDT     HISTORY: RUE redness, swelling, pain     COMPARISON: NONE     FINDINGS:  Frontal and lateral radiographs of the right forearm were obtained.     No visualized acute fracture or malalignment. Mild osteoarthritis change  at the radiocarpal joint. Alignment is anatomic. Similarly, there is  mild osteoarthritis change at the elbow without fracture, malalignment  or capsular distention. Nonspecific subcutaneous edema in the upper arm  and forearm.       Impression:      1. No visualized fracture or malalignment.  2. Elbow and radiocarpal joint osteoarthritis changes which are mild.  3. Nonspecific subcutaneous edema which appears fairly diffuse along the  included portion of the upper arm as well as the forearm.     This report was finalized on 08/04/2023 14:28 by Dr Wyatt Bone, .    XR Elbow 3+ View Right [[id number]] Collected: 08/04/23 1409     Updated: 08/04/23 1414    Narrative:      Right elbow, 3 views 8/4/2023 2:06 PM CDT     History: RUE redness, swelling, pain     Comparison: None      Findings:   Frontal, lateral and oblique views of the right elbow were obtained.      There is no fracture or malalignment. Mild osteoarthritis changes at the  radiocapitellar and ulnohumeral joint. No elbow joint capsular  distention. Notable subcutaneous edema in the upper and lower arm,  essentially diffuse. No soft tissue gas identified.       Impression:      Impression:   1. Nonspecific diffuse subcutaneous edema.  2. No visualized fracture, malalignment  or elbow joint capsular  distention.     This report was finalized on 08/04/2023 14:11 by Dr Wyatt Bone, .              Assessment & Plan       Cellulitis of right upper extremity    Prostate cancer    Gout, arthropathy    Hypertension    Malignant neoplasm of trigone of urinary bladder    Stage 3a chronic kidney disease    Protein deficiency    Sepsis due to Gram negative bacteria      IMPRESSION:  Gram-negative bacteremia with 3 of 4 bottles positive for Enterobacterales spp.-A threat to life or bodily function.  Generally suspect GI source.  Could be urinary source and patient with history of prostate and bladder cancer however, not symptomatic on admission.  Does not seem to correlate with organisms isolated from right arm swab.  Patient has had diarrhea for over a month and GI had begun work-up as an outpatient very recently  Cellulitis right upper extremity-clinically improved.  Swab of skin tear positive for methicillin susceptible Staphylococcus aureus (most likely pathogen) and Providencia which may have been colonizing the wound  Diarrhea for approximately 1 month.  GI panel negative, C. difficile negative.  Ova and parasites ordered as outpatient.  Leukocytosis on admission-improved      RECOMMENDATION:   Discontinue vancomycin-no MRSA isolated  Discontinue Silke-Colace (patient has been declining)  Check stool for microsporidia   Follow-up on further ID DICKSON of the positive blood cultures for Enterobacterales  We will continue ceftriaxone at this point.  Patient has clinically improved.  Follow-up on further ID and DICKSON of Enterobacterales  Considered CT abdomen and pelvis looking for source of gram-negative bacteremia but will hold at this time until further information on blood/urine cultures  Wound care per RENE Fowler MD  08/07/23  09:13 CDT

## 2023-08-07 NOTE — THERAPY TREATMENT NOTE
Acute Care - Occupational Therapy Treatment Note  [facility]     Patient Name: Aleks Monzon  : 1950  MRN: [id number]  Today's Date: 2023             Admit Date: 2023       ICD-10-CM ICD-9-CM   1. Pressure injury of right buttock, stage 3  L89.313 707.05     707.23   2. Cellulitis of right upper extremity  L03.113 682.3   3. Hypomagnesemia  E83.42 275.2   4. Leukocytosis, unspecified type  D72.829 288.60   5. Decreased activities of daily living (ADL) [Z78.9 (ICD-10-CM)]  Z78.9 V49.89   6. Pressure injury of sacral region, stage 2  L89.152 707.03     707.22   7. Impaired mobility and ADLs  Z74.09 V49.89    Z78.9    8. Weakness  R53.1 780.79   9. Skin tear of right forearm without complication, initial encounter  S51.811A 881.00   10. Sepsis due to Gram negative bacteria  A41.50 038.40     995.91     Patient Active Problem List   Diagnosis    IFG (impaired fasting glucose)    High cholesterol    Prostate cancer    Elevated PSA    Elevated serum creatinine    Gout, arthropathy    Obesity (BMI 30-39.9)    Primary osteoarthritis of both knees    Retroperitoneal mass    Edema    Abnormal CT of the abdomen    Axillary mass, left    Retroperitoneal fibrosis    Gout    Ureteral obstruction, left    Hypertension    Malignant neoplasm of trigone of urinary bladder    COVID-19    Closed fracture of right hip    Closed fracture of right hip, initial encounter    Stage 3a chronic kidney disease    Cancer    Weight loss    Protein deficiency    Decreased appetite    BRBPR (bright red blood per rectum)    History of adenomatous polyp of colon    Overweight with body mass index (BMI) of 27 to 27.9 in adult    Non-smoker    Cellulitis of right upper extremity    Sepsis due to Gram negative bacteria     Past Medical History:   Diagnosis Date    Arthritis     Cancer     PROSTATE AND BLADDER    Cellulitis     Gout     Hypertension     IFG (impaired fasting glucose)     Low back pain     Lymphedema      Retroperitoneal fibrosis      Past Surgical History:   Procedure Laterality Date    AXILLARY LYMPH NODE BIOPSY/EXCISION Left 10/01/2020    Procedure: LEFT AXILLARY LYMPH NODE BIOPSY;  Surgeon: Dina To MD;  Location: [facility] OR;  Service: General;  Laterality: Left;    BACK SURGERY      COLONOSCOPY      2017?    COLONOSCOPY N/A 10/11/2022    Procedure: COLONOSCOPY WITH ANESTHESIA;  Surgeon: Burton Chaudhari MD;  Location: [facility] ENDOSCOPY;  Service: Gastroenterology;  Laterality: N/A;  preop; abdominal pain  postop; polyps   PCP Tj Hyde MD    ENDOSCOPY N/A 10/11/2022    Procedure: ESOPHAGOGASTRODUODENOSCOPY WITH ANESTHESIA;  Surgeon: Burton Chaudhari MD;  Location: [facility] ENDOSCOPY;  Service: Gastroenterology;  Laterality: N/A;  preop; abdominal pain  postop; misshape stomach  PCP jT Hyde MD    JOINT REPLACEMENT      BILATERAL HIP     SKIN CANCER EXCISION      TOTAL HIP ARTHROPLASTY      URETERAL STENT INSERTION           OT ASSESSMENT FLOWSHEET (last 12 hours)       OT Evaluation and Treatment       Row Name 08/07/23 0750                   OT Time and Intention    Subjective Information no complaints  -AC (r) WB (t) AC (c)        Document Type therapy note (daily note)  -AC (r) WB (t) AC (c)        Mode of Treatment occupational therapy  -AC (r) WB (t) AC (c)           General Information    Existing Precautions/Restrictions fall (P)   -WB           Pain Assessment    Pretreatment Pain Rating 0/10 - no pain (P)   -WB        Posttreatment Pain Rating 0/10 - no pain (P)   -WB           Activities of Daily Living    BADL Assessment/Intervention toileting;lower body dressing  -AC (r) WB (t) AC (c)           Lower Body Dressing Assessment/Training    Bath Level (Lower Body Dressing) don;socks;dependent (less than 25% patient effort)  -AC (r) WB (t) AC (c)        Position (Lower Body Dressing) edge of bed sitting  -AC (r) WB (t) AC (c)           Toileting Assessment/Training     Griffin Level (Toileting) adjust/manage clothing;perform perineal hygiene;maximum assist (25% patient effort)  -AC (r) WB (t) AC (c)        Assistive Devices (Toileting) commode, bedside without drop arms  -AC (r) WB (t) AC (c)        Position (Toileting) supported sitting  -AC (r) WB (t) AC (c)           Bed Mobility    Bed Mobility supine-sit;sit-supine  -AC (r) WB (t) AC (c)        Supine-Sit Griffin (Bed Mobility) verbal cues;moderate assist (50% patient effort)  -AC (r) WB (t) AC (c)        Sit-Supine Griffin (Bed Mobility) verbal cues;moderate assist (50% patient effort)  -AC (r) WB (t) AC (c)        Assistive Device (Bed Mobility) bed rails;head of bed elevated;draw sheet  -AC (r) WB (t) AC (c)           Functional Mobility    Functional Mobility- Ind. Level unable to perform  -AC (r) WB (t) AC (c)           Transfer Assessment/Treatment    Transfers sit-stand transfer;stand-sit transfer;stand pivot/stand step transfer;toilet transfer  -AC (r) WB (t) AC (c)           Sit-Stand Transfer    Sit-Stand Griffin (Transfers) verbal cues;moderate assist (50% patient effort)  -AC (r) WB (t) AC (c)        Comment, (Sit-Stand Transfer) pt requested elevated bed surface d/t urgency to have BM; pt verbalized understanding of importance of standing from standard height  -AC (r) WB (t) AC (c)           Stand-Sit Transfer    Stand-Sit Griffin (Transfers) verbal cues;moderate assist (50% patient effort)  -AC (r) WB (t) AC (c)           Stand Pivot/Stand Step Transfer    Stand Pivot/Stand Step Griffin (Transfers) verbal cues;moderate assist (50% patient effort)  -AC (r) WB (t) AC (c)        Assistive Device (Stand Pivot Stand Step Transfer) other (see comments)  rollator  -AC (r) WB (t) AC (c)           Toilet Transfer    Type (Toilet Transfer) sit-stand;stand-sit  -AC (r) WB (t) AC (c)        Griffin Level (Toilet Transfer) moderate assist (50% patient effort)  -AC (r) WB (t) AC (c)         Assistive Device (Toilet Transfer) commode, bedside without drop arms  -AC (r) WB (t) AC (c)        Comment, (Toilet Transfer) required 2 attempts  -AC (r) WB (t) AC (c)           Wound 08/04/23 1744 Right upper arm Skin Tear    Wound - Properties Group Placement Date: 08/04/23  -MB Placement Time: 1744  -MB Present on Hospital Admission: Y  -MB Side: Right  -MB Orientation: upper  -MB Location: arm  -MB Primary Wound Type: Skin tear  -MB    Retired Wound - Properties Group Placement Date: 08/04/23  -MB Placement Time: 1744  -MB Present on Hospital Admission: Y  -MB Side: Right  -MB Orientation: upper  -MB Location: arm  -MB Primary Wound Type: Skin tear  -MB    Retired Wound - Properties Group Date first assessed: 08/04/23  -MB Time first assessed: 1744  -MB Present on Hospital Admission: Y  -MB Side: Right  -MB Location: arm  -MB Primary Wound Type: Skin tear  -MB       Wound 08/04/23 1746 Left medial perirectal Pressure Injury    Wound - Properties Group Placement Date: 08/04/23  -MB Placement Time: 1746  -MB Present on Hospital Admission: Y  -MB Side: Left  -MB Orientation: medial  -MB Location: perirectal  -MB Primary Wound Type: Pressure inj  -MB    Retired Wound - Properties Group Placement Date: 08/04/23  -MB Placement Time: 1746  -MB Present on Hospital Admission: Y  -MB Side: Left  -MB Orientation: medial  -MB Location: perirectal  -MB Primary Wound Type: Pressure inj  -MB    Retired Wound - Properties Group Date first assessed: 08/04/23  -MB Time first assessed: 1746  -MB Present on Hospital Admission: Y  -MB Side: Left  -MB Location: perirectal  -MB Primary Wound Type: Pressure inj  -MB       Wound 08/04/23 1746 Right medial perirectal Pressure Injury    Wound - Properties Group Placement Date: 08/04/23  -MB Placement Time: 1746  -MB Present on Hospital Admission: Y  -MB Side: Right  -MB Orientation: medial  -MB Location: perirectal  -MB Primary Wound Type: Pressure inj  -MB    Retired Wound - Properties  Group Placement Date: 08/04/23  -MB Placement Time: 1746  -MB Present on Hospital Admission: Y  -MB Side: Right  -MB Orientation: medial  -MB Location: perirectal  -MB Primary Wound Type: Pressure inj  -MB    Retired Wound - Properties Group Date first assessed: 08/04/23  -MB Time first assessed: 1746  -MB Present on Hospital Admission: Y  -MB Side: Right  -MB Location: perirectal  -MB Primary Wound Type: Pressure inj  -MB       Plan of Care Review    Plan of Care Reviewed With patient  -AC (r) WB (t) AC (c)        Progress improving  -AC (r) WB (t) AC (c)        Outcome Evaluation OT tx completed. Pt agreeable to therapy. Requested to use BR upon attaining EOB. Dependent for donning B socks. Functional transfers requiring modA and verbal cues, including elevated bed surface. Toileting completed with maxA. Pt would benefit from education on safety during functional transfers. He would also benefit from RW to increase safety during transfers. OT will continue POC. Recommend continued rehab at d/c.  -AC (r) WB (t) AC (c)           Positioning and Restraints    Pre-Treatment Position in bed  -AC (r) WB (t) AC (c)        Post Treatment Position bed  -AC (r) WB (t) AC (c)        In Bed fowlers;call light within reach;encouraged to call for assist;with nsg;side rails up x2  -AC (r) WB (t) AC (c)                  User Key  (r) = Recorded By, (t) = Taken By, (c) = Cosigned By      Initials Name Effective Dates    Venkat Yu OTR/L, CNT 02/03/23 -     Sandra Glez RN 10/14/22 -     Agustin Lopez OT Student 12/05/22 -                      Occupational Therapy Education       Title: PT OT SLP Therapies (In Progress)       Topic: Occupational Therapy (In Progress)       Point: ADL training (Done)       Description:   Instruct learner(s) on proper safety adaptation and remediation techniques during self care or transfers.   Instruct in proper use of assistive devices.                  Learning Progress Summary              Patient Acceptance, E,TB, VU by WB at 8/7/2023 0832    Comment: Safety during functional transfers    Acceptance, E, VU by RH at 8/5/2023 0850   Family Acceptance, E,TB, VU by WB at 8/7/2023 0832    Comment: Safety during functional transfers   Significant Other Acceptance, E, VU by RH at 8/5/2023 0850                         Point: Home exercise program (Not Started)       Description:   Instruct learner(s) on appropriate technique for monitoring, assisting and/or progressing therapeutic exercises/activities.                  Learner Progress:  Not documented in this visit.              Point: Precautions (Not Started)       Description:   Instruct learner(s) on prescribed precautions during self-care and functional transfers.                  Learner Progress:  Not documented in this visit.              Point: Body mechanics (Done)       Description:   Instruct learner(s) on proper positioning and spine alignment during self-care, functional mobility activities and/or exercises.                  Learning Progress Summary             Patient Acceptance, E,TB, VU by WB at 8/7/2023 0832    Comment: Safety during functional transfers   Family Acceptance, E,TB, VU by WB at 8/7/2023 0832    Comment: Safety during functional transfers                                         User Key       Initials Effective Dates Name Provider Type Discipline    WB 12/05/22 -  Agustin Vasques, OT Student OT Student OT     05/03/23 -  Rae Watkins OT Student OT Student OT                      OT Recommendation and Plan     Plan of Care Review  Plan of Care Reviewed With: patient  Progress: improving  Outcome Evaluation: OT tx completed. Pt agreeable to therapy. Requested to use BR upon attaining EOB. Dependent for donning B socks. Functional transfers requiring modA and verbal cues, including elevated bed surface. Toileting completed with maxA. Pt would benefit from education on safety during functional transfers. He would  also benefit from RW to increase safety during transfers. OT will continue POC. Recommend continued rehab at d/c.  Plan of Care Reviewed With: patient  Outcome Evaluation: OT tx completed. Pt agreeable to therapy. Requested to use BR upon attaining EOB. Dependent for donning B socks. Functional transfers requiring modA and verbal cues, including elevated bed surface. Toileting completed with maxA. Pt would benefit from education on safety during functional transfers. He would also benefit from RW to increase safety during transfers. OT will continue POC. Recommend continued rehab at d/c.     Outcome Measures       Row Name 08/07/23 0800             How much help from another is currently needed...    Putting on and taking off regular lower body clothing? 1  -AC (r) WB (t) AC (c)      Bathing (including washing, rinsing, and drying) 2  -AC (r) WB (t) AC (c)      Toileting (which includes using toilet bed pan or urinal) 2  -AC (r) WB (t) AC (c)      Putting on and taking off regular upper body clothing 2  -AC (r) WB (t) AC (c)      Taking care of personal grooming (such as brushing teeth) 3  -AC (r) WB (t) AC (c)      Eating meals 3  -AC (r) WB (t) AC (c)      AM-PAC 6 Clicks Score (OT) 13  -AC (r) WB (t)         Functional Assessment    Outcome Measure Options AM-PAC 6 Clicks Daily Activity (OT)  -AC (r) WB (t) AC (c)                User Key  (r) = Recorded By, (t) = Taken By, (c) = Cosigned By      Initials Name Provider Type    Venkat Yu, OTR/L, CNT Occupational Therapist    Agustin Lopez OT Student OT Student                    Time Calculation:    Time Calculation- OT       Row Name 08/07/23 0831             Time Calculation- OT    OT Start Time 0750  -AC (r) WB (t) AC (c)      OT Stop Time 0823  -AC (r) WB (t) AC (c)      OT Time Calculation (min) 33 min  -AC (r) WB (t)      Total Timed Code Minutes- OT 33 minute(s)  -AC (r) WB (t) AC (c)      OT Received On 08/07/23  -AC (r) WB (t) AC (c)                 User Key  (r) = Recorded By, (t) = Taken By, (c) = Cosigned By      Initials Name Provider Type    AC Venkat Hamilton, OTR/L, CNT Occupational Therapist    Agustin Lopez OT Student OT Student                           JULISSA Latham  8/7/2023

## 2023-08-07 NOTE — PLAN OF CARE
Goal Outcome Evaluation:  Plan of Care Reviewed With: patient, spouse        Progress: improving  Outcome Evaluation: NTN assessment. Pt has multiple wounds and cellulitis. Wound-Right upper arm (skin tear). Wound-Left medial perirectal (PI-2). Wound-Right medial perirectal (PI-2). Pt reports of good appetite and is tolerating meals and ONS (boost plus). Per intake report, pt consumed an average of 66.67% over 6 meals. No reports of nausea or vomiting. Last BM 8/6. Ordering Franklin BID for wound healing. Will continue to follow per protocol.

## 2023-08-07 NOTE — PLAN OF CARE
Goal Outcome Evaluation:  Plan of Care Reviewed With: guardian        Progress: improving  Outcome Evaluation: Entered room d/t NSG staff needing assist as pt B knees sliding and pt with heavy posterior lean. Pt needing ModAx2 to scoot bottom back in bed. Pt then progressed to balance of CGA-SBA. a few steps via Rollator>BSC Donna. Pt does not fully extend back, very forward flexed needs cues for hand placement on rollator. Toilet task MaxA for brief management, depA hygiene. Pt limited by his overall strength and endurance. Pt c/o and demo'd s/s of dizziness while on BSC. BP checked 89/66, performed ankle pumps and after 2-3 mins improved. Once EOB attempted to check BP but unable to get accurate reading, d/t pt continued dizziness and increased fatigue BTB modx2. Recommend continued rehab at d/c for increased overall ADL/transitional movements. Pt unsafe to d/c home at this time

## 2023-08-07 NOTE — CONSULTS
"    [facility]  INPATIENT WOUND & OSTOMY CONSULTATION    Today's Date: 08/07/23    Patient Name: Aleks Monzon  MRN: [id number]  CSN: [id number]  PCP: Tj Hyde MD  Referring Provider:   Consulting Provider (From admission, onward)      Start Ordered     Status Ordering Provider    08/04/23 1748 08/04/23 1747  Inpatient Wound Care Provider Consult  Once        Specialty:  Wound Care  Provider:  Janeth Langford APRN Acknowledged THOMPSON, SAMUEL F           Attending Provider: Tj Hyde MD  Length of Stay: 3    SUBJECTIVE   Chief Complaint: Sacral wound    HPI: Aleks Monzon, a 73 y.o.male, presents with a past medical history of hypertension, prostate and bladder cancer, arthritis, retroperitoneal fibrosis, lymphedema.  A full past medical history as listed below.  Patient presented to the hospital on 8/4 due to abrupt swelling of right hand and arm starting 1 day prior to admission.  He originally thought this was lymphedema as he has a history noted to bilateral lower extremities.  Patient has abrasion/skin tear of dorsal aspect of right hand which he felt may have been the start of this but this was obtained 3 weeks prior.  Patient is receiving Rocephin and has received vancomycin.  Tramadol ordered for pain.    Inpatient wound care consulted due to sacral wound.  Patient's wife is at the bedside.  Patient and patient's wife provide history of wounds and current condition.  Sacral wounds of been present 2 to 3 weeks since feeling sick.  He has become very weak and is not able to get up and go to the bathroom.  He has intermittent bowel and bladder incontinence.  He has been wearing briefs and wife states wound started as a \"diaper rash\".  She has been treating area with diaper rash creams.  He currently now has 2 ulcerated areas.  Midline sacrum is a stage 2 pressure injury and right lateral sacrum is a stage 3 pressure injury.  Patient also has skin tear of " right forearm that has recently happened.  It is actively weeping serous drainage.  It appears right arm swelling and erythema has greatly decreased with use of antibiotics.  He is also receiving calcium and magnesium for electrolyte deficiencies.  He is working with physical therapy and Occupational Therapy during hospital stay.  Patient goes to [facility] rehab for physical therapy for lymphedema of bilateral lower extremities.      Visit Dx:    ICD-10-CM ICD-9-CM   1. Cellulitis of right upper extremity  L03.113 682.3   2. Hypomagnesemia  E83.42 275.2   3. Leukocytosis, unspecified type  D72.829 288.60   4. Decreased activities of daily living (ADL) [Z78.9 (ICD-10-CM)]  Z78.9 V49.89       Hospital Problem List:     Cellulitis of right upper extremity    Prostate cancer    Gout, arthropathy    Hypertension    Malignant neoplasm of trigone of urinary bladder    Stage 3a chronic kidney disease    Protein deficiency    Sepsis due to Gram negative bacteria      History:   Past Medical History:   Diagnosis Date    Arthritis     Cancer     PROSTATE AND BLADDER    Cellulitis     Gout     Hypertension     IFG (impaired fasting glucose)     Low back pain     Lymphedema     Retroperitoneal fibrosis      Past Surgical History:   Procedure Laterality Date    AXILLARY LYMPH NODE BIOPSY/EXCISION Left 10/01/2020    Procedure: LEFT AXILLARY LYMPH NODE BIOPSY;  Surgeon: Dina To MD;  Location: [facility] OR;  Service: General;  Laterality: Left;    BACK SURGERY      COLONOSCOPY      2017?    COLONOSCOPY N/A 10/11/2022    Procedure: COLONOSCOPY WITH ANESTHESIA;  Surgeon: Burton Chaudhari MD;  Location: [facility] ENDOSCOPY;  Service: Gastroenterology;  Laterality: N/A;  preop; abdominal pain  postop; polyps   PCP Tj Hyde MD    ENDOSCOPY N/A 10/11/2022    Procedure: ESOPHAGOGASTRODUODENOSCOPY WITH ANESTHESIA;  Surgeon: Burton Chaudhari MD;  Location: [facility] ENDOSCOPY;  Service: Gastroenterology;  Laterality:  N/A;  preop; abdominal pain  postop; misshape stomach  PCP Tj Hyde MD    JOINT REPLACEMENT      BILATERAL HIP     SKIN CANCER EXCISION      TOTAL HIP ARTHROPLASTY      URETERAL STENT INSERTION       Social History     Socioeconomic History    Marital status:    Tobacco Use    Smoking status: Never    Smokeless tobacco: Never   Vaping Use    Vaping Use: Never used   Substance and Sexual Activity    Alcohol use: Never    Drug use: Never    Sexual activity: Not Currently     Partners: Female     Family History   Problem Relation Age of Onset    Heart disease Father     Diabetes Maternal Grandmother     Colon cancer Neg Hx     Colon polyps Neg Hx        Allergies:  Allergies   Allergen Reactions    Niacin Itching     same as of 1/9/2017-itching         Medications:    Current Facility-Administered Medications:     acetaminophen (TYLENOL) tablet 650 mg, 650 mg, Oral, Q4H PRNBarrett Samuel F, MD    Calcium Replacement - Follow Nurse / BPA Driven Protocol, , Does not apply, Barrett SMALLS Samuel F, MD    cefTRIAXone (ROCEPHIN) 2,000 mg in sodium chloride 0.9 % 100 mL IVPB, 2,000 mg, Intravenous, Q24H, Eugene Stock MD, Last Rate: 200 mL/hr at 08/07/23 0832, 2,000 mg at 08/07/23 0832    Magnesium Standard Dose Replacement - Follow Nurse / BPA Driven Protocol, , Does not apply, Barrett SMALLS Samuel F, MD    melatonin tablet 5 mg, 5 mg, Oral, Nightly PRNBarrett Samuel F, MD    Phosphorus Replacement - Follow Nurse / BPA Driven Protocol, , Does not apply, Barrett SMALLS Samuel F, MD    Potassium Replacement - Follow Nurse / BPA Driven Protocol, , Does not apply, Barrett SMALLS Samuel F, MD    [COMPLETED] Insert Peripheral IV, , , Once **AND** sodium chloride 0.9 % flush 10 mL, 10 mL, Intravenous, PRN, Tj Hyde MD    sodium chloride 0.9 % flush 10 mL, 10 mL, Intravenous, Q12H, Tj Hyde MD, 10 mL at 08/07/23 0832    sodium chloride 0.9 % flush 10 mL, 10 mL, Intravenous,  PRN, Tj Hyde MD, 10 mL at 23 2030    sodium chloride 0.9 % infusion 40 mL, 40 mL, Intravenous, PRNBarrett Samuel F, MD    traMADol (ULTRAM) tablet 50 mg, 50 mg, Oral, Q6H PRNBarrett Samuel F, MD    Review of Systems:   Review of Systems   Constitutional:  Positive for activity change, appetite change and fatigue. Negative for chills and fever.   HENT:  Negative for rhinorrhea and sore throat.    Respiratory:  Negative for cough and shortness of breath.    Cardiovascular:  Positive for leg swelling. Negative for chest pain and palpitations.   Gastrointestinal:  Negative for diarrhea, nausea and vomiting.   Genitourinary:  Negative for flank pain and hematuria.   Musculoskeletal:  Positive for arthralgias, back pain, gait problem and myalgias.   Skin:  Positive for color change and wound.   Neurological:  Positive for weakness. Negative for dizziness and headaches.   Psychiatric/Behavioral:  Negative for agitation and behavioral problems.        OBJECTIVE     Vitals:    23 0823   BP: 103/87   Pulse: 86   Resp: 17   Temp: 98.3 øF (36.8 øC)   SpO2:        PHYSICAL EXAM:   Physical Exam  Vitals and nursing note reviewed.   Constitutional:       General: He is awake.      Appearance: He is overweight.      Comments: Body mass index is 26.66 kg/mý.    HENT:      Head: Normocephalic and atraumatic.   Eyes:      General: Lids are normal. Gaze aligned appropriately.   Cardiovascular:      Rate and Rhythm: Normal rate and regular rhythm.   Pulmonary:      Effort: Pulmonary effort is normal. No respiratory distress.   Abdominal:      General: Abdomen is flat.      Palpations: Abdomen is soft.   Musculoskeletal:      Cervical back: Normal range of motion and neck supple.      Right upper leg: Edema present.      Left upper leg: Edema present.      Right lower le+ Pitting Edema present.      Left lower le+ Pitting Edema present.      Comments: Edema up to abdomen   Skin:     General: Skin  is warm and dry.      Findings: Erythema and wound present.      Comments: Stage 2 pressure injury of midline sacrum.  Even open edges attached to wound bed. Wound bed is red and moist. Small amount of serous drainage present. Periwound area is erythemic and blanchable.   Stage 3 pressure injury of right lateral sacrum.  Wound bed has subcutaneous tissue and scant slough present.  Small amount of serous drainage.  Wound edges are even and open and attached wound bed.  No undermining or tunneling noted.  Periwound area is erythemic and blanchable.  Skin tear present right forearm.  Very superficial with a large amount of weeping  of serous drainage present.  Patient has calcification cyst of midline thoracic spine confirmed by Dr. De La Vega per patient's wife.  Small area of purple discoloration patient reports was acquired when he sat on toilet and hit his back.  This is trauma related.   Neurological:      Mental Status: He is lethargic.   Psychiatric:         Behavior: Behavior is cooperative.       Picture taken by nursing staff on admission         Results Review:  Lab Results (last 48 hours)       Procedure Component Value Units Date/Time    Blood Culture - Blood, Arm, Right [[id number]]  (Abnormal) Collected: 08/04/23 1419    Specimen: Blood from Arm, Right Updated: 08/07/23 0601     Blood Culture Gram Negative Bacilli     Isolated from Anaerobic Bottle     Gram Stain Anaerobic Bottle Gram negative bacilli    Narrative:      ID/MICs to follow    Blood Culture - Blood, Arm, Left [[id number]]  (Abnormal) Collected: 08/04/23 1413    Specimen: Blood from Arm, Left Updated: 08/07/23 0601     Blood Culture Gram Negative Bacilli     Comment: ID/MICs to follow        Isolated from Aerobic and Anaerobic Bottles     Gram Stain Anaerobic Bottle Gram negative bacilli      Aerobic Bottle Gram negative bacilli    Comprehensive Metabolic Panel [[id number]]  (Abnormal) Collected: 08/07/23 0341    Specimen: Blood Updated:  08/07/23 0430     Glucose 100 mg/dL      BUN 51 mg/dL      Creatinine 1.50 mg/dL      Sodium 132 mmol/L      Potassium 3.9 mmol/L      Chloride 108 mmol/L      CO2 18.0 mmol/L      Calcium 7.1 mg/dL      Total Protein 3.2 g/dL      Albumin 1.2 g/dL      ALT (SGPT) 27 U/L      AST (SGOT) 39 U/L      Alkaline Phosphatase 214 U/L      Total Bilirubin 0.2 mg/dL      Globulin 2.0 gm/dL      A/G Ratio 0.6 g/dL      BUN/Creatinine Ratio 34.0     Anion Gap 6.0 mmol/L      eGFR 49.5 mL/min/1.73     Narrative:      GFR Normal >60  Chronic Kidney Disease <60  Kidney Failure <15    The GFR formula is only valid for adults with stable renal function between ages 18 and 70.    CBC & Differential [[id number]]  (Abnormal) Collected: 08/07/23 0341    Specimen: Blood Updated: 08/07/23 0409    Narrative:      The following orders were created for panel order CBC & Differential.  Procedure                               Abnormality         Status                     ---------                               -----------         ------                     CBC Auto Differential[[id number]]        Abnormal            Final result                 Please view results for these tests on the individual orders.    CBC Auto Differential [[id number]]  (Abnormal) Collected: 08/07/23 0341    Specimen: Blood Updated: 08/07/23 0409     WBC 7.62 10*3/mm3      RBC 3.74 10*6/mm3      Hemoglobin 10.7 g/dL      Hematocrit 32.5 %      MCV 86.9 fL      MCH 28.6 pg      MCHC 32.9 g/dL      RDW 16.0 %      RDW-SD 50.4 fl      MPV 9.8 fL      Platelets 159 10*3/mm3      Neutrophil % 79.6 %      Lymphocyte % 8.9 %      Monocyte % 5.6 %      Eosinophil % 1.3 %      Basophil % 0.4 %      Immature Grans % 4.2 %      Neutrophils, Absolute 6.06 10*3/mm3      Lymphocytes, Absolute 0.68 10*3/mm3      Monocytes, Absolute 0.43 10*3/mm3      Eosinophils, Absolute 0.10 10*3/mm3      Basophils, Absolute 0.03 10*3/mm3      Immature Grans, Absolute 0.32 10*3/mm3      nRBC 0.0 /100  WBC     Vancomycin, Trough [[id number]]  (Normal) Collected: 08/06/23 1514    Specimen: Blood Updated: 08/06/23 1548     Vancomycin Trough 14.10 mcg/mL     Calcium, Ionized [[id number]] Collected: 08/06/23 1526    Specimen: Blood Updated: 08/06/23 1522     Ionized Calcium 4.68 mg/dL      Collected by [id number]     Comment: Meter: [id number]     :  [id number]       Wound Culture - Wound, Arm, Right [[id number]]  (Abnormal)  (Susceptibility) Collected: 08/04/23 1612    Specimen: Wound from Arm, Right Updated: 08/06/23 0956     Wound Culture Light growth (2+) Staphylococcus aureus      Rare Providencia rettgeri      Light growth (2+) Normal Skin Leticia     Gram Stain Few (2+) Gram positive cocci      No WBCs seen    Susceptibility        Staphylococcus aureus      DICKSON      Clindamycin Susceptible      Erythromycin Susceptible      Inducible Clindamycin Resistance Negative      Oxacillin Susceptible      Rifampin Susceptible      Tetracycline Susceptible      Trimethoprim + Sulfamethoxazole Susceptible      Vancomycin Susceptible                       Susceptibility        Providencia rettgeri      DICKSON      Ampicillin Resistant      Ampicillin + Sulbactam Susceptible      Cefepime Susceptible      Ceftazidime Susceptible      Ceftriaxone Susceptible      Gentamicin Susceptible      Levofloxacin Susceptible      Piperacillin + Tazobactam Susceptible      Tetracycline Resistant      Trimethoprim + Sulfamethoxazole Susceptible                       Susceptibility Comments       Staphylococcus aureus    This isolate does not demonstrate inducible clindamycin resistance in vitro.        Providencia rettgeri    Cefazolin sensitivity will not be reported for Enterobacteriaceae in non-urine isolates. If cefazolin is preferred, please call the microbiology lab to request an E-test.  With the exception of urinary-sourced infections, aminoglycosides should not be used as monotherapy.               Calcium, Ionized [[id number]]   (Abnormal) Collected: 08/06/23 0932    Specimen: Blood Updated: 08/06/23 0927     Ionized Calcium 4.36 mg/dL      Comment: 84 Value below reference range        Collected by [id number]     Comment: Meter: [id number]     :  [id number]       Magnesium [[id number]]  (Normal) Collected: 08/06/23 0402    Specimen: Blood Updated: 08/06/23 0501     Magnesium 2.0 mg/dL     Comprehensive Metabolic Panel [[id number]]  (Abnormal) Collected: 08/06/23 0402    Specimen: Blood Updated: 08/06/23 0501     Glucose 102 mg/dL      BUN 51 mg/dL      Creatinine 1.51 mg/dL      Sodium 131 mmol/L      Potassium 4.1 mmol/L      Chloride 104 mmol/L      CO2 18.0 mmol/L      Calcium 7.2 mg/dL      Total Protein 3.0 g/dL      Albumin 1.3 g/dL      ALT (SGPT) 26 U/L      AST (SGOT) 33 U/L      Alkaline Phosphatase 193 U/L      Total Bilirubin 0.2 mg/dL      Globulin 1.7 gm/dL      A/G Ratio 0.8 g/dL      BUN/Creatinine Ratio 33.8     Anion Gap 9.0 mmol/L      eGFR 49.1 mL/min/1.73     Narrative:      GFR Normal >60  Chronic Kidney Disease <60  Kidney Failure <15    The GFR formula is only valid for adults with stable renal function between ages 18 and 70.    CBC & Differential [[id number]]  (Abnormal) Collected: 08/06/23 0402    Specimen: Blood Updated: 08/06/23 0437    Narrative:      The following orders were created for panel order CBC & Differential.  Procedure                               Abnormality         Status                     ---------                               -----------         ------                     CBC Auto Differential[[id number]]        Abnormal            Final result                 Please view results for these tests on the individual orders.    CBC Auto Differential [[id number]]  (Abnormal) Collected: 08/06/23 0402    Specimen: Blood Updated: 08/06/23 0437     WBC 9.73 10*3/mm3      RBC 3.79 10*6/mm3      Hemoglobin 10.6 g/dL      Hematocrit 33.0 %      MCV 87.1 fL      MCH 28.0 pg      MCHC 32.1 g/dL       RDW 15.9 %      RDW-SD 50.1 fl      MPV 9.5 fL      Platelets 182 10*3/mm3      Neutrophil % 86.4 %      Lymphocyte % 5.4 %      Monocyte % 5.0 %      Eosinophil % 0.5 %      Basophil % 0.3 %      Immature Grans % 2.4 %      Neutrophils, Absolute 8.40 10*3/mm3      Lymphocytes, Absolute 0.53 10*3/mm3      Monocytes, Absolute 0.49 10*3/mm3      Eosinophils, Absolute 0.05 10*3/mm3      Basophils, Absolute 0.03 10*3/mm3      Immature Grans, Absolute 0.23 10*3/mm3      nRBC 0.0 /100 WBC     Potassium [[id number]]  (Normal) Collected: 08/05/23 1505    Specimen: Blood Updated: 08/05/23 1523     Potassium 4.2 mmol/L           Imaging Results (Last 72 Hours)       Procedure Component Value Units Date/Time    US Venous Doppler Upper Extremity Right (duplex) [[id number]] Collected: 08/04/23 1605     Updated: 08/04/23 1608    Narrative:      History: Pain and swelling       Impression:      Impression: There is no evidence of deep venous thrombosis or  superficial thrombophlebitis of the right upper extremity.     Comments: Right upper extremity venous duplex exam was performed using  color Doppler flow, Doppler wave form analysis, and grayscale imaging,  with and without compression. There is no evidence of deep venous  thrombosis of the internal jugular, subclavian, axillary, brachial,  radial, and ulnar veins. There is no evidence of superficial  thrombophlebitis involving the cephalic or basilic veins.      This report was finalized on 08/04/2023 16:05 by Dr. Fransico Lundy MD.    XR Wrist 3+ View Right [[id number]] Collected: 08/04/23 1437     Updated: 08/04/23 1445    Narrative:      EXAM: XR WRIST 3+ VW RIGHT-      DATE: 8/4/2023 2:07 PM CDT     HISTORY: RUE redness, swelling, pain       COMPARISON: No existing relevant imaging studies available.      TECHNIQUE:  3 views. PA, oblique, lateral. 3 images.     FINDINGS:    No acute displaced fracture or aggressive bony finding. Mild diffuse  joint space narrowing.  Degenerative changes at the first carpometacarpal  joint as well as first and second metacarpophalangeal joint. There may  be subluxation at the first metacarpophalangeal joint. Possible erosion  at the posterior carpals on lateral view. Diffuse soft tissue swelling.          Impression:      1. Diffuse soft tissue swelling with possible erosion at the posterior  carpal on lateral view. Differential would include artifact, crystal  arthropathy or osteomyelitis. Correlate with patient presentation.  2. Degenerative changes with possible subluxation of the first  metacarpal phalangeal joint. Correlate with range of motion.  3. If there is continued concern for occult fracture, particularly if  there is snuff box tenderness, follow-up radiographs in 10-14 days  following immobilization might be considered.     This report was finalized on 08/04/2023 14:42 by Dr Heather Barros MD.    XR Hand 3+ View Right [[id number]] Collected: 08/04/23 1430     Updated: 08/04/23 1439    Narrative:      XR HAND 3+ VW RIGHT- 8/4/2023 2:07 PM CDT     HISTORY: RUE redness, swelling, pain     COMPARISON: None      FINDINGS:   Frontal, lateral and oblique radiographs of the right hand were provided  for review.      No visualized acute fracture. Multifocal osteoarthritis change,  especially first metacarpophalangeal joint and fifth PIP joint. Notable  swelling and a soft tissue calcification around the third PIP joint and  there is a small erosion identified along the radial aspect of the head  of the proximal phalanx. There is also a slight ulnar deviation at this  third PIP joint. Advanced first CMC joint osteoarthritis.       Impression:      1. No visualized acute fracture.  2. Multifocal degenerative change, mostly appearing as osteoarthritis.  However, there is periarticular swelling with soft tissue calcification,  bone erosion and ulnar deviation at the third digit PIP joint which may  reflect a crystalline arthropathy such as gout,  pseudogout etc.  This report was finalized on 08/04/2023 14:36 by Dr Wyatt Bone, .    XR Forearm 2 View Right [[id number]] Collected: 08/04/23 1411     Updated: 08/04/23 1431    Narrative:      Right forearm, 2 views 8/4/2023 2:06 PM CDT     HISTORY: RUE redness, swelling, pain     COMPARISON: NONE     FINDINGS:  Frontal and lateral radiographs of the right forearm were obtained.     No visualized acute fracture or malalignment. Mild osteoarthritis change  at the radiocarpal joint. Alignment is anatomic. Similarly, there is  mild osteoarthritis change at the elbow without fracture, malalignment  or capsular distention. Nonspecific subcutaneous edema in the upper arm  and forearm.       Impression:      1. No visualized fracture or malalignment.  2. Elbow and radiocarpal joint osteoarthritis changes which are mild.  3. Nonspecific subcutaneous edema which appears fairly diffuse along the  included portion of the upper arm as well as the forearm.     This report was finalized on 08/04/2023 14:28 by Dr Wyatt Bone, .    XR Elbow 3+ View Right [[id number]] Collected: 08/04/23 1409     Updated: 08/04/23 1414    Narrative:      Right elbow, 3 views 8/4/2023 2:06 PM CDT     History: RUE redness, swelling, pain     Comparison: None      Findings:   Frontal, lateral and oblique views of the right elbow were obtained.      There is no fracture or malalignment. Mild osteoarthritis changes at the  radiocapitellar and ulnohumeral joint. No elbow joint capsular  distention. Notable subcutaneous edema in the upper and lower arm,  essentially diffuse. No soft tissue gas identified.       Impression:      Impression:   1. Nonspecific diffuse subcutaneous edema.  2. No visualized fracture, malalignment or elbow joint capsular  distention.     This report was finalized on 08/04/2023 14:11 by Dr Wyatt Bone, .               ASSESSMENT/PLAN       Examination and evaluation of wound(s) was performed.    DIAGNOSIS:   Pressure injury  of right buttock, stage 3  Pressure injury of sacral region, stage 2  Skin tear of right forearm  Impaired mobility and ADLs  Generalized Weakness  Bowel and bladder incontinence  Cellulitis of right upper extremity  Sepsis due to Gram negative bacteria  Stage 3a chronic kidney disease       PLAN:   Orders placed for wound care and pressure/moisture management are listed below.  Patient to follow-up in the wound care center after discharge.  Patient will continue with physical therapy at [facility] rehab for lymphedema therapy.  ill continue to follow patient during hospital stay.      Start     Ordered    08/07/23 2000  Wound Care  Every 12 Hours        Question Answer Comment   Wound Locations Midline sacrum and right lateral sacrum    Wound Care Instructions Clean with NS. Apply Opticell Ag to wounds and cover with Silicone border foam dressing.  Change every 12 hours or as needed if soiled    Cleanse Normal Saline    Intervention Other    Other Opticell Ag    Moistened? No    Dressing: Silicone Border Dressing        08/07/23 1047    08/07/23 2000  Wound Care  Every 12 Hours        Question Answer Comment   Wound Locations Right forearm    Wound Care Instructions Clean with NS.  Apply Puracol Ag to skin tear. Cover with borderless foam dressing and wrap with Kerlix. Avoid any tape to skin.    Cleanse Normal Saline    Intervention Other    Other Puracol Ag; borderless foam dressing    Moistened? No    Dressing: Gauze Roll        08/07/23 1049    08/07/23 1047  Specialty Bed Dolphin FIS Mattress  Once        Comments: For Dolphin FIS Mattress, call [facility] to order a [employer] bed frame.  If bariatric/bariatric dolphin, [employer] provides frame, mattress, pump, and trapeze (if needed).  Nurse or HUC is to call [employer], the rental company, to order the equipment, provide patient's name, room number, and if in isolation. [phone].   Question:  Specialty Bed needed  Answer:  Dolphin FIS Mattress    08/07/23 1047    08/07/23  1047  Elevate Heels Off of Bed  Until Discontinued         08/07/23 1047    08/07/23 1047  Turn Patient  Now Then Every 2 Hours         08/07/23 1047    08/07/23 1047  Use Repositioning Wedge to Position Patient  Continuous        Comments: Use Comfort Glide repositioning sheet and wedges to position patient.    08/07/23 1047    08/07/23 1047  Use Seat Cushion When Up In Chair  Continuous         08/07/23 1047    08/07/23 0000  Ambulatory Referral to Wound Clinic         08/07/23 1055    Unscheduled  Apply Moisture Barrier After Any Incontinence  As Needed      Comments: Zguard - avoid wounds   Question:  Wound Care Instructions  Answer:  Apply Moisture Barrier After Any Incontinence    08/07/23 1047                     Discussed findings and treatment plan including risks, benefits, and treatment options with patient in detail. Patient agreed with treatment plan.      This document has been electronically signed by RENE Monteiro on 8/7/2023 10:17 CDT

## 2023-08-07 NOTE — THERAPY TREATMENT NOTE
Patient Name: Aleks Monzon  : 1950    MRN: [id number]                              Today's Date: 2023       Admit Date: 2023    Visit Dx: Therapist utilized gait belt, applied non-slipped socks, provided fall risk education/prevention, & facilitated muscle strengthening PRN to reduce patient falls risk during this session.      ICD-10-CM ICD-9-CM   1. Pressure injury of right buttock, stage 3  L89.313 707.05     707.23   2. Cellulitis of right upper extremity  L03.113 682.3   3. Hypomagnesemia  E83.42 275.2   4. Leukocytosis, unspecified type  D72.829 288.60   5. Decreased activities of daily living (ADL) [Z78.9 (ICD-10-CM)]  Z78.9 V49.89   6. Pressure injury of sacral region, stage 2  L89.152 707.03     707.22   7. Impaired mobility and ADLs  Z74.09 V49.89    Z78.9    8. Weakness  R53.1 780.79   9. Skin tear of right forearm without complication, initial encounter  S51.811A 881.00   10. Sepsis due to Gram negative bacteria  A41.50 038.40     995.91     Patient Active Problem List   Diagnosis    IFG (impaired fasting glucose)    High cholesterol    Prostate cancer    Elevated PSA    Elevated serum creatinine    Gout, arthropathy    Obesity (BMI 30-39.9)    Primary osteoarthritis of both knees    Retroperitoneal mass    Edema    Abnormal CT of the abdomen    Axillary mass, left    Retroperitoneal fibrosis    Gout    Ureteral obstruction, left    Hypertension    Malignant neoplasm of trigone of urinary bladder    COVID-19    Closed fracture of right hip    Closed fracture of right hip, initial encounter    Stage 3a chronic kidney disease    Cancer    Weight loss    Protein deficiency    Decreased appetite    BRBPR (bright red blood per rectum)    History of adenomatous polyp of colon    Overweight with body mass index (BMI) of 27 to 27.9 in adult    Non-smoker    Cellulitis of right upper extremity    Sepsis due to Gram negative bacteria     Past Medical History:   Diagnosis Date    Arthritis      Cancer     PROSTATE AND BLADDER    Cellulitis     Gout     Hypertension     IFG (impaired fasting glucose)     Low back pain     Lymphedema     Retroperitoneal fibrosis      Past Surgical History:   Procedure Laterality Date    AXILLARY LYMPH NODE BIOPSY/EXCISION Left 10/01/2020    Procedure: LEFT AXILLARY LYMPH NODE BIOPSY;  Surgeon: Dina To MD;  Location: [facility] OR;  Service: General;  Laterality: Left;    BACK SURGERY      COLONOSCOPY      2017?    COLONOSCOPY N/A 10/11/2022    Procedure: COLONOSCOPY WITH ANESTHESIA;  Surgeon: Burton Chaudhari MD;  Location: [facility] ENDOSCOPY;  Service: Gastroenterology;  Laterality: N/A;  preop; abdominal pain  postop; polyps   PCP Tj Hyde MD    ENDOSCOPY N/A 10/11/2022    Procedure: ESOPHAGOGASTRODUODENOSCOPY WITH ANESTHESIA;  Surgeon: Burton Chaudhari MD;  Location: [facility] ENDOSCOPY;  Service: Gastroenterology;  Laterality: N/A;  preop; abdominal pain  postop; misshape stomach  PCP Tj Hyde MD    JOINT REPLACEMENT      BILATERAL HIP     SKIN CANCER EXCISION      TOTAL HIP ARTHROPLASTY      URETERAL STENT INSERTION        General Information       Row Name 08/07/23 1323          OT Time and Intention    Document Type therapy note (daily note)  -MT     Mode of Treatment occupational therapy  -MT       Row Name 08/07/23 1323          General Information    Patient Profile Reviewed yes  -MT     Existing Precautions/Restrictions fall  -MT       Row Name 08/07/23 1323          Cognition    Orientation Status (Cognition) oriented x 4  -MT       Row Name 08/07/23 1323          Safety Issues, Functional Mobility    Impairments Affecting Function (Mobility) balance;endurance/activity tolerance;postural/trunk control;range of motion (ROM);shortness of breath;sensation/sensory awareness;strength;pain  -MT               User Key  (r) = Recorded By, (t) = Taken By, (c) = Cosigned By      Initials Name Provider Type    MT Genet Grullon COTA  Occupational Therapist Assistant                     Mobility/ADL's       Row Name 08/07/23 1323          Bed Mobility    Sit-Supine [place] (Bed Mobility) moderate assist (50% patient effort);maximum assist (25% patient effort);2 person assist  -MT     Bed Mobility, Safety Issues decreased use of arms for pushing/pulling;decreased use of legs for bridging/pushing;impaired trunk control for bed mobility  -MT     Assistive Device (Bed Mobility) bed rails;head of bed elevated;draw sheet  -MT     Comment, (Bed Mobility) Entered room d/t NSG staff needing assist as pt B knees sliding and pt with heavy posterior lean. Pt needing ModAx2 to scoot bottom back in bed. Pt then progressed to balance of CGA-SBA.  -MT       Row Name 08/07/23 [facility]          Transfers    Transfers sit-stand transfer;stand-sit transfer;toilet transfer  -MT       Row Name 08/07/23 [facility]          Sit-Stand Transfer    Sit-Stand [place] (Transfers) moderate assist (50% patient effort);1 person assist;2 person assist  -MT       Row Name 08/07/23 [facility]          Stand-Sit Transfer    Stand-Sit [place] (Transfers) verbal cues;moderate assist (50% patient effort)  -MT       Row Name 08/07/23 132          Toilet Transfer    Type (Toilet Transfer) stand-sit;sit-stand  -MT     [place] Level (Toilet Transfer) moderate assist (50% patient effort);1 person assist;2 person assist  -MT     Assistive Device (Toilet Transfer) commode, bedside without drop arms  -MT       Row Name 08/07/23 1323          Functional Mobility    Functional Mobility- Comment a few steps via Rollator>BSC Donna. Pt does not fully extend back, very forward flexed needs cues for hand placement on rollator  -MT               User Key  (r) = Recorded By, (t) = Taken By, (c) = Cosigned By      Initials Name Provider Type    MT Genet Grullon COTA Occupational Therapist Assistant                   Obj/Interventions    No documentation.                  Goals/Plan    No  documentation.                  Clinical Impression       Row Name 08/07/23 1323          Therapy Plan Review/Discharge Plan (OT)    Anticipated Discharge Disposition (OT) sub acute care setting;inpatient rehabilitation facility  -MT       Row Name 08/07/23 1323          Positioning and Restraints    Pre-Treatment Position in bed  -MT     Post Treatment Position bed  -MT     In Bed with PT  -MT               User Key  (r) = Recorded By, (t) = Taken By, (c) = Cosigned By      Initials Name Provider Type    Genet Hu COTA Occupational Therapist Assistant                   Outcome Measures       Row Name 08/07/23 1323 08/07/23 0800       How much help from another is currently needed...    Putting on and taking off regular lower body clothing? 1  -MT 1  -AC (r) WB (t) AC (c)    Bathing (including washing, rinsing, and drying) -- 2  -AC (r) WB (t) AC (c)    Toileting (which includes using toilet bed pan or urinal) 2  -MT 2  -AC (r) WB (t) AC (c)    Putting on and taking off regular upper body clothing 2  -MT 2  -AC (r) WB (t) AC (c)    Taking care of personal grooming (such as brushing teeth) 4  -MT 3  -AC (r) WB (t) AC (c)    Eating meals 4  -MT 3  -AC (r) WB (t) AC (c)    AM-PAC 6 Clicks Score (OT) -- 13  -AC (r) WB (t)      Row Name 08/07/23 0832          How much help from another person do you currently need...    Turning from your back to your side while in flat bed without using bedrails? 3  -CP     Moving from lying on back to sitting on the side of a flat bed without bedrails? 3  -CP     Moving to and from a bed to a chair (including a wheelchair)? 3  -CP     Standing up from a chair using your arms (e.g., wheelchair, bedside chair)? 3  -CP     Climbing 3-5 steps with a railing? 2  -CP     To walk in hospital room? 2  -CP     AM-PAC 6 Clicks Score (PT) 16  -CP     Highest level of mobility 5 --> Static standing  -CP       Row Name 08/07/23 1301 08/07/23 0800       Functional Assessment    Outcome  Measure Options AM-PAC 6 Clicks Basic Mobility (PT) (P)   -CG AM-PAC 6 Clicks Daily Activity (OT)  -AC (r) WB (t) AC (c)              User Key  (r) = Recorded By, (t) = Taken By, (c) = Cosigned By      Initials Name Provider Type    AC Venkat Hamilton, OTR/L, CNT Occupational Therapist    MT Genet Grullon COTA Occupational Therapist Assistant    CP Kourtney Tripp, RN Registered Nurse    WB Agustin Vasques, OT Student OT Student    CG Joss Modi, PT Student PT Student                    Occupational Therapy Education       Title: PT OT SLP Therapies (In Progress)       Topic: Occupational Therapy (In Progress)       Point: ADL training (Done)       Description:   Instruct learner(s) on proper safety adaptation and remediation techniques during self care or transfers.   Instruct in proper use of assistive devices.                  Learning Progress Summary             Patient Acceptance, E,TB, VU by WB at 8/7/2023 0832    Comment: Safety during functional transfers    Acceptance, E, VU by RH at 8/5/2023 0850   Family Acceptance, E,TB, VU by WB at 8/7/2023 0832    Comment: Safety during functional transfers   Significant Other Acceptance, E, VU by RH at 8/5/2023 0850                         Point: Home exercise program (Not Started)       Description:   Instruct learner(s) on appropriate technique for monitoring, assisting and/or progressing therapeutic exercises/activities.                  Learner Progress:  Not documented in this visit.              Point: Precautions (Not Started)       Description:   Instruct learner(s) on prescribed precautions during self-care and functional transfers.                  Learner Progress:  Not documented in this visit.              Point: Body mechanics (Done)       Description:   Instruct learner(s) on proper positioning and spine alignment during self-care, functional mobility activities and/or exercises.                  Learning Progress Summary             Patient  Acceptance, E,TB, VU by WB at 8/7/2023 0832    Comment: Safety during functional transfers   Family Acceptance, E,TB, VU by WB at 8/7/2023 0832    Comment: Safety during functional transfers                                         User Key       Initials Effective Dates Name Provider Type Discipline    WB 12/05/22 -  Agustin Vasques, OT Student OT Student OT     05/03/23 -  Rae Watkins, OT Student OT Student OT                  OT Recommendation and Plan     Plan of Care Review  Plan of Care Reviewed With: guardian  Progress: improving  Outcome Evaluation: Entered room d/t NSG staff needing assist as pt B knees sliding and pt with heavy posterior lean. Pt needing ModAx2 to scoot bottom back in bed. Pt then progressed to balance of CGA-SBA. a few steps via Rollator>BSC Donna. Pt does not fully extend back, very forward flexed needs cues for hand placement on rollator. Toilet task MaxA for brief management, depA hygiene. Pt limited by his overall strength and endurance. Pt c/o and demo'd s/s of dizziness while on BSC. BP checked 89/66, performed ankle pumps and after 2-3 mins improved. Once EOB attempted to check BP but unable to get accurate reading, d/t pt continued dizziness and increased fatigue BTB modx2. Recommend continued rehab at d/c for increased overall ADL/transitional movements. Pt unsafe to d/c home at this time     Time Calculation:         Time Calculation- OT       Row Name 08/07/23 1323 08/07/23 0831          Time Calculation- OT    OT Start Time 1323  -MT 0750  -AC (r) WB (t) AC (c)     OT Stop Time 1401  -MT 0823  -AC (r) WB (t) AC (c)     OT Time Calculation (min) 38 min  -MT 33 min  -AC (r) WB (t)     Total Timed Code Minutes- OT 38 minute(s)  -MT 33 minute(s)  -AC (r) WB (t) AC (c)     OT Received On 08/07/23  -MT 08/07/23  -AC (r) WB (t) AC (c)        Timed Charges    11940 - OT Therapeutic Activity Minutes 10  -MT --     44455 - OT Self Care/Mgmt Minutes 28  -MT --        Total Minutes     Timed Charges Total Minutes 38  -MT --      Total Minutes 38  -MT --               User Key  (r) = Recorded By, (t) = Taken By, (c) = Cosigned By      Initials Name Provider Type    Venkat Yu, OTR/L, CNT Occupational Therapist    Genet Hu COTA Occupational Therapist Assistant    Agustin Lopez, OT Student OT Student                  Therapy Charges for Today       Code Description Service Date Service Provider Modifiers Qty    69847434916 HC OT THERAPEUTIC ACT EA 15 MIN 8/6/2023 Genet Grullon COTA GO 3    90574432615 HC OT THERAPEUTIC ACT EA 15 MIN 8/7/2023 Genet Grullon COTA GO 1    54937574381 HC OT SELF CARE/MGMT/TRAIN EA 15 MIN 8/7/2023 Genet Grullon COTA GO 2                 RACQUEL Mcarthur  8/7/2023

## 2023-08-07 NOTE — DISCHARGE PLACEMENT REQUEST
"MonsivaisAleks salinas (73 y.o. Male)       Date of Birth   1950    Social Security Number       Address   [address] [place] [id number]    Home Phone   [phone]    MRN   [id number]       [facility]    Marital Status                               Admission Date   8/4/23    Admission Type   Emergency    Admitting Provider   Tj Hyde MD    Attending Provider   Tj Hyde MD    Department, Room/Bed   [facility] 3A, 328/1       Discharge Date       Discharge Disposition       Discharge Destination                                 Attending Provider: Tj Hyde MD    Allergies: Niacin    Isolation: None   Infection: COVID (History) (10/01/20)   Code Status: CPR    Ht: 188 cm (74\")   Wt: 94.2 kg (207 lb 10.8 oz)    Admission Cmt: None   Principal Problem: Cellulitis of right upper extremity [L03.113]                   Active Insurance as of 8/4/2023       Primary Coverage       Payor Plan Insurance Group Employer/Plan Group    MEDICARE MEDICARE A & B        Payor Plan Address Payor Plan Phone Number Payor Plan Fax Number Effective Dates    [address] [phone]  2/1/2017 - None Entered    [facility] [id number]         Subscriber Name Subscriber Birth Date Member ID       ALEKS MONSIVAIS 1950 [id number]               Secondary Coverage       Payor Plan Insurance Group Employer/Plan Group    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[employer] AND LIFE  SUP        PLAN G       Payor Plan Address Payor Plan Phone Number Payor Plan Fax Number Effective Dates    [address] [address]   2/1/2017 - None Entered    [facility] [address]         Subscriber Name Subscriber Birth Date Member ID       ALEKS MONSIVAIS 1950 [id number]                     Emergency Contacts        (Rel.) Home Phone Work Phone Mobile Phone    MonsivaisViolet salinas (Spouse) [phone] -- [phone]              Insurance Information                  MEDICARE/MEDICARE A & B Phone: " [phone]    Subscriber: Aleks Monzon Subscriber#: [id number]    Group#: -- Precert#: --        [employer]ET[employer] GLG/[employer] [facility]        Phone: --    Subscriber: Aleks Monzon Subscriber#: [id number]    Group#: [id number] Precert#: --

## 2023-08-07 NOTE — PLAN OF CARE
Goal Outcome Evaluation:  Plan of Care Reviewed With: patient        Progress: improving  Outcome Evaluation: OT tx completed. Pt agreeable to therapy. Requested to use BR upon attaining EOB. Dependent for donning B socks. Functional transfers requiring modA and verbal cues, including elevated bed surface. Toileting completed with maxA. Pt would benefit from education on safety during functional transfers. He would also benefit from RW to increase safety during transfers. OT will continue POC. Recommend continued rehab at d/c.

## 2023-08-07 NOTE — PLAN OF CARE
Goal Outcome Evaluation:  Plan of Care Reviewed With: patient        Progress: improving  Outcome Evaluation: He is alert and oriented x4. Very pleasant gentleman. He is up to BR x1 assist. His dressing to right arm has remained dry and intact. His left arm has been weeping some this shift. He denies any pain. His dressing to coccxy is dry and intact. He has been turned and repositioned q 2hrs. Call light at bedside within reach.

## 2023-08-07 NOTE — PLAN OF CARE
Goal Outcome Evaluation:  Plan of Care Reviewed With: patient, spouse        Progress: no change  Outcome Evaluation: PT eval complete. Pt was A&Ox4 sitting EOB w/ nsg staff attempting to do bed <> BSC t/f upon SPT and CLEMENT entering. Pt completed bed <> BSC t/f w/ mod A x2. Sit <> stand required mod-max Ax2. Pt utilized a stand-pivot t/f. Pt demonstrated increase thoracic kyphosis and decrease balance. Pt noted feeling lightheaded upon sitting on BSC. PLB was implemented and BP was taken. 89/66. Pt completed sit <> stand requiring mod-maxA x2 and another BP was taken. 143/92 w/ 152 bpm. Pt completed BSC <> bed t/f w/ mod A x2. Sensation was assesed EOB and LE was intact. Strength was attempted to be assesed EOB but pt noted he needed to lay down. Pt complete sit <> supine w/ max Ax2. Vitals were taken again in supine. 103/73 w/ 115 bpm. Pt was able to completed AROM throughout B LE. B LE were assesed at at least 3/5. Pt and spouse were educated on repositioning, safety w/ moving from bed, bedside activites, POC, and d/c. Skilled therapy is required to address strength, activity endurance, and balance. D/c recommendation to inpatient rehab for further rehab.      Anticipated Discharge Disposition (PT): inpatient rehabilitation facility

## 2023-08-07 NOTE — PROGRESS NOTES
"    Daily Progress Note  Aleks Monzon  MRN: [id number] LOS: 3    Admit Date: 2023 06:31 CDT    Subjective:         Interval History:    Reviewed overnight events and nursing notes.     Doing well this morning.  No new complaints.  Continues to have improvement in his right upper extremity cellulitis.    ROS:  Review of Systems   Constitutional:  Negative for chills and fever.   Respiratory:  Negative for cough, chest tightness and shortness of breath.    Cardiovascular:  Positive for leg swelling. Negative for chest pain.   Gastrointestinal:  Negative for abdominal pain, diarrhea, nausea and vomiting.     DIET:  Diet: Regular/House Diet; Texture: Regular Texture (IDDSI 7); Fluid Consistency: Thin (IDDSI 0)    Medications:      calcium gluconate, 1,000 mg, Intravenous, Once  cefTRIAXone, 2,000 mg, Intravenous, Q24H  senna-docusate sodium, 2 tablet, Oral, BID  sodium chloride, 10 mL, Intravenous, Q12H  vancomycin, 15 mg/kg, Intravenous, Q24H          Objective:     Vitals: /79 (BP Location: Left arm, Patient Position: Lying)   Pulse 70   Temp 97.5 øF (36.4 øC) (Oral)   Resp 18   Ht 188 cm (74\")   Wt 94.2 kg (207 lb 10.8 oz)   SpO2 97%   BMI 26.66 kg/mý    Intake/Output Summary (Last 24 hours) at 2023 0631  Last data filed at 2023 0436  Gross per 24 hour   Intake 1800 ml   Output 700 ml   Net 1100 ml    Temp (24hrs), Av øF (36.7 øC), Min:97.5 øF (36.4 øC), Max:98.4 øF (36.9 øC)    Glucose:  No results found for: POCGLU  Physical Examination:   Physical Exam  Constitutional:       General: He is not in acute distress.     Appearance: Normal appearance. He is normal weight. He is not ill-appearing.   Cardiovascular:      Rate and Rhythm: Normal rate and regular rhythm.      Pulses: Normal pulses.      Heart sounds: Normal heart sounds. No murmur heard.  Pulmonary:      Effort: Pulmonary effort is normal.      Breath sounds: Normal breath sounds. No wheezing, rhonchi or " rales.   Abdominal:      General: Abdomen is flat. Bowel sounds are normal.      Palpations: Abdomen is soft.      Tenderness: There is no abdominal tenderness.   Neurological:      Mental Status: He is alert.       Labs:  Lab Results (last 24 hours)       Procedure Component Value Units Date/Time    Blood Culture - Blood, Arm, Right [[id number]]  (Abnormal) Collected: 08/04/23 1419    Specimen: Blood from Arm, Right Updated: 08/07/23 0601     Blood Culture Gram Negative Bacilli     Isolated from Anaerobic Bottle     Gram Stain Anaerobic Bottle Gram negative bacilli    Narrative:      ID/MICs to follow    Blood Culture - Blood, Arm, Left [[id number]]  (Abnormal) Collected: 08/04/23 1413    Specimen: Blood from Arm, Left Updated: 08/07/23 0601     Blood Culture Gram Negative Bacilli     Comment: ID/MICs to follow        Isolated from Aerobic and Anaerobic Bottles     Gram Stain Anaerobic Bottle Gram negative bacilli      Aerobic Bottle Gram negative bacilli    Comprehensive Metabolic Panel [[id number]]  (Abnormal) Collected: 08/07/23 0341    Specimen: Blood Updated: 08/07/23 0430     Glucose 100 mg/dL      BUN 51 mg/dL      Creatinine 1.50 mg/dL      Sodium 132 mmol/L      Potassium 3.9 mmol/L      Chloride 108 mmol/L      CO2 18.0 mmol/L      Calcium 7.1 mg/dL      Total Protein 3.2 g/dL      Albumin 1.2 g/dL      ALT (SGPT) 27 U/L      AST (SGOT) 39 U/L      Alkaline Phosphatase 214 U/L      Total Bilirubin 0.2 mg/dL      Globulin 2.0 gm/dL      A/G Ratio 0.6 g/dL      BUN/Creatinine Ratio 34.0     Anion Gap 6.0 mmol/L      eGFR 49.5 mL/min/1.73     Narrative:      GFR Normal >60  Chronic Kidney Disease <60  Kidney Failure <15    The GFR formula is only valid for adults with stable renal function between ages 18 and 70.    CBC & Differential [[id number]]  (Abnormal) Collected: 08/07/23 0341    Specimen: Blood Updated: 08/07/23 0409    Narrative:      The following orders were created for panel order CBC &  Differential.  Procedure                               Abnormality         Status                     ---------                               -----------         ------                     CBC Auto Differential[[id number]]        Abnormal            Final result                 Please view results for these tests on the individual orders.    CBC Auto Differential [[id number]]  (Abnormal) Collected: 08/07/23 0341    Specimen: Blood Updated: 08/07/23 0409     WBC 7.62 10*3/mm3      RBC 3.74 10*6/mm3      Hemoglobin 10.7 g/dL      Hematocrit 32.5 %      MCV 86.9 fL      MCH 28.6 pg      MCHC 32.9 g/dL      RDW 16.0 %      RDW-SD 50.4 fl      MPV 9.8 fL      Platelets 159 10*3/mm3      Neutrophil % 79.6 %      Lymphocyte % 8.9 %      Monocyte % 5.6 %      Eosinophil % 1.3 %      Basophil % 0.4 %      Immature Grans % 4.2 %      Neutrophils, Absolute 6.06 10*3/mm3      Lymphocytes, Absolute 0.68 10*3/mm3      Monocytes, Absolute 0.43 10*3/mm3      Eosinophils, Absolute 0.10 10*3/mm3      Basophils, Absolute 0.03 10*3/mm3      Immature Grans, Absolute 0.32 10*3/mm3      nRBC 0.0 /100 WBC     Vancomycin, Trough [[id number]]  (Normal) Collected: 08/06/23 1514    Specimen: Blood Updated: 08/06/23 1548     Vancomycin Trough 14.10 mcg/mL     Calcium, Ionized [[id number]] Collected: 08/06/23 1526    Specimen: Blood Updated: 08/06/23 1522     Ionized Calcium 4.68 mg/dL      Collected by [id number]     Comment: Meter: [id number]     :  [id number]       Wound Culture - Wound, Arm, Right [[id number]]  (Abnormal)  (Susceptibility) Collected: 08/04/23 1612    Specimen: Wound from Arm, Right Updated: 08/06/23 0956     Wound Culture Light growth (2+) Staphylococcus aureus      Rare Providencia rettgeri      Light growth (2+) Normal Skin Leticia     Gram Stain Few (2+) Gram positive cocci      No WBCs seen    Susceptibility        Staphylococcus aureus      DICKSON      Clindamycin Susceptible      Erythromycin Susceptible      Inducible  Clindamycin Resistance Negative      Oxacillin Susceptible      Rifampin Susceptible      Tetracycline Susceptible      Trimethoprim + Sulfamethoxazole Susceptible      Vancomycin Susceptible                       Susceptibility        Providencia rettgeri      DICKSON      Ampicillin Resistant      Ampicillin + Sulbactam Susceptible      Cefepime Susceptible      Ceftazidime Susceptible      Ceftriaxone Susceptible      Gentamicin Susceptible      Levofloxacin Susceptible      Piperacillin + Tazobactam Susceptible      Tetracycline Resistant      Trimethoprim + Sulfamethoxazole Susceptible                       Susceptibility Comments       Staphylococcus aureus    This isolate does not demonstrate inducible clindamycin resistance in vitro.        Providencia rettgeri    Cefazolin sensitivity will not be reported for Enterobacteriaceae in non-urine isolates. If cefazolin is preferred, please call the microbiology lab to request an E-test.  With the exception of urinary-sourced infections, aminoglycosides should not be used as monotherapy.               Calcium, Ionized [[id number]]  (Abnormal) Collected: 08/06/23 0932    Specimen: Blood Updated: 08/06/23 0927     Ionized Calcium 4.36 mg/dL      Comment: 84 Value below reference range        Collected by [id number]     Comment: Meter: [id number]     :  [id number]                Imaging:  No radiology results from the last 24 hrs       Assessment and Plan:     Primary Problem:  Cellulitis of right upper extremity    Hospital Problem list:    Cellulitis of right upper extremity    Prostate cancer    Gout, arthropathy    Hypertension    Malignant neoplasm of trigone of urinary bladder    Stage 3a chronic kidney disease    Protein deficiency    Sepsis due to Gram negative bacteria        Assessment: 73-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    Plan:    Right upper  extremity cellulitis  Improving, continue Rocephin.    Enterobacterales bacteremia  Cultures performed on 8/4 were positive x2.  We will repeat cultures today and infectious disease consultation pending.  I do not suspect this organism came from his cellulitis but will defer this to infectious disease.  We will obtain urine sample and culture.    Will have PT evaluate has acute on chronic decline and may need SNF/rehab    Discharge planning:   Home    Reviewed treatment plans with the patient and/or family.     Code Status:   Code Status and Medical Interventions:   Ordered at: 08/05/23 1506     Code Status (Patient has no pulse and is not breathing):    CPR (Attempt to Resuscitate)     Medical Interventions (Patient has pulse or is breathing):    Full Support     Release to patient:    Routine Release       Electronically signed by Tj Hyde MD on 8/7/2023 at 06:31 CDT

## 2023-08-08 ENCOUNTER — APPOINTMENT (OUTPATIENT)
Dept: ULTRASOUND IMAGING | Facility: HOSPITAL | Age: 73
DRG: 871 | End: 2023-08-08
Payer: MEDICARE

## 2023-08-08 LAB
ALBUMIN SERPL-MCNC: 1.3 G/DL (ref 3.5–5.2)
ALBUMIN/GLOB SERPL: 0.7 G/DL
ALP SERPL-CCNC: 249 U/L (ref 39–117)
ALT SERPL W P-5'-P-CCNC: 32 U/L (ref 1–41)
ANION GAP SERPL CALCULATED.3IONS-SCNC: 8 MMOL/L (ref 5–15)
ANISOCYTOSIS BLD QL: ABNORMAL
AST SERPL-CCNC: 42 U/L (ref 1–40)
BACTERIA SPEC AEROBE CULT: ABNORMAL
BACTERIA SPEC AEROBE CULT: ABNORMAL
BACTERIA SPEC AEROBE CULT: NO GROWTH
BILIRUB SERPL-MCNC: 0.2 MG/DL (ref 0–1.2)
BUN SERPL-MCNC: 54 MG/DL (ref 8–23)
BUN/CREAT SERPL: 37.2 (ref 7–25)
BURR CELLS BLD QL SMEAR: ABNORMAL
CALCIUM SPEC-SCNC: 6.9 MG/DL (ref 8.6–10.5)
CHLORIDE SERPL-SCNC: 103 MMOL/L (ref 98–107)
CO2 SERPL-SCNC: 19 MMOL/L (ref 22–29)
CREAT SERPL-MCNC: 1.45 MG/DL (ref 0.76–1.27)
DEPRECATED RDW RBC AUTO: 50.5 FL (ref 37–54)
EGFRCR SERPLBLD CKD-EPI 2021: 51.5 ML/MIN/1.73
ELLIPTOCYTES BLD QL SMEAR: ABNORMAL
EOSINOPHIL # BLD MANUAL: 0.18 10*3/MM3 (ref 0–0.4)
EOSINOPHIL NFR BLD MANUAL: 2.1 % (ref 0.3–6.2)
ERYTHROCYTE [DISTWIDTH] IN BLOOD BY AUTOMATED COUNT: 15.9 % (ref 12.3–15.4)
GLOBULIN UR ELPH-MCNC: 1.8 GM/DL
GLUCOSE SERPL-MCNC: 93 MG/DL (ref 65–99)
GRAM STN SPEC: ABNORMAL
HCT VFR BLD AUTO: 34.4 % (ref 37.5–51)
HGB BLD-MCNC: 11 G/DL (ref 13–17.7)
HOLD SPECIMEN: NORMAL
ISOLATED FROM: ABNORMAL
ISOLATED FROM: ABNORMAL
LYMPHOCYTES # BLD MANUAL: 0 10*3/MM3 (ref 0.7–3.1)
LYMPHOCYTES NFR BLD MANUAL: 2.1 % (ref 5–12)
MCH RBC QN AUTO: 27.8 PG (ref 26.6–33)
MCHC RBC AUTO-ENTMCNC: 32 G/DL (ref 31.5–35.7)
MCV RBC AUTO: 87.1 FL (ref 79–97)
METAMYELOCYTES NFR BLD MANUAL: 3.1 % (ref 0–0)
MONOCYTES # BLD: 0.18 10*3/MM3 (ref 0.1–0.9)
NEUTROPHILS # BLD AUTO: 7.94 10*3/MM3 (ref 1.7–7)
NEUTROPHILS NFR BLD MANUAL: 86.6 % (ref 42.7–76)
NEUTS BAND NFR BLD MANUAL: 6.2 % (ref 0–5)
PLAT MORPH BLD: NORMAL
PLATELET # BLD AUTO: 147 10*3/MM3 (ref 140–450)
PMV BLD AUTO: 9.3 FL (ref 6–12)
POIKILOCYTOSIS BLD QL SMEAR: ABNORMAL
POTASSIUM SERPL-SCNC: 3.9 MMOL/L (ref 3.5–5.2)
PROT SERPL-MCNC: 3.1 G/DL (ref 6–8.5)
RBC # BLD AUTO: 3.95 10*6/MM3 (ref 4.14–5.8)
SODIUM SERPL-SCNC: 130 MMOL/L (ref 136–145)
VARIANT LYMPHS NFR BLD MANUAL: 0 % (ref 19.6–45.3)
WBC MORPH BLD: NORMAL
WBC NRBC COR # BLD: 8.56 10*3/MM3 (ref 3.4–10.8)

## 2023-08-08 PROCEDURE — 85007 BL SMEAR W/DIFF WBC COUNT: CPT | Performed by: FAMILY MEDICINE

## 2023-08-08 PROCEDURE — 87015 SPECIMEN INFECT AGNT CONCNTJ: CPT | Performed by: INTERNAL MEDICINE

## 2023-08-08 PROCEDURE — 99222 1ST HOSP IP/OBS MODERATE 55: CPT | Performed by: NURSE PRACTITIONER

## 2023-08-08 PROCEDURE — 97530 THERAPEUTIC ACTIVITIES: CPT

## 2023-08-08 PROCEDURE — 25010000002 CEFTRIAXONE PER 250 MG: Performed by: FAMILY MEDICINE

## 2023-08-08 PROCEDURE — 87207 SMEAR SPECIAL STAIN: CPT | Performed by: INTERNAL MEDICINE

## 2023-08-08 PROCEDURE — 99232 SBSQ HOSP IP/OBS MODERATE 35: CPT | Performed by: INTERNAL MEDICINE

## 2023-08-08 PROCEDURE — 85025 COMPLETE CBC W/AUTO DIFF WBC: CPT | Performed by: FAMILY MEDICINE

## 2023-08-08 PROCEDURE — 97535 SELF CARE MNGMENT TRAINING: CPT

## 2023-08-08 PROCEDURE — 93970 EXTREMITY STUDY: CPT

## 2023-08-08 PROCEDURE — 80053 COMPREHEN METABOLIC PANEL: CPT | Performed by: FAMILY MEDICINE

## 2023-08-08 PROCEDURE — 93970 EXTREMITY STUDY: CPT | Performed by: SURGERY

## 2023-08-08 RX ORDER — CHOLESTYRAMINE 4 G/9G
1 POWDER, FOR SUSPENSION ORAL
Status: DISCONTINUED | OUTPATIENT
Start: 2023-08-08 | End: 2023-08-10

## 2023-08-08 RX ORDER — LOPERAMIDE HYDROCHLORIDE 2 MG/1
2 CAPSULE ORAL 2 TIMES DAILY
Status: DISCONTINUED | OUTPATIENT
Start: 2023-08-08 | End: 2023-08-08

## 2023-08-08 RX ORDER — LOPERAMIDE HYDROCHLORIDE 2 MG/1
2 CAPSULE ORAL 3 TIMES DAILY PRN
Status: DISCONTINUED | OUTPATIENT
Start: 2023-08-08 | End: 2023-08-10 | Stop reason: HOSPADM

## 2023-08-08 RX ORDER — L.ACID,PARA/B.BIFIDUM/S.THERM 8B CELL
1 CAPSULE ORAL 2 TIMES DAILY
Status: DISCONTINUED | OUTPATIENT
Start: 2023-08-08 | End: 2023-08-10 | Stop reason: HOSPADM

## 2023-08-08 RX ADMIN — SODIUM CHLORIDE 40 ML: 900 INJECTION INTRAVENOUS at 08:40

## 2023-08-08 RX ADMIN — CHOLESTYRAMINE 1 PACKET: 4 POWDER, FOR SUSPENSION ORAL at 11:27

## 2023-08-08 RX ADMIN — Medication 10 ML: at 09:01

## 2023-08-08 RX ADMIN — SODIUM CHLORIDE 40 ML: 900 INJECTION INTRAVENOUS at 08:58

## 2023-08-08 RX ADMIN — Medication 1 CAPSULE: at 20:56

## 2023-08-08 RX ADMIN — CHOLESTYRAMINE 1 PACKET: 4 POWDER, FOR SUSPENSION ORAL at 16:42

## 2023-08-08 RX ADMIN — CEFTRIAXONE SODIUM 2000 MG: 2 INJECTION, POWDER, FOR SOLUTION INTRAMUSCULAR; INTRAVENOUS at 08:59

## 2023-08-08 RX ADMIN — APIXABAN 10 MG: 5 TABLET, FILM COATED ORAL at 20:56

## 2023-08-08 RX ADMIN — APIXABAN 10 MG: 5 TABLET, FILM COATED ORAL at 11:26

## 2023-08-08 RX ADMIN — Medication 1 CAPSULE: at 11:26

## 2023-08-08 NOTE — CONSULTS
[facility] Gastroenterology  Initial Inpatient Consult Note    Referring Provider: No Known Provider    Date of Admission: 8/4/2023  Date of Service:  08/08/23    Reason for Consultation: diarrhea    Subjective     History of present illness: This is a 73-year-old male presented to [facility] ER on 8/4/2023 with cellulitis.  GI has been asked to evaluate patient for persistent diarrhea.  He indicates sudden onset of diarrhea early July after coming into contact with a coworker that had similar symptoms.  His coworker improved however his diarrhea persisted.  He was placed on doxycycline for abrasion to his elbow in mid July.  Bowels are loose and moving 4-6 times daily.  He has been awakened from his sleep to have a bowel movement.  He denies melena or bright red blood.  No abdominal pain.  Denies difficulty with heartburn or indigestion.  Prior to onset of diarrhea he denies changes in medication or supplements.  February 2023 weight documented at 212 pounds compared to 207 pounds today.    Polypectomy on colonoscopy 10/2022.  Small bowel biopsy also negative for celiac disease at that time.    COLONOSCOPY (10/11/2022 10:29)   UPPER GI ENDOSCOPY (10/11/2022 10:29)     Tissue Pathology Exam (10/11/2022 10:36)       Past Medical History:  Past Medical History:   Diagnosis Date    Arthritis     Cancer     PROSTATE AND BLADDER    Cellulitis     Gout     Hypertension     IFG (impaired fasting glucose)     Low back pain     Lymphedema     Retroperitoneal fibrosis        Past Surgical History:  Past Surgical History:   Procedure Laterality Date    AXILLARY LYMPH NODE BIOPSY/EXCISION Left 10/01/2020    Procedure: LEFT AXILLARY LYMPH NODE BIOPSY;  Surgeon: Dina To MD;  Location: [facility] OR;  Service: General;  Laterality: Left;    BACK SURGERY      COLONOSCOPY      2017?    COLONOSCOPY N/A 10/11/2022    Procedure: COLONOSCOPY WITH ANESTHESIA;  Surgeon: Burton Chaudhari MD;  Location: [facility]  ENDOSCOPY;  Service: Gastroenterology;  Laterality: N/A;  preop; abdominal pain  postop; polyps   PCP Tj Hyde MD    ENDOSCOPY N/A 10/11/2022    Procedure: ESOPHAGOGASTRODUODENOSCOPY WITH ANESTHESIA;  Surgeon: Burton Chaudhari MD;  Location: [facility] ENDOSCOPY;  Service: Gastroenterology;  Laterality: N/A;  preop; abdominal pain  [facility]  PCP Tj Hyde MD    JOINT REPLACEMENT      BILATERAL HIP     SKIN CANCER EXCISION      TOTAL HIP ARTHROPLASTY      URETERAL STENT INSERTION          Social History:   Social History     Tobacco Use    Smoking status: Never    Smokeless tobacco: Never   Substance Use Topics    Alcohol use: Never        Family History:  Family History   Problem Relation Age of Onset    Heart disease Father     Diabetes Maternal Grandmother     Colon cancer Neg Hx     Colon polyps Neg Hx        Home Meds:  Medications Prior to Admission   Medication Sig Dispense Refill Last Dose    allopurinol (ZYLOPRIM) 300 MG tablet Take 1 tablet by mouth Daily.   8/3/2023    Calcium Carb-Cholecalciferol 600-10 MG-MCG tablet Take 1 tablet by mouth Daily.   8/3/2023    colestipol (COLESTID) 1 g tablet Take 1 tablet by mouth Daily. 30 tablet 2 8/3/2023    mirtazapine (REMERON) 7.5 MG tablet Take 1 tablet by mouth Every Night.   8/3/2023    predniSONE (DELTASONE) 10 MG tablet Take 1 tablet by mouth Daily.   Patient Taking Differently    tamsulosin (FLOMAX) 0.4 MG capsule 24 hr capsule Take 1 capsule by mouth Daily.   8/3/2023    torsemide (DEMADEX) 20 MG tablet Take 1 tablet by mouth Daily.   Patient Taking Differently    colchicine 0.6 MG tablet Take 1 tablet by mouth 2 (Two) Times a Day As Needed for Muscle / Joint Pain. 20 tablet 5 Unknown    diphenhydrAMINE (BENADRYL) 25 MG tablet Take 1 tablet by mouth Every 6 (Six) Hours As Needed for Allergies (rare).   More than a month    liver oil-zinc oxide (DESITIN) 40 % ointment Apply 1 application  topically to the appropriate area as  directed 2 (Two) Times a Day.       metoclopramide (REGLAN) 5 MG tablet Take 1 tablet by mouth 3 (Three) Times a Day As Needed.       pantoprazole (PROTONIX) 40 MG EC tablet Take 1 tablet by mouth Daily As Needed.          Current Meds:     Current Facility-Administered Medications:     acetaminophen (TYLENOL) tablet 650 mg, 650 mg, Oral, Q4H PRN, Tj Hyde MD    apixaban (ELIQUIS) tablet 10 mg, 10 mg, Oral, Q12H **FOLLOWED BY** [START ON 8/15/2023] apixaban (ELIQUIS) tablet 5 mg, 5 mg, Oral, Q12H, Tj Hyde MD    Calcium Replacement - Follow Nurse / BPA Driven Protocol, , Does not apply, Barrett SMALLS Samuel F, MD    cefTRIAXone (ROCEPHIN) 2,000 mg in sodium chloride 0.9 % 100 mL IVPB, 2,000 mg, Intravenous, Q24H, Eugene Stock MD, Last Rate: 200 mL/hr at 08/08/23 0859, 2,000 mg at 08/08/23 0859    lactobacillus acidophilus (RISAQUAD) capsule 1 capsule, 1 capsule, Oral, BID, Tj Hyde MD    loperamide (IMODIUM) capsule 2 mg, 2 mg, Oral, BID, Tj Hyde MD    loperamide (IMODIUM) capsule 2 mg, 2 mg, Oral, TID PRNBarrett Samuel F, MD    Magnesium Standard Dose Replacement - Follow Nurse / BPA Driven Protocol, , Does not apply, Barrett SMALLS Samuel F, MD    melatonin tablet 5 mg, 5 mg, Oral, Nightly PRNBarrett Samuel F, MD    Phosphorus Replacement - Follow Nurse / BPA Driven Protocol, , Does not apply, Barrett SMALLS Samuel F, MD    Potassium Replacement - Follow Nurse / BPA Driven Protocol, , Does not apply, Barrett SMALLS Samuel F, MD    [COMPLETED] Insert Peripheral IV, , , Once **AND** sodium chloride 0.9 % flush 10 mL, 10 mL, Intravenous, PRN, Tj Hyde MD    sodium chloride 0.9 % flush 10 mL, 10 mL, Intravenous, Q12H, Tj Hyde MD, 10 mL at 08/08/23 0901    sodium chloride 0.9 % flush 10 mL, 10 mL, Intravenous, PRN, Tj Hyde MD, 10 mL at 08/05/23 2030    sodium chloride 0.9 % infusion 40 mL, 40 mL, Intravenous, PRN,  Tj Hyde MD, 40 mL at 08/08/23 0858    traMADol (ULTRAM) tablet 50 mg, 50 mg, Oral, Q6H PRN, Tj Hyde MD    Allergies:  Allergies   Allergen Reactions    Niacin Itching     same as of 1/9/2017-itching         Vital Signs  Temp:  [97.5 øF (36.4 øC)-98.7 øF (37.1 øC)] 97.5 øF (36.4 øC)  Heart Rate:  [70-95] 73  Resp:  [17-20] 20  BP: ()/(68-81) 104/72  Body mass index is 26.66 kg/mý.    Intake/Output Summary (Last 24 hours) at 8/8/2023 0935  Last data filed at 8/7/2023 2000  Gross per 24 hour   Intake 720 ml   Output 450 ml   Net 270 ml     No intake/output data recorded.    Review of Systems     Constitution:  negative for chills, fatigue and fevers  Eyes:  negative for blurriness and change of vision  ENT:   negative for sore throat and voice change  Respiratory: negative for  cough and shortness of air  Cardiovascular:  Negative for chest pain or palpitations  Gastrointestinal:  negative for  See HPI  Genitourinary:  negative for  blood in urine and painful urination  Integument: negative for  rash and redness  Hematologic / Lymphatic: negative for  excessive bleeding and easy bruising  Musculoskeletal: negative for  joint pain and joint stiffness out of the ordinary  Neurological:  negative for  seizures and speech abnormality  Behavioral/Psych:  negative for  anxiety and depression out of the ordinary  Endocrine: negative for  diabetes and positive weight loss, unintended  Allergies / Immunologic:  negative for  anaphylaxis and rash      Objective     Physical Exam:  General :    Alert, cooperative, in no acute distress   Head:    Normocephalic, without obvious abnormality, atraumatic   Eyes:            Lids and lashes normal, conjunctivae and sclerae normal, no   Icterus, conjunctival pallor   Throat:   No oral lesions, no thrush, oral mucosa moist, posterior oropharynx clear   Neck:   No adenopathy, supple, trachea midline, no thyromegaly, no   carotid bruit, no JVD   Lungs:      Clear to auscultation, respirations regular, even and                   unlabored    Heart:    Regular rhythm and normal rate, normal S1 and S2, no            murmur   Chest Wall:    No abnormalities observed   Abdomen:     Normal bowel sounds, no masses, no organomegaly, soft        nontender, nondistended, no guarding, no rebound                 tenderness   Rectal:     Deferred   Extremities:   No edema, no cyanosis           Psychiatric:  Judgement and insight: normal   Orientation to person place and time: normal   Mood and affect: normal        Results Review:    I have reviewed all of the patients current test results  Results from last 7 days   Lab Units 08/08/23  0501 08/07/23  0341 08/06/23  0402   WBC 10*3/mm3 8.56 7.62 9.73   HEMOGLOBIN g/dL 11.0* 10.7* 10.6*   HEMATOCRIT % 34.4* 32.5* 33.0*   PLATELETS 10*3/mm3 147 159 182       Results from last 7 days   Lab Units 08/08/23  0501 08/07/23  0341 08/06/23  0402   SODIUM mmol/L 130* 132* 131*   POTASSIUM mmol/L 3.9 3.9 4.1   CHLORIDE mmol/L 103 108* 104   CO2 mmol/L 19.0* 18.0* 18.0*   BUN mg/dL 54* 51* 51*   CREATININE mg/dL 1.45* 1.50* 1.51*   CALCIUM mg/dL 6.9* 7.1* 7.2*   BILIRUBIN mg/dL 0.2 0.2 0.2   ALK PHOS U/L 249* 214* 193*   ALT (SGPT) U/L 32 27 26   AST (SGOT) U/L 42* 39 33   GLUCOSE mg/dL 93 100* 102*             No results found for: LIPASE    Radiology:    Imaging Results (Last 24 Hours)       Procedure Component Value Units Date/Time    US Venous Doppler Lower Extremity Bilateral (duplex) [[id number]] Resulted: 08/08/23 0907     Updated: 08/08/23 0908              Assessment & Plan       Cellulitis of right upper extremity    Prostate cancer    Gout, arthropathy    Hypertension    Malignant neoplasm of trigone of urinary bladder    Stage 3a chronic kidney disease    Protein deficiency    Sepsis due to Gram negative bacteria      Impression/Plan    Diarrhea  Cellulitis  History of colon polyps    Stool testing has been negative for C.  difficile as well as other factors that could contribute to diarrhea.  Small bowel biopsy - October 2022 for celiac.  Will place patient on lactose-free, high-fiber diet.  Question if patient contracted similar viral illness as his coworker that was exacerbated by use of antibiotics.  I have stopped scheduled Imodium and added Questran to daily medication regimen.  He will need to take this prior to eating.  I have ordered this for 3 times daily prior to meals but we can add it prior to bedtime if needed.  Ok to continue as needed Imodium.   No plans on endoscopic evaluation at this time.      Electronically signed by RENE Calloway, 08/08/23, 9:35 AM CDT.         RENE Calloway  08/08/23  09:35 CDT

## 2023-08-08 NOTE — CASE MANAGEMENT/SOCIAL WORK
Continued Stay Note   [place]     Patient Name: Aleks Monzon  MRN: [id number]  Today's Date: 8/8/2023    Admit Date: 8/4/2023    Plan: SNF vs Acute Rehab   Discharge Plan       Row Name 08/08/23 1239       Plan    Plan Comments [facility] declined stating pt is not able to tolerate 3 hours of therapy per day. SW called pt wife (Violet) to inform but had to leave a VM. Await to hear back from pt wife in regards to other options.                   Discharge Codes    No documentation.                 Expected Discharge Date and Time       Expected Discharge Date Expected Discharge Time    Aug 10, 2023               BRETT Umaña

## 2023-08-08 NOTE — PLAN OF CARE
Goal Outcome Evaluation:  Pt awake all day today, A&Ox4, VSS, answers questions appropriately. No pain, no s/s of distress. Skin tear on Rt arm dressed, intact. Area on sacral area cleaned and dressing changed per order. Swelling still +3 on rt lower extremity. Please see MAR for VTE. Pt is able to walk 5-6 feet with walker and assistance only. Generalized weakness. Stool sample submitted to labs.

## 2023-08-08 NOTE — PROGRESS NOTES
"    Daily Progress Note  Aleks Monzon  MRN: [id number] LOS: 4    Admit Date: 2023 09:26 CDT    Subjective:         Interval History:    Reviewed overnight events and nursing notes.     Continues to have diarrhea, cellulitis improving.    ROS:  Review of Systems   Constitutional:  Negative for chills and fever.   Respiratory:  Negative for cough, chest tightness and shortness of breath.    Cardiovascular:  Positive for leg swelling. Negative for chest pain.   Gastrointestinal:  Positive for diarrhea. Negative for abdominal pain, constipation, nausea and vomiting.     DIET:  Diet: Regular/House Diet, Gastrointestinal Diets; Lactose-Controlled, High Fiber; Texture: Regular Texture (IDDSI 7); Fluid Consistency: Thin (IDDSI 0)    Medications:      cefTRIAXone, 2,000 mg, Intravenous, Q24H  lactobacillus acidophilus, 1 capsule, Oral, BID  loperamide, 2 mg, Oral, BID  sodium chloride, 10 mL, Intravenous, Q12H          Objective:     Vitals: /72 (BP Location: Left arm, Patient Position: Lying)   Pulse 73   Temp 97.5 øF (36.4 øC) (Oral)   Resp 20   Ht 188 cm (74\")   Wt 94.2 kg (207 lb 10.8 oz)   SpO2 95%   BMI 26.66 kg/mý    Intake/Output Summary (Last 24 hours) at 2023 09  Last data filed at 2023  Gross per 24 hour   Intake 720 ml   Output 450 ml   Net 270 ml    Temp (24hrs), Av.9 øF (36.6 øC), Min:97.5 øF (36.4 øC), Max:98.7 øF (37.1 øC)    Glucose:  No results found for: POCGLU  Physical Examination:   Physical Exam  Constitutional:       General: He is not in acute distress.     Appearance: Normal appearance. He is normal weight. He is not ill-appearing.   Cardiovascular:      Rate and Rhythm: Normal rate and regular rhythm.      Pulses: Normal pulses.      Heart sounds: Normal heart sounds. No murmur heard.  Pulmonary:      Effort: Pulmonary effort is normal.      Breath sounds: Normal breath sounds. No wheezing, rhonchi or rales.   Abdominal:      General: Abdomen " is flat. Bowel sounds are normal.      Palpations: Abdomen is soft.      Tenderness: There is no abdominal tenderness.   Musculoskeletal:      Right lower leg: Edema present.      Left lower leg: Edema present.   Neurological:      Mental Status: He is alert.       Labs:  Lab Results (last 24 hours)       Procedure Component Value Units Date/Time    Urine Culture - Urine, Urine, Clean Catch [[id number]]  (Normal) Collected: 08/07/23 1332    Specimen: Urine, Clean Catch Updated: 08/08/23 0919     Urine Culture No growth    Manual Differential [[id number]]  (Abnormal) Collected: 08/08/23 0501    Specimen: Blood Updated: 08/08/23 0631     Neutrophil % 86.6 %      Lymphocyte % 0.0 %      Monocyte % 2.1 %      Eosinophil % 2.1 %      Bands %  6.2 %      Metamyelocyte % 3.1 %      Neutrophils Absolute 7.94 10*3/mm3      Lymphocytes Absolute 0.00 10*3/mm3      Monocytes Absolute 0.18 10*3/mm3      Eosinophils Absolute 0.18 10*3/mm3      Anisocytosis Slight/1+     Crenated RBC's Large/3+     Elliptocytes Slight/1+     Poikilocytes Large/3+     WBC Morphology Normal     Platelet Morphology Normal    CBC & Differential [[id number]]  (Abnormal) Collected: 08/08/23 0501    Specimen: Blood Updated: 08/08/23 0631    Narrative:      The following orders were created for panel order CBC & Differential.  Procedure                               Abnormality         Status                     ---------                               -----------         ------                     CBC Auto Differential[[id number]]        Abnormal            Final result                 Please view results for these tests on the individual orders.    CBC Auto Differential [[id number]]  (Abnormal) Collected: 08/08/23 0501    Specimen: Blood Updated: 08/08/23 0631     WBC 8.56 10*3/mm3      RBC 3.95 10*6/mm3      Hemoglobin 11.0 g/dL      Hematocrit 34.4 %      MCV 87.1 fL      MCH 27.8 pg      MCHC 32.0 g/dL      RDW 15.9 %      RDW-SD 50.5 fl      MPV 9.3 fL       Platelets 147 10*3/mm3     Narrative:      The previously reported component NRBC is no longer being reported. Previous result was 0.0 /100 WBC (Reference Range: 0.0-0.2 /100 WBC) on 8/8/2023 at 0610 T.    Comprehensive Metabolic Panel [[id number]]  (Abnormal) Collected: 08/08/23 0501    Specimen: Blood Updated: 08/08/23 0621     Glucose 93 mg/dL      BUN 54 mg/dL      Creatinine 1.45 mg/dL      Sodium 130 mmol/L      Potassium 3.9 mmol/L      Chloride 103 mmol/L      CO2 19.0 mmol/L      Calcium 6.9 mg/dL      Total Protein 3.1 g/dL      Albumin 1.3 g/dL      ALT (SGPT) 32 U/L      AST (SGOT) 42 U/L      Alkaline Phosphatase 249 U/L      Total Bilirubin 0.2 mg/dL      Globulin 1.8 gm/dL      A/G Ratio 0.7 g/dL      BUN/Creatinine Ratio 37.2     Anion Gap 8.0 mmol/L      eGFR 51.5 mL/min/1.73     Narrative:      GFR Normal >60  Chronic Kidney Disease <60  Kidney Failure <15    The GFR formula is only valid for adults with stable renal function between ages 18 and 70.    Blood Culture - Blood, Arm, Left [[id number]]  (Abnormal)  (Susceptibility) Collected: 08/04/23 1413    Specimen: Blood from Arm, Left Updated: 08/08/23 0537     Blood Culture Providencia rettgeri     Isolated from Aerobic and Anaerobic Bottles     Gram Stain Anaerobic Bottle Gram negative bacilli      Aerobic Bottle Gram negative bacilli    Susceptibility        Providencia rettgeri      DICKSON      Ampicillin Intermediate      Ampicillin + Sulbactam Susceptible      Cefepime Susceptible      Ceftazidime Susceptible      Ceftriaxone Susceptible      Gentamicin Susceptible      Levofloxacin Susceptible      Piperacillin + Tazobactam Susceptible      Trimethoprim + Sulfamethoxazole Susceptible                       Susceptibility Comments       Providencia rettgeri    Cefazolin sensitivity will not be reported for Enterobacteriaceae in non-urine isolates. If cefazolin is preferred, please call the microbiology lab to request an E-test.  With the  exception of urinary-sourced infections, aminoglycosides should not be used as monotherapy.               Blood Culture - Blood, Arm, Right [[id number]]  (Abnormal) Collected: 08/04/23 1419    Specimen: Blood from Arm, Right Updated: 08/08/23 0537     Blood Culture Providencia rettgeri     Isolated from Anaerobic Bottle     Gram Stain Anaerobic Bottle Gram negative bacilli    Narrative:      Refer to previous blood culture collected on 08/04/2023 1513 for [facility].      Urinalysis With Culture If Indicated - Urine, Clean Catch [[id number]]  (Abnormal) Collected: 08/07/23 1332    Specimen: Urine, Clean Catch Updated: 08/07/23 1356     Color, UA Yellow     Appearance, UA Clear     pH, UA 5.5     Specific Gravity, UA 1.020     Glucose, UA Negative     Ketones, UA Negative     Bilirubin, UA Negative     Blood, UA Negative     Protein, UA Trace     Leuk Esterase, UA Large (3+)     Nitrite, UA Negative     Urobilinogen, UA 0.2 E.U./dL    Narrative:      In absence of clinical symptoms, the presence of pyuria, bacteria, and/or nitrites on the urinalysis result does not correlate with infection.    Urinalysis, Microscopic Only - Urine, Clean Catch [[id number]]  (Abnormal) Collected: 08/07/23 1332    Specimen: Urine, Clean Catch Updated: 08/07/23 1356     RBC, UA None Seen /HPF      WBC, UA 31-50 /HPF      Bacteria, UA None Seen /HPF      Squamous Epithelial Cells, UA None Seen /HPF      Hyaline Casts, UA 7-12 /LPF      Methodology Manual Light Microscopy             Imaging:  No radiology results from the last 24 hrs       Assessment and Plan:     Primary Problem:  Cellulitis of right upper extremity    Hospital Problem list:    Cellulitis of right upper extremity    Prostate cancer    Gout, arthropathy    Hypertension    Malignant neoplasm of trigone of urinary bladder    Stage 3a chronic kidney disease    Protein deficiency    Sepsis due to Gram negative bacteria      Assessment: 73-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     Plan:     Right upper extremity cellulitis  Improving, continue Rocephin.     Enterobacterales bacteremia  Cultures performed on 8/4 were positive x2.  Fairly sensitive, ID following.    Chronic diarrhea  Had outpatient consultation with Dr. Chaudhari pending, request to go ahead and do this consultation as inpatient.  Will schedule imodium and start probiotics.  O&P pending    Concern for DVT  DVT scan done yesterday, read pending but verbal from tech was concerning for VTE, will go ahead and treat with eliquis and can stop if scan negative    Hopefully can dc to [facility] this week    Reviewed treatment plans with the patient and/or family.     Code Status:   Code Status and Medical Interventions:   Ordered at: 08/05/23 1506     Code Status (Patient has no pulse and is not breathing):    CPR (Attempt to Resuscitate)     Medical Interventions (Patient has pulse or is breathing):    Full Support     Release to patient:    Routine Release       Electronically signed by Tj Hyde MD on 8/8/2023 at 09:26 CDT

## 2023-08-08 NOTE — PLAN OF CARE
Goal Outcome Evaluation:  Plan of Care Reviewed With: patient, spouse        Progress: improving  Outcome Evaluation: pt is very motivated to work with therapy, pt trans to EOB mod 2 wife assisted, sit-stand mod 2, took steps to BSC with rollator mod assist, BSC trans mod-max 2, took steps back to bed, stood x 3 mod 2, took 2-3 side steps to L, left pt sitting EOB with OT, pt would benefit from rehab

## 2023-08-08 NOTE — PROGRESS NOTES
"INFECTIOUS DISEASES PROGRESS NOTE    Patient:  Aleks Monzon  YOB: 1950  MRN: [id number]   Admit date: 2023   Admitting Physician: Tj Hyde MD  Primary Care Physician: Tj Hyde MD    Chief Complaint: Diarrhea        Interval History: Patient reports he had one of the worst episodes of diarrhea overnight.    RENE Madrigal with GI was finishing up seeing the patient.  She discussed limiting dairy which we actually talked about yesterday but I did not alter his diet in the system.  She also mentioned adding cholestyramine.        Allergies:   Allergies   Allergen Reactions    Niacin Itching     same as of 2017-itching         Current Scheduled Medications:   cefTRIAXone, 2,000 mg, Intravenous, Q24H  lactobacillus acidophilus, 1 capsule, Oral, BID  loperamide, 2 mg, Oral, BID  sodium chloride, 10 mL, Intravenous, Q12H      Current PRN Medications:    acetaminophen    Calcium Replacement - Follow Nurse / BPA Driven Protocol    loperamide    Magnesium Standard Dose Replacement - Follow Nurse / BPA Driven Protocol    melatonin    Phosphorus Replacement - Follow Nurse / BPA Driven Protocol    Potassium Replacement - Follow Nurse / BPA Driven Protocol    [COMPLETED] Insert Peripheral IV **AND** sodium chloride    sodium chloride    sodium chloride    traMADol      Objective     Vital Signs:  Temp (24hrs), Av.9 øF (36.6 øC), Min:97.5 øF (36.4 øC), Max:98.7 øF (37.1 øC)      /72 (BP Location: Left arm, Patient Position: Lying)   Pulse 73   Temp 97.5 øF (36.4 øC) (Oral)   Resp 20   Ht 188 cm (74\")   Wt 94.2 kg (207 lb 10.8 oz)   SpO2 95%   BMI 26.66 kg/mý         Physical Exam    General: Patient's chronically ill-appearing lying in bed.  Family at bedside.  RACHAEL Wyatt at bedside  HEENT: Sclera anicteric  Neuro: Alert and oriented, speech clear  Right upper extremity edema improved proximal to elbow.  Forearm was dressed and the dressing was dry.        Results " Review:    I reviewed the patient's new clinical results.    Lab Results:    CBC:   Lab Results   Lab 08/04/23  1413 08/06/23 0402 08/07/23 0341 08/08/23  0501   WBC 17.21* 9.73 7.62 8.56   HEMOGLOBIN 12.1* 10.6* 10.7* 11.0*   HEMATOCRIT 37.0* 33.0* 32.5* 34.4*   PLATELETS 185 182 159 147        AutoDiff:   Lab Results   Lab 08/04/23  1413 08/06/23 0402 08/07/23 0341 08/08/23  0501   NEUTROPHIL % 89.1* 86.4* 79.6*  --    LYMPHOCYTE % 4.8* 5.4* 8.9*  --    MONOCYTES % 3.3* 5.0 5.6  --    EOSINOPHIL % 0.2* 0.5 1.3  --    BASOPHIL % 0.2 0.3 0.4  --    NEUTROS ABS 15.32* 8.40* 6.06 7.94*   LYMPHS ABS 0.83 0.53* 0.68*  --    MONOS ABS 0.57 0.49 0.43  --    EOS ABS 0.03 0.05 0.10 0.18   BASOS ABS 0.04 0.03 0.03  --         Manual Diff:    Lab Results   Lab 08/06/23 0402 08/07/23 0341 08/08/23  0501   NEUTROPHIL %  --   --  86.6*   LYMPHOCYTE %  --   --  0.0*   MONOCYTES %  --   --  2.1*   BANDS %  --   --  6.2*   METAMYELOCYTE %  --   --  3.1*   NEUTROS ABS 8.40* 6.06 7.94*   LYMPHS ABS  --   --  0.00*   ANISOCYTOSIS  --   --  Slight/1+   POIKILOCYTES  --   --  Large/3+   WBC MORPHOLOGY  --   --  Normal   PLT MORPH  --   --  Normal           CMP:   Lab Results   Lab 08/06/23 0402 08/07/23 0341 08/08/23  0501   SODIUM 131* 132* 130*   POTASSIUM 4.1 3.9 3.9   CHLORIDE 104 108* 103   CO2 18.0* 18.0* 19.0*   BUN 51* 51* 54*   CREATININE 1.51* 1.50* 1.45*   CALCIUM 7.2* 7.1* 6.9*   BILIRUBIN 0.2 0.2 0.2   ALK PHOS 193* 214* 249*   ALT (SGPT) 26 27 32   AST (SGOT) 33 39 42*   GLUCOSE 102* 100* 93       Estimated Creatinine Clearance: 62.3 mL/min (A) (by C-G formula based on SCr of 1.45 mg/dL (H)).    Culture Results:    Microbiology Results (last 10 days)       Procedure Component Value - Date/Time    Wound Culture - Wound, Arm, Right [[id number]]  (Abnormal)  (Susceptibility) Collected: 08/04/23 1612    Lab Status: Final result Specimen: Wound from Arm, Right Updated: 08/06/23 0956     Wound Culture Light growth (2+)  Staphylococcus aureus      Rare Providencia rettgeri      Light growth (2+) Normal Skin Leticia     Gram Stain Few (2+) Gram positive cocci      No WBCs seen    Susceptibility        Staphylococcus aureus      DICKSON      Clindamycin Susceptible      Erythromycin Susceptible      Inducible Clindamycin Resistance Negative      Oxacillin Susceptible      Rifampin Susceptible      Tetracycline Susceptible      Trimethoprim + Sulfamethoxazole Susceptible      Vancomycin Susceptible                       Susceptibility        Providencia rettgeri      DICKSON      Ampicillin Resistant      Ampicillin + Sulbactam Susceptible      Cefepime Susceptible      Ceftazidime Susceptible      Ceftriaxone Susceptible      Gentamicin Susceptible      Levofloxacin Susceptible      Piperacillin + Tazobactam Susceptible      Tetracycline Resistant      Trimethoprim + Sulfamethoxazole Susceptible                       Susceptibility Comments       Staphylococcus aureus    This isolate does not demonstrate inducible clindamycin resistance in vitro.        Providencia rettgeri    Cefazolin sensitivity will not be reported for Enterobacteriaceae in non-urine isolates. If cefazolin is preferred, please call the microbiology lab to request an E-test.  With the exception of urinary-sourced infections, aminoglycosides should not be used as monotherapy.               Blood Culture - Blood, Arm, Right [[id number]]  (Abnormal) Collected: 08/04/23 1419    Lab Status: Final result Specimen: Blood from Arm, Right Updated: 08/08/23 0537     Blood Culture Providencia rettgeri     Isolated from Anaerobic Bottle     Gram Stain Anaerobic Bottle Gram negative bacilli    Narrative:      Refer to previous blood culture collected on 08/04/2023 1513 for [facility].      Blood Culture - Blood, Arm, Left [[id number]]  (Abnormal)  (Susceptibility) Collected: 08/04/23 1413    Lab Status: Final result Specimen: Blood from Arm, Left Updated: 08/08/23 0537     Blood Culture  Providencia rettgeri     Isolated from Aerobic and Anaerobic Bottles     Gram Stain Anaerobic Bottle Gram negative bacilli      Aerobic Bottle Gram negative bacilli    Susceptibility        Providencia rettgeri      DICKSON      Ampicillin Intermediate      Ampicillin + Sulbactam Susceptible      Cefepime Susceptible      Ceftazidime Susceptible      Ceftriaxone Susceptible      Gentamicin Susceptible      Levofloxacin Susceptible      Piperacillin + Tazobactam Susceptible      Trimethoprim + Sulfamethoxazole Susceptible                       Susceptibility Comments       Providencia rettgeri    Cefazolin sensitivity will not be reported for Enterobacteriaceae in non-urine isolates. If cefazolin is preferred, please call the microbiology lab to request an E-test.  With the exception of urinary-sourced infections, aminoglycosides should not be used as monotherapy.               Blood Culture ID, PCR - Blood, Arm, Left [[id number]]  (Abnormal) Collected: 08/04/23 1413    Lab Status: Final result Specimen: Blood from Arm, Left Updated: 08/05/23 0741     BCID, PCR Enterobacterales spp. Identification by BCID2 PCR.     BOTTLE TYPE Anaerobic Bottle    Narrative:      No resistance genes detected.    Clostridioides difficile Toxin - Stool, Per Rectum [[id number]]  (Normal) Collected: 08/03/23 1514    Lab Status: Final result Specimen: Stool from Per Rectum Updated: 08/03/23 1700    Narrative:      The following orders were created for panel order Clostridioides difficile Toxin - Stool, Per Rectum.  Procedure                               Abnormality         Status                     ---------                               -----------         ------                     Clostridioides difficile...[[id number]]  Normal              Final result                 Please view results for these tests on the individual orders.    Gastrointestinal Panel, PCR - Stool, Per Rectum [[id number]]  (Normal) Collected: 08/03/23 1514    Lab Status:  Final result Specimen: Stool from Per Rectum Updated: 08/03/23 1728     Campylobacter Not Detected     Plesiomonas shigelloides Not Detected     Salmonella Not Detected     Vibrio Not Detected     Vibrio cholerae Not Detected     Yersinia enterocolitica Not Detected     Enteroaggregative E. coli (EAEC) Not Detected     Enteropathogenic E. coli (EPEC) Not Detected     Enterotoxigenic E. coli (ETEC) lt/st Not Detected     Shiga-like toxin-producing E. coli (STEC) stx1/stx2 Not Detected     Shigella/Enteroinvasive E. coli (EIEC) Not Detected     Cryptosporidium Not Detected     Cyclospora cayetanensis Not Detected     Entamoeba histolytica Not Detected     Giardia lamblia Not Detected     Adenovirus F40/41 Not Detected     Astrovirus Not Detected     Norovirus GI/GII Not Detected     Rotavirus A Not Detected     Sapovirus (I, II, IV or V) Not Detected    Narrative:      If Aeromonas, Staphylococcus aureus or Bacillus cereus are suspected, please order [id number]: Stool Culture, Aeromonas, S aureus, B Cereus.    Clostridioides difficile Toxin, PCR - Stool, Per Rectum [[id number]]  (Normal) Collected: 08/03/23 1514    Lab Status: Final result Specimen: Stool from Per Rectum Updated: 08/03/23 1700     Toxigenic C. difficile by PCR Negative    Narrative:      The result indicates the absence of toxigenic C. difficile from stool specimen.                  Radiology:   Imaging Results (Last 72 Hours)       ** No results found for the last 72 hours. **            Assessment & Plan     Active Hospital Problems    Diagnosis     **Cellulitis of right upper extremity     Sepsis due to Gram negative bacteria     Protein deficiency     Stage 3a chronic kidney disease     Malignant neoplasm of trigone of urinary bladder     Hypertension     Gout, arthropathy     Prostate cancer        IMPRESSION:  Providencia rettgeri bacteremia-I do agree with Dr. Hyde that this is not likely from his cellulitis.  I suspect this is secondary to  urinary tract source.  It is much more likely to be urinary tract etiology in a 73-year-old man with his history.  Right upper extremity cellulitis with swab cultures of skin tear positive for methicillin susceptible Staphylococcus aureus and Providencia-significantly improved  Diarrhea-ongoing for 1 month approximately.  GI panel by PCR and C. difficile negative as outpatient.  I ordered microsporidia yesterday.  GI has been consulted  Chronic kidney disease stage IIIa-stable  Bladder cancer with history of chemotherapy and radiation.  Patient with pyuria noted on urinalysis.  Culture pending    RECOMMENDATION:   Continue ceftriaxone-will have lab set up  e test but procidentia notoriously resistant to first generation cephalosporins and more narrow antibiotics.  Discussed with nursing to remove dressing on arm and if no active weeping could just dressed the small skin tear  Keep right upper extremity elevated above the level of the heart  Questran and lactose-free diet ordered per GI.    Discussed at bedside with RENE Madrigal MD  08/08/23  08:47 CDT

## 2023-08-08 NOTE — THERAPY TREATMENT NOTE
Acute Care - Occupational Therapy Treatment Note  [facility]     Patient Name: Aleks Monzon  : 1950  MRN: [id number]  Today's Date: 2023             Admit Date: 2023       ICD-10-CM ICD-9-CM   1. Pressure injury of right buttock, stage 3  L89.313 707.05     707.23   2. Cellulitis of right upper extremity  L03.113 682.3   3. Hypomagnesemia  E83.42 275.2   4. Leukocytosis, unspecified type  D72.829 288.60   5. Decreased activities of daily living (ADL) [Z78.9 (ICD-10-CM)]  Z78.9 V49.89   6. Pressure injury of sacral region, stage 2  L89.152 707.03     707.22   7. Impaired mobility and ADLs  Z74.09 V49.89    Z78.9    8. Weakness  R53.1 780.79   9. Skin tear of right forearm without complication, initial encounter  S51.811A 881.00   10. Sepsis due to Gram negative bacteria  A41.50 038.40     995.91   11. Impaired mobility [Z74.09 (ICD-10-CM)]  Z74.09 799.89     Patient Active Problem List   Diagnosis    IFG (impaired fasting glucose)    High cholesterol    Prostate cancer    Elevated PSA    Elevated serum creatinine    Gout, arthropathy    Obesity (BMI 30-39.9)    Primary osteoarthritis of both knees    Retroperitoneal mass    Edema    Abnormal CT of the abdomen    Axillary mass, left    Retroperitoneal fibrosis    Gout    Ureteral obstruction, left    Hypertension    Malignant neoplasm of trigone of urinary bladder    COVID-19    Closed fracture of right hip    Closed fracture of right hip, initial encounter    Stage 3a chronic kidney disease    Cancer    Weight loss    Protein deficiency    Decreased appetite    BRBPR (bright red blood per rectum)    History of adenomatous polyp of colon    Overweight with body mass index (BMI) of 27 to 27.9 in adult    Non-smoker    Cellulitis of right upper extremity    Sepsis due to Gram negative bacteria     Past Medical History:   Diagnosis Date    Arthritis     Cancer     PROSTATE AND BLADDER    Cellulitis     Gout     Hypertension     IFG (impaired  fasting glucose)     Low back pain     Lymphedema     Retroperitoneal fibrosis      Past Surgical History:   Procedure Laterality Date    AXILLARY LYMPH NODE BIOPSY/EXCISION Left 10/01/2020    Procedure: LEFT AXILLARY LYMPH NODE BIOPSY;  Surgeon: Dina To MD;  Location: [facility] OR;  Service: General;  Laterality: Left;    BACK SURGERY      COLONOSCOPY      2017?    COLONOSCOPY N/A 10/11/2022    Procedure: COLONOSCOPY WITH ANESTHESIA;  Surgeon: Burton Chaudhari MD;  Location: [facility] ENDOSCOPY;  Service: Gastroenterology;  Laterality: N/A;  preop; abdominal pain  postop; polyps   PCP Tj Hyde MD    ENDOSCOPY N/A 10/11/2022    Procedure: ESOPHAGOGASTRODUODENOSCOPY WITH ANESTHESIA;  Surgeon: Burton Chaudhari MD;  Location: [facility] ENDOSCOPY;  Service: Gastroenterology;  Laterality: N/A;  preop; abdominal pain  postop; misshape stomach  PCP Tj Hyde MD    JOINT REPLACEMENT      BILATERAL HIP     SKIN CANCER EXCISION      TOTAL HIP ARTHROPLASTY      URETERAL STENT INSERTION           OT ASSESSMENT FLOWSHEET (last 12 hours)       OT Evaluation and Treatment       Row Name 08/08/23 1402 08/08/23 1341 08/08/23 0930             OT Time and Intention    Subjective Information complains of;weakness  -AC (r) WB (t) AC (c) -- --      Document Type therapy note (daily note)  -AC (r) WB (t) AC (c) -- --      Mode of Treatment occupational therapy  -AC (r) WB (t) AC (c) -- --      Session Not Performed -- patient unavailable for treatment  -AC (r) WB (t) AC (c) unable to treat, medical status change  -AC (r) WB (t) AC (c)      Comment, Session Not Performed -- with PT  -AC (r) WB (t) AC (c) DVT found in RLE; thrombus in LLE  -AC (r) WB (t) AC (c)      Comment Session began at 1410; Pt cleared to work with therapy by Dr. Barrett Wyatt RN  -AC (r) WB (t) AC (c) -- --         General Information    Existing Precautions/Restrictions fall  -AC (r) WB (t) AC (c) -- --         Pain Assessment     Pretreatment Pain Rating 0/10 - no pain  -AC (r) WB (t) AC (c) -- --      Posttreatment Pain Rating 0/10 - no pain  -AC (r) WB (t) AC (c) -- --         Activities of Daily Living    BADL Assessment/Intervention upper body dressing;lower body dressing  -AC (r) WB (t) AC (c) -- --         Upper Body Dressing Assessment/Training    Ross Level (Upper Body Dressing) doff;don;pajuan manuelma/robe;minimum assist (75% patient effort)  -AC (r) WB (t) AC (c) -- --      Position (Upper Body Dressing) edge of bed sitting  -AC (r) WB (t) AC (c) -- --         Lower Body Dressing Assessment/Training    [place] Level (Lower Body Dressing) don;socks;maximum assist (25% patient effort)  -AC (r) WB (t) AC (c) -- --      Position (Lower Body Dressing) edge of bed sitting  -AC (r) WB (t) AC (c) -- --      Comment, (Lower Body Dressing) able to extend B knees while socks were donned  -AC (r) WB (t) AC (c) -- --         Bed Mobility    Bed Mobility sit-supine;scooting/bridging  -AC (r) WB (t) AC (c) -- --      Scooting/Bridging Ross (Bed Mobility) dependent (less than 25% patient effort);2 person assist  -AC (r) WB (t) AC (c) -- --      Sit-Supine Ross (Bed Mobility) moderate assist (50% patient effort)  -AC (r) WB (t) AC (c) -- --      Assistive Device (Bed Mobility) bed rails;draw sheet  -AC (r) WB (t) AC (c) -- --      Comment, (Bed Mobility) needed assistance for B LE, able to control upper body during completion of sit-supine  -AC (r) WB (t) AC (c) -- --         Transfer Assessment/Treatment    Transfers sit-stand transfer  -AC (r) WB (t) AC (c) -- --         Sit-Stand Transfer    Sit-Stand [place] (Transfers) moderate assist (50% patient effort);maximum assist (25% patient effort);2 person assist  -AC (r) WB (t) AC (c) -- --      Assistive Device (Sit-Stand Transfers) walker, rolling platform  -AC (r) WB (t) AC (c) -- --      Comment, (Sit-Stand Transfer) elevated bed surface  -AC (r) WB (t) AC (c) -- --          Stand-Sit Transfer    Stand-Sit [place] (Transfers) minimum assist (75% patient effort);moderate assist (50% patient effort);2 person assist;verbal cues  -AC (r) WB (t) AC (c) -- --      Assistive Device (Stand-Sit Transfers) walker, rolling platform  -AC (r) WB (t) AC (c) -- --      Comment, (Stand-Sit Transfer) elevated bed surface  -AC (r) WB (t) AC (c) -- --         Balance    Balance Assessment sitting static balance  -AC (r) WB (t) AC (c) -- --      Static Sitting Balance standby assist  -AC (r) WB (t) AC (c) -- --      Balance Interventions static;dynamic;sitting  -AC (r) WB (t) AC (c) -- --      Comment, Balance completed 15 minutes of sitting EOB using unilateral UE support  -AC (r) WB (t) AC (c) -- --         Wound 08/04/23 1744 Right upper arm Skin Tear    Wound - Properties Group Placement Date: 08/04/23  -MB Placement Time: 1744  -MB Present on Hospital Admission: Y  -MB Side: Right  -MB Orientation: upper  -MB Location: arm  -MB Primary Wound Type: Skin tear  -MB    Retired Wound - Properties Group Placement Date: 08/04/23  -MB Placement Time: 1744  -MB Present on Hospital Admission: Y  -MB Side: Right  -MB Orientation: upper  -MB Location: arm  -MB Primary Wound Type: Skin tear  -MB    Retired Wound - Properties Group Date first assessed: 08/04/23  -MB Time first assessed: 1744  -MB Present on Hospital Admission: Y  -MB Side: Right  -MB Location: arm  -MB Primary Wound Type: Skin tear  -MB       Wound 08/04/23 1746 Left medial perirectal Pressure Injury    Wound - Properties Group Placement Date: 08/04/23  -MB Placement Time: 1746  -MB Present on Hospital Admission: Y  -MB Side: Left  -MB Orientation: medial  -MB Location: perirectal  -MB Primary Wound Type: Pressure inj  -MB    Retired Wound - Properties Group Placement Date: 08/04/23  -MB Placement Time: 1746  -MB Present on Hospital Admission: Y  -MB Side: Left  -MB Orientation: medial  -MB Location: perirectal  -MB Primary Wound Type:  Pressure inj  -MB    Retired Wound - Properties Group Date first assessed: 08/04/23 -MB Time first assessed: 1746  -MB Present on Hospital Admission: Y  -MB Side: Left  -MB Location: perirectal  -MB Primary Wound Type: Pressure inj  -MB       Wound 08/04/23 1746 Right medial perirectal Pressure Injury    Wound - Properties Group Placement Date: 08/04/23  -MB Placement Time: 1746  -MB Present on Hospital Admission: Y  -MB Side: Right  -MB Orientation: medial  -MB Location: perirectal  -MB Primary Wound Type: Pressure inj  -MB    Retired Wound - Properties Group Placement Date: 08/04/23  -MB Placement Time: 1746  -MB Present on Hospital Admission: Y  -MB Side: Right  -MB Orientation: medial  -MB Location: perirectal  -MB Primary Wound Type: Pressure inj  -MB    Retired Wound - Properties Group Date first assessed: 08/04/23 -MB Time first assessed: 1746  -MB Present on Hospital Admission: Y  -MB Side: Right  -MB Location: perirectal  -MB Primary Wound Type: Pressure inj  -MB       Plan of Care Review    Plan of Care Reviewed With patient;spouse  -AC (r) WB (t) AC (c) -- --      Progress improving  -AC (r) WB (t) AC (c) -- --      Outcome Evaluation OT tx completed. On BSC with PT upon arrival. Assisted with transfer back to bed. Pt sat EOB for approx. 15 minutes while nsg changed dressing and gown changed with Linda. Pt utilized unilateral UE support and bed rail to maintain sitting balance. Completed one sit-stand with mod-maxAx2 so nsg could change sacral dressing. B socks donned maxA. OT will continue to treat to increase functional strength, endurance, and ADL independence. Recommend further rehab at d/c.  -AC (r) WB (t) AC (c) -- --         Positioning and Restraints    Pre-Treatment Position bedside commode  -AC (r) WB (t) AC (c) -- --      Post Treatment Position bed  -AC (r) WB (t) AC (c) -- --      In Bed fowlers;call light within reach;encouraged to call for assist;with family/caregiver;side rails up x3  -AC  (r) WB (t) AC (c) -- --                User Key  (r) = Recorded By, (t) = Taken By, (c) = Cosigned By      Initials Name Effective Dates    AC Alfonso Venkat TAM, OTR/L, CNT 02/03/23 -     Sandra Glez RN 10/14/22 -     WB Agustin Vasques OT Student 12/05/22 -                      Occupational Therapy Education       Title: PT OT SLP Therapies (In Progress)       Topic: Occupational Therapy (In Progress)       Point: ADL training (Done)       Description:   Instruct learner(s) on proper safety adaptation and remediation techniques during self care or transfers.   Instruct in proper use of assistive devices.                  Learning Progress Summary             Patient Acceptance, E, VU by WB at 8/8/2023 1453    Comment: Safety during functional transfers    Acceptance, E,TB, VU by WB at 8/7/2023 0832    Comment: Safety during functional transfers    Acceptance, E, VU by RH at 8/5/2023 0850   Family Acceptance, E,TB, VU by WB at 8/7/2023 0832    Comment: Safety during functional transfers   Significant Other Acceptance, E, VU by RH at 8/5/2023 0850                         Point: Home exercise program (Not Started)       Description:   Instruct learner(s) on appropriate technique for monitoring, assisting and/or progressing therapeutic exercises/activities.                  Learner Progress:  Not documented in this visit.              Point: Precautions (Not Started)       Description:   Instruct learner(s) on prescribed precautions during self-care and functional transfers.                  Learner Progress:  Not documented in this visit.              Point: Body mechanics (Done)       Description:   Instruct learner(s) on proper positioning and spine alignment during self-care, functional mobility activities and/or exercises.                  Learning Progress Summary             Patient Acceptance, E, VU by WB at 8/8/2023 1453    Comment: Safety during functional transfers    Acceptance, E,TB, VU by WB at  8/7/2023 0832    Comment: Safety during functional transfers   Family Acceptance, E,TB, VU by WB at 8/7/2023 0832    Comment: Safety during functional transfers                                         User Key       Initials Effective Dates Name Provider Type Discipline    WB 12/05/22 -  Agustin Vasques, OT Student OT Student OT     05/03/23 -  Rae Watkins OT Student OT Student OT                      OT Recommendation and Plan     Plan of Care Review  Plan of Care Reviewed With: patient, spouse  Progress: improving  Outcome Evaluation: OT tx completed. On BSC with PT upon arrival. Assisted with transfer back to bed. Pt sat EOB for approx. 15 minutes while nsg changed dressing and gown changed with Linda. Pt utilized unilateral UE support and bed rail to maintain sitting balance. Completed one sit-stand with mod-maxAx2 so nsg could change sacral dressing. B socks donned maxA. OT will continue to treat to increase functional strength, endurance, and ADL independence. Recommend further rehab at d/c.  Plan of Care Reviewed With: patient, spouse  Outcome Evaluation: OT tx completed. On BSC with PT upon arrival. Assisted with transfer back to bed. Pt sat EOB for approx. 15 minutes while nsg changed dressing and gown changed with Linda. Pt utilized unilateral UE support and bed rail to maintain sitting balance. Completed one sit-stand with mod-maxAx2 so nsg could change sacral dressing. B socks donned maxA. OT will continue to treat to increase functional strength, endurance, and ADL independence. Recommend further rehab at d/c.     Outcome Measures       Row Name 08/08/23 1452 08/08/23 1400 08/07/23 0800       How much help from another person do you currently need...    Turning from your back to your side while in flat bed without using bedrails? -- 3  -AH --    Moving from lying on back to sitting on the side of a flat bed without bedrails? -- 3  -AH --    Moving to and from a bed to a chair (including a wheelchair)?  -- 2  -AH --    Standing up from a chair using your arms (e.g., wheelchair, bedside chair)? -- 2  -AH --    Climbing 3-5 steps with a railing? -- 1  -AH --    To walk in hospital room? -- 2  -AH --    AM-PAC 6 Clicks Score (PT) -- 13  -AH --       How much help from another is currently needed...    Putting on and taking off regular lower body clothing? 1  -AC (r) WB (t) AC (c) -- 1  -AC (r) WB (t) AC (c)    Bathing (including washing, rinsing, and drying) 2  -AC (r) WB (t) AC (c) -- 2  -AC (r) WB (t) AC (c)    Toileting (which includes using toilet bed pan or urinal) 2  -AC (r) WB (t) AC (c) -- 2  -AC (r) WB (t) AC (c)    Putting on and taking off regular upper body clothing 3  -AC (r) WB (t) AC (c) -- 2  -AC (r) WB (t) AC (c)    Taking care of personal grooming (such as brushing teeth) 4  -AC (r) WB (t) AC (c) -- 3  -AC (r) WB (t) AC (c)    Eating meals 4  -AC (r) WB (t) AC (c) -- 3  -AC (r) WB (t) AC (c)    AM-PAC 6 Clicks Score (OT) 16  -AC (r) WB (t) -- 13  -AC (r) WB (t)       Functional Assessment    Outcome Measure Options AM-PAC 6 Clicks Daily Activity (OT)  -AC (r) WB (t) AC (c) AM-PAC 6 Clicks Basic Mobility (PT)  - AM-PAC 6 Clicks Daily Activity (OT)  -AC (r) WB (t) AC (c)              User Key  (r) = Recorded By, (t) = Taken By, (c) = Cosigned By      Initials Name Provider Type    Venkat Yu, OTR/L, CNT Occupational Therapist    Makayla More, PTA Physical Therapist Assistant    Agustin Lopez, OT Student OT Student                    Time Calculation:    Time Calculation- OT       Row Name 08/08/23 1453             Time Calculation- OT    OT Start Time 1410  -AC (r) WB (t) AC (c)      OT Stop Time 1435  -AC (r) WB (t) AC (c)      OT Time Calculation (min) 25 min  -AC (r) WB (t)      Total Timed Code Minutes- OT 25 minute(s)  -AC (r) WB (t) AC (c)      OT Received On 08/08/23  -AC (r) WB (t) AC (c)                User Key  (r) = Recorded By, (t) = Taken By, (c) = Cosigned By       Initials Name Provider Type    AC Venkat Hamilton, OTR/L, CNT Occupational Therapist    Agustin Lopez, JULISSA Student OT Student                           JULISSA Latham  8/8/2023

## 2023-08-08 NOTE — PLAN OF CARE
Goal Outcome Evaluation:      VSS. Afebrile. One episode of bowel and urinary incontinence at approx 0400. No complaints of pain. Pt resting comfortably at this time. Call button within reach. Safety maintained. Will continue to monitor.

## 2023-08-08 NOTE — PLAN OF CARE
Goal Outcome Evaluation:  Plan of Care Reviewed With: patient, spouse        Progress: improving  Outcome Evaluation: OT tx completed. On BSC with PT upon arrival. Assisted with transfer back to bed. Pt sat EOB for approx. 15 minutes while nsg changed dressing and gown changed with Linda. Pt utilized unilateral UE support and bed rail to maintain sitting balance. Completed one sit-stand with mod-maxAx2 so nsg could change sacral dressing. B socks donned maxA. OT will continue to treat to increase functional strength, endurance, and ADL independence. Recommend further rehab at d/c.

## 2023-08-08 NOTE — THERAPY TREATMENT NOTE
Acute Care - Physical Therapy Treatment Note  [facility]     Patient Name: Aleks Monzon  : 1950  MRN: [id number]  Today's Date: 2023      Visit Dx:     ICD-10-CM ICD-9-CM   1. Pressure injury of right buttock, stage 3  L89.313 707.05     707.23   2. Cellulitis of right upper extremity  L03.113 682.3   3. Hypomagnesemia  E83.42 275.2   4. Leukocytosis, unspecified type  D72.829 288.60   5. Decreased activities of daily living (ADL) [Z78.9 (ICD-10-CM)]  Z78.9 V49.89   6. Pressure injury of sacral region, stage 2  L89.152 707.03     707.22   7. Impaired mobility and ADLs  Z74.09 V49.89    Z78.9    8. Weakness  R53.1 780.79   9. Skin tear of right forearm without complication, initial encounter  S51.811A 881.00   10. Sepsis due to Gram negative bacteria  A41.50 038.40     995.91   11. Impaired mobility [Z74.09 (ICD-10-CM)]  Z74.09 799.89     Patient Active Problem List   Diagnosis    IFG (impaired fasting glucose)    High cholesterol    Prostate cancer    Elevated PSA    Elevated serum creatinine    Gout, arthropathy    Obesity (BMI 30-39.9)    Primary osteoarthritis of both knees    Retroperitoneal mass    Edema    Abnormal CT of the abdomen    Axillary mass, left    Retroperitoneal fibrosis    Gout    Ureteral obstruction, left    Hypertension    Malignant neoplasm of trigone of urinary bladder    COVID-19    Closed fracture of right hip    Closed fracture of right hip, initial encounter    Stage 3a chronic kidney disease    Cancer    Weight loss    Protein deficiency    Decreased appetite    BRBPR (bright red blood per rectum)    History of adenomatous polyp of colon    Overweight with body mass index (BMI) of 27 to 27.9 in adult    Non-smoker    Cellulitis of right upper extremity    Sepsis due to Gram negative bacteria     Past Medical History:   Diagnosis Date    Arthritis     Cancer     PROSTATE AND BLADDER    Cellulitis     Gout     Hypertension     IFG (impaired fasting glucose)     Low back  pain     Lymphedema     Retroperitoneal fibrosis      Past Surgical History:   Procedure Laterality Date    AXILLARY LYMPH NODE BIOPSY/EXCISION Left 10/01/2020    Procedure: LEFT AXILLARY LYMPH NODE BIOPSY;  Surgeon: Dina To MD;  Location: [facility] OR;  Service: General;  Laterality: Left;    BACK SURGERY      COLONOSCOPY      2017?    COLONOSCOPY N/A 10/11/2022    Procedure: COLONOSCOPY WITH ANESTHESIA;  Surgeon: Burton Chaudhari MD;  Location: [facility] ENDOSCOPY;  Service: Gastroenterology;  Laterality: N/A;  preop; abdominal pain  postop; polyps   PCP Tj Hyde MD    ENDOSCOPY N/A 10/11/2022    Procedure: ESOPHAGOGASTRODUODENOSCOPY WITH ANESTHESIA;  Surgeon: Burton Chaudhari MD;  Location: [facility] ENDOSCOPY;  Service: Gastroenterology;  Laterality: N/A;  preop; abdominal pain  postop; misshape stomach  PCP Tj Hyde MD    JOINT REPLACEMENT      BILATERAL HIP     SKIN CANCER EXCISION      TOTAL HIP ARTHROPLASTY      URETERAL STENT INSERTION       PT Assessment (last 12 hours)       PT Evaluation and Treatment       Row Name 08/08/23 1338 08/08/23 0781       Physical Therapy Time and Intention    Subjective Information complains of;pain;weakness;fatigue  - --  -    Document Type therapy note (daily note)  - --    Mode of Treatment physical therapy  - --    Session Not Performed -- other (see comments)  -    Comment, Session Not Performed -- check back later, wife reports pt has blood clots BLE   -    Comment Edmundo CANO spoke with Dr. Hyde re: mobility, pt okay to work with   - --      Row Name 08/08/23 1339          General Information    Patient/Family/Caregiver Comments/Observations wife  -     Existing Precautions/Restrictions fall  -       Row Name 08/08/23 1338          Pain    Pretreatment Pain Rating 5/10  -     Posttreatment Pain Rating 5/10  -     Pain Location lower  -     Pain Location - back  -     Pain Intervention(s) Medication (See  MAR);Repositioned  -[facility] Name 08/08/23 1338          Bed Mobility    Supine-Sit Rand (Bed Mobility) moderate assist (50% patient effort);2 person assist;verbal cues  wife assisted  -     Sit-Supine Rand (Bed Mobility) --  EOB with OT  -[facility] Name 08/08/23 1338          Transfers    Comment, (Transfers) bed-BS-BED, stood x 3  -[facility] Name 08/08/23 1338          Sit-Stand Transfer    Sit-Stand Rand (Transfers) moderate assist (50% patient effort);2 person assist;verbal cues;maximum assist (25% patient effort)  -[facility] Name 08/08/23 1338          Stand-Sit Transfer    Stand-Sit Rand (Transfers) minimum assist (75% patient effort);moderate assist (50% patient effort);2 person assist;verbal cues  -AH       Row Name 08/08/23 1338          Stand Pivot/Stand Step Transfer    Stand Pivot/Stand Step Rand (Transfers) moderate assist (50% patient effort);verbal cues  -[facility] Name 08/08/23 1338          Toilet Transfer    Rand Level (Toilet Transfer) moderate assist (50% patient effort);2 person assist;verbal cues  -     Assistive Device (Toilet Transfer) commode, 3-in-1;walker, 4-wheeled  -[facility] Name 08/08/23 1338          Gait/Stairs (Locomotion)    Rand Level (Gait) moderate assist (50% patient effort);2 person assist;verbal cues  -     Assistive Device (Gait) walker, 4-wheeled  -     Distance in Feet (Gait) steps bed-BS-bed, then 2-3 side steps to L  -AH       Row Name             Wound 08/04/23 1744 Right upper arm Skin Tear    Wound - Properties Group Placement Date: 08/04/23  -MB Placement Time: 1744  -MB Present on Hospital Admission: Y  -MB Side: Right  -MB Orientation: upper  -MB Location: arm  -MB Primary Wound Type: Skin tear  -MB    Retired Wound - Properties Group Placement Date: 08/04/23  -MB Placement Time: 1744  -MB Present on Hospital Admission: Y  -MB Side: Right  -MB Orientation: upper  -MB Location: arm   -MB Primary Wound Type: Skin tear  -MB    Retired Wound - Properties Group Date first assessed: 08/04/23  -MB Time first assessed: 1744  -MB Present on Hospital Admission: Y  -MB Side: Right  -MB Location: arm  -MB Primary Wound Type: Skin tear  -MB      Row Name             Wound 08/04/23 1746 Left medial perirectal Pressure Injury    Wound - Properties Group Placement Date: 08/04/23  -MB Placement Time: 1746  -MB Present on Hospital Admission: Y  -MB Side: Left  -MB Orientation: medial  -MB Location: perirectal  -MB Primary Wound Type: Pressure inj  -MB    Retired Wound - Properties Group Placement Date: 08/04/23  -MB Placement Time: 1746  -MB Present on Hospital Admission: Y  -MB Side: Left  -MB Orientation: medial  -MB Location: perirectal  -MB Primary Wound Type: Pressure inj  -MB    Retired Wound - Properties Group Date first assessed: 08/04/23  -MB Time first assessed: 1746  -MB Present on Hospital Admission: Y  -MB Side: Left  -MB Location: perirectal  -MB Primary Wound Type: Pressure inj  -MB      Row Name             Wound 08/04/23 1746 Right medial perirectal Pressure Injury    Wound - Properties Group Placement Date: 08/04/23  -MB Placement Time: 1746  -MB Present on Hospital Admission: Y  -MB Side: Right  -MB Orientation: medial  -MB Location: perirectal  -MB Primary Wound Type: Pressure inj  -MB    Retired Wound - Properties Group Placement Date: 08/04/23  -MB Placement Time: 1746  -MB Present on Hospital Admission: Y  -MB Side: Right  -MB Orientation: medial  -MB Location: perirectal  -MB Primary Wound Type: Pressure inj  -MB    Retired Wound - Properties Group Date first assessed: 08/04/23  -MB Time first assessed: 1746  -MB Present on Hospital Admission: Y  -MB Side: Right  -MB Location: perirectal  -MB Primary Wound Type: Pressure inj  -MB      Row Name 08/08/23 1338          Plan of Care Review    Plan of Care Reviewed With patient;[facility]  -     Progress [facility]  -     Outcome Evaluation pt  is very motivated to work with therapy, pt trans to EOB mod 2 wife assisted, sit-stand mod 2, took steps to BSC with rollator mod assist, BSC trans mod-max 2, took steps back to bed, stood x 3 mod 2, took 2-3 side steps to L, left pt sitting EOB with OT, pt would benefit from rehab  -       Row Name 08/08/23 1338          Positioning and Restraints    Pre-Treatment Position in bed  -AH     Post Treatment Position bed  -AH     In Bed sitting EOB;encouraged to call for assist;call light within reach;with OT  -               User Key  (r) = Recorded By, (t) = Taken By, (c) = Cosigned By      Initials Name Provider Type     Makayla Stroud PTA Physical Therapist Assistant    Sandra Glez, RN Registered Nurse                    Physical Therapy Education       Title: PT OT SLP Therapies (In Progress)       Topic: Physical Therapy (Done)       Point: Mobility training (Done)       Learning Progress Summary             Patient Acceptance, E, VU by CG at 8/7/2023 1434    Comment: Pt and spouse were educated on repositioning, safety w/ moving from bed, bedside activites, POC, and d/c                         Point: Body mechanics (Done)       Learning Progress Summary             Patient Acceptance, E, VU by CG at 8/7/2023 1434    Comment: Pt and spouse were educated on repositioning, safety w/ moving from bed, bedside activites, POC, and d/c                         Point: Precautions (Done)       Learning Progress Summary             Patient Acceptance, E, VU by CG at 8/7/2023 1434    Comment: Pt and spouse were educated on repositioning, safety w/ moving from bed, bedside activites, POC, and d/c                                         User Key       Initials Effective Dates Name Provider Type Discipline     04/27/23 -  Joss Modi, PT Student PT Student PT                  PT Recommendation and Plan     Plan of Care Reviewed With: patient, spouse  Progress: improving  Outcome Evaluation: pt is very motivated  to work with therapy, pt trans to EOB mod 2 wife assisted, sit-stand mod 2, took steps to BSC with rollator mod assist, BSC trans mod-max 2, took steps back to bed, stood x 3 mod 2, took 2-3 side steps to L, left pt sitting EOB with OT, pt would benefit from rehab   Outcome Measures       Row Name 08/08/23 1400 08/07/23 0800          How much help from another person do you currently need...    Turning from your back to your side while in flat bed without using bedrails? 3  -AH --     Moving from lying on back to sitting on the side of a flat bed without bedrails? 3  -AH --     Moving to and from a bed to a chair (including a wheelchair)? 2  -AH --     Standing up from a chair using your arms (e.g., wheelchair, bedside chair)? 2  -AH --     Climbing 3-5 steps with a railing? 1  -AH --     To walk in hospital room? 2  -AH --     AM-PAC 6 Clicks Score (PT) 13  -AH --        How much help from another is currently needed...    Putting on and taking off regular lower body clothing? -- 1  -AC (r) WB (t) AC (c)     Bathing (including washing, rinsing, and drying) -- 2  -AC (r) WB (t) AC (c)     Toileting (which includes using toilet bed pan or urinal) -- 2  -AC (r) WB (t) AC (c)     Putting on and taking off regular upper body clothing -- 2  -AC (r) WB (t) AC (c)     Taking care of personal grooming (such as brushing teeth) -- 3  -AC (r) WB (t) AC (c)     Eating meals -- 3  -AC (r) WB (t) AC (c)     AM-PAC 6 Clicks Score (OT) -- 13  -AC (r) WB (t)        Functional Assessment    Outcome Measure Options AM-PAC 6 Clicks Basic Mobility (PT)  -AH AM-PAC 6 Clicks Daily Activity (OT)  -AC (r) WB (t) AC (c)               User Key  (r) = Recorded By, (t) = Taken By, (c) = Cosigned By      Initials Name Provider Type    Venkat Yu, OTR/L, CNT Occupational Therapist    Makayla More, PTA Physical Therapist Assistant    Agustin Lopez, JULISSA Student OT Student                     Time Calculation:    PT Charges       Row  Name 08/08/23 1428             Time Calculation    Start Time 1338  -      Stop Time 1410  -      Time Calculation (min) 32 min  -      PT Received On 08/08/23  -         Time Calculation- PT    Total Timed Code Minutes- PT 32 minute(s)  -         Timed Charges    49858 - PT Therapeutic Activity Minutes 32  -AH         Total Minutes    Timed Charges Total Minutes 32  -AH       Total Minutes 32  -AH                User Key  (r) = Recorded By, (t) = Taken By, (c) = Cosigned By      Initials Name Provider Type     Makayla Stroud PTA Physical Therapist Assistant                  Therapy Charges for Today       Code Description Service Date Service Provider Modifiers Qty    [id number] HC PT THERAPEUTIC ACT EA 15 MIN 8/8/2023 Makayla Stroud PTA GP 2            PT G-Codes  Outcome Measure Options: AM-PAC 6 Clicks Basic Mobility (PT)  AM-PAC 6 Clicks Score (PT): 13  AM-PAC 6 Clicks Score (OT): 13    Makayla Stroud PTA  8/8/2023

## 2023-08-09 LAB
ALBUMIN SERPL-MCNC: 1.5 G/DL (ref 3.5–5.2)
ALBUMIN/GLOB SERPL: 0.8 G/DL
ALP SERPL-CCNC: 258 U/L (ref 39–117)
ALT SERPL W P-5'-P-CCNC: 35 U/L (ref 1–41)
ANION GAP SERPL CALCULATED.3IONS-SCNC: 6 MMOL/L (ref 5–15)
ANISOCYTOSIS BLD QL: ABNORMAL
AST SERPL-CCNC: 47 U/L (ref 1–40)
BILIRUB SERPL-MCNC: 0.2 MG/DL (ref 0–1.2)
BUN SERPL-MCNC: 55 MG/DL (ref 8–23)
BUN/CREAT SERPL: 44.4 (ref 7–25)
BURR CELLS BLD QL SMEAR: ABNORMAL
CALCIUM SPEC-SCNC: 7 MG/DL (ref 8.6–10.5)
CHLORIDE SERPL-SCNC: 103 MMOL/L (ref 98–107)
CO2 SERPL-SCNC: 20 MMOL/L (ref 22–29)
CREAT SERPL-MCNC: 1.24 MG/DL (ref 0.76–1.27)
DEPRECATED RDW RBC AUTO: 49.6 FL (ref 37–54)
EGFRCR SERPLBLD CKD-EPI 2021: 62.2 ML/MIN/1.73
ERYTHROCYTE [DISTWIDTH] IN BLOOD BY AUTOMATED COUNT: 16 % (ref 12.3–15.4)
GLOBULIN UR ELPH-MCNC: 1.9 GM/DL
GLUCOSE SERPL-MCNC: 81 MG/DL (ref 65–99)
HCT VFR BLD AUTO: 35.3 % (ref 37.5–51)
HGB BLD-MCNC: 11.4 G/DL (ref 13–17.7)
LYMPHOCYTES # BLD MANUAL: 0.18 10*3/MM3 (ref 0.7–3.1)
LYMPHOCYTES NFR BLD MANUAL: 1 % (ref 5–12)
MCH RBC QN AUTO: 27.7 PG (ref 26.6–33)
MCHC RBC AUTO-ENTMCNC: 32.3 G/DL (ref 31.5–35.7)
MCV RBC AUTO: 85.9 FL (ref 79–97)
MONOCYTES # BLD: 0.09 10*3/MM3 (ref 0.1–0.9)
MYELOCYTES NFR BLD MANUAL: 2 % (ref 0–0)
NEUTROPHILS # BLD AUTO: 8.47 10*3/MM3 (ref 1.7–7)
NEUTROPHILS NFR BLD MANUAL: 89 % (ref 42.7–76)
NEUTS BAND NFR BLD MANUAL: 6 % (ref 0–5)
PLAT MORPH BLD: NORMAL
PLATELET # BLD AUTO: 152 10*3/MM3 (ref 140–450)
PMV BLD AUTO: 9.3 FL (ref 6–12)
POIKILOCYTOSIS BLD QL SMEAR: ABNORMAL
POTASSIUM SERPL-SCNC: 4 MMOL/L (ref 3.5–5.2)
PROT SERPL-MCNC: 3.4 G/DL (ref 6–8.5)
RBC # BLD AUTO: 4.11 10*6/MM3 (ref 4.14–5.8)
SODIUM SERPL-SCNC: 129 MMOL/L (ref 136–145)
VARIANT LYMPHS NFR BLD MANUAL: 2 % (ref 19.6–45.3)
WBC MORPH BLD: NORMAL
WBC NRBC COR # BLD: 8.92 10*3/MM3 (ref 3.4–10.8)

## 2023-08-09 PROCEDURE — 97530 THERAPEUTIC ACTIVITIES: CPT

## 2023-08-09 PROCEDURE — 85007 BL SMEAR W/DIFF WBC COUNT: CPT | Performed by: FAMILY MEDICINE

## 2023-08-09 PROCEDURE — 97535 SELF CARE MNGMENT TRAINING: CPT

## 2023-08-09 PROCEDURE — 80053 COMPREHEN METABOLIC PANEL: CPT | Performed by: FAMILY MEDICINE

## 2023-08-09 PROCEDURE — 99232 SBSQ HOSP IP/OBS MODERATE 35: CPT | Performed by: NURSE PRACTITIONER

## 2023-08-09 PROCEDURE — 25010000002 CEFTRIAXONE PER 250 MG: Performed by: FAMILY MEDICINE

## 2023-08-09 PROCEDURE — 99232 SBSQ HOSP IP/OBS MODERATE 35: CPT | Performed by: INTERNAL MEDICINE

## 2023-08-09 PROCEDURE — 85025 COMPLETE CBC W/AUTO DIFF WBC: CPT | Performed by: FAMILY MEDICINE

## 2023-08-09 RX ADMIN — Medication 10 ML: at 09:47

## 2023-08-09 RX ADMIN — Medication 10 ML: at 21:28

## 2023-08-09 RX ADMIN — Medication 1 CAPSULE: at 21:28

## 2023-08-09 RX ADMIN — CHOLESTYRAMINE 1 PACKET: 4 POWDER, FOR SUSPENSION ORAL at 12:30

## 2023-08-09 RX ADMIN — APIXABAN 10 MG: 5 TABLET, FILM COATED ORAL at 09:46

## 2023-08-09 RX ADMIN — CHOLESTYRAMINE 1 PACKET: 4 POWDER, FOR SUSPENSION ORAL at 17:29

## 2023-08-09 RX ADMIN — CHOLESTYRAMINE 1 PACKET: 4 POWDER, FOR SUSPENSION ORAL at 09:46

## 2023-08-09 RX ADMIN — APIXABAN 10 MG: 5 TABLET, FILM COATED ORAL at 21:28

## 2023-08-09 RX ADMIN — CEFTRIAXONE SODIUM 2000 MG: 2 INJECTION, POWDER, FOR SOLUTION INTRAMUSCULAR; INTRAVENOUS at 09:46

## 2023-08-09 RX ADMIN — Medication 1 CAPSULE: at 09:46

## 2023-08-09 NOTE — CASE MANAGEMENT/SOCIAL WORK
Continued Stay Note  [facility]     Patient Name: Aleks Monzon  MRN: [id number]  Today's Date: 8/9/2023    Admit Date: 8/4/2023    Plan: SNF vs Acute Rehab   Discharge Plan       Row Name 08/09/23 1536       Plan    Plan Comments Bed offered at [facility] and family accepts. Bed will be ready when pt is medically stable for dc. Pharmacy has been updated in Epic.    Final Discharge Disposition Code 03 - skilled nursing facility (SNF)      Row Name 08/09/23 1240       Plan    Plan Comments SW spoke to pt and wife in room regarding dc plan and rehab. Provided Medicare Compare list and [facility] is preferred. Referral has been sent to [facility], await answer.                   Discharge Codes    No documentation.                 Expected Discharge Date and Time       Expected Discharge Date Expected Discharge Time    Aug 10, 2023               BRETT Umaña

## 2023-08-09 NOTE — THERAPY TREATMENT NOTE
Acute Care - Occupational Therapy Treatment Note  [facility]     Patient Name: Aleks Monzon  : 1950  MRN: [id number]  Today's Date: 2023             Admit Date: 2023       ICD-10-CM ICD-9-CM   1. Pressure injury of right buttock, stage 3  L89.313 707.05     707.23   2. Cellulitis of right upper extremity  L03.113 682.3   3. Hypomagnesemia  E83.42 275.2   4. Leukocytosis, unspecified type  D72.829 288.60   5. Decreased activities of daily living (ADL) [Z78.9 (ICD-10-CM)]  Z78.9 V49.89   6. Pressure injury of sacral region, stage 2  L89.152 707.03     707.22   7. Impaired mobility and ADLs  Z74.09 V49.89    Z78.9    8. Weakness  R53.1 780.79   9. Skin tear of right forearm without complication, initial encounter  S51.811A 881.00   10. Sepsis due to Gram negative bacteria  A41.50 038.40     995.91   11. Impaired mobility [Z74.09 (ICD-10-CM)]  Z74.09 799.89     Patient Active Problem List   Diagnosis    IFG (impaired fasting glucose)    High cholesterol    Prostate cancer    Elevated PSA    Elevated serum creatinine    Gout, arthropathy    Obesity (BMI 30-39.9)    Primary osteoarthritis of both knees    Retroperitoneal mass    Edema    Abnormal CT of the abdomen    Axillary mass, left    Retroperitoneal fibrosis    Gout    Ureteral obstruction, left    Hypertension    Malignant neoplasm of trigone of urinary bladder    COVID-19    Closed fracture of right hip    Closed fracture of right hip, initial encounter    Stage 3a chronic kidney disease    Cancer    Weight loss    Protein deficiency    Decreased appetite    BRBPR (bright red blood per rectum)    History of adenomatous polyp of colon    Overweight with body mass index (BMI) of 27 to 27.9 in adult    Non-smoker    Cellulitis of right upper extremity    Sepsis due to Gram negative bacteria     Past Medical History:   Diagnosis Date    Arthritis     Cancer     PROSTATE AND BLADDER    Cellulitis     Gout     Hypertension     IFG (impaired  fasting glucose)     Low back pain     Lymphedema     Retroperitoneal fibrosis      Past Surgical History:   Procedure Laterality Date    AXILLARY LYMPH NODE BIOPSY/EXCISION Left 10/01/2020    Procedure: LEFT AXILLARY LYMPH NODE BIOPSY;  Surgeon: Dina To MD;  Location: [facility] OR;  Service: General;  Laterality: Left;    BACK SURGERY      COLONOSCOPY      2017?    COLONOSCOPY N/A 10/11/2022    Procedure: COLONOSCOPY WITH ANESTHESIA;  Surgeon: Burton Chaudhari MD;  Location: [facility] ENDOSCOPY;  Service: Gastroenterology;  Laterality: N/A;  preop; abdominal pain  postop; polyps   PCP Tj Hyde MD    ENDOSCOPY N/A 10/11/2022    Procedure: ESOPHAGOGASTRODUODENOSCOPY WITH ANESTHESIA;  Surgeon: Burton Chaudhari MD;  Location: [facility] ENDOSCOPY;  Service: Gastroenterology;  Laterality: N/A;  preop; abdominal pain  postop; misshape stomach  PCP Tj Hyde MD    JOINT REPLACEMENT      BILATERAL HIP     SKIN CANCER EXCISION      TOTAL HIP ARTHROPLASTY      URETERAL STENT INSERTION           OT ASSESSMENT FLOWSHEET (last 12 hours)       OT Evaluation and Treatment       Row Name 08/09/23 0850                   OT Time and Intention    Subjective Information complains of;pain;weakness  -AC (r) WB (t) AC (c)        Document Type therapy note (daily note)  -AC (r) WB (t) AC (c)        Mode of Treatment occupational therapy  -AC (r) WB (t) AC (c)           General Information    Existing Precautions/Restrictions fall  -AC (r) WB (t) AC (c)           Pain Assessment    Pretreatment Pain Rating 1/10  -AC (r) WB (t) AC (c)        Posttreatment Pain Rating 1/10  -AC (r) WB (t) AC (c)        Pain Location lower  -AC (r) WB (t) AC (c)        Pain Location - back  -AC (r) WB (t) AC (c)        Pain Intervention(s) Repositioned;Ambulation/increased activity;Rest  -AC (r) WB (t) AC (c)           Activities of Daily Living    BADL Assessment/Intervention toileting;lower body dressing  -AC (r) WB (t) AC (c)            Lower Body Dressing Assessment/Training    [place] Level (Lower Body Dressing) don;socks;dependent (less than 25% patient effort);pants/bottoms;maximum assist (25% patient effort)  -AC (r) WB (t) AC (c)        Position (Lower Body Dressing) edge of bed sitting;supine  -AC (r) WB (t) AC (c)           Toileting Assessment/Training    [place] Level (Toileting) adjust/manage clothing;perform perineal hygiene;maximum assist (25% patient effort)  -AC (r) WB (t) AC (c)        Assistive Devices (Toileting) commode, bedside without drop arms  -AC (r) WB (t) AC (c)        Position (Toileting) supported sitting  -AC (r) WB (t) AC (c)        Comment, (Toileting) Pt had BM when he stood up, moved to [facility] to complete toileting  -AC (r) WB (t) AC (c)           Bed Mobility    Bed Mobility supine-sit;sit-supine;scooting/bridging  -AC (r) WB (t) AC (c)        Scooting/Bridging [place] (Bed Mobility) dependent (less than 25% patient effort);2 person assist  -AC (r) WB (t) AC (c)        Supine-Sit [place] (Bed Mobility) contact guard  -AC (r) WB (t) AC (c)        Sit-Supine [place] (Bed Mobility) moderate assist (50% patient effort)  -AC (r) WB (t) AC (c)        Assistive Device (Bed Mobility) bed rails;draw sheet  -AC (r) WB (t) AC (c)           Transfer Assessment/Treatment    Transfers sit-stand transfer;stand-sit transfer;bed-chair transfer;toilet transfer  -AC (r) WB (t) AC (c)           Bed-Chair Transfer    Bed-Chair [place] (Transfers) moderate assist (50% patient effort)  -AC (r) WB (t) AC (c)        Assistive Device (Bed-Chair Transfers) walker, rolling platform  -AC (r) WB (t) AC (c)        Comment, (Bed-Chair Transfer) stand step t/f to BS; pt remained in very forward flexed position throughout  -AC (r) WB (t) AC (c)           Sit-Stand Transfer    Sit-Stand [place] (Transfers) moderate assist (50% patient effort);maximum assist (25% patient effort);2 person assist  -AC (r) WB (t)  AC (c)        Assistive Device (Sit-Stand Transfers) walker, rolling platform  -AC (r) WB (t) AC (c)        Comment, (Sit-Stand Transfer) elevated bed surface  -AC (r) WB (t) AC (c)           Stand-Sit Transfer    Stand-Sit [place] (Transfers) minimum assist (75% patient effort);moderate assist (50% patient effort);2 person assist;verbal cues  -AC (r) WB (t) AC (c)        Assistive Device (Stand-Sit Transfers) walker, rolling platform  -AC (r) WB (t) AC (c)        Comment, (Stand-Sit Transfer) elevated bed surface  -AC (r) WB (t) AC (c)           Toilet Transfer    Type (Toilet Transfer) sit-stand;stand-sit  -AC (r) WB (t) AC (c)        [place] Level (Toilet Transfer) moderate assist (50% patient effort)  -AC (r) WB (t) AC (c)        Assistive Device (Toilet Transfer) commode, bedside without drop arms  -AC (r) WB (t) AC (c)           Wound 08/04/23 1744 Right upper arm Skin Tear    Wound - Properties Group Placement Date: 08/04/23  -MB Placement Time: 1744  -MB Present on Hospital Admission: Y  -MB Side: Right  -MB Orientation: upper  -MB Location: arm  -MB Primary Wound Type: Skin tear  -MB    Retired Wound - Properties Group Placement Date: 08/04/23  -MB Placement Time: 1744  -MB Present on Hospital Admission: Y  -MB Side: Right  -MB Orientation: upper  -MB Location: arm  -MB Primary Wound Type: Skin tear  -MB    Retired Wound - Properties Group Date first assessed: 08/04/23  -MB Time first assessed: 1744  -MB Present on Hospital Admission: Y  -MB Side: Right  -MB Location: arm  -MB Primary Wound Type: Skin tear  -MB       Wound 08/04/23 1746 Left medial perirectal Pressure Injury    Wound - Properties Group Placement Date: 08/04/23  -MB Placement Time: 1746  -MB Present on Hospital Admission: Y  -MB Side: Left  -MB Orientation: medial  -MB Location: perirectal  -MB Primary Wound Type: Pressure inj  -MB    Retired Wound - Properties Group Placement Date: 08/04/23  -MB Placement Time: 1746  -MB Present  on Hospital Admission: Y  -MB Side: Left  -MB Orientation: medial  -MB Location: perirectal  -MB Primary Wound Type: Pressure inj  -MB    Retired Wound - Properties Group Date first assessed: 08/04/23  -MB Time first assessed: 1746  -MB Present on Hospital Admission: Y  -MB Side: Left  -MB Location: perirectal  -MB Primary Wound Type: Pressure inj  -MB       Wound 08/04/23 1746 Right medial perirectal Pressure Injury    Wound - Properties Group Placement Date: 08/04/23  -MB Placement Time: 1746  -MB Present on Hospital Admission: Y  -MB Side: Right  -MB Orientation: medial  -MB Location: perirectal  -MB Primary Wound Type: Pressure inj  -MB    Retired Wound - Properties Group Placement Date: 08/04/23  -MB Placement Time: 1746  -MB Present on Hospital Admission: Y  -MB Side: Right  -MB Orientation: medial  -MB Location: perirectal  -MB Primary Wound Type: Pressure inj  -MB    Retired Wound - Properties Group Date first assessed: 08/04/23  -MB Time first assessed: 1746  -MB Present on Hospital Admission: Y  -MB Side: Right  -MB Location: perirectal  -MB Primary Wound Type: Pressure inj  -MB       Plan of Care Review    Plan of Care Reviewed With patient;spouse  -AC (r) WB (t) AC (c)        Progress improving  -AC (r) WB (t) AC (c)        Outcome Evaluation OT tx completed. Pt reports 1/10 back pain and decreased frequency for BM overnight. Agreeable to therapy. Completed supine-sit with CGA, demonstrating improvement. Sit-stand mod-maxAx2. Upon standing, pt had BM and requested to step to BSC. ModA to transfer to BSC. Toileting hygiene completed maxA. Lower body dressing maxA-dependent. Upon returning to EOB, pt reported dizziness and tunnel vision. BP 96/72 and SpO2 83%. Returned to supine with modA. /83 and SpO2 remained at 83%. With cues for pursed lip breathing, SpO2 returned to 90% within 2 minutes. Nsg made aware. OT will continue POC. Recommend further rehab at d/c.  -AC (r) WB (t) AC (c)           Vital  Signs    Intra Systolic BP Rehab 96  -AC (r) WB (t) AC (c)        Intra Treatment Diastolic BP 72  -AC (r) WB (t) AC (c)        Post Systolic BP Rehab 104  -AC (r) WB (t) AC (c)        Post Treatment Diastolic BP 83  -AC (r) WB (t) AC (c)        O2 Delivery Pre Treatment room air  -AC (r) WB (t) AC (c)        Intra SpO2 (%) 83  -AC (r) WB (t) AC (c)        O2 Delivery Intra Treatment room air  -AC (r) WB (t) AC (c)        Post SpO2 (%) 90  -AC (r) WB (t) AC (c)        O2 Delivery Post Treatment room air  -AC (r) WB (t) AC (c)           Positioning and Restraints    Pre-Treatment Position in bed  -AC (r) WB (t) AC (c)        Post Treatment Position bed  -AC (r) WB (t) AC (c)        In Bed notified nsg;fowlers;call light within reach;encouraged to call for assist;exit alarm on;with other staff;side rails up x2  -AC (r) WB (t) AC (c)                  User Key  (r) = Recorded By, (t) = Taken By, (c) = Cosigned By      Initials Name Effective Dates    Venkat Yu, OTR/L, CNT 02/03/23 -     Sandra Glez, RN 10/14/22 -     WB Agustin Vasques OT Student 12/05/22 -                      Occupational Therapy Education       Title: PT OT SLP Therapies (In Progress)       Topic: Occupational Therapy (In Progress)       Point: ADL training (Done)       Description:   Instruct learner(s) on proper safety adaptation and remediation techniques during self care or transfers.   Instruct in proper use of assistive devices.                  Learning Progress Summary             Patient Acceptance, E, VU by WB at 8/9/2023 0948    Comment: Safety during functional transfers; importance of OOB activity    Acceptance, E, VU by WB at 8/8/2023 1453    Comment: Safety during functional transfers    Acceptance, E,TB, VU by WB at 8/7/2023 0832    Comment: Safety during functional transfers    Acceptance, E, VU by RH at 8/5/2023 0850   Family Acceptance, E,TB, VU by WB at 8/7/2023 0832    Comment: Safety during functional transfers    Significant Other Acceptance, E, VU by  at 8/5/2023 0850                         Point: Home exercise program (Not Started)       Description:   Instruct learner(s) on appropriate technique for monitoring, assisting and/or progressing therapeutic exercises/activities.                  Learner Progress:  Not documented in this visit.              Point: Precautions (Not Started)       Description:   Instruct learner(s) on prescribed precautions during self-care and functional transfers.                  Learner Progress:  Not documented in this visit.              Point: Body mechanics (Done)       Description:   Instruct learner(s) on proper positioning and spine alignment during self-care, functional mobility activities and/or exercises.                  Learning Progress Summary             Patient Acceptance, E, VU by WB at 8/9/2023 0948    Comment: Safety during functional transfers; importance of OOB activity    Acceptance, E, VU by WB at 8/8/2023 1453    Comment: Safety during functional transfers    Acceptance, E,TB, VU by WB at 8/7/2023 0832    Comment: Safety during functional transfers   Family Acceptance, E,TB, VU by WB at 8/7/2023 0832    Comment: Safety during functional transfers                                         User Key       Initials Effective Dates Name Provider Type Discipline    WB 12/05/22 -  Agustin aVsques, OT Student OT Student OT     05/03/23 -  Rae Watkins OT Student OT Student OT                      OT Recommendation and Plan     Plan of Care Review  Plan of Care Reviewed With: patient, spouse  Progress: improving  Outcome Evaluation: OT tx completed. Pt reports 1/10 back pain and decreased frequency for BM overnight. Agreeable to therapy. Completed supine-sit with CGA, demonstrating improvement. Sit-stand mod-maxAx2. Upon standing, pt had BM and requested to step to BSC. ModA to transfer to BSC. Toileting hygiene completed maxA. Lower body dressing maxA-dependent. Upon  returning to EOB, pt reported dizziness and tunnel vision. BP 96/72 and SpO2 83%. Returned to supine with modA. /83 and SpO2 remained at 83%. With cues for pursed lip breathing, SpO2 returned to 90% within 2 minutes. Nsg made aware. OT will continue POC. Recommend further rehab at d/c.  Plan of Care Reviewed With: patient, spouse  Outcome Evaluation: OT tx completed. Pt reports 1/10 back pain and decreased frequency for BM overnight. Agreeable to therapy. Completed supine-sit with CGA, demonstrating improvement. Sit-stand mod-maxAx2. Upon standing, pt had BM and requested to step to BSC. ModA to transfer to BSC. Toileting hygiene completed maxA. Lower body dressing maxA-dependent. Upon returning to EOB, pt reported dizziness and tunnel vision. BP 96/72 and SpO2 83%. Returned to supine with modA. /83 and SpO2 remained at 83%. With cues for pursed lip breathing, SpO2 returned to 90% within 2 minutes. Nsg made aware. OT will continue POC. Recommend further rehab at d/c.     Outcome Measures       Row Name 08/09/23 0900 08/08/23 1452 08/08/23 1400       How much help from another person do you currently need...    Turning from your back to your side while in flat bed without using bedrails? -- -- 3  -AH    Moving from lying on back to sitting on the side of a flat bed without bedrails? -- -- 3  -AH    Moving to and from a bed to a chair (including a wheelchair)? -- -- 2  -AH    Standing up from a chair using your arms (e.g., wheelchair, bedside chair)? -- -- 2  -AH    Climbing 3-5 steps with a railing? -- -- 1  -AH    To walk in hospital room? -- -- 2  -AH    AM-PAC 6 Clicks Score (PT) -- -- 13  -AH       How much help from another is currently needed...    Putting on and taking off regular lower body clothing? 1  -AC (r) WB (t) AC (c) 1  -AC (r) WB (t) AC (c) --    Bathing (including washing, rinsing, and drying) 2  -AC (r) WB (t) AC (c) 2  -AC (r) WB (t) AC (c) --    Toileting (which includes using toilet  bed pan or urinal) 2  -AC (r) WB (t) AC (c) 2  -AC (r) WB (t) AC (c) --    Putting on and taking off regular upper body clothing 3  -AC (r) WB (t) AC (c) 3  -AC (r) WB (t) AC (c) --    Taking care of personal grooming (such as brushing teeth) 4  -AC (r) WB (t) AC (c) 4  -AC (r) WB (t) AC (c) --    Eating meals 4  -AC (r) WB (t) AC (c) 4  -AC (r) WB (t) AC (c) --    AM-PAC 6 Clicks Score (OT) 16  -AC (r) WB (t) 16  -AC (r) WB (t) --       Functional Assessment    Outcome Measure Options AM-PAC 6 Clicks Daily Activity (OT)  -AC (r) WB (t) AC (c) AM-PAC 6 Clicks Daily Activity (OT)  -AC (r) WB (t) AC (c) AM-PAC 6 Clicks Basic Mobility (PT)  -      Row Name 08/07/23 0800             How much help from another is currently needed...    Putting on and taking off regular lower body clothing? 1  -AC (r) WB (t) AC (c)      Bathing (including washing, rinsing, and drying) 2  -AC (r) WB (t) AC (c)      Toileting (which includes using toilet bed pan or urinal) 2  -AC (r) WB (t) AC (c)      Putting on and taking off regular upper body clothing 2  -AC (r) WB (t) AC (c)      Taking care of personal grooming (such as brushing teeth) 3  -AC (r) WB (t) AC (c)      Eating meals 3  -AC (r) WB (t) AC (c)      AM-PAC 6 Clicks Score (OT) 13  -AC (r) WB (t)         Functional Assessment    Outcome Measure Options AM-PAC 6 Clicks Daily Activity (OT)  -AC (r) WB (t) AC (c)                User Key  (r) = Recorded By, (t) = Taken By, (c) = Cosigned By      Initials Name Provider Type    Venkat Yu, OTR/L, CNT Occupational Therapist    Makayla More, PTA Physical Therapist Assistant    Agustin Lopez, OT Student OT Student                    Time Calculation:    Time Calculation- OT       Row Name 08/09/23 0948             Time Calculation- OT    OT Start Time 0850  -AC (r) WB (t) AC (c)      OT Stop Time 0935  -AC (r) WB (t) AC (c)      OT Time Calculation (min) 45 min  -AC (r) WB (t)      Total Timed Code Minutes- OT 45  minute(s)  -AC (r) WB (t) AC (c)      OT Received On 08/09/23  -AC (r) WB (t) AC (c)                User Key  (r) = Recorded By, (t) = Taken By, (c) = Cosigned By      Initials Name Provider Type    Venkat Yu, OTR/L, CNT Occupational Therapist    Agustin Lopez, OT Student OT Student                           Agustin Vasques OT Student  8/9/2023

## 2023-08-09 NOTE — PLAN OF CARE
Goal Outcome Evaluation:  Plan of Care Reviewed With: patient, spouse         Patient is alert and oriented x 4. VSS. RA. VSS. Denies pain but complains of discomfort on his back. Dressing change done. Reduced swelling noticed in BLLE and RUE. Reduced frequency in bowel movement. Voiding. Safety precautions maintained. See MAR for VTE. Will continue to monitor. Spouse at bedside.

## 2023-08-09 NOTE — PROGRESS NOTES
"    Daily Progress Note  Aleks Monzon  MRN: [id number] LOS: 5    Admit Date: 2023 07:52 CDT    Subjective:         Interval History:    Reviewed overnight events and nursing notes.         ROS:  Review of Systems   Constitutional:  Negative for chills and fever.   Respiratory:  Negative for cough, chest tightness and shortness of breath.    Cardiovascular:  Negative for chest pain.   Gastrointestinal:  Negative for abdominal pain, diarrhea, nausea and vomiting.     DIET:  Diet: Regular/House Diet, Gastrointestinal Diets; Lactose-Controlled, High Fiber; Texture: Regular Texture (IDDSI 7); Fluid Consistency: Thin (IDDSI 0)    Medications:      apixaban, 10 mg, Oral, Q12H   Followed by  [START ON 8/15/2023] apixaban, 5 mg, Oral, Q12H  cefTRIAXone, 2,000 mg, Intravenous, Q24H  cholestyramine, 1 packet, Oral, TID AC  lactobacillus acidophilus, 1 capsule, Oral, BID  sodium chloride, 10 mL, Intravenous, Q12H          Objective:     Vitals: BP 95/81 (BP Location: Left arm, Patient Position: Lying) Comment: RN notified  Pulse 89   Temp 97.6 øF (36.4 øC) (Oral)   Resp 18   Ht 188 cm (74\")   Wt 94.2 kg (207 lb 10.8 oz)   SpO2 96%   BMI 26.66 kg/mý    Intake/Output Summary (Last 24 hours) at 2023 0752  Last data filed at 2023 0800  Gross per 24 hour   Intake 200 ml   Output --   Net 200 ml    Temp (24hrs), Av.7 øF (36.5 øC), Min:97.5 øF (36.4 øC), Max:98.1 øF (36.7 øC)    Glucose:  No results found for: POCGLU  Physical Examination:   Physical Exam  Constitutional:       General: He is not in acute distress.     Appearance: Normal appearance. He is normal weight. He is not ill-appearing.   Cardiovascular:      Rate and Rhythm: Normal rate and regular rhythm.      Pulses: Normal pulses.      Heart sounds: Normal heart sounds. No murmur heard.  Pulmonary:      Effort: Pulmonary effort is normal.      Breath sounds: Normal breath sounds. No wheezing, rhonchi or rales.   Abdominal:      " General: Abdomen is flat. Bowel sounds are normal.      Palpations: Abdomen is soft.      Tenderness: There is no abdominal tenderness.   Musculoskeletal:      Right lower leg: Edema present.      Left lower leg: Edema present.   Neurological:      Mental Status: He is alert.       Labs:  Lab Results (last 24 hours)       Procedure Component Value Units Date/Time    Manual Differential [[id number]]  (Abnormal) Collected: 08/09/23 0531    Specimen: Blood Updated: 08/09/23 0645     Neutrophil % 89.0 %      Lymphocyte % 2.0 %      Monocyte % 1.0 %      Bands %  6.0 %      Myelocyte % 2.0 %      Neutrophils Absolute 8.47 10*3/mm3      Lymphocytes Absolute 0.18 10*3/mm3      Monocytes Absolute 0.09 10*3/mm3      Anisocytosis Slight/1+     Crenated RBC's Slight/1+     Poikilocytes Mod/2+     WBC Morphology Normal     Platelet Morphology Normal    CBC & Differential [[id number]]  (Abnormal) Collected: 08/09/23 0531    Specimen: Blood Updated: 08/09/23 0645    Narrative:      The following orders were created for panel order CBC & Differential.  Procedure                               Abnormality         Status                     ---------                               -----------         ------                     CBC Auto Differential[[id number]]        Abnormal            Final result                 Please view results for these tests on the individual orders.    CBC Auto Differential [[id number]]  (Abnormal) Collected: 08/09/23 0531    Specimen: Blood Updated: 08/09/23 0645     WBC 8.92 10*3/mm3      RBC 4.11 10*6/mm3      Hemoglobin 11.4 g/dL      Hematocrit 35.3 %      MCV 85.9 fL      MCH 27.7 pg      MCHC 32.3 g/dL      RDW 16.0 %      RDW-SD 49.6 fl      MPV 9.3 fL      Platelets 152 10*3/mm3     Narrative:      The previously reported component NRBC is no longer being reported. Previous result was 0.0 /100 WBC (Reference Range: 0.0-0.2 /100 WBC) on 8/9/2023 at 0623 T.    Comprehensive Metabolic Panel [[id number]]   (Abnormal) Collected: 08/09/23 0531    Specimen: Blood Updated: 08/09/23 0638     Glucose 81 mg/dL      BUN 55 mg/dL      Creatinine 1.24 mg/dL      Sodium 129 mmol/L      Potassium 4.0 mmol/L      Chloride 103 mmol/L      CO2 20.0 mmol/L      Calcium 7.0 mg/dL      Total Protein 3.4 g/dL      Albumin 1.5 g/dL      ALT (SGPT) 35 U/L      AST (SGOT) 47 U/L      Alkaline Phosphatase 258 U/L      Total Bilirubin 0.2 mg/dL      Globulin 1.9 gm/dL      A/G Ratio 0.8 g/dL      BUN/Creatinine Ratio 44.4     Anion Gap 6.0 mmol/L      eGFR 62.2 mL/min/1.73     Narrative:      GFR Normal >60  Chronic Kidney Disease <60  Kidney Failure <15    The GFR formula is only valid for adults with stable renal function between ages 18 and 70.    Microsporidia Stain - Stool, Per Rectum [[id number]] Collected: 08/08/23 1439    Specimen: Stool from Per Rectum Updated: 08/08/23 1442    Miscellaneous Micro Request - Specimen Not Sent, Specimen Not Sent [[id number]] Resulted: 08/08/23 1319    Specimen: Specimen Not Sent Updated: 08/08/23 1319     Extra Tube --    Narrative:      Cefazolin reported.    Blood Culture - Blood, Arm, Left [[id number]]  (Abnormal)  (Susceptibility) Collected: 08/04/23 1413    Specimen: Blood from Arm, Left Updated: 08/08/23 1318     Blood Culture Providencia rettgeri     Isolated from Aerobic and Anaerobic Bottles     Gram Stain Anaerobic Bottle Gram negative bacilli      Aerobic Bottle Gram negative bacilli    Susceptibility        Providencia rettgeri      DICKSON      Ampicillin Intermediate      Ampicillin + Sulbactam Susceptible      Cefazolin Resistant (C)  [1]       Cefepime Susceptible      Ceftazidime Susceptible      Ceftriaxone Susceptible      Gentamicin Susceptible      Levofloxacin Susceptible      Piperacillin + Tazobactam Susceptible      Trimethoprim + Sulfamethoxazole Susceptible                   [1]  Appended report. These results have been appended to a previously final verified report.                Susceptibility Comments       Providencia rettgeri    Cefazolin sensitivity will not be reported for Enterobacteriaceae in non-urine isolates. If cefazolin is preferred, please call the microbiology lab to request an E-test.  With the exception of urinary-sourced infections, aminoglycosides should not be used as monotherapy.               Urine Culture - Urine, Urine, Clean Catch [[id number]]  (Normal) Collected: 08/07/23 1332    Specimen: Urine, Clean Catch Updated: 08/08/23 0919     Urine Culture No growth             Imaging:  US Venous Doppler Lower Extremity Bilateral (duplex)    Result Date: 8/8/2023  History: Swelling      Impression: Impression: There is evidence of deep venous thrombosis in both lower extremities.  Comments: Bilateral lower extremity venous duplex exam was performed using color Doppler flow, Doppler waveform analysis, and grayscale imaging, with and without compression. There is evidence of extensive deep venous thrombosis in the common femoral, superficial femoral, popliteal, peroneal, anterior tibial, and posterior tibial veins in the right lower extremity. There is also evidence of deep venous thrombosis in the popliteal vein in the left lower extremity. No thrombus is identified in the saphenofemoral junctions and greater saphenous veins bilaterally.   This report was finalized on 08/08/2023 15:24 by Dr. Fransico Lundy MD.        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     Enterobacterales bacteremia  Cultures performed on 8/4 were positive x2.  Fairly sensitive, ID following.     Chronic diarrhea  GI consulted started antidiarrheals.     Right lower extremity DVT  Continue Eliquis, no changes.    Medically stable to start referring to rehab options.    Reviewed treatment plans with the patient and/or family.     Code Status:   Code Status and Medical Interventions:   Ordered at: 08/05/23 1506     Code Status (Patient has no pulse and is not breathing):    CPR (Attempt to Resuscitate)     Medical Interventions (Patient has pulse or is breathing):    Full Support     Release to patient:    Routine Release       Electronically signed by Tj Hyde MD on 8/9/2023 at 07:52 CDT

## 2023-08-09 NOTE — THERAPY TREATMENT NOTE
Acute Care - Physical Therapy Treatment Note  [facility]     Patient Name: Aleks Monzon  : 1950  MRN: [id number]  Today's Date: 2023      Visit Dx:     ICD-10-CM ICD-9-CM   1. Pressure injury of right buttock, stage 3  L89.313 707.05     707.23   2. Cellulitis of right upper extremity  L03.113 682.3   3. Hypomagnesemia  E83.42 275.2   4. Leukocytosis, unspecified type  D72.829 288.60   5. Decreased activities of daily living (ADL) [Z78.9 (ICD-10-CM)]  Z78.9 V49.89   6. Pressure injury of sacral region, stage 2  L89.152 707.03     707.22   7. Impaired mobility and ADLs  Z74.09 V49.89    Z78.9    8. Weakness  R53.1 780.79   9. Skin tear of right forearm without complication, initial encounter  S51.811A 881.00   10. Sepsis due to Gram negative bacteria  A41.50 038.40     995.91   11. Impaired mobility [Z74.09 (ICD-10-CM)]  Z74.09 799.89     Patient Active Problem List   Diagnosis    IFG (impaired fasting glucose)    High cholesterol    Prostate cancer    Elevated PSA    Elevated serum creatinine    Gout, arthropathy    Obesity (BMI 30-39.9)    Primary osteoarthritis of both knees    Retroperitoneal mass    Edema    Abnormal CT of the abdomen    Axillary mass, left    Retroperitoneal fibrosis    Gout    Ureteral obstruction, left    Hypertension    Malignant neoplasm of trigone of urinary bladder    COVID-19    Closed fracture of right hip    Closed fracture of right hip, initial encounter    Stage 3a chronic kidney disease    Cancer    Weight loss    Protein deficiency    Decreased appetite    BRBPR (bright red blood per rectum)    History of adenomatous polyp of colon    Overweight with body mass index (BMI) of 27 to 27.9 in adult    Non-smoker    Cellulitis of right upper extremity    Sepsis due to Gram negative bacteria     Past Medical History:   Diagnosis Date    Arthritis     Cancer     PROSTATE AND BLADDER    Cellulitis     Gout     Hypertension     IFG (impaired fasting glucose)     Low back  pain     Lymphedema     Retroperitoneal fibrosis      Past Surgical History:   Procedure Laterality Date    AXILLARY LYMPH NODE BIOPSY/EXCISION Left 10/01/2020    Procedure: LEFT AXILLARY LYMPH NODE BIOPSY;  Surgeon: Dina To MD;  Location: [facility] OR;  Service: General;  Laterality: Left;    BACK SURGERY      COLONOSCOPY      2017?    COLONOSCOPY N/A 10/11/2022    Procedure: COLONOSCOPY WITH ANESTHESIA;  Surgeon: Burton Chaudhari MD;  Location: [facility] ENDOSCOPY;  Service: Gastroenterology;  Laterality: N/A;  preop; abdominal pain  postop; polyps   PCP Tj Hyde MD    ENDOSCOPY N/A 10/11/2022    Procedure: ESOPHAGOGASTRODUODENOSCOPY WITH ANESTHESIA;  Surgeon: Burton Chaudhari MD;  Location: [facility] ENDOSCOPY;  Service: Gastroenterology;  Laterality: N/A;  preop; abdominal pain  postop; misshape stomach  PCP Tj Hyde MD    JOINT REPLACEMENT      BILATERAL HIP     SKIN CANCER EXCISION      TOTAL HIP ARTHROPLASTY      URETERAL STENT INSERTION       PT Assessment (last 12 hours)       PT Evaluation and Treatment       Row Name 08/09/23 1259 08/09/23 0904       Physical Therapy Time and Intention    Subjective Information complains of;weakness;fatigue;pain  - --    Document Type therapy note (daily note)  - --    Mode of Treatment physical therapy  - --    Session Not Performed -- other (see comments)  -    Comment, Session Not Performed -- with OT  -      Row Name 08/09/23 0755          Physical Therapy Time and Intention    Subjective Information --  -     Session Not Performed other (see comments)  -     Comment, Session Not Performed pt requested to check back after breakfast  -       Row Name 08/09/23 1259          General Information    Existing Precautions/Restrictions fall  -       Row Name 08/09/23 1259          Pain    Pretreatment Pain Rating 5/10  -     Posttreatment Pain Rating 5/10  -     Pain Location lower  -     Pain Location - back  -     Pain  Intervention(s) Repositioned  -       Row Name 08/09/23 1259          Bed Mobility    Supine-Sit [place] (Bed Mobility) moderate assist (50% patient effort);2 person assist;verbal cues  wife assisted  -     Sit-Supine [place] (Bed Mobility) moderate assist (50% patient effort);2 person assist;verbal cues  wife assisted  -       Row Name 08/09/23 1259          Sit-Stand Transfer    Sit-Stand [place] (Transfers) moderate assist (50% patient effort);2 person assist;verbal cues;maximum assist (25% patient effort)  -[facility] Name 08/09/23 1259          Stand-Sit Transfer    Stand-Sit [place] (Transfers) minimum assist (75% patient effort);moderate assist (50% patient effort);2 person assist;verbal cues  -[facility] Name 08/09/23 1259          Stand Pivot/Stand Step Transfer    Stand Pivot/Stand Step [place] (Transfers) moderate assist (50% patient effort);verbal cues  -[facility] Name 08/09/23 1259          Toilet Transfer    [place] Level (Toilet Transfer) moderate assist (50% patient effort);2 person assist;verbal cues  -     Assistive Device (Toilet Transfer) commode, 3-in-1;walker, 4-wheeled  -[facility] Name 08/09/23 1259          Gait/Stairs (Locomotion)    [place] Level (Gait) moderate assist (50% patient effort);2 person assist;verbal cues  -     Assistive Device (Gait) walker, 4-wheeled  -     Distance in Feet (Gait) steps bed-BSC-bed  -       Row Name 08/09/23 1259          Balance    Comment, Balance sat EOB 15 minutes sba  -[facility] Name             Wound 08/04/23 1744 Right upper arm Skin Tear    Wound - Properties Group Placement Date: 08/04/23  -MB Placement Time: 1744  -MB Present on Hospital Admission: Y  -MB Side: Right  -MB Orientation: upper  -MB Location: arm  -MB Primary Wound Type: Skin tear  -MB    Retired Wound - Properties Group Placement Date: 08/04/23  -MB Placement Time: 1744  -MB Present on Hospital Admission: Y  -MB Side: Right   -MB Orientation: upper  -MB Location: arm  -MB Primary Wound Type: Skin tear  -MB    Retired Wound - Properties Group Date first assessed: 08/04/23  -MB Time first assessed: 1744  -MB Present on Hospital Admission: Y  -MB Side: Right  -MB Location: arm  -MB Primary Wound Type: Skin tear  -MB      Row Name             Wound 08/04/23 1746 Left medial perirectal Pressure Injury    Wound - Properties Group Placement Date: 08/04/23  -MB Placement Time: 1746  -MB Present on Hospital Admission: Y  -MB Side: Left  -MB Orientation: medial  -MB Location: perirectal  -MB Primary Wound Type: Pressure inj  -MB    Retired Wound - Properties Group Placement Date: 08/04/23  -MB Placement Time: 1746  -MB Present on Hospital Admission: Y  -MB Side: Left  -MB Orientation: medial  -MB Location: perirectal  -MB Primary Wound Type: Pressure inj  -MB    Retired Wound - Properties Group Date first assessed: 08/04/23  -MB Time first assessed: 1746  -MB Present on Hospital Admission: Y  -MB Side: Left  -MB Location: perirectal  -MB Primary Wound Type: Pressure inj  -MB      Row Name             Wound 08/04/23 1746 Right medial perirectal Pressure Injury    Wound - Properties Group Placement Date: 08/04/23  -MB Placement Time: 1746  -MB Present on Hospital Admission: Y  -MB Side: Right  -MB Orientation: medial  -MB Location: perirectal  -MB Primary Wound Type: Pressure inj  -MB    Retired Wound - Properties Group Placement Date: 08/04/23  -MB Placement Time: 1746  -MB Present on Hospital Admission: Y  -MB Side: Right  -MB Orientation: medial  -MB Location: perirectal  -MB Primary Wound Type: Pressure inj  -MB    Retired Wound - Properties Group Date first assessed: 08/04/23  -MB Time first assessed: 1746  -MB Present on Hospital Admission: Y  -MB Side: Right  -MB Location: perirectal  -MB Primary Wound Type: Pressure inj  -MB      Row Name 08/09/23 1259          Plan of Care Review    Plan of Care Reviewed With patient;spouse  -      Progress no change  -     Outcome Evaluation pt very motivated to work with therapy, trans to EOB mod 2 with wife assisting, sit-stand mod 2, took few steps bed-BSC-bed with rollator min-mod, pt sat EOB 15 minutes sba, trans back to bed mod 2. pt would benefit from rehab  -       Row Name 08/09/23 1259          Positioning and Restraints    Pre-Treatment Position in bed  -     Post Treatment Position bed  -     In Bed fowlers;call light within reach;encouraged to call for assist;with family/caregiver  -               User Key  (r) = Recorded By, (t) = Taken By, (c) = Cosigned By      Initials Name Provider Type     Makayla Stroud PTA Physical Therapist Assistant    Sandra Glez, RN Registered Nurse                    Physical Therapy Education       Title: PT OT SLP Therapies (In Progress)       Topic: Physical Therapy (Done)       Point: Mobility training (Done)       Learning Progress Summary             Patient Acceptance, E, VU by  at 8/7/2023 1434    Comment: Pt and spouse were educated on repositioning, safety w/ moving from bed, bedside activites, POC, and d/c                         Point: Body mechanics (Done)       Learning Progress Summary             Patient Acceptance, E, VU by CG at 8/7/2023 1434    Comment: Pt and spouse were educated on repositioning, safety w/ moving from bed, bedside activites, POC, and d/c                         Point: Precautions (Done)       Learning Progress Summary             Patient Acceptance, E, VU by CG at 8/7/2023 1434    Comment: Pt and spouse were educated on repositioning, safety w/ moving from bed, bedside activites, POC, and d/c                                         User Key       Initials Effective Dates Name Provider Type Discipline     04/27/23 -  Joss Modi, PT Student PT Student PT                  PT Recommendation and Plan     Plan of Care Reviewed With: patient, spouse  Progress: no change  Outcome Evaluation: pt very motivated  to work with therapy, trans to EOB mod 2 with wife assisting, sit-stand mod 2, took few steps bed-BSC-bed with rollator min-mod, pt sat EOB 15 minutes sba, trans back to bed mod 2. pt would benefit from rehab   Outcome Measures       Row Name 08/09/23 1400 08/09/23 0900 08/08/23 2383       How much help from another person do you currently need...    Turning from your back to your side while in flat bed without using bedrails? 3  -AH -- --    Moving from lying on back to sitting on the side of a flat bed without bedrails? 2  -AH -- --    Moving to and from a bed to a chair (including a wheelchair)? 2  -AH -- --    Standing up from a chair using your arms (e.g., wheelchair, bedside chair)? 2  -AH -- --    Climbing 3-5 steps with a railing? 1  -AH -- --    To walk in hospital room? 1  -AH -- --    AM-PAC 6 Clicks Score (PT) 11  -AH -- --       How much help from another is currently needed...    Putting on and taking off regular lower body clothing? -- 1  -AC (r) WB (t) AC (c) 1  -AC (r) WB (t) AC (c)    Bathing (including washing, rinsing, and drying) -- 2  -AC (r) WB (t) AC (c) 2  -AC (r) WB (t) AC (c)    Toileting (which includes using toilet bed pan or urinal) -- 2  -AC (r) WB (t) AC (c) 2  -AC (r) WB (t) AC (c)    Putting on and taking off regular upper body clothing -- 3  -AC (r) WB (t) AC (c) 3  -AC (r) WB (t) AC (c)    Taking care of personal grooming (such as brushing teeth) -- 4  -AC (r) WB (t) AC (c) 4  -AC (r) WB (t) AC (c)    Eating meals -- 4  -AC (r) WB (t) AC (c) 4  -AC (r) WB (t) AC (c)    AM-PAC 6 Clicks Score (OT) -- 16  -AC (r) WB (t) 16  -AC (r) WB (t)       Functional Assessment    Outcome Measure Options AM-PAC 6 Clicks Basic Mobility (PT)  -AH AM-PAC 6 Clicks Daily Activity (OT)  -AC (r) WB (t) AC (c) AM-PAC 6 Clicks Daily Activity (OT)  -AC (r) WB (t) AC (c)      Row Name 08/08/23 1400 08/07/23 0800          How much help from another person do you currently need...    Turning from your back  to your side while in flat bed without using bedrails? 3  -AH --     Moving from lying on back to sitting on the side of a flat bed without bedrails? 3  -AH --     Moving to and from a bed to a chair (including a wheelchair)? 2  -AH --     Standing up from a chair using your arms (e.g., wheelchair, bedside chair)? 2  -AH --     Climbing 3-5 steps with a railing? 1  -AH --     To walk in hospital room? 2  -AH --     AM-PAC 6 Clicks Score (PT) 13  -AH --        How much help from another is currently needed...    Putting on and taking off regular lower body clothing? -- 1  -AC (r) WB (t) AC (c)     Bathing (including washing, rinsing, and drying) -- 2  -AC (r) WB (t) AC (c)     Toileting (which includes using toilet bed pan or urinal) -- 2  -AC (r) WB (t) AC (c)     Putting on and taking off regular upper body clothing -- 2  -AC (r) WB (t) AC (c)     Taking care of personal grooming (such as brushing teeth) -- 3  -AC (r) WB (t) AC (c)     Eating meals -- 3  -AC (r) WB (t) AC (c)     AM-PAC 6 Clicks Score (OT) -- 13  -AC (r) WB (t)        Functional Assessment    Outcome Measure Options AM-[facility] 6 Clicks Basic Mobility (PT)  - AM-PAC 6 Clicks Daily Activity (OT)  -AC (r) WB (t) AC (c)               User Key  (r) = Recorded By, (t) = Taken By, (c) = Cosigned By      Initials Name Provider Type    Venkat Yu, OTR/L, CNT Occupational Therapist    Makayla More, PTA Physical Therapist Assistant    Agustin Lopez, OT Student OT Student                     Time Calculation:    PT Charges       Row Name 08/09/23 1401             Time Calculation    Start Time 1259  -      Stop Time 1345  -      Time Calculation (min) 46 min  -      PT Received On 08/09/23  -         Time Calculation- PT    Total Timed Code Minutes- PT 46 minute(s)  -         Timed Charges    02505 - PT Therapeutic Activity Minutes 46  -AH         Total Minutes    Timed Charges Total Minutes 46  -AH       Total Minutes 46  -AH                 User Key  (r) = Recorded By, (t) = Taken By, (c) = Cosigned By      Initials Name Provider Type    Makayla More PTA Physical Therapist Assistant                  Therapy Charges for Today       Code Description Service Date Service Provider Modifiers Qty    01019715228 HC PT THERAPEUTIC ACT EA 15 MIN 8/8/2023 Maakyla Stroud PTA GP 2    61376462097 HC PT THERAPEUTIC ACT EA 15 MIN 8/9/2023 Makayla Stroud PTA GP 3            PT G-Codes  Outcome Measure Options: AM-PAC 6 Clicks Basic Mobility (PT)  AM-PAC 6 Clicks Score (PT): 11  AM-PAC 6 Clicks Score (OT): 16    Makayla Stroud PTA  8/9/2023

## 2023-08-09 NOTE — PROGRESS NOTES
[facility]  Inpatient Progress Note      Date of Admission: 8/4/2023  Date of Service:  08/09/23    Reason for Follow Up: Diarrhea    Subjective     Subjective:   Patient is lying in bed with family at bedside.  He was started on Questran yesterday as well as lactose-free diet.  Reports only 2 occurrences of diarrhea since yesterday.  No signs of GI blood loss.  He denies abdominal pain.    Current Facility-Administered Medications:     acetaminophen (TYLENOL) tablet 650 mg, 650 mg, Oral, Q4H PRN, Tj Hyde MD    apixaban (ELIQUIS) tablet 10 mg, 10 mg, Oral, Q12H, 10 mg at 08/08/23 2056 **FOLLOWED BY** [START ON 8/15/2023] apixaban (ELIQUIS) tablet 5 mg, 5 mg, Oral, Q12H, Tj Hyde MD    Calcium Replacement - Follow Nurse / BPA Driven Protocol, , Does not apply, PRBarrett TURCIOS Samuel F, MD    cefTRIAXone (ROCEPHIN) 2,000 mg in sodium chloride 0.9 % 100 mL IVPB, 2,000 mg, Intravenous, Q24H, Eugene Stock MD, Last Rate: 200 mL/hr at 08/08/23 0859, 2,000 mg at 08/08/23 0859    cholestyramine (QUESTRAN) packet 1 packet, 1 packet, Oral, TID CLEO, Rohan Nguyen, APRN, 1 packet at 08/08/23 1642    lactobacillus acidophilus (RISAQUAD) capsule 1 capsule, 1 capsule, Oral, BID, Tj Hyde MD, 1 capsule at 08/08/23 2056    loperamide (IMODIUM) capsule 2 mg, 2 mg, Oral, TID PRNBarrett Samuel F, MD    Magnesium Standard Dose Replacement - Follow Nurse / BPA Driven Protocol, , Does not apply, Barrett SMALLS Samuel F, MD    melatonin tablet 5 mg, 5 mg, Oral, Nightly PRBarrett TURCIOS Samuel F, MD    Phosphorus Replacement - Follow Nurse / BPA Driven Protocol, , Does not apply, Barrett SMALLS Samuel F, MD    Potassium Replacement - Follow Nurse / BPA Driven Protocol, , Does not apply, Barrett SMALLS Samuel F, MD    [COMPLETED] Insert Peripheral IV, , , Once **AND** sodium chloride 0.9 % flush 10 mL, 10 mL, Intravenous, PRN, Tj Hyde MD    sodium chloride  0.9 % flush 10 mL, 10 mL, Intravenous, Q12H, Tj Hyde MD, 10 mL at 08/08/23 0901    sodium chloride 0.9 % flush 10 mL, 10 mL, Intravenous, PRN, Tj Hyde MD, 10 mL at 08/05/23 2030    sodium chloride 0.9 % infusion 40 mL, 40 mL, Intravenous, PRN, Tj Hyde MD, 40 mL at 08/08/23 0858    traMADol (ULTRAM) tablet 50 mg, 50 mg, Oral, Q6H PRN, Tj Hyde MD    Review of Systems     Constitution:  negative for chills, fatigue and fevers  Eyes:  negative for blurriness and change of vision  ENT:   negative for sore throat and voice change  Respiratory: negative for  cough and shortness of air  Cardiovascular:  Negative for chest pain or palpitations  Gastrointestinal:  negative for  See HPI  Genitourinary:  negative for  blood in urine and painful urination  Integument: negative for  rash and redness  Hematologic / Lymphatic: negative for  excessive bleeding and easy bruising  Musculoskeletal: negative for  joint pain and joint stiffness out of the ordinary  Neurological:  negative for  seizures and speech abnormality  Behavioral/Psych:  negative for  anxiety and depression out of the ordinary  Endocrine: negative for  diabetes and weight loss, unintended  Allergies / Immunologic:  negative for  anaphylaxis and rash        Objective     Vital Signs  Temp:  [97.5 øF (36.4 øC)-98.1 øF (36.7 øC)] 97.6 øF (36.4 øC)  Heart Rate:  [70-90] 89  Resp:  [16-18] 18  BP: ()/(67-85) 95/81  Body mass index is 26.66 kg/mý.  No intake or output data in the 24 hours ending 08/09/23 0850  No intake/output data recorded.       Physical Exam:   General: patient awake, alert and cooperative   Eyes: Normal lids and lashes, no scleral icterus   Neck: supple, normal ROM   Skin: warm and dry, not jaundiced, erythema, skin tears, did not assess coccyx   Cardiovascular: regular rhythm and rate, no murmurs auscultated   Pulm: clear to auscultation bilaterally, regular and unlabored   Abdomen: soft,  nontender, nondistended; normal bowel sounds   Rectal: deferred   Extremities: no rash, positive bilateral lower extremity edema,   Psychiatric: Normal mood and behavior; memory intact         Results Review:    I have reviewed all of the patients current test results  Results from last 7 days   Lab Units 08/09/23  0531 08/08/23  0501 08/07/23  0341   WBC 10*3/mm3 8.92 8.56 7.62   HEMOGLOBIN g/dL 11.4* 11.0* 10.7*   HEMATOCRIT % 35.3* 34.4* 32.5*   PLATELETS 10*3/mm3 152 147 159       Results from last 7 days   Lab Units 08/09/23  0531 08/08/23  0501 08/07/23  0341   SODIUM mmol/L 129* 130* 132*   POTASSIUM mmol/L 4.0 3.9 3.9   CHLORIDE mmol/L 103 103 108*   CO2 mmol/L 20.0* 19.0* 18.0*   BUN mg/dL 55* 54* 51*   CREATININE mg/dL 1.24 1.45* 1.50*   CALCIUM mg/dL 7.0* 6.9* 7.1*   BILIRUBIN mg/dL 0.2 0.2 0.2   ALK PHOS U/L 258* 249* 214*   ALT (SGPT) U/L 35 32 27   AST (SGOT) U/L 47* 42* 39   GLUCOSE mg/dL 81 93 100*             No results found for: LIPASE    Radiology:    Imaging Results (Last 24 Hours)       Procedure Component Value Units Date/Time    US Venous Doppler Lower Extremity Bilateral (duplex) [[id number]] Collected: 08/08/23 1522     Updated: 08/08/23 1527    Narrative:      History: Swelling       Impression:      Impression: There is evidence of deep venous thrombosis in both lower  extremities.     Comments: Bilateral lower extremity venous duplex exam was performed  using color Doppler flow, Doppler waveform analysis, and grayscale  imaging, with and without compression. There is evidence of extensive  deep venous thrombosis in the common femoral, superficial femoral,  popliteal, peroneal, anterior tibial, and posterior tibial veins in the  right lower extremity. There is also evidence of deep venous thrombosis  in the popliteal vein in the left lower extremity. No thrombus is  identified in the saphenofemoral junctions and greater saphenous veins  bilaterally.         This report was finalized on  08/08/2023 15:24 by Dr. Fransico Lundy MD.              Assessment & Plan       Cellulitis of right upper extremity    Prostate cancer    Gout, arthropathy    Hypertension    Malignant neoplasm of trigone of urinary bladder    Stage 3a chronic kidney disease    Protein deficiency    Sepsis due to Gram negative bacteria      Impression/Plan    Diarrhea  Cellulitis  History of colon polyps     Diarrhea has improved with Questran.  I did advise Mr. Monzon to hold Questran if he begins to feel backed up.  He does still have Imodium as needed.  Colonoscopy last year with benign polyp.  Negative small bowel biopsy last year for celiac.  Stool testing has been negative.  Diarrhea likely exacerbated by use of antibiotics.  No plans on colonoscopy/endoscopy at this time.  Questran can be changed to Colest[place] upon discharge.  GI will continue to follow.        Electronically signed by RENE Calloway, 08/09/23, 8:45 AM CDT.       RENE Calloway  08/09/23  08:50 CDT  .

## 2023-08-09 NOTE — CASE MANAGEMENT/SOCIAL WORK
Continued Stay Note   [place]     Patient Name: Aleks Monzon  MRN: [id number]  Today's Date: 8/9/2023    Admit Date: 8/4/2023    Plan: SNF vs Acute Rehab   Discharge Plan       Row Name 08/09/23 1240       Plan    Plan Comments SW spoke to pt and wife in room regarding dc plan and rehab. Provided Medicare Compare list and [facility] is preferred. Referral has been sent to [facility], await answer.                   Discharge Codes    No documentation.                 Expected Discharge Date and Time       Expected Discharge Date Expected Discharge Time    Aug 10, 2023               BRETT Umaña

## 2023-08-09 NOTE — PLAN OF CARE
Goal Outcome Evaluation:  Plan of Care Reviewed With: patient, spouse        Progress: no change  Outcome Evaluation: pt very motivated to work with therapy, trans to EOB mod 2 with wife assisting, sit-stand mod 2, took few steps bed-BSC-bed with rollator min-mod, pt sat EOB 15 minutes sba, trans back to bed mod 2. pt would benefit from rehab

## 2023-08-09 NOTE — PLAN OF CARE
Goal Outcome Evaluation: No complaints of pain this shift. Wound care is following patient and patient has orders for dressing changes to right upper extremity. Patient safety to be maintained this shift, continue to monitor and report abnormal to provider.

## 2023-08-09 NOTE — PLAN OF CARE
Goal Outcome Evaluation:  Plan of Care Reviewed With: patient, spouse        Progress: improving  Outcome Evaluation: OT tx completed. Pt reports 1/10 back pain and decreased frequency for BM overnight. Agreeable to therapy. Completed supine-sit with CGA, demonstrating improvement. Sit-stand mod-maxAx2. Upon standing, pt had BM and requested to step to BSC. ModA to transfer to BSC. Toileting hygiene completed maxA. Lower body dressing maxA-dependent. Upon returning to EOB, pt reported dizziness and tunnel vision. BP 96/72 and SpO2 83%. Returned to supine with modA. /83 and SpO2 remained at 83%. With cues for pursed lip breathing, SpO2 returned to 90% within 2 minutes. Nsg made aware. OT will continue POC. Recommend further rehab at d/c.

## 2023-08-09 NOTE — PROGRESS NOTES
"INFECTIOUS DISEASES PROGRESS NOTE    Patient:  Aleks Monzon  YOB: 1950  MRN: [id number]   Admit date: 2023   Admitting Physician: Tj Hyde MD  Primary Care Physician: Tj Hyde MD    Chief Complaint: Wondering if he can have a sherbet        Interval History: Patient noted he is on lactose-free diet and wondered if he could have sherbet.  I told him I would check at the  when I went up there to see if they could verify with dietitian.      Allergies:   Allergies   Allergen Reactions    Niacin Itching     same as of 2017-itching         Current Scheduled Medications:   apixaban, 10 mg, Oral, Q12H   Followed by  [START ON 8/15/2023] apixaban, 5 mg, Oral, Q12H  cefTRIAXone, 2,000 mg, Intravenous, Q24H  cholestyramine, 1 packet, Oral, TID AC  lactobacillus acidophilus, 1 capsule, Oral, BID  sodium chloride, 10 mL, Intravenous, Q12H      Current PRN Medications:    acetaminophen    Calcium Replacement - Follow Nurse / BPA Driven Protocol    loperamide    Magnesium Standard Dose Replacement - Follow Nurse / BPA Driven Protocol    melatonin    Phosphorus Replacement - Follow Nurse / BPA Driven Protocol    Potassium Replacement - Follow Nurse / BPA Driven Protocol    [COMPLETED] Insert Peripheral IV **AND** sodium chloride    sodium chloride    sodium chloride    traMADol      Objective     Vital Signs:  Temp (24hrs), Av.7 øF (36.5 øC), Min:97.5 øF (36.4 øC), Max:98.1 øF (36.7 øC)      BP 95/81 (BP Location: Left arm, Patient Position: Lying) Comment: RN notified  Pulse 89   Temp 97.6 øF (36.4 øC) (Oral)   Resp 18   Ht 188 cm (74\")   Wt 94.2 kg (207 lb 10.8 oz)   SpO2 96%   BMI 26.66 kg/mý         Physical Exam    General: Patient chronically ill-appearing sitting up in bed in no acute distress.  His wife is at bedside  Right upper extremity cellulitis appears nearly resolved and edema much improved.  He does have a large dressing in place over his " entire forearm      Results Review:    I reviewed the patient's new clinical results.    Lab Results:    CBC:   Lab Results   Lab 08/04/23  1413 08/06/23  0402 08/07/23 0341 08/08/23  0501 08/09/23  0531   WBC 17.21* 9.73 7.62 8.56 8.92   HEMOGLOBIN 12.1* 10.6* 10.7* 11.0* 11.4*   HEMATOCRIT 37.0* 33.0* 32.5* 34.4* 35.3*   PLATELETS 185 182 159 147 152          AutoDiff:   Lab Results   Lab 08/04/23  1413 08/06/23  0402 08/07/23 0341 08/08/23  0501 08/09/23  0531   NEUTROPHIL % 89.1* 86.4* 79.6*  --   --    LYMPHOCYTE % 4.8* 5.4* 8.9*  --   --    MONOCYTES % 3.3* 5.0 5.6  --   --    EOSINOPHIL % 0.2* 0.5 1.3  --   --    BASOPHIL % 0.2 0.3 0.4  --   --    NEUTROS ABS 15.32* 8.40* 6.06 7.94* 8.47*   LYMPHS ABS 0.83 0.53* 0.68*  --   --    MONOS ABS 0.57 0.49 0.43  --   --    EOS ABS 0.03 0.05 0.10 0.18  --    BASOS ABS 0.04 0.03 0.03  --   --           Manual Diff:    Lab Results   Lab 08/07/23 0341 08/08/23 0501 08/08/23  0501 08/09/23  0531   NEUTROPHIL %  --  86.6*  --  89.0*   LYMPHOCYTE %  --  0.0*  --  2.0*   MONOCYTES %  --  2.1*  --  1.0*   BANDS %  --  6.2*  --  6.0*   METAMYELOCYTE %  --  3.1*  --   --    NEUTROS ABS 6.06 7.94*  --  8.47*   LYMPHS ABS  --  0.00*  --  0.18*   ANISOCYTOSIS  --  Slight/1+  --  Slight/1+   POIKILOCYTES  --  Large/3+  --  Mod/2+   WBC MORPHOLOGY  --  Normal  --  Normal   PLT MORPH  --  Normal   < > Normal    < > = values in this interval not displayed.             CMP:   Lab Results   Lab 08/07/23  0341 08/08/23  0501 08/09/23  0531   SODIUM 132* 130* 129*   POTASSIUM 3.9 3.9 4.0   CHLORIDE 108* 103 103   CO2 18.0* 19.0* 20.0*   BUN 51* 54* 55*   CREATININE 1.50* 1.45* 1.24   CALCIUM 7.1* 6.9* 7.0*   BILIRUBIN 0.2 0.2 0.2   ALK PHOS 214* 249* 258*   ALT (SGPT) 27 32 35   AST (SGOT) 39 42* 47*   GLUCOSE 100* 93 81         Estimated Creatinine Clearance: 72.8 mL/min (by C-G formula based on SCr of 1.24 mg/dL).    Culture Results:    Microbiology Results (last 10 days)        Procedure Component Value - Date/Time    Miscellaneous Micro Request - Specimen Not Sent, Specimen Not Sent [[id number]] Resulted: 08/08/23 1319    Lab Status: Final result Specimen: Specimen Not Sent Updated: 08/08/23 1319     Extra Tube --    Narrative:      Cefazolin reported.    Urine Culture - Urine, Urine, Clean Catch [[id number]]  (Normal) Collected: 08/07/23 1332    Lab Status: Final result Specimen: Urine, Clean Catch Updated: 08/08/23 0919     Urine Culture No growth    Wound Culture - Wound, Arm, Right [[id number]]  (Abnormal)  (Susceptibility) Collected: 08/04/23 1612    Lab Status: Final result Specimen: Wound from Arm, Right Updated: 08/06/23 0956     Wound Culture Light growth (2+) Staphylococcus aureus      Rare Providencia rettgeri      Light growth (2+) Normal Skin Leticia     Gram Stain Few (2+) Gram positive cocci      No WBCs seen    Susceptibility        Staphylococcus aureus      DICKSON      Clindamycin Susceptible      Erythromycin Susceptible      Inducible Clindamycin Resistance Negative      Oxacillin Susceptible      Rifampin Susceptible      Tetracycline Susceptible      Trimethoprim + Sulfamethoxazole Susceptible      Vancomycin Susceptible                       Susceptibility        Providencia rettgeri      DICKSON      Ampicillin Resistant      Ampicillin + Sulbactam Susceptible      Cefepime Susceptible      Ceftazidime Susceptible      Ceftriaxone Susceptible      Gentamicin Susceptible      Levofloxacin Susceptible      Piperacillin + Tazobactam Susceptible      Tetracycline Resistant      Trimethoprim + Sulfamethoxazole Susceptible                       Susceptibility Comments       Staphylococcus aureus    This isolate does not demonstrate inducible clindamycin resistance in vitro.        Providencia rettgeri    Cefazolin sensitivity will not be reported for Enterobacteriaceae in non-urine isolates. If cefazolin is preferred, please call the microbiology lab to request an E-test.  With the  exception of urinary-sourced infections, aminoglycosides should not be used as monotherapy.               Blood Culture - Blood, Arm, Right [[id number]]  (Abnormal) Collected: 08/04/23 1419    Lab Status: Final result Specimen: Blood from Arm, Right Updated: 08/08/23 0537     Blood Culture Providencia rettgeri     Isolated from Anaerobic Bottle     Gram Stain Anaerobic Bottle Gram negative bacilli    Narrative:      Refer to previous blood culture collected on 08/04/2023 1513 for MICS.      Blood Culture - Blood, Arm, Left [[id number]]  (Abnormal)  (Susceptibility) Collected: 08/04/23 1413    Lab Status: Edited Result - FINAL Specimen: Blood from Arm, Left Updated: 08/08/23 1318     Blood Culture Providencia rettgeri     Isolated from Aerobic and Anaerobic Bottles     Gram Stain Anaerobic Bottle Gram negative bacilli      Aerobic Bottle Gram negative bacilli    Susceptibility        Providencia rettgeri      DICKSON      Ampicillin Intermediate      Ampicillin + Sulbactam Susceptible      Cefazolin Resistant (C)  [1]       Cefepime Susceptible      Ceftazidime Susceptible      Ceftriaxone Susceptible      Gentamicin Susceptible      Levofloxacin Susceptible      Piperacillin + Tazobactam Susceptible      Trimethoprim + Sulfamethoxazole Susceptible                   [1]  Appended report. These results have been appended to a previously final verified report.               Susceptibility Comments       Providencia rettgeri    Cefazolin sensitivity will not be reported for Enterobacteriaceae in non-urine isolates. If cefazolin is preferred, please call the microbiology lab to request an E-test.  With the exception of urinary-sourced infections, aminoglycosides should not be used as monotherapy.               Blood Culture ID, PCR - Blood, Arm, Left [[id number]]  (Abnormal) Collected: 08/04/23 1413    Lab Status: Final result Specimen: Blood from Arm, Left Updated: 08/05/23 0741     BCID, PCR Enterobacterales spp.  Identification by BCID2 PCR.     BOTTLE TYPE Anaerobic Bottle    Narrative:      No resistance genes detected.    Clostridioides difficile Toxin - Stool, Per Rectum [[id number]]  (Normal) Collected: 08/03/23 1514    Lab Status: Final result Specimen: Stool from Per Rectum Updated: 08/03/23 1700    Narrative:      The following orders were created for panel order Clostridioides difficile Toxin - Stool, Per Rectum.  Procedure                               Abnormality         Status                     ---------                               -----------         ------                     Clostridioides difficile...[[id number]]  Normal              Final result                 Please view results for these tests on the individual orders.    Gastrointestinal Panel, PCR - Stool, Per Rectum [[id number]]  (Normal) Collected: 08/03/23 1514    Lab Status: Final result Specimen: Stool from Per Rectum Updated: 08/03/23 1728     Campylobacter Not Detected     Plesiomonas shigelloides Not Detected     Salmonella Not Detected     Vibrio Not Detected     Vibrio cholerae Not Detected     Yersinia enterocolitica Not Detected     Enteroaggregative E. coli (EAEC) Not Detected     Enteropathogenic E. coli (EPEC) Not Detected     Enterotoxigenic E. coli (ETEC) lt/st Not Detected     Shiga-like toxin-producing E. coli (STEC) stx1/stx2 Not Detected     Shigella/Enteroinvasive E. coli (EIEC) Not Detected     Cryptosporidium Not Detected     Cyclospora cayetanensis Not Detected     Entamoeba histolytica Not Detected     Giardia lamblia Not Detected     Adenovirus F40/41 Not Detected     Astrovirus Not Detected     Norovirus GI/GII Not Detected     Rotavirus A Not Detected     Sapovirus (I, II, IV or V) Not Detected    Narrative:      If Aeromonas, Staphylococcus aureus or Bacillus cereus are suspected, please order [id number]: Stool Culture, Aeromonas, S aureus, B Cereus.    Clostridioides difficile Toxin, PCR - Stool, Per Rectum [[id number]]   (Normal) Collected: 08/03/23 1514    Lab Status: Final result Specimen: Stool from Per Rectum Updated: 08/03/23 1700     Toxigenic C. difficile by PCR Negative    Narrative:      The result indicates the absence of toxigenic C. difficile from stool specimen.                  Radiology:   Imaging Results (Last 72 Hours)       Procedure Component Value Units Date/Time    US Venous Doppler Lower Extremity Bilateral (duplex) [[id number]] Collected: 08/08/23 1522     Updated: 08/08/23 1527    Narrative:      History: Swelling       Impression:      Impression: There is evidence of deep venous thrombosis in both lower  extremities.     Comments: Bilateral lower extremity venous duplex exam was performed  using color Doppler flow, Doppler waveform analysis, and grayscale  imaging, with and without compression. There is evidence of extensive  deep venous thrombosis in the common femoral, superficial femoral,  popliteal, peroneal, anterior tibial, and posterior tibial veins in the  right lower extremity. There is also evidence of deep venous thrombosis  in the popliteal vein in the left lower extremity. No thrombus is  identified in the saphenofemoral junctions and greater saphenous veins  bilaterally.         This report was finalized on 08/08/2023 15:24 by Dr. Fransico Lundy MD.            Assessment & Plan     Active Hospital Problems    Diagnosis     **Cellulitis of right upper extremity     Sepsis due to Gram negative bacteria     Protein deficiency     Stage 3a chronic kidney disease     Malignant neoplasm of trigone of urinary bladder     Hypertension     Gout, arthropathy     Prostate cancer        IMPRESSION:  Providencia rettgeri bacteremia- likely to be urinary tract etiology in a 73-year-old man with his history of bladder cancer, prostate cancer and retroperitoneal fibrosis  Right upper extremity cellulitis with swab cultures of skin tear positive for methicillin susceptible Staphylococcus aureus and  Providencia-significantly improved  Diarrhea-ongoing for 1 month approximately.  GI panel by PCR and C. difficile negative as outpatient.  I ordered microsporidia yesterday.  GI has been consulted  Chronic kidney disease stage IIIa-creatinine improved today  Bladder cancer with history of chemotherapy and radiation.  Patient with pyuria noted on urinalysis.  Culture no growth however was collected after antibiotics  Lower extremity DVT-noted on vascular studies yesterday-started on apixaban    RECOMMENDATION:   Continue ceftriaxone-Providencia isolate is resistant to cefazolin-can consider transition to oral cefdinir soon  Placed order for nursing to remove large dressing and replaced with smaller dressing over skin tear.  Monitor diarrhea with the addition of Questran and lactose-free diet ordered per GI on August 8.    Discussed at bedside with Dr. Barrett Khalil MD  08/09/23  08:31 CDT

## 2023-08-10 VITALS
OXYGEN SATURATION: 96 % | RESPIRATION RATE: 18 BRPM | HEART RATE: 88 BPM | SYSTOLIC BLOOD PRESSURE: 101 MMHG | HEIGHT: 74 IN | TEMPERATURE: 97.8 F | WEIGHT: 207.67 LBS | DIASTOLIC BLOOD PRESSURE: 77 MMHG | BODY MASS INDEX: 26.65 KG/M2

## 2023-08-10 LAB
ALBUMIN SERPL-MCNC: 1.4 G/DL (ref 3.5–5.2)
ALBUMIN/GLOB SERPL: 0.7 G/DL
ALP SERPL-CCNC: 302 U/L (ref 39–117)
ALT SERPL W P-5'-P-CCNC: 48 U/L (ref 1–41)
ANION GAP SERPL CALCULATED.3IONS-SCNC: 6 MMOL/L (ref 5–15)
AST SERPL-CCNC: 80 U/L (ref 1–40)
BILIRUB SERPL-MCNC: 0.3 MG/DL (ref 0–1.2)
BUN SERPL-MCNC: 56 MG/DL (ref 8–23)
BUN/CREAT SERPL: 39.2 (ref 7–25)
BURR CELLS BLD QL SMEAR: ABNORMAL
CALCIUM SPEC-SCNC: 6.7 MG/DL (ref 8.6–10.5)
CHLORIDE SERPL-SCNC: 102 MMOL/L (ref 98–107)
CLUMPED PLATELETS: PRESENT
CO2 SERPL-SCNC: 21 MMOL/L (ref 22–29)
CREAT SERPL-MCNC: 1.43 MG/DL (ref 0.76–1.27)
DEPRECATED RDW RBC AUTO: 50.2 FL (ref 37–54)
EGFRCR SERPLBLD CKD-EPI 2021: 52.4 ML/MIN/1.73
ERYTHROCYTE [DISTWIDTH] IN BLOOD BY AUTOMATED COUNT: 16.1 % (ref 12.3–15.4)
GLOBULIN UR ELPH-MCNC: 1.9 GM/DL
GLUCOSE SERPL-MCNC: 90 MG/DL (ref 65–99)
HCT VFR BLD AUTO: 35.3 % (ref 37.5–51)
HGB BLD-MCNC: 11.3 G/DL (ref 13–17.7)
LYMPHOCYTES # BLD MANUAL: 0 10*3/MM3 (ref 0.7–3.1)
LYMPHOCYTES NFR BLD MANUAL: 1 % (ref 5–12)
MCH RBC QN AUTO: 27.4 PG (ref 26.6–33)
MCHC RBC AUTO-ENTMCNC: 32 G/DL (ref 31.5–35.7)
MCV RBC AUTO: 85.7 FL (ref 79–97)
MONOCYTES # BLD: 0.08 10*3/MM3 (ref 0.1–0.9)
MYELOCYTES NFR BLD MANUAL: 3 % (ref 0–0)
NEUTROPHILS # BLD AUTO: 7.65 10*3/MM3 (ref 1.7–7)
NEUTROPHILS NFR BLD MANUAL: 87.1 % (ref 42.7–76)
NEUTS BAND NFR BLD MANUAL: 8.9 % (ref 0–5)
PLATELET # BLD AUTO: 152 10*3/MM3 (ref 140–450)
PMV BLD AUTO: 9.3 FL (ref 6–12)
POIKILOCYTOSIS BLD QL SMEAR: ABNORMAL
POTASSIUM SERPL-SCNC: 3.9 MMOL/L (ref 3.5–5.2)
PROT SERPL-MCNC: 3.3 G/DL (ref 6–8.5)
RBC # BLD AUTO: 4.12 10*6/MM3 (ref 4.14–5.8)
SARS-COV-2 AG RESP QL IA.RAPID: NORMAL
SODIUM SERPL-SCNC: 129 MMOL/L (ref 136–145)
VARIANT LYMPHS NFR BLD MANUAL: 0 % (ref 19.6–45.3)
WBC MORPH BLD: NORMAL
WBC NRBC COR # BLD: 7.97 10*3/MM3 (ref 3.4–10.8)

## 2023-08-10 PROCEDURE — 97110 THERAPEUTIC EXERCISES: CPT

## 2023-08-10 PROCEDURE — 80053 COMPREHEN METABOLIC PANEL: CPT | Performed by: FAMILY MEDICINE

## 2023-08-10 PROCEDURE — 97535 SELF CARE MNGMENT TRAINING: CPT

## 2023-08-10 PROCEDURE — 99232 SBSQ HOSP IP/OBS MODERATE 35: CPT | Performed by: INTERNAL MEDICINE

## 2023-08-10 PROCEDURE — 85007 BL SMEAR W/DIFF WBC COUNT: CPT | Performed by: FAMILY MEDICINE

## 2023-08-10 PROCEDURE — 99232 SBSQ HOSP IP/OBS MODERATE 35: CPT | Performed by: NURSE PRACTITIONER

## 2023-08-10 PROCEDURE — 97530 THERAPEUTIC ACTIVITIES: CPT

## 2023-08-10 PROCEDURE — 85025 COMPLETE CBC W/AUTO DIFF WBC: CPT | Performed by: FAMILY MEDICINE

## 2023-08-10 PROCEDURE — 87426 SARSCOV CORONAVIRUS AG IA: CPT | Performed by: STUDENT IN AN ORGANIZED HEALTH CARE EDUCATION/TRAINING PROGRAM

## 2023-08-10 PROCEDURE — 25010000002 CEFTRIAXONE PER 250 MG: Performed by: INTERNAL MEDICINE

## 2023-08-10 RX ORDER — CHOLESTYRAMINE 4 G/9G
1 POWDER, FOR SUSPENSION ORAL
Status: DISCONTINUED | OUTPATIENT
Start: 2023-08-10 | End: 2023-08-10 | Stop reason: HOSPADM

## 2023-08-10 RX ORDER — CHOLESTYRAMINE 4 G/9G
1 POWDER, FOR SUSPENSION ORAL
Start: 2023-08-10

## 2023-08-10 RX ORDER — L.ACID,PARA/B.BIFIDUM/S.THERM 8B CELL
1 CAPSULE ORAL 2 TIMES DAILY
Qty: 30 CAPSULE | Refills: 1
Start: 2023-08-10

## 2023-08-10 RX ORDER — CEFDINIR 300 MG/1
300 CAPSULE ORAL 2 TIMES DAILY
Start: 2023-08-10 | End: 2023-08-18

## 2023-08-10 RX ORDER — LOPERAMIDE HYDROCHLORIDE 2 MG/1
2 CAPSULE ORAL 3 TIMES DAILY PRN
Start: 2023-08-10

## 2023-08-10 RX ADMIN — CHOLESTYRAMINE 1 PACKET: 4 POWDER, FOR SUSPENSION ORAL at 11:35

## 2023-08-10 RX ADMIN — CHOLESTYRAMINE 1 PACKET: 4 POWDER, FOR SUSPENSION ORAL at 17:32

## 2023-08-10 RX ADMIN — Medication 1 CAPSULE: at 08:07

## 2023-08-10 RX ADMIN — CEFTRIAXONE SODIUM 2000 MG: 2 INJECTION, POWDER, FOR SOLUTION INTRAMUSCULAR; INTRAVENOUS at 08:07

## 2023-08-10 RX ADMIN — Medication 10 ML: at 08:08

## 2023-08-10 RX ADMIN — CHOLESTYRAMINE 1 PACKET: 4 POWDER, FOR SUSPENSION ORAL at 08:06

## 2023-08-10 RX ADMIN — APIXABAN 10 MG: 5 TABLET, FILM COATED ORAL at 08:07

## 2023-08-10 NOTE — DISCHARGE SUMMARY
Hospital Discharge Summary    Aleks Monzon  :  1950  MRN:  [id number]    Admit date:  2023  Discharge date:  8/10/2023    Admitting Physician:    Tj Hyde MD    Discharge Diagnoses:      Cellulitis of right upper extremity    Prostate cancer    Gout, arthropathy    Hypertension    Malignant neoplasm of trigone of urinary bladder    Stage 3a chronic kidney disease    Protein deficiency    Sepsis due to Gram negative bacteria      Hospital Course:       Mr. Monzon is a 74 yo M w/ retroperitoneal fibrosis who presented with right upper extremity cellulitis and acute diarrheal illness.  He was admitted for IV abx and improved considerably from a cellulitis standpoint.      Unfortunately, his hospital course was complicated by lower extremity VTE which he will be treated with eliquis.  It was also complicated by diarrhea which he will be on questran for,    Of note, he will need a BMP within one week of d/c to monitor creatinine.    Discharge Medications:         Discharge Medications        ASK your doctor about these medications        Instructions Start Date   allopurinol 300 MG tablet  Commonly known as: ZYLOPRIM   300 mg, Oral, Daily      calcium carb-cholecalciferol 600-10 MG-MCG tablet per tablet   1 tablet, Oral, Daily      colchicine 0.6 MG tablet   0.6 mg, Oral, 2 Times Daily PRN      colestipol 1 g tablet  Commonly known as: COLESTID   1 g, Oral, Daily      diphenhydrAMINE 25 MG tablet  Commonly known as: BENADRYL   25 mg, Oral, Every 6 Hours PRN      liver oil-zinc oxide 40 % ointment  Commonly known as: DESITIN   1 application , Topical, 2 Times Daily      metoclopramide 5 MG tablet  Commonly known as: REGLAN   5 mg, Oral, 3 Times Daily PRN      mirtazapine 7.5 MG tablet  Commonly known as: REMERON   7.5 mg, Oral, Nightly      pantoprazole 40 MG EC tablet  Commonly known as: PROTONIX   40 mg, Oral, Daily PRN      predniSONE 10 MG tablet  Commonly known as: DELTASONE   10 mg,  Oral, Daily      tamsulosin 0.4 MG capsule 24 hr capsule  Commonly known as: FLOMAX   1 capsule, Oral, Daily      torsemide 20 MG tablet  Commonly known as: DEMADEX   20 mg, Oral, Daily               Consults:     Significant Diagnostic Studies:      XR Elbow 3+ View Right    Result Date: 8/4/2023  Impression: 1. Nonspecific diffuse subcutaneous edema. 2. No visualized fracture, malalignment or elbow joint capsular distention.  This report was finalized on 08/04/2023 14:11 by Dr Wyatt Bone, .    XR Forearm 2 View Right    Result Date: 8/4/2023  1. No visualized fracture or malalignment. 2. Elbow and radiocarpal joint osteoarthritis changes which are mild. 3. Nonspecific subcutaneous edema which appears fairly diffuse along the included portion of the upper arm as well as the forearm.  This report was finalized on 08/04/2023 14:28 by Dr Wyatt Bone, .    XR Wrist 3+ View Right    Result Date: 8/4/2023  1. Diffuse soft tissue swelling with possible erosion at the posterior carpal on lateral view. Differential would include artifact, crystal arthropathy or osteomyelitis. Correlate with patient presentation. 2. Degenerative changes with possible subluxation of the first metacarpal phalangeal joint. Correlate with range of motion. 3. If there is continued concern for occult fracture, particularly if there is snuff box tenderness, follow-up radiographs in 10-14 days following immobilization might be considered.  This report was finalized on 08/04/2023 14:42 by Dr Heather Barros MD.    XR Hand 3+ View Right    Result Date: 8/4/2023  1. No visualized acute fracture. 2. Multifocal degenerative change, mostly appearing as osteoarthritis. However, there is periarticular swelling with soft tissue calcification, bone erosion and ulnar deviation at the third digit PIP joint which may reflect a crystalline arthropathy such as gout, pseudogout etc. This report was finalized on 08/04/2023 14:36 by Dr Wyatt Bone, .    US  Venous Doppler Upper Extremity Right (duplex)    Result Date: 8/4/2023  Impression: There is no evidence of deep venous thrombosis or superficial thrombophlebitis of the right upper extremity.  Comments: Right upper extremity venous duplex exam was performed using color Doppler flow, Doppler wave form analysis, and grayscale imaging, with and without compression. There is no evidence of deep venous thrombosis of the internal jugular, subclavian, axillary, brachial, radial, and ulnar veins. There is no evidence of superficial thrombophlebitis involving the cephalic or basilic veins.  This report was finalized on 08/04/2023 16:05 by Dr. Fransico Lundy MD.    US Venous Doppler Lower Extremity Bilateral (duplex)    Result Date: 8/8/2023  Impression: There is evidence of deep venous thrombosis in both lower extremities.  Comments: Bilateral lower extremity venous duplex exam was performed using color Doppler flow, Doppler waveform analysis, and grayscale imaging, with and without compression. There is evidence of extensive deep venous thrombosis in the common femoral, superficial femoral, popliteal, peroneal, anterior tibial, and posterior tibial veins in the right lower extremity. There is also evidence of deep venous thrombosis in the popliteal vein in the left lower extremity. No thrombus is identified in the saphenofemoral junctions and greater saphenous veins bilaterally.   This report was finalized on 08/08/2023 15:24 by Dr. Fransico Lundy MD.        Disposition:   PP SNF  Follow up with Tj Hyde MD in 1 weeks.    Signed:  Tj Hyde MD   8/10/2023, 11:54 CDT

## 2023-08-10 NOTE — THERAPY TREATMENT NOTE
Acute Care - Physical Therapy Treatment Note  [facility]     Patient Name: Aleks Monzon  : 1950  MRN: [id number]  Today's Date: 8/10/2023      Visit Dx:     ICD-10-CM ICD-9-CM   1. Pressure injury of right buttock, stage 3  L89.313 707.05     707.23   2. Cellulitis of right upper extremity  L03.113 682.3   3. Hypomagnesemia  E83.42 275.2   4. Leukocytosis, unspecified type  D72.829 288.60   5. Decreased activities of daily living (ADL) [Z78.9 (ICD-10-CM)]  Z78.9 V49.89   6. Pressure injury of sacral region, stage 2  L89.152 707.03     707.22   7. Impaired mobility and ADLs  Z74.09 V49.89    Z78.9    8. Weakness  R53.1 780.79   9. Skin tear of right forearm without complication, initial encounter  S51.811A 881.00   10. Sepsis due to Gram negative bacteria  A41.50 038.40     995.91   11. Impaired mobility [Z74.09 (ICD-10-CM)]  Z74.09 799.89     Patient Active Problem List   Diagnosis    IFG (impaired fasting glucose)    High cholesterol    Prostate cancer    Elevated PSA    Elevated serum creatinine    Gout, arthropathy    Obesity (BMI 30-39.9)    Primary osteoarthritis of both knees    Retroperitoneal mass    Edema    Abnormal CT of the abdomen    Axillary mass, left    Retroperitoneal fibrosis    Gout    Ureteral obstruction, left    Hypertension    Malignant neoplasm of trigone of urinary bladder    COVID-19    Closed fracture of right hip    Closed fracture of right hip, initial encounter    Stage 3a chronic kidney disease    Cancer    Weight loss    Protein deficiency    Decreased appetite    BRBPR (bright red blood per rectum)    History of adenomatous polyp of colon    Overweight with body mass index (BMI) of 27 to 27.9 in adult    Non-smoker    Cellulitis of right upper extremity    Sepsis due to Gram negative bacteria     Past Medical History:   Diagnosis Date    Arthritis     Cancer     PROSTATE AND BLADDER    Cellulitis     Gout     Hypertension     IFG (impaired fasting glucose)     Low back  pain     Lymphedema     Retroperitoneal fibrosis      Past Surgical History:   Procedure Laterality Date    AXILLARY LYMPH NODE BIOPSY/EXCISION Left 10/01/2020    Procedure: LEFT AXILLARY LYMPH NODE BIOPSY;  Surgeon: Dina To MD;  Location: [facility] OR;  Service: General;  Laterality: Left;    BACK SURGERY      COLONOSCOPY      2017?    COLONOSCOPY N/A 10/11/2022    Procedure: COLONOSCOPY WITH ANESTHESIA;  Surgeon: Burton Chaudhari MD;  Location: [facility] ENDOSCOPY;  Service: Gastroenterology;  Laterality: N/A;  preop; abdominal pain  postop; polyps   PCP Tj Hyde MD    ENDOSCOPY N/A 10/11/2022    Procedure: ESOPHAGOGASTRODUODENOSCOPY WITH ANESTHESIA;  Surgeon: Burton Chaudhari MD;  Location: [facility] ENDOSCOPY;  Service: Gastroenterology;  Laterality: N/A;  preop; abdominal pain  postop; misshape stomach  PCP Tj Hyde MD    JOINT REPLACEMENT      BILATERAL HIP     SKIN CANCER EXCISION      TOTAL HIP ARTHROPLASTY      URETERAL STENT INSERTION       PT Assessment (last 12 hours)       PT Evaluation and Treatment       Row Name 08/10/23 1056 08/10/23 0856       Physical Therapy Time and Intention    Subjective Information complains of;weakness;fatigue;pain  - --  -    Document Type therapy note (daily note)  - --    Mode of Treatment physical therapy  - --    Session Not Performed -- other (see comments)  -    Comment, Session Not Performed -- BREAKFAST  -      Row Name 08/10/23 1056          General Information    Existing Precautions/Restrictions fall  -       Row Name 08/10/23 1056          Pain    Pretreatment Pain Rating 5/10  -     Posttreatment Pain Rating 5/10  -     Pain Location - back  -     Pain Intervention(s) Repositioned  -       Row Name 08/10/23 1056          Bed Mobility    Supine-Sit [place] (Bed Mobility) --  sitting EOB with OT  -     Sit-Supine [place] (Bed Mobility) moderate assist (50% patient effort);2 person assist;verbal cues   -       Row Name 08/10/23 1056          Sit-Stand Transfer    Sit-Stand [place] (Transfers) moderate assist (50% patient effort);2 person assist;verbal cues;maximum assist (25% patient effort)  -       Row Name 08/10/23 1056          Stand-Sit Transfer    Stand-Sit [place] (Transfers) minimum assist (75% patient effort);moderate assist (50% patient effort);2 person assist;verbal cues  -       Row Name 08/10/23 1056          Gait/Stairs (Locomotion)    [place] Level (Gait) 2 person assist;verbal cues;minimum assist (75% patient effort)  -     Assistive Device (Gait) walker, 4-wheeled  -     Distance in Feet (Gait) 2-3 side steps to L  -       Row Name             Wound 08/04/23 1744 Right upper arm Skin Tear    Wound - Properties Group Placement Date: 08/04/23  -MB Placement Time: 1744  -MB Present on Hospital Admission: Y  -MB Side: Right  -MB Orientation: upper  -MB Location: arm  -MB Primary Wound Type: Skin tear  -MB    Retired Wound - Properties Group Placement Date: 08/04/23  -MB Placement Time: 1744  -MB Present on Hospital Admission: Y  -MB Side: Right  -MB Orientation: upper  -MB Location: arm  -MB Primary Wound Type: Skin tear  -MB    Retired Wound - Properties Group Date first assessed: 08/04/23  -MB Time first assessed: 1744  -MB Present on Hospital Admission: Y  -MB Side: Right  -MB Location: arm  -MB Primary Wound Type: Skin tear  -MB      Row Name             Wound 08/04/23 1746 Left medial perirectal Pressure Injury    Wound - Properties Group Placement Date: 08/04/23  -MB Placement Time: 1746  -MB Present on Hospital Admission: Y  -MB Side: Left  -MB Orientation: medial  -MB Location: perirectal  -MB Primary Wound Type: Pressure inj  -MB    Retired Wound - Properties Group Placement Date: 08/04/23  -MB Placement Time: 1746  -MB Present on Hospital Admission: Y  -MB Side: Left  -MB Orientation: medial  -MB Location: perirectal  -MB Primary Wound Type: Pressure inj  -MB     Retired Wound - Properties Group Date first assessed: 08/04/23  -MB Time first assessed: 1746  -MB Present on Hospital Admission: Y  -MB Side: Left  -MB Location: perirectal  -MB Primary Wound Type: Pressure inj  -MB      Row Name             Wound 08/04/23 1746 Right medial perirectal Pressure Injury    Wound - Properties Group Placement Date: 08/04/23  -MB Placement Time: 1746  -MB Present on Hospital Admission: Y  -MB Side: Right  -MB Orientation: medial  -MB Location: perirectal  -MB Primary Wound Type: Pressure inj  -MB    Retired Wound - Properties Group Placement Date: 08/04/23  -MB Placement Time: 1746  -MB Present on Hospital Admission: Y  -MB Side: Right  -MB Orientation: medial  -MB Location: perirectal  -MB Primary Wound Type: Pressure inj  -MB    Retired Wound - Properties Group Date first assessed: 08/04/23  -MB Time first assessed: 1746  -MB Present on Hospital Admission: Y  -MB Side: Right  -MB Location: perirectal  -MB Primary Wound Type: Pressure inj  -MB      Row Name 08/10/23 1056          Positioning and Restraints    Pre-Treatment Position in bed  -AH     Post Treatment Position bed  -AH     In Bed side lying right;call light within reach;encouraged to call for assist;with family/caregiver;pillow between legs  -AH               User Key  (r) = Recorded By, (t) = Taken By, (c) = Cosigned By      Initials Name Provider Type     Makayla Stroud PTA Physical Therapist Assistant    Sandra Glez, RN Registered Nurse                    Physical Therapy Education       Title: PT OT SLP Therapies (In Progress)       Topic: Physical Therapy (Done)       Point: Mobility training (Done)       Learning Progress Summary             Patient Acceptance, E, VU by CG at 8/7/2023 1324    Comment: Pt and spouse were educated on repositioning, safety w/ moving from bed, bedside activites, POC, and d/c                         Point: Body mechanics (Done)       Learning Progress Summary             Patient  Acceptance, E, VU by CG at 8/7/2023 1434    Comment: Pt and spouse were educated on repositioning, safety w/ moving from bed, bedside activites, POC, and d/c                         Point: Precautions (Done)       Learning Progress Summary             Patient Acceptance, E, VU by CG at 8/7/2023 1434    Comment: Pt and spouse were educated on repositioning, safety w/ moving from bed, bedside activites, POC, and d/c                                         User Key       Initials Effective Dates Name Provider Type Discipline     04/27/23 -  Joss Modi, PT Student PT Student PT                  PT Recommendation and Plan     Plan of Care Reviewed With: patient, spouse  Progress: no change  Outcome Evaluation: pt very motivated to work with therapy, trans to EOB mod 2 with wife assisting, sit-stand mod 2, took few steps bed-BSC-bed with rollator min-mod, pt sat EOB 15 minutes sba, trans back to bed mod 2. pt would benefit from rehab   Outcome Measures       Row Name 08/10/23 1100 08/09/23 1400 08/09/23 0900       How much help from another person do you currently need...    Turning from your back to your side while in flat bed without using bedrails? 3  -AH 3  -AH --    Moving from lying on back to sitting on the side of a flat bed without bedrails? 2  -AH 2  -AH --    Moving to and from a bed to a chair (including a wheelchair)? 2  -AH 2  -AH --    Standing up from a chair using your arms (e.g., wheelchair, bedside chair)? 2  -AH 2  -AH --    Climbing 3-5 steps with a railing? 1  -AH 1  -AH --    To walk in hospital room? 1  -AH 1  -AH --    AM-PAC 6 Clicks Score (PT) 11  -AH 11  -AH --       How much help from another is currently needed...    Putting on and taking off regular lower body clothing? -- -- 1  -AC (r) WB (t) AC (c)    Bathing (including washing, rinsing, and drying) -- -- 2  -AC (r) WB (t) AC (c)    Toileting (which includes using toilet bed pan or urinal) -- -- 2  -AC (r) WB (t) AC (c)    Putting on  and taking off regular upper body clothing -- -- 3  -AC (r) WB (t) AC (c)    Taking care of personal grooming (such as brushing teeth) -- -- 4  -AC (r) WB (t) AC (c)    Eating meals -- -- 4  -AC (r) WB (t) AC (c)    AM-[facility] 6 Clicks Score (OT) -- -- 16  -AC (r) WB (t)       Functional Assessment    Outcome Measure Options AM-[facility] 6 Clicks Basic Mobility (PT)  - AM-[facility] 6 Clicks Basic Mobility (PT)  -[facility] 6 Clicks Daily Activity (OT)  -AC (r) WB (t) AC (c)      Row Name 08/08/23 1452 08/08/23 1400          How much help from another person do you currently need...    Turning from your back to your side while in flat bed without using bedrails? -- 3  -AH     Moving from lying on back to sitting on the side of a flat bed without bedrails? -- 3  -AH     Moving to and from a bed to a chair (including a wheelchair)? -- 2  -AH     Standing up from a chair using your arms (e.g., wheelchair, bedside chair)? -- 2  -AH     Climbing 3-5 steps with a railing? -- 1  -AH     To walk in hospital room? -- 2  -AH     AM-[facility] 6 Clicks Score (PT) -- 13  -AH        How much help from another is currently needed...    Putting on and taking off regular lower body clothing? 1  -AC (r) WB (t) AC (c) --     Bathing (including washing, rinsing, and drying) 2  -AC (r) WB (t) AC (c) --     Toileting (which includes using toilet bed pan or urinal) 2  -AC (r) WB (t) AC (c) --     Putting on and taking off regular upper body clothing 3  -AC (r) WB (t) AC (c) --     Taking care of personal grooming (such as brushing teeth) 4  -AC (r) WB (t) AC (c) --     Eating meals 4  -AC (r) WB (t) AC (c) --     AM-[facility] 6 Clicks Score (OT) 16  -AC (r) WB (t) --        Functional Assessment    Outcome Measure Options AM-PAC 6 Clicks Daily Activity (OT)  -AC (r) WB (t) AC (c) AM-PAC 6 Clicks Basic Mobility (PT)  -               User Key  (r) = Recorded By, (t) = Taken By, (c) = Cosigned By      Initials Name Provider Type    Venkat Yu, OTR/L, CNT  Occupational Therapist    Makayla More PTA Physical Therapist Assistant    Agustin Lopez, OT Student OT Student                     Time Calculation:    PT Charges       Row Name 08/10/23 1113             Time Calculation    Start Time 1056  -      Stop Time 1111  -AH      Time Calculation (min) 15 min  -AH      PT Received On 08/10/23  -         Time Calculation- PT    Total Timed Code Minutes- PT 15 minute(s)  -AH         Timed Charges    86681 - PT Therapeutic Activity Minutes 15  -AH         Total Minutes    Timed Charges Total Minutes 15  -AH       Total Minutes 15  -AH                User Key  (r) = Recorded By, (t) = Taken By, (c) = Cosigned By      Initials Name Provider Type    Makayla More PTA Physical Therapist Assistant                  Therapy Charges for Today       Code Description Service Date Service Provider Modifiers Qty    30314659545 HC PT THERAPEUTIC ACT EA 15 MIN 8/9/2023 Makayla Struod PTA GP 3    05523660219 HC PT THERAPEUTIC ACT EA 15 MIN 8/10/2023 Makayla Stroud PTA GP 1            PT G-Codes  Outcome Measure Options: AM-PAC 6 Clicks Basic Mobility (PT)  AM-PAC 6 Clicks Score (PT): 11  AM-PAC 6 Clicks Score (OT): 16    Makayla Stroud PTA  8/10/2023

## 2023-08-10 NOTE — PLAN OF CARE
Goal Outcome Evaluation:      Pt Aox4. Wife at bedside and attentive to pt. Denies pain, but appears stiff upon movement. Edema present on legs and arms. Wounds cared for per orders. Turned q2 to maintain skin integrity. 1 BM on shift, formed and loose. Up x 1-2 to bsc. Pt discharged to [facility]. Report called to Manju at 1655. Education completed.

## 2023-08-10 NOTE — PROGRESS NOTES
"INFECTIOUS DISEASES PROGRESS NOTE    Patient:  Aleks Monzon  YOB: 1950  MRN: [id number]   Admit date: 2023   Admitting Physician: Tj Hyde MD  Primary Care Physician: Tj Hyde MD    Chief Complaint: Wondering if he can have a sherbet        Interval History: Patient noted he is on lactose-free diet and wondered if he could have sherbet.  I told him I would check at the  when I went up there to see if they could verify with dietitian.      Allergies:   Allergies   Allergen Reactions    Niacin Itching     same as of 2017-itching         Current Scheduled Medications:   apixaban, 10 mg, Oral, Q12H   Followed by  [START ON 8/15/2023] apixaban, 5 mg, Oral, Q12H  cefTRIAXone, 2,000 mg, Intravenous, Q24H  cholestyramine, 1 packet, Oral, 4x Daily AC & at Bedtime  lactobacillus acidophilus, 1 capsule, Oral, BID  sodium chloride, 10 mL, Intravenous, Q12H      Current PRN Medications:    acetaminophen    Calcium Replacement - Follow Nurse / BPA Driven Protocol    loperamide    Magnesium Standard Dose Replacement - Follow Nurse / BPA Driven Protocol    melatonin    Phosphorus Replacement - Follow Nurse / BPA Driven Protocol    Potassium Replacement - Follow Nurse / BPA Driven Protocol    [COMPLETED] Insert Peripheral IV **AND** sodium chloride    sodium chloride    sodium chloride    traMADol      Objective     Vital Signs:  Temp (24hrs), Av.9 øF (36.6 øC), Min:97.6 øF (36.4 øC), Max:98.2 øF (36.8 øC)      /82 (BP Location: Right arm, Patient Position: Lying)   Pulse 91   Temp 98.2 øF (36.8 øC) (Oral)   Resp 16   Ht 188 cm (74\")   Wt 94.2 kg (207 lb 10.8 oz)   SpO2 97%   BMI 26.66 kg/mý         Physical Exam    General: Patient chronically ill-appearing sitting up in bed in no acute distress.  His wife is at bedside  Right upper extremity cellulitis appears nearly resolved and edema much improved.  He does have a large dressing in place over his " entire forearm      Results Review:    I reviewed the patient's new clinical results.    Lab Results:    CBC:   Lab Results   Lab 08/04/23  1413 08/06/23  0402 08/07/23  0341 08/08/23  0501 08/09/23  0531 08/10/23  0334   WBC 17.21* 9.73 7.62 8.56 8.92 7.97   HEMOGLOBIN 12.1* 10.6* 10.7* 11.0* 11.4* 11.3*   HEMATOCRIT 37.0* 33.0* 32.5* 34.4* 35.3* 35.3*   PLATELETS 185 182 159 147 152 152          AutoDiff:   Lab Results   Lab 08/04/23  1413 08/06/23  0402 08/07/23  0341 08/08/23  0501 08/09/23  0531 08/10/23  0334   NEUTROPHIL % 89.1* 86.4* 79.6*  --   --   --    LYMPHOCYTE % 4.8* 5.4* 8.9*  --   --   --    MONOCYTES % 3.3* 5.0 5.6  --   --   --    EOSINOPHIL % 0.2* 0.5 1.3  --   --   --    BASOPHIL % 0.2 0.3 0.4  --   --   --    NEUTROS ABS 15.32* 8.40* 6.06 7.94* 8.47* 7.65*   LYMPHS ABS 0.83 0.53* 0.68*  --   --   --    MONOS ABS 0.57 0.49 0.43  --   --   --    EOS ABS 0.03 0.05 0.10 0.18  --   --    BASOS ABS 0.04 0.03 0.03  --   --   --           Manual Diff:    Lab Results   Lab 08/08/23  0501 08/09/23  0531 08/10/23  0334   NEUTROPHIL % 86.6* 89.0* 87.1*   LYMPHOCYTE % 0.0* 2.0* 0.0*   MONOCYTES % 2.1* 1.0* 1.0*   BANDS % 6.2* 6.0* 8.9*   METAMYELOCYTE % 3.1*  --   --    NEUTROS ABS 7.94* 8.47* 7.65*   LYMPHS ABS 0.00* 0.18* 0.00*   ANISOCYTOSIS Slight/1+ Slight/1+  --    POIKILOCYTES Large/3+ Mod/2+ Large/3+   WBC MORPHOLOGY Normal Normal Normal   PLT MORPH Normal Normal  --              CMP:   Lab Results   Lab 08/08/23  0501 08/09/23  0531 08/10/23  0334   SODIUM 130* 129* 129*   POTASSIUM 3.9 4.0 3.9   CHLORIDE 103 103 102   CO2 19.0* 20.0* 21.0*   BUN 54* 55* 56*   CREATININE 1.45* 1.24 1.43*   CALCIUM 6.9* 7.0* 6.7*   BILIRUBIN 0.2 0.2 0.3   ALK PHOS 249* 258* 302*   ALT (SGPT) 32 35 48*   AST (SGOT) 42* 47* 80*   GLUCOSE 93 81 90         Estimated Creatinine Clearance: 63.1 mL/min (A) (by C-G formula based on SCr of 1.43 mg/dL (H)).    Culture Results:    Microbiology Results (last 10 days)        Procedure Component Value - Date/Time    COVID-19 RAPID AG,VERITOR,COR/MALCOLM/PAD/FELISHA/MAD/MARIFER/LAG/WILNER/ IN-HOUSE,DRY SWAB, 1-2 HR TAT - Swab, Nasal Cavity [[id number]]  (Normal) Collected: 08/10/23 1131    Lab Status: Final result Specimen: Swab from Nasal Cavity Updated: 08/10/23 1227     COVID19 Presumptive Negative    Narrative:      Fact sheets for providers: https://www.fda.gov/media/089065/download    Fact sheets for patients: https://www.fda.gov/media/588476/download    Miscellaneous Micro Request - Specimen Not Sent, Specimen Not Sent [[id number]] Resulted: 08/08/23 1319    Lab Status: Final result Specimen: Specimen Not Sent Updated: 08/08/23 1319     Extra Tube --    Narrative:      Cefazolin reported.    Urine Culture - Urine, Urine, Clean Catch [[id number]]  (Normal) Collected: 08/07/23 1332    Lab Status: Final result Specimen: Urine, Clean Catch Updated: 08/08/23 0919     Urine Culture No growth    Wound Culture - Wound, Arm, Right [[id number]]  (Abnormal)  (Susceptibility) Collected: 08/04/23 1612    Lab Status: Final result Specimen: Wound from Arm, Right Updated: 08/06/23 0956     Wound Culture Light growth (2+) Staphylococcus aureus      Rare Providencia rettgeri      Light growth (2+) Normal Skin Leticia     Gram Stain Few (2+) Gram positive cocci      No WBCs seen    Susceptibility        Staphylococcus aureus      DICKSON      Clindamycin Susceptible      Erythromycin Susceptible      Inducible Clindamycin Resistance Negative      Oxacillin Susceptible      Rifampin Susceptible      Tetracycline Susceptible      Trimethoprim + Sulfamethoxazole Susceptible      Vancomycin Susceptible                       Susceptibility        Providencia rettgeri      DICKSON      Ampicillin Resistant      Ampicillin + Sulbactam Susceptible      Cefepime Susceptible      Ceftazidime Susceptible      Ceftriaxone Susceptible      Gentamicin Susceptible      Levofloxacin Susceptible      Piperacillin + Tazobactam Susceptible       Tetracycline Resistant      Trimethoprim + Sulfamethoxazole Susceptible                       Susceptibility Comments       Staphylococcus aureus    This isolate does not demonstrate inducible clindamycin resistance in vitro.        Providencia rettgeri    Cefazolin sensitivity will not be reported for Enterobacteriaceae in non-urine isolates. If cefazolin is preferred, please call the microbiology lab to request an E-test.  With the exception of urinary-sourced infections, aminoglycosides should not be used as monotherapy.               Blood Culture - Blood, Arm, Right [[id number]]  (Abnormal) Collected: 08/04/23 1419    Lab Status: Final result Specimen: Blood from Arm, Right Updated: 08/08/23 0537     Blood Culture Providencia rettgeri     Isolated from Anaerobic Bottle     Gram Stain Anaerobic Bottle Gram negative bacilli    Narrative:      Refer to previous blood culture collected on 08/04/2023 1513 for MICS.      Blood Culture - Blood, Arm, Left [[id number]]  (Abnormal)  (Susceptibility) Collected: 08/04/23 1413    Lab Status: Edited Result - FINAL Specimen: Blood from Arm, Left Updated: 08/08/23 1318     Blood Culture Providencia rettgeri     Isolated from Aerobic and Anaerobic Bottles     Gram Stain Anaerobic Bottle Gram negative bacilli      Aerobic Bottle Gram negative bacilli    Susceptibility        Providencia rettgeri      DICKSON      Ampicillin Intermediate      Ampicillin + Sulbactam Susceptible      Cefazolin Resistant (C)  [1]       Cefepime Susceptible      Ceftazidime Susceptible      Ceftriaxone Susceptible      Gentamicin Susceptible      Levofloxacin Susceptible      Piperacillin + Tazobactam Susceptible      Trimethoprim + Sulfamethoxazole Susceptible                   [1]  Appended report. These results have been appended to a previously final verified report.               Susceptibility Comments       Providencia rettgeri    Cefazolin sensitivity will not be reported for Enterobacteriaceae in  non-urine isolates. If cefazolin is preferred, please call the microbiology lab to request an E-test.  With the exception of urinary-sourced infections, aminoglycosides should not be used as monotherapy.               Blood Culture ID, PCR - Blood, Arm, Left [[id number]]  (Abnormal) Collected: 08/04/23 1413    Lab Status: Final result Specimen: Blood from Arm, Left Updated: 08/05/23 0741     BCID, PCR Enterobacterales spp. Identification by BCID2 PCR.     BOTTLE TYPE Anaerobic Bottle    Narrative:      No resistance genes detected.    Clostridioides difficile Toxin - Stool, Per Rectum [[id number]]  (Normal) Collected: 08/03/23 1514    Lab Status: Final result Specimen: Stool from Per Rectum Updated: 08/03/23 1700    Narrative:      The following orders were created for panel order Clostridioides difficile Toxin - Stool, Per Rectum.  Procedure                               Abnormality         Status                     ---------                               -----------         ------                     Clostridioides difficile...[[id number]]  Normal              Final result                 Please view results for these tests on the individual orders.    Gastrointestinal Panel, PCR - Stool, Per Rectum [[id number]]  (Normal) Collected: 08/03/23 1514    Lab Status: Final result Specimen: Stool from Per Rectum Updated: 08/03/23 1728     Campylobacter Not Detected     Plesiomonas shigelloides Not Detected     Salmonella Not Detected     Vibrio Not Detected     Vibrio cholerae Not Detected     Yersinia enterocolitica Not Detected     Enteroaggregative E. coli (EAEC) Not Detected     Enteropathogenic E. coli (EPEC) Not Detected     Enterotoxigenic E. coli (ETEC) lt/st Not Detected     Shiga-like toxin-producing E. coli (STEC) stx1/stx2 Not Detected     Shigella/Enteroinvasive E. coli (EIEC) Not Detected     Cryptosporidium Not Detected     Cyclospora cayetanensis Not Detected     Entamoeba histolytica Not Detected      Giardia lamblia Not Detected     Adenovirus F40/41 Not Detected     Astrovirus Not Detected     Norovirus GI/GII Not Detected     Rotavirus A Not Detected     Sapovirus (I, II, IV or V) Not Detected    Narrative:      If Aeromonas, Staphylococcus aureus or Bacillus cereus are suspected, please order [id number]: Stool Culture, Aeromonas, S aureus, B Cereus.    Clostridioides difficile Toxin, PCR - Stool, Per Rectum [[id number]]  (Normal) Collected: 08/03/23 1514    Lab Status: Final result Specimen: Stool from Per Rectum Updated: 08/03/23 1700     Toxigenic C. difficile by PCR Negative    Narrative:      The result indicates the absence of toxigenic C. difficile from stool specimen.                  Radiology:   Imaging Results (Last 72 Hours)       Procedure Component Value Units Date/Time    US Venous Doppler Lower Extremity Bilateral (duplex) [[id number]] Collected: 08/08/23 1522     Updated: 08/08/23 1527    Narrative:      History: Swelling       Impression:      Impression: There is evidence of deep venous thrombosis in both lower  extremities.     Comments: Bilateral lower extremity venous duplex exam was performed  using color Doppler flow, Doppler waveform analysis, and grayscale  imaging, with and without compression. There is evidence of extensive  deep venous thrombosis in the common femoral, superficial femoral,  popliteal, peroneal, anterior tibial, and posterior tibial veins in the  right lower extremity. There is also evidence of deep venous thrombosis  in the popliteal vein in the left lower extremity. No thrombus is  identified in the saphenofemoral junctions and greater saphenous veins  bilaterally.         This report was finalized on 08/08/2023 15:24 by Dr. Fransico Lundy MD.            Assessment & Plan     Active Hospital Problems    Diagnosis     **Cellulitis of right upper extremity     Sepsis due to Gram negative bacteria     Protein deficiency     Stage 3a chronic kidney disease     Malignant  neoplasm of trigone of urinary bladder     Hypertension     Gout, arthropathy     Prostate cancer        IMPRESSION:  Providencia rettgeri bacteremia- likely to be urinary tract etiology in a 73-year-old man with his history of bladder cancer, prostate cancer and retroperitoneal fibrosis  Right upper extremity cellulitis with swab cultures of skin tear positive for methicillin susceptible Staphylococcus aureus and Providencia-resolved  Diarrhea-ongoing for 1 month approximately.  GI panel by PCR and C. difficile negative as outpatient.  Stool for O&P ordered as an outpatient and stool for microsporidia sent as inpatient are still pending.  Cholestyramine increased to 4 times a day.  Remains on lactose-free diet  Chronic kidney disease stage IIIa-  Bladder cancer with history of chemotherapy and radiation.  Patient with pyuria noted on urinalysis.  Culture no growth however was collected after antibiotics  Lower extremity DVT-diagnosis admission.  Started on apixaban.      RECOMMENDATION:   Continue ceftriaxone-Providencia isolate is resistant to cefazolin-plan to transition to cefdinir upon discharge to complete a total 2-week antibiotic course.  Communicated with Dr. Hyde regarding antibiotic course    Follow-up with infectious disease as needed    Cassy Khalil MD  08/10/23  13:18 CDT

## 2023-08-10 NOTE — CASE MANAGEMENT/SOCIAL WORK
Continued Stay Note  [facility]     Patient Name: Aleks Monzon  MRN: [id number]  Today's Date: 8/10/2023    Admit Date: 8/4/2023    Plan: SNF vs Acute Rehab   Discharge Plan       Row Name 08/10/23 7820       Plan    Final Note Pt is being dcd to [facility]. Pt will need a new covid test. Call  report number is [phone]. Fax number is [phone].      Row Name 08/10/23 6005       Plan    Plan Comments Received message from pt wife stating they preferred [facility] instead of [facility]. Referral was made and bed offered at [place]. Per admissions, bed is ready later this afternoon, if pt is ready for dc. Message sent to Physician to inform. Pt will need a new covid test prior to dc.                   Discharge Codes    No documentation.                 Expected Discharge Date and Time       Expected Discharge Date Expected Discharge Time    Aug 10, 2023               BRETT Umaña

## 2023-08-10 NOTE — PLAN OF CARE
Goal Outcome Evaluation:                      VSS. A&Ox4. BLE and R arm edema. PPP. No c/o n/t. Wound care per orders. Resting comfortably throughout night. Up x1 bsc. No c/o pain. Call light within reach. Safety maintained.

## 2023-08-10 NOTE — PLAN OF CARE
Goal Outcome Evaluation:  Plan of Care Reviewed With: patient, spouse        Progress: improving  Outcome Evaluation: OT tx completed. Pt agreeable to therapy. Demonstrated improved supine-sit needing Linda with verbal cues. Completed one sit-stand with modAx2 and maintained standing for approx. 1 minute with 2 short bouts of fully upright posture. Returned to sitting and completed B scapular exercise and B shoulder stretching. Pt demonstrates muscular tightness with B shoulder PROM <75% intact. Demonstrated slightly improved B shoulder PROM after scapular exercise and stretching. Assisted PT with standing pt once more with modAx2 and pt took several side steps toward HOB with Linda. Sit-supine with modA. Pt sat EOB for approx 40 minutes during sessions with OT and PT, meeting his goal. OT will continue POC. Recommend further rehab at d/c.

## 2023-08-10 NOTE — PROGRESS NOTES
[facility]  Inpatient Progress Note      Date of Admission: 8/4/2023  Date of Service:  08/10/23    Reason for Follow Up: Diarrhea    Subjective     Subjective:   Patient is lying in bed with family present at bedside.  Diarrhea has lessened but he did experience occurrence of fecal incontinence when working with physical therapy yesterday.  No signs of GI blood loss.  He did have a large regular bowel movement yesterday.    Current Facility-Administered Medications:     acetaminophen (TYLENOL) tablet 650 mg, 650 mg, Oral, Q4H PRN, Tj Hyde MD    apixaban (ELIQUIS) tablet 10 mg, 10 mg, Oral, Q12H, 10 mg at 08/10/23 0807 **FOLLOWED BY** [START ON 8/15/2023] apixaban (ELIQUIS) tablet 5 mg, 5 mg, Oral, Q12H, Tj Hyde MD    Calcium Replacement - Follow Nurse / BPA Driven Protocol, , Does not apply, PRBarrett TURCIOS Samuel F, MD    cefTRIAXone (ROCEPHIN) 2,000 mg in sodium chloride 0.9 % 100 mL IVPB, 2,000 mg, Intravenous, Q24H, Cassy Khalil MD, Last Rate: 200 mL/hr at 08/10/23 0807, 2,000 mg at 08/10/23 0807    cholestyramine (QUESTRAN) packet 1 packet, 1 packet, Oral, TID CLEO, Rohan Nguyen, APRN, 1 packet at 08/10/23 1135    lactobacillus acidophilus (RISAQUAD) capsule 1 capsule, 1 capsule, Oral, BID, Tj Hyde MD, 1 capsule at 08/10/23 0807    loperamide (IMODIUM) capsule 2 mg, 2 mg, Oral, TID PRNBarrett Samuel F, MD    Magnesium Standard Dose Replacement - Follow Nurse / BPA Driven Protocol, , Does not apply, Barrett SMALLS Samuel F, MD    melatonin tablet 5 mg, 5 mg, Oral, Nightly PRNBarrett Samuel F, MD    Phosphorus Replacement - Follow Nurse / BPA Driven Protocol, , Does not apply, Barrett SMALLS Samuel F, MD    Potassium Replacement - Follow Nurse / BPA Driven Protocol, , Does not apply, Barrett SMLALS Samuel F, MD    [COMPLETED] Insert Peripheral IV, , , Once **AND** sodium chloride 0.9 % flush 10 mL, 10 mL, Intravenous, PRN,  Tj Hyde MD    sodium chloride 0.9 % flush 10 mL, 10 mL, Intravenous, Q12H, Tj Hyde MD, 10 mL at 08/10/23 0808    sodium chloride 0.9 % flush 10 mL, 10 mL, Intravenous, PRN, Tj Hyde MD, 10 mL at 08/05/23 2030    sodium chloride 0.9 % infusion 40 mL, 40 mL, Intravenous, PRN, Tj Hyde MD, 40 mL at 08/08/23 0858    traMADol (ULTRAM) tablet 50 mg, 50 mg, Oral, Q6H PRN, Tj Hyde MD    Review of Systems     Constitution:  negative for chills and fevers, positive fatigue  Eyes:  negative for blurriness and change of vision  ENT:   negative for sore throat and voice change  Respiratory: negative for  cough and shortness of air  Cardiovascular:  Negative for chest pain or palpitations  Gastrointestinal:  negative for  See HPI  Genitourinary:  negative for  blood in urine and painful urination  Integument: negative for  rash and redness  Hematologic / Lymphatic: negative for  excessive bleeding and easy bruising  Musculoskeletal: negative for  joint pain and joint stiffness out of the ordinary  Neurological:  negative for  seizures and speech abnormality  Behavioral/Psych:  negative for  anxiety and depression out of the ordinary  Endocrine: negative for  diabetes and weight loss, unintended  Allergies / Immunologic:  negative for  anaphylaxis and rash        Objective     Vital Signs  Temp:  [97.6 øF (36.4 øC)-98.2 øF (36.8 øC)] 98.2 øF (36.8 øC)  Heart Rate:  [77-91] 91  Resp:  [15-18] 16  BP: ()/(69-99) 121/82  Body mass index is 26.66 kg/mý.    Intake/Output Summary (Last 24 hours) at 8/10/2023 1154  Last data filed at 8/9/2023 1905  Gross per 24 hour   Intake 480 ml   Output 200 ml   Net 280 ml     No intake/output data recorded.       Physical Exam:   General: patient awake, alert and cooperative, ill-appearing   Eyes: Normal lids and lashes, no scleral icterus   Neck: supple, normal ROM   Skin: warm and dry, not jaundiced, multiple skin  tears   Cardiovascular: regular rhythm and rate, no murmurs auscultated   Pulm: clear to auscultation bilaterally, regular and unlabored   Abdomen: soft, nontender, nondistended; normal bowel sounds   Rectal: deferred   Extremities: no rash or edema   Psychiatric: Normal mood and behavior; memory intact         Results Review:    I have reviewed all of the patients current test results  Results from last 7 days   Lab Units 08/10/23  0334 08/09/23  0531 08/08/23  0501   WBC 10*3/mm3 7.97 8.92 8.56   HEMOGLOBIN g/dL 11.3* 11.4* 11.0*   HEMATOCRIT % 35.3* 35.3* 34.4*   PLATELETS 10*3/mm3 152 152 147       Results from last 7 days   Lab Units 08/10/23  0334 08/09/23  0531 08/08/23  0501   SODIUM mmol/L 129* 129* 130*   POTASSIUM mmol/L 3.9 4.0 3.9   CHLORIDE mmol/L 102 103 103   CO2 mmol/L 21.0* 20.0* 19.0*   BUN mg/dL 56* 55* 54*   CREATININE mg/dL 1.43* 1.24 1.45*   CALCIUM mg/dL 6.7* 7.0* 6.9*   BILIRUBIN mg/dL 0.3 0.2 0.2   ALK PHOS U/L 302* 258* 249*   ALT (SGPT) U/L 48* 35 32   AST (SGOT) U/L 80* 47* 42*   GLUCOSE mg/dL 90 81 93             No results found for: LIPASE    Radiology:    Imaging Results (Last 24 Hours)       ** No results found for the last 24 hours. **              Assessment & Plan       Cellulitis of right upper extremity    Prostate cancer    Gout, arthropathy    Hypertension    Malignant neoplasm of trigone of urinary bladder    Stage 3a chronic kidney disease    Protein deficiency    Sepsis due to Gram negative bacteria      Impression/Plan    Diarrhea  Elevated liver enzymes  Cellulitis  History of colon polyps    Number bowel movements have decreased with use of Questran/lactose-free diet.  Will go ahead and increase dose to 4 times daily and advised patient to take prior to eating meals and at bedtime.  I did instruct him to hold medication if he feels constipated.  Liver enzymes are trending upward since admission.  He did have increased liver enzymes in May 2023 with hepatitis C antibody  negative at that time.  Recommend patient be discharged on Colestid or Questran post discharge.    Electronically signed by RENE Calloway, 08/10/23, 11:54 AM CDT.       RENE Calloway  08/10/23  11:54 CDT    Diarrhea

## 2023-08-10 NOTE — THERAPY TREATMENT NOTE
Acute Care - Occupational Therapy Treatment Note  [facility]     Patient Name: Aleks Monzon  : 1950  MRN: [id number]  Today's Date: 8/10/2023             Admit Date: 2023       ICD-10-CM ICD-9-CM   1. Pressure injury of right buttock, stage 3  L89.313 707.05     707.23   2. Cellulitis of right upper extremity  L03.113 682.3   3. Hypomagnesemia  E83.42 275.2   4. Leukocytosis, unspecified type  D72.829 288.60   5. Decreased activities of daily living (ADL) [Z78.9 (ICD-10-CM)]  Z78.9 V49.89   6. Pressure injury of sacral region, stage 2  L89.152 707.03     707.22   7. Impaired mobility and ADLs  Z74.09 V49.89    Z78.9    8. Weakness  R53.1 780.79   9. Skin tear of right forearm without complication, initial encounter  S51.811A 881.00   10. Sepsis due to Gram negative bacteria  A41.50 038.40     995.91   11. Impaired mobility [Z74.09 (ICD-10-CM)]  Z74.09 799.89     Patient Active Problem List   Diagnosis    IFG (impaired fasting glucose)    High cholesterol    Prostate cancer    Elevated PSA    Elevated serum creatinine    Gout, arthropathy    Obesity (BMI 30-39.9)    Primary osteoarthritis of both knees    Retroperitoneal mass    Edema    Abnormal CT of the abdomen    Axillary mass, left    Retroperitoneal fibrosis    Gout    Ureteral obstruction, left    Hypertension    Malignant neoplasm of trigone of urinary bladder    COVID-19    Closed fracture of right hip    Closed fracture of right hip, initial encounter    Stage 3a chronic kidney disease    Cancer    Weight loss    Protein deficiency    Decreased appetite    BRBPR (bright red blood per rectum)    History of adenomatous polyp of colon    Overweight with body mass index (BMI) of 27 to 27.9 in adult    Non-smoker    Cellulitis of right upper extremity    Sepsis due to Gram negative bacteria     Past Medical History:   Diagnosis Date    Arthritis     Cancer     PROSTATE AND BLADDER    Cellulitis     Gout     Hypertension     IFG (impaired  fasting glucose)     Low back pain     Lymphedema     Retroperitoneal fibrosis      Past Surgical History:   Procedure Laterality Date    AXILLARY LYMPH NODE BIOPSY/EXCISION Left 10/01/2020    Procedure: LEFT AXILLARY LYMPH NODE BIOPSY;  Surgeon: Dina To MD;  Location: [facility] OR;  Service: General;  Laterality: Left;    BACK SURGERY      COLONOSCOPY      2017?    COLONOSCOPY N/A 10/11/2022    Procedure: COLONOSCOPY WITH ANESTHESIA;  Surgeon: Burton Chaudhari MD;  Location: [facility] ENDOSCOPY;  Service: Gastroenterology;  Laterality: N/A;  preop; abdominal pain  postop; polyps   PCP Tj Hyde MD    ENDOSCOPY N/A 10/11/2022    Procedure: ESOPHAGOGASTRODUODENOSCOPY WITH ANESTHESIA;  Surgeon: Burton Chaudhari MD;  Location: [facility] ENDOSCOPY;  Service: Gastroenterology;  Laterality: N/A;  preop; abdominal pain  postop; misshape stomach  PCP Tj Hyde MD    JOINT REPLACEMENT      BILATERAL HIP     SKIN CANCER EXCISION      TOTAL HIP ARTHROPLASTY      URETERAL STENT INSERTION           OT ASSESSMENT FLOWSHEET (last 12 hours)       OT Evaluation and Treatment       Row Name 08/10/23 1025                   OT Time and Intention    Subjective Information complains of;weakness  -AC (r) WB (t) AC (c)        Document Type therapy note (daily note)  -AC (r) WB (t) AC (c)        Mode of Treatment occupational therapy  -AC (r) WB (t) AC (c)           General Information    Existing Precautions/Restrictions fall  -AC (r) WB (t) AC (c)           Pain Assessment    Pretreatment Pain Rating 0/10 - no pain  -AC (r) WB (t) AC (c)        Posttreatment Pain Rating 0/10 - no pain  -AC (r) WB (t) AC (c)           Activities of Daily Living    BADL Assessment/Intervention lower body dressing  -AC (r) WB (t) AC (c)           Lower Body Dressing Assessment/Training    [place] Level (Lower Body Dressing) don;socks;maximum assist (25% patient effort)  -AC (r) WB (t) AC (c)        Position (Lower Body Dressing)  edge of bed sitting  -AC (r) WB (t) AC (c)           Bed Mobility    Bed Mobility supine-sit;sit-supine  -AC (r) WB (t) AC (c)        Supine-Sit [place] (Bed Mobility) verbal cues;minimum assist (75% patient effort)  -AC (r) WB (t) AC (c)        Sit-Supine [place] (Bed Mobility) moderate assist (50% patient effort);verbal cues  -AC (r) WB (t) AC (c)        Assistive Device (Bed Mobility) bed rails  -AC (r) WB (t) AC (c)           Functional Mobility    Functional Mobility- Ind. Level minimum assist (75% patient effort)  -AC (r) WB (t) AC (c)        Functional Mobility- Device walker, rolling platform  -AC (r) WB (t) AC (c)        Functional Mobility- Comment few steps toward HOB with rollator while forward flexed  -AC (r) WB (t) AC (c)           Transfer Assessment/Treatment    Transfers sit-stand transfer;stand-sit transfer  -AC (r) WB (t) AC (c)           Sit-Stand Transfer    Sit-Stand [place] (Transfers) moderate assist (50% patient effort);2 person assist  -AC (r) WB (t) AC (c)        Assistive Device (Sit-Stand Transfers) walker, rolling platform  -AC (r) WB (t) AC (c)        Comment, (Sit-Stand Transfer) elevated bed surface; completed x2; able to stand fully upright for short time x3 attempts  -AC (r) WB (t) AC (c)           Stand-Sit Transfer    Stand-Sit [place] (Transfers) minimum assist (75% patient effort);2 person assist  -AC (r) WB (t) AC (c)        Assistive Device (Stand-Sit Transfers) walker, rolling platform  -AC (r) WB (t) AC (c)        Comment, (Stand-Sit Transfer) elevated bed surface  -AC (r) WB (t) AC (c)           Motor Skills    Therapeutic Exercise shoulder  -AC (r) WB (t) AC (c)           Shoulder (Therapeutic Exercise)    Shoulder (Therapeutic Exercise) AROM (active range of motion);PROM (passive range of motion)  -AC (r) WB (t) AC (c)        Shoulder AROM (Therapeutic Exercise) bilateral;flexion;10 repetitions;sitting;scapular elevation;scapular retraction;2 sets   minimal active B shoulder flexion; demonstrated WFL scapular elevation, retraction, and circumduction  -AC (r) WB (t) AC (c)        Shoulder PROM (Therapeutic Exercise) bilateral;flexion;sitting;5 repetitions;10 second hold  completed for shoulder stretching  -AC (r) WB (t) AC (c)           Wound 08/04/23 1744 Right upper arm Skin Tear    Wound - Properties Group Placement Date: 08/04/23  -MB Placement Time: 1744  -MB Present on Hospital Admission: Y  -MB Side: Right  -MB Orientation: upper  -MB Location: arm  -MB Primary Wound Type: Skin tear  -MB    Retired Wound - Properties Group Placement Date: 08/04/23  -MB Placement Time: 1744  -MB Present on Hospital Admission: Y  -MB Side: Right  -MB Orientation: upper  -MB Location: arm  -MB Primary Wound Type: Skin tear  -MB    Retired Wound - Properties Group Date first assessed: 08/04/23  -MB Time first assessed: 1744  -MB Present on Hospital Admission: Y  -MB Side: Right  -MB Location: arm  -MB Primary Wound Type: Skin tear  -MB       Wound 08/04/23 1746 Left medial perirectal Pressure Injury    Wound - Properties Group Placement Date: 08/04/23  -MB Placement Time: 1746  -MB Present on Hospital Admission: Y  -MB Side: Left  -MB Orientation: medial  -MB Location: perirectal  -MB Primary Wound Type: Pressure inj  -MB    Retired Wound - Properties Group Placement Date: 08/04/23  -MB Placement Time: 1746  -MB Present on Hospital Admission: Y  -MB Side: Left  -MB Orientation: medial  -MB Location: perirectal  -MB Primary Wound Type: Pressure inj  -MB    Retired Wound - Properties Group Date first assessed: 08/04/23  -MB Time first assessed: 1746  -MB Present on Hospital Admission: Y  -MB Side: Left  -MB Location: perirectal  -MB Primary Wound Type: Pressure inj  -MB       Wound 08/04/23 1746 Right medial perirectal Pressure Injury    Wound - Properties Group Placement Date: 08/04/23  -MB Placement Time: 1746  -MB Present on Hospital Admission: Y  -MB Side: Right  -MB  Orientation: medial  -MB Location: perirectal  -MB Primary Wound Type: Pressure inj  -MB    Retired Wound - Properties Group Placement Date: 08/04/23  -MB Placement Time: 1746  -MB Present on Hospital Admission: Y  -MB Side: Right  -MB Orientation: medial  -MB Location: perirectal  -MB Primary Wound Type: Pressure inj  -MB    Retired Wound - Properties Group Date first assessed: 08/04/23  -MB Time first assessed: 1746  -MB Present on Hospital Admission: Y  -MB Side: Right  -MB Location: perirectal  -MB Primary Wound Type: Pressure inj  -MB       Plan of Care Review    Plan of Care Reviewed With patient;spouse  -AC (r) WB (t) AC (c)        Progress improving  -AC (r) WB (t) AC (c)        Outcome Evaluation OT tx completed. Pt agreeable to therapy. Demonstrated improved supine-sit needing Linda with verbal cues. Completed one sit-stand with modAx2 and maintained standing for approx. 1 minute with 2 short bouts of fully upright posture. Returned to sitting and completed B scapular exercise and B shoulder stretching. Pt demonstrates muscular tightness with B shoulder PROM <75% intact. Demonstrated slightly improved B shoulder PROM after scapular exercise and stretching. Assisted PT with standing pt once more with modAx2 and pt took several side steps toward HOB with Linda. Sit-supine with modA. Pt sat EOB for approx 40 minutes during sessions with OT and PT, meeting his goal. OT will continue POC. Recommend further rehab at d/c.  -AC (r) WB (t) AC (c)           Positioning and Restraints    Pre-Treatment Position in bed  -AC (r) WB (t) AC (c)        Post Treatment Position bed  -AC (r) WB (t) AC (c)        In Bed fowlers;call light within reach;encouraged to call for assist;with PT;side rails up x2  -AC (r) WB (t) AC (c)           Problem Specific Goal 1 (OT)    Problem Specific Goal 1 (OT) Pt will tolerate sitting EOB with no LOB for 15 min to participate in ADLs and improve fxl activity tolerance, sitting balance, and  trunk control.  -AC (r) WB (t) AC (c)        Time Frame (Problem Specific Goal 1, OT) long term goal (LTG);10 days  -AC (r) WB (t) AC (c)        Progress/Outcome (Problem Specific Goal 1, OT) goal met  -AC (r) WB (t) AC (c)                  User Key  (r) = Recorded By, (t) = Taken By, (c) = Cosigned By      Initials Name Effective Dates    AC Venkat Hamilton, OTR/L, CNT 02/03/23 -     Sandra Glez, RN 10/14/22 -     WB Agustin Vasques OT Student 12/05/22 -                      Occupational Therapy Education       Title: PT OT SLP Therapies (In Progress)       Topic: Occupational Therapy (In Progress)       Point: ADL training (Done)       Description:   Instruct learner(s) on proper safety adaptation and remediation techniques during self care or transfers.   Instruct in proper use of assistive devices.                  Learning Progress Summary             Patient Acceptance, E, VU by WB at 8/10/2023 1121    Comment: Safety during functional transfers; importance of OOB activity    Acceptance, E, VU by WB at 8/9/2023 0948    Comment: Safety during functional transfers; importance of OOB activity    Acceptance, E, VU by WB at 8/8/2023 1453    Comment: Safety during functional transfers    Acceptance, E,TB, VU by WB at 8/7/2023 0832    Comment: Safety during functional transfers    Acceptance, E, VU by RH at 8/5/2023 0850   Family Acceptance, E,TB, VU by WB at 8/7/2023 0832    Comment: Safety during functional transfers   Significant Other Acceptance, E, VU by RH at 8/5/2023 0850                         Point: Home exercise program (Not Started)       Description:   Instruct learner(s) on appropriate technique for monitoring, assisting and/or progressing therapeutic exercises/activities.                  Learner Progress:  Not documented in this visit.              Point: Precautions (Not Started)       Description:   Instruct learner(s) on prescribed precautions during self-care and functional transfers.                   Learner Progress:  Not documented in this visit.              Point: Body mechanics (Done)       Description:   Instruct learner(s) on proper positioning and spine alignment during self-care, functional mobility activities and/or exercises.                  Learning Progress Summary             Patient Acceptance, E, VU by WB at 8/10/2023 1121    Comment: Safety during functional transfers; importance of OOB activity    Acceptance, E, VU by WB at 8/9/2023 0948    Comment: Safety during functional transfers; importance of OOB activity    Acceptance, E, VU by WB at 8/8/2023 1453    Comment: Safety during functional transfers    Acceptance, E,TB, VU by WB at 8/7/2023 0832    Comment: Safety during functional transfers   Family Acceptance, E,TB, VU by WB at 8/7/2023 0832    Comment: Safety during functional transfers                                         User Key       Initials Effective Dates Name Provider Type Discipline    WB 12/05/22 -  Agustin Vasques OT Student OT Student OT     05/03/23 -  Rae Watkins OT Student OT Student OT                      OT Recommendation and Plan     Plan of Care Review  Plan of Care Reviewed With: patient, spouse  Progress: improving  Outcome Evaluation: OT tx completed. Pt agreeable to therapy. Demonstrated improved supine-sit needing Linda with verbal cues. Completed one sit-stand with modAx2 and maintained standing for approx. 1 minute with 2 short bouts of fully upright posture. Returned to sitting and completed B scapular exercise and B shoulder stretching. Pt demonstrates muscular tightness with B shoulder PROM <75% intact. Demonstrated slightly improved B shoulder PROM after scapular exercise and stretching. Assisted PT with standing pt once more with modAx2 and pt took several side steps toward HOB with Linda. Sit-supine with modA. Pt sat EOB for approx 40 minutes during sessions with OT and PT, meeting his goal. OT will continue POC. Recommend further rehab at  d/c.  Plan of Care Reviewed With: patient, spouse  Outcome Evaluation: OT tx completed. Pt agreeable to therapy. Demonstrated improved supine-sit needing Linda with verbal cues. Completed one sit-stand with modAx2 and maintained standing for approx. 1 minute with 2 short bouts of fully upright posture. Returned to sitting and completed B scapular exercise and B shoulder stretching. Pt demonstrates muscular tightness with B shoulder PROM <75% intact. Demonstrated slightly improved B shoulder PROM after scapular exercise and stretching. Assisted PT with standing pt once more with modAx2 and pt took several side steps toward HOB with Linda. Sit-supine with modA. Pt sat EOB for approx 40 minutes during sessions with OT and PT, meeting his goal. OT will continue POC. Recommend further rehab at d/c.     Outcome Measures       Row Name 08/10/23 1121 08/10/23 1100 08/09/23 1400       How much help from another person do you currently need...    Turning from your back to your side while in flat bed without using bedrails? -- 3  -AH 3  -AH    Moving from lying on back to sitting on the side of a flat bed without bedrails? -- 2  -AH 2  -AH    Moving to and from a bed to a chair (including a wheelchair)? -- 2  -AH 2  -AH    Standing up from a chair using your arms (e.g., wheelchair, bedside chair)? -- 2  -AH 2  -AH    Climbing 3-5 steps with a railing? -- 1  -AH 1  -AH    To walk in hospital room? -- 1  -AH 1  -AH    AM-PAC 6 Clicks Score (PT) -- 11  -AH 11  -AH       How much help from another is currently needed...    Putting on and taking off regular lower body clothing? 1  -AC (r) WB (t) AC (c) -- --    Bathing (including washing, rinsing, and drying) 2  -AC (r) WB (t) AC (c) -- --    Toileting (which includes using toilet bed pan or urinal) 2  -AC (r) WB (t) AC (c) -- --    Putting on and taking off regular upper body clothing 3  -AC (r) WB (t) AC (c) -- --    Taking care of personal grooming (such as brushing teeth) 4  -AC  (r) WB (t) AC (c) -- --    Eating meals 4  -AC (r) WB (t) AC (c) -- --    AM-[facility] 6 Clicks Score (OT) 16  -AC (r) WB (t) -- --       Functional Assessment    Outcome Measure Options AM-[facility] 6 Clicks Daily Activity (OT)  -AC (r) WB (t) AC (c) AM-[facility] 6 Clicks Basic Mobility (PT)  - AM-[facility] 6 Clicks Basic Mobility (PT)  -      Row Name 08/09/23 0900 08/08/23 1452 08/08/23 1400       How much help from another person do you currently need...    Turning from your back to your side while in flat bed without using bedrails? -- -- 3  -AH    Moving from lying on back to sitting on the side of a flat bed without bedrails? -- -- 3  -AH    Moving to and from a bed to a chair (including a wheelchair)? -- -- 2  -AH    Standing up from a chair using your arms (e.g., wheelchair, bedside chair)? -- -- 2  -AH    Climbing 3-5 steps with a railing? -- -- 1  -AH    To walk in hospital room? -- -- 2  -AH    AM-[facility] 6 Clicks Score (PT) -- -- 13  -AH       How much help from another is currently needed...    Putting on and taking off regular lower body clothing? 1  -AC (r) WB (t) AC (c) 1  -AC (r) WB (t) AC (c) --    Bathing (including washing, rinsing, and drying) 2  -AC (r) WB (t) AC (c) 2  -AC (r) WB (t) AC (c) --    Toileting (which includes using toilet bed pan or urinal) 2  -AC (r) WB (t) AC (c) 2  -AC (r) WB (t) AC (c) --    Putting on and taking off regular upper body clothing 3  -AC (r) WB (t) AC (c) 3  -AC (r) WB (t) AC (c) --    Taking care of personal grooming (such as brushing teeth) 4  -AC (r) WB (t) AC (c) 4  -AC (r) WB (t) AC (c) --    Eating meals 4  -AC (r) WB (t) AC (c) 4  -AC (r) WB (t) AC (c) --    AM-PAC 6 Clicks Score (OT) 16  -AC (r) WB (t) 16  -AC (r) WB (t) --       Functional Assessment    Outcome Measure Options AM-PAC 6 Clicks Daily Activity (OT)  -AC (r) WB (t) AC (c) AM-PAC 6 Clicks Daily Activity (OT)  -AC (r) WB (t) AC (c) AM-PAC 6 Clicks Basic Mobility (PT)  -              User Key  (r) = Recorded By,  (t) = Taken By, (c) = Cosigned By      Initials Name Provider Type    Venkat Yu, OTR/L, CNT Occupational Therapist    Makayla More, PTA Physical Therapist Assistant    Agustin Lopez, OT Student OT Student                    Time Calculation:    Time Calculation- OT       Row Name 08/10/23 1121             Time Calculation- OT    OT Start Time 1025  -AC (r) WB (t) AC (c)      OT Stop Time 1055  -AC (r) WB (t) AC (c)      OT Time Calculation (min) 30 min  -AC (r) WB (t)      Total Timed Code Minutes- OT 30 minute(s)  -AC (r) WB (t) AC (c)      OT Received On 08/10/23  -AC (r) WB (t) AC (c)                User Key  (r) = Recorded By, (t) = Taken By, (c) = Cosigned By      Initials Name Provider Type    Venkat Yu, OTR/L, IRMA Occupational Therapist    Agustin Lopez OT Student OT Student                           Agustin Vasques OT Student  8/10/2023

## 2023-08-11 NOTE — THERAPY DISCHARGE NOTE
Acute Care - Physical Therapy Discharge Summary  [facility]       Patient Name: Aleks Monzon  : 1950  MRN: [id number]    Today's Date: 2023                 Admit Date: 2023      PT Recommendation and Plan    Visit Dx:    ICD-10-CM ICD-9-CM   1. Pressure injury of right buttock, stage 3  L89.313 707.05     707.23   2. Cellulitis of right upper extremity  L03.113 682.3   3. Hypomagnesemia  E83.42 275.2   4. Leukocytosis, unspecified type  D72.829 288.60   5. Decreased activities of daily living (ADL) [Z78.9 (ICD-10-CM)]  Z78.9 V49.89   6. Pressure injury of sacral region, stage 2  L89.152 707.03     707.22   7. Impaired mobility and ADLs  Z74.09 V49.89    Z78.9    8. Weakness  R53.1 780.79   9. Skin tear of right forearm without complication, initial encounter  S51.811A 881.00   10. Sepsis due to Gram negative bacteria  A41.50 038.40     995.91   11. Impaired mobility [Z74.09 (ICD-10-CM)]  Z74.09 799.89   12. Malignant neoplasm of trigone of urinary bladder  C67.0 188.0   13. Overweight with body mass index (BMI) of 27 to 27.9 in adult  E66.3 278.02    Z68.27 V85.23   14. Closed fracture of right hip, initial encounter  S72.001A 820.8        Outcome Measures       Row Name 08/10/23 1121 08/10/23 1100 23 1400       How much help from another person do you currently need...    Turning from your back to your side while in flat bed without using bedrails? -- 3  -AH 3  -AH    Moving from lying on back to sitting on the side of a flat bed without bedrails? -- 2  -AH 2  -AH    Moving to and from a bed to a chair (including a wheelchair)? -- 2  -AH 2  -AH    Standing up from a chair using your arms (e.g., wheelchair, bedside chair)? -- 2  -AH 2  -AH    Climbing 3-5 steps with a railing? -- 1  -AH 1  -AH    To walk in hospital room? -- 1  -AH 1  -AH    AM-PAC 6 Clicks Score (PT) -- 11  - 11  -AH       How much help from another is currently needed...    Putting on and taking off regular lower  body clothing? 1  -AC (r) WB (t) AC (c) -- --    Bathing (including washing, rinsing, and drying) 2  -AC (r) WB (t) AC (c) -- --    Toileting (which includes using toilet bed pan or urinal) 2  -AC (r) WB (t) AC (c) -- --    Putting on and taking off regular upper body clothing 3  -AC (r) WB (t) AC (c) -- --    Taking care of personal grooming (such as brushing teeth) 4  -AC (r) WB (t) AC (c) -- --    Eating meals 4  -AC (r) WB (t) AC (c) -- --    AM-[facility] 6 Clicks Score (OT) 16  -AC (r) WB (t) -- --       Functional Assessment    Outcome Measure Options AM-PAC 6 Clicks Daily Activity (OT)  -AC (r) WB (t) AC (c) AM-PAC 6 Clicks Basic Mobility (PT)  - AM-[facility] 6 Clicks Basic Mobility (PT)  -      Row Name 08/09/23 0900 08/08/23 1452 08/08/23 1400       How much help from another person do you currently need...    Turning from your back to your side while in flat bed without using bedrails? -- -- 3  -AH    Moving from lying on back to sitting on the side of a flat bed without bedrails? -- -- 3  -AH    Moving to and from a bed to a chair (including a wheelchair)? -- -- 2  -AH    Standing up from a chair using your arms (e.g., wheelchair, bedside chair)? -- -- 2  -AH    Climbing 3-5 steps with a railing? -- -- 1  -AH    To walk in hospital room? -- -- 2  -AH    AM-PAC 6 Clicks Score (PT) -- -- 13  -AH       How much help from another is currently needed...    Putting on and taking off regular lower body clothing? 1  -AC (r) WB (t) AC (c) 1  -AC (r) WB (t) AC (c) --    Bathing (including washing, rinsing, and drying) 2  -AC (r) WB (t) AC (c) 2  -AC (r) WB (t) AC (c) --    Toileting (which includes using toilet bed pan or urinal) 2  -AC (r) WB (t) AC (c) 2  -AC (r) WB (t) AC (c) --    Putting on and taking off regular upper body clothing 3  -AC (r) WB (t) AC (c) 3  -AC (r) WB (t) AC (c) --    Taking care of personal grooming (such as brushing teeth) 4  -AC (r) WB (t) AC (c) 4  -AC (r) WB (t) AC (c) --    Eating meals 4   -AC (r) WB (t) AC (c) 4  -AC (r) WB (t) AC (c) --    AM-PAC 6 Clicks Score (OT) 16  -AC (r) WB (t) 16  -AC (r) WB (t) --       Functional Assessment    Outcome Measure Options AM-PAC 6 Clicks Daily Activity (OT)  -AC (r) WB (t) AC (c) AM-PAC 6 Clicks Daily Activity (OT)  -AC (r) WB (t) AC (c) AM-PAC 6 Clicks Basic Mobility (PT)  -              User Key  (r) = Recorded By, (t) = Taken By, (c) = Cosigned By      Initials Name Provider Type    AC Venkat Hamilton, OTR/L, CNT Occupational Therapist    Makayla More, PTA Physical Therapist Assistant    Agustin Lopez, OT Student OT Student                         PT Rehab Goals       Row Name 08/11/23 1100             Bed Mobility Goal 1 (PT)    Activity/Assistive Device (Bed Mobility Goal 1, PT) bed mobility activities, all  -AB      [place] Level/Cues Needed (Bed Mobility Goal 1, PT) minimum assist (75% or more patient effort)  -AB      Time Frame (Bed Mobility Goal 1, PT) long term goal (LTG)  -AB      Progress/Outcomes (Bed Mobility Goal 1, PT) goal not met  -AB         Transfer Goal 1 (PT)    Activity/Assistive Device (Transfer Goal 1, PT) sit-to-stand/stand-to-sit;bed-to-chair/chair-to-bed  -AB      [place] Level/Cues Needed (Transfer Goal 1, PT) minimum assist (75% or more patient effort)  -AB      Time Frame (Transfer Goal 1, PT) long term goal (LTG)  -AB      Progress/Outcome (Transfer Goal 1, PT) goal not met  -AB         Gait Training Goal 1 (PT)    Activity/Assistive Device (Gait Training Goal 1, PT) gait (walking locomotion);assistive device use;improve balance and speed;increase endurance/gait distance;decrease fall risk;increase energy conservation  -AB      [place] Level (Gait Training Goal 1, PT) contact guard required  -AB      Distance (Gait Training Goal 1, PT) 40 feet w/ AD and no rest breaks  -AB      Time Frame (Gait Training Goal 1, PT) long term goal (LTG)  -AB      Progress/Outcome (Gait Training Goal 1, PT) goal not met   -AB                User Key  (r) = Recorded By, (t) = Taken By, (c) = Cosigned By      Initials Name Provider Type Discipline    Margret Castillo, PTA Physical Therapist Assistant PT                        PT Discharge Summary  Anticipated Discharge Disposition (PT): inpatient rehabilitation facility  Reason for Discharge: Discharge from facility  Outcomes Achieved: Refer to plan of care for updates on goals achieved  Discharge Destination: SNF      Margret Avila PTA   8/11/2023

## 2023-08-12 LAB — MICROSPORID STL MOD TRI STAIN: NEGATIVE

## 2023-08-16 LAB
O+P SPEC MICRO: NORMAL
O+P STL CONC: NORMAL

## 2023-08-18 ENCOUNTER — TELEPHONE (OUTPATIENT)
Dept: GASTROENTEROLOGY | Facility: CLINIC | Age: 73
End: 2023-08-18
Payer: MEDICARE

## 2023-08-18 NOTE — TELEPHONE ENCOUNTER
I spoke with his wife to let her know stool for o&p negative. She told me he was just released from the hospital last week.  Rohan did see him for diarrhea while hospitalized. Currently the diarrhea is improving. He is getting stronger. He is at [place] [facility] and his wife is hoping he will be discharged in 1 week.

## 2023-08-22 NOTE — TELEPHONE ENCOUNTER
Caller: Violet Monzon     Relationship: [unfilled] PATIENT'S WIFE    Best call back number: [phone]    What is your medical concern? PATIENT IS AT A REHAB AND HE COULDN'T MAKE HIS APPT BECAUSE HE IS VERY WEAK AND HE'S AFRAID HE'S GOING TO FALL IF HE GETS OUT AND DOESN'T THINK HE'LL BE ABLE TO RESCHEUDLE. HE WOULD LIKE A NURSE TO CALL HIS WIFE TO DISCUSS.

## 2023-08-22 NOTE — TELEPHONE ENCOUNTER
Pt [facility]. He is inquiring to see if this appointment can be scheduled over the phone.

## 2024-03-21 NOTE — TELEPHONE ENCOUNTER
Caller: Violet Monzon    Relationship to patient: Emergency Contact    Best call back number: [phone]    Patient is needing:     Patients wife is checking the status of there referral to Dr. Stratton/Dermatologist. She states they have not been called to schedule.    No

## (undated) DEVICE — ELECTRD BLD EZ CLN MOD XLNG 2.75IN

## (undated) DEVICE — THE SINGLE USE ETRAP – POLYP TRAP IS USED FOR SUCTION RETRIEVAL OF ENDOSCOPICALLY REMOVED POLYPS.: Brand: ETRAP

## (undated) DEVICE — ADHS LIQ MASTISOL 2/3ML

## (undated) DEVICE — PK TURNOVER RM ADV

## (undated) DEVICE — THE CHANNEL CLEANING BRUSH IS A NYLON FLEXI BRUSH ATTACHED TO A FLEXIBLE PLASTIC SHEATH DESIGNED TO SAFELY REMOVE DEBRIS FROM FLEXIBLE ENDOSCOPES.

## (undated) DEVICE — ENDO KIT,LOURDES HOSPITAL: Brand: MEDLINE INDUSTRIES, INC.

## (undated) DEVICE — FRCP BIOP ENDO CAPTURAPRO SPK SERR 2.8MM 230CM

## (undated) DEVICE — PAD MINOR UNIVERSAL: Brand: MEDLINE INDUSTRIES, INC.

## (undated) DEVICE — SNAR POLYP SENSATION MICRO OVL 13 240X40

## (undated) DEVICE — Z DUPLICATE USE 2738952 SYSTEM VENT M AD NSL PAP DEV HD STRP 2L HYPRINFL BG MRI

## (undated) DEVICE — TBG SMPL FLTR LINE NASL 02/C02 A/ BX/100

## (undated) DEVICE — ANTIBACTERIAL UNDYED BRAIDED (POLYGLACTIN 910), SYNTHETIC ABSORBABLE SUTURE: Brand: COATED VICRYL

## (undated) DEVICE — GLV SURG BIOGEL LTX PF 6 1/2

## (undated) DEVICE — CONMED SCOPE SAVER BITE BLOCK, 20X27 MM: Brand: SCOPE SAVER

## (undated) DEVICE — YANKAUER,BULB TIP WITH VENT: Brand: ARGYLE

## (undated) DEVICE — ENDOSCOPIC ULTRASOUND FINE NEEDLE BIOPSY (FNB) DEVICE: Brand: ACQUIRE

## (undated) DEVICE — VAGINAL PREP TRAY: Brand: MEDLINE INDUSTRIES, INC.

## (undated) DEVICE — DRN JP RND NO TROC SIL 10F 1/8IN

## (undated) DEVICE — 3M™ STERI-STRIP™ REINFORCED ADHESIVE SKIN CLOSURES, R1547, 1/2 IN X 4 IN (12 MM X 100 MM), 6 STRIPS/ENVELOPE: Brand: 3M™ STERI-STRIP™

## (undated) DEVICE — RESERVOIR,SUCTION,100CC,SILICONE: Brand: MEDLINE

## (undated) DEVICE — SENSR O2 OXIMAX FNGR A/ 18IN NONSTR

## (undated) DEVICE — MASK,OXYGEN,MED CONC,ADLT,7' TUB, UC: Brand: PENDING

## (undated) DEVICE — CUFF,BP,DISP,1 TUBE,ADULT,HP: Brand: MEDLINE

## (undated) DEVICE — Device: Brand: DEFENDO AIR/WATER/SUCTION AND BIOPSY VALVE